# Patient Record
Sex: FEMALE | Race: WHITE | NOT HISPANIC OR LATINO | URBAN - METROPOLITAN AREA
[De-identification: names, ages, dates, MRNs, and addresses within clinical notes are randomized per-mention and may not be internally consistent; named-entity substitution may affect disease eponyms.]

---

## 2017-02-14 ENCOUNTER — EMERGENCY (EMERGENCY)
Facility: HOSPITAL | Age: 82
LOS: 1 days | Discharge: HOME | End: 2017-02-14
Attending: EMERGENCY MEDICINE
Payer: MEDICARE

## 2017-02-14 VITALS
SYSTOLIC BLOOD PRESSURE: 132 MMHG | OXYGEN SATURATION: 96 % | RESPIRATION RATE: 20 BRPM | DIASTOLIC BLOOD PRESSURE: 81 MMHG | WEIGHT: 145.06 LBS | HEART RATE: 76 BPM | HEIGHT: 68 IN | TEMPERATURE: 98 F

## 2017-02-14 VITALS
DIASTOLIC BLOOD PRESSURE: 72 MMHG | HEART RATE: 85 BPM | OXYGEN SATURATION: 94 % | RESPIRATION RATE: 18 BRPM | TEMPERATURE: 98 F | SYSTOLIC BLOOD PRESSURE: 135 MMHG

## 2017-02-14 DIAGNOSIS — Z88.6 ALLERGY STATUS TO ANALGESIC AGENT: ICD-10-CM

## 2017-02-14 DIAGNOSIS — Z88.2 ALLERGY STATUS TO SULFONAMIDES: ICD-10-CM

## 2017-02-14 DIAGNOSIS — I10 ESSENTIAL (PRIMARY) HYPERTENSION: ICD-10-CM
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
## 2017-02-14 DIAGNOSIS — Z98.89 OTHER SPECIFIED POSTPROCEDURAL STATES: Chronic | ICD-10-CM

## 2017-02-14 DIAGNOSIS — S91.301A UNSPECIFIED OPEN WOUND, RIGHT FOOT, INITIAL ENCOUNTER: ICD-10-CM

## 2017-02-14 DIAGNOSIS — E86.0 DEHYDRATION: ICD-10-CM
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
## 2017-02-14 DIAGNOSIS — Z79.01 LONG TERM (CURRENT) USE OF ANTICOAGULANTS: ICD-10-CM
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
## 2017-02-14 DIAGNOSIS — Z88.0 ALLERGY STATUS TO PENICILLIN: ICD-10-CM

## 2017-02-14 DIAGNOSIS — Z91.040 LATEX ALLERGY STATUS: ICD-10-CM

## 2017-02-14 LAB
ALBUMIN SERPL ELPH-MCNC: 3.9 G/DL — SIGNIFICANT CHANGE UP (ref 3.3–5.2)
ALP SERPL-CCNC: 101 U/L — SIGNIFICANT CHANGE UP (ref 40–120)
ALT FLD-CCNC: 15 U/L — SIGNIFICANT CHANGE UP
ANION GAP SERPL CALC-SCNC: 14 MMOL/L — SIGNIFICANT CHANGE UP (ref 5–17)
AST SERPL-CCNC: 30 U/L — SIGNIFICANT CHANGE UP
BILIRUB SERPL-MCNC: 1.3 MG/DL — SIGNIFICANT CHANGE UP (ref 0.4–2)
BUN SERPL-MCNC: 44 MG/DL — HIGH (ref 8–20)
CALCIUM SERPL-MCNC: 9.3 MG/DL — SIGNIFICANT CHANGE UP (ref 8.6–10.2)
CHLORIDE SERPL-SCNC: 97 MMOL/L — LOW (ref 98–107)
CO2 SERPL-SCNC: 25 MMOL/L — SIGNIFICANT CHANGE UP (ref 22–29)
CREAT SERPL-MCNC: 1.65 MG/DL — HIGH (ref 0.5–1.3)
GLUCOSE SERPL-MCNC: 95 MG/DL — SIGNIFICANT CHANGE UP (ref 70–115)
HCT VFR BLD CALC: 44.2 % — SIGNIFICANT CHANGE UP (ref 37–47)
HGB BLD-MCNC: 14.9 G/DL — SIGNIFICANT CHANGE UP (ref 12–16)
INR BLD: 3.33 RATIO — HIGH (ref 0.88–1.16)
MCHC RBC-ENTMCNC: 33.7 G/DL — SIGNIFICANT CHANGE UP (ref 32–36)
MCHC RBC-ENTMCNC: 35.8 PG — HIGH (ref 27–31)
MCV RBC AUTO: 106.3 FL — HIGH (ref 81–99)
PLATELET # BLD AUTO: 163 K/UL — SIGNIFICANT CHANGE UP (ref 150–400)
POTASSIUM SERPL-MCNC: 4.2 MMOL/L — SIGNIFICANT CHANGE UP (ref 3.5–5.3)
POTASSIUM SERPL-SCNC: 4.2 MMOL/L — SIGNIFICANT CHANGE UP (ref 3.5–5.3)
PROT SERPL-MCNC: 7.1 G/DL — SIGNIFICANT CHANGE UP (ref 6.6–8.7)
PROTHROM AB SERPL-ACNC: 37.1 SEC — HIGH (ref 10–13.1)
RBC # BLD: 4.16 M/UL — LOW (ref 4.4–5.2)
RBC # FLD: 14.4 % — SIGNIFICANT CHANGE UP (ref 11–15.6)
SODIUM SERPL-SCNC: 136 MMOL/L — SIGNIFICANT CHANGE UP (ref 135–145)
WBC # BLD: 7.1 K/UL — SIGNIFICANT CHANGE UP (ref 4.8–10.8)
WBC # FLD AUTO: 7.1 K/UL — SIGNIFICANT CHANGE UP (ref 4.8–10.8)

## 2017-02-14 PROCEDURE — 99284 EMERGENCY DEPT VISIT MOD MDM: CPT

## 2017-02-14 PROCEDURE — 85610 PROTHROMBIN TIME: CPT

## 2017-02-14 PROCEDURE — 84443 ASSAY THYROID STIM HORMONE: CPT

## 2017-02-14 PROCEDURE — 85027 COMPLETE CBC AUTOMATED: CPT

## 2017-02-14 PROCEDURE — 80053 COMPREHEN METABOLIC PANEL: CPT

## 2017-02-14 RX ORDER — SODIUM CHLORIDE 9 MG/ML
500 INJECTION INTRAMUSCULAR; INTRAVENOUS; SUBCUTANEOUS ONCE
Qty: 0 | Refills: 0 | Status: COMPLETED | OUTPATIENT
Start: 2017-02-14 | End: 2017-02-14

## 2017-02-14 RX ADMIN — SODIUM CHLORIDE 500 MILLILITER(S): 9 INJECTION INTRAMUSCULAR; INTRAVENOUS; SUBCUTANEOUS at 19:05

## 2017-02-14 RX ADMIN — SODIUM CHLORIDE 500 MILLILITER(S): 9 INJECTION INTRAMUSCULAR; INTRAVENOUS; SUBCUTANEOUS at 17:14

## 2017-02-14 NOTE — ED PROVIDER NOTE - MEDICAL DECISION MAKING DETAILS
foot wound willtx and rec close f/u pmd for wound check foot wound will tx and rec close f/u pmd for wound check

## 2017-02-14 NOTE — ED ADULT NURSE NOTE - OBJECTIVE STATEMENT
Patient recd this evening A/Ox3, VSS, denies chest pain. Respirations are even and unlabored, but reports shortness of breath. lungs cta, +bowel x4 quads, abdomen soft, nontender/nondistended, skin w/d/i. Patient's family reports "patient has had diarrhea for 3 weeks, has become very fatigued over the past several days.  Patient was tested for CDiff at PCP-negative.  Patient currently has UTI-taking macrobid 100mg.  Denies fever, vomit.  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

## 2017-02-14 NOTE — ED PROVIDER NOTE - CARE PLAN
Principal Discharge DX:	Wound, open, foot Principal Discharge DX:	Wound, open, foot  Instructions for follow-up, activity and diet:	Stay hydrated, follow up with PMD.  Secondary Diagnosis:	Dehydration

## 2017-02-14 NOTE — ED PROVIDER NOTE - OBJECTIVE STATEMENT
83 y/o female states she takes coumadin and she was putting on her shoe today and accidently cur her foot and c/o foot laceration 85 y/o female states she takes coumadin and she was putting on her shoe today and accidently cur her foot and c/o foot laceration pt s daughter later states pt has had diarrhea for 3 weeks and had w/u by pmd and all tests on stool for c diff and cultures all negative pt has been feeling generally weak but able to walk and eating ok Patient says watery diarrhea has now resolved for the past 24 hours

## 2017-02-20 ENCOUNTER — INPATIENT (INPATIENT)
Facility: HOSPITAL | Age: 82
LOS: 8 days | Discharge: NURSING, REHAB OR LONG-TERM CARE | DRG: 871 | End: 2017-03-01
Attending: INTERNAL MEDICINE | Admitting: FAMILY MEDICINE
Payer: MEDICARE

## 2017-02-20 VITALS
RESPIRATION RATE: 22 BRPM | DIASTOLIC BLOOD PRESSURE: 79 MMHG | SYSTOLIC BLOOD PRESSURE: 152 MMHG | HEIGHT: 64 IN | TEMPERATURE: 100 F | OXYGEN SATURATION: 97 % | WEIGHT: 136.91 LBS | HEART RATE: 82 BPM

## 2017-02-20 DIAGNOSIS — Z98.89 OTHER SPECIFIED POSTPROCEDURAL STATES: Chronic | ICD-10-CM

## 2017-02-20 DIAGNOSIS — R65.10 SYSTEMIC INFLAMMATORY RESPONSE SYNDROME (SIRS) OF NON-INFECTIOUS ORIGIN WITHOUT ACUTE ORGAN DYSFUNCTION: ICD-10-CM

## 2017-02-20 DIAGNOSIS — A41.9 SEPSIS, UNSPECIFIED ORGANISM: ICD-10-CM

## 2017-02-20 DIAGNOSIS — R10.9 UNSPECIFIED ABDOMINAL PAIN: ICD-10-CM

## 2017-02-20 DIAGNOSIS — R06.02 SHORTNESS OF BREATH: ICD-10-CM

## 2017-02-20 DIAGNOSIS — K80.20 CALCULUS OF GALLBLADDER WITHOUT CHOLECYSTITIS WITHOUT OBSTRUCTION: ICD-10-CM

## 2017-02-20 DIAGNOSIS — Z71.89 OTHER SPECIFIED COUNSELING: ICD-10-CM

## 2017-02-20 DIAGNOSIS — I48.91 UNSPECIFIED ATRIAL FIBRILLATION: ICD-10-CM

## 2017-02-20 DIAGNOSIS — I50.9 HEART FAILURE, UNSPECIFIED: ICD-10-CM

## 2017-02-20 LAB
ALBUMIN SERPL ELPH-MCNC: 3.8 G/DL — SIGNIFICANT CHANGE UP (ref 3.3–5.2)
ALP SERPL-CCNC: 118 U/L — SIGNIFICANT CHANGE UP (ref 40–120)
ALT FLD-CCNC: 20 U/L — SIGNIFICANT CHANGE UP
AMYLASE P1 CFR SERPL: 88 U/L — SIGNIFICANT CHANGE UP (ref 36–128)
AMYLASE P1 CFR SERPL: 92 U/L — SIGNIFICANT CHANGE UP (ref 36–128)
ANION GAP SERPL CALC-SCNC: 15 MMOL/L — SIGNIFICANT CHANGE UP (ref 5–17)
ANION GAP SERPL CALC-SCNC: 18 MMOL/L — HIGH (ref 5–17)
ANISOCYTOSIS BLD QL: SLIGHT — SIGNIFICANT CHANGE UP
APPEARANCE UR: CLEAR — SIGNIFICANT CHANGE UP
APTT BLD: 41.9 SEC — HIGH (ref 27.5–37.4)
AST SERPL-CCNC: 30 U/L — SIGNIFICANT CHANGE UP
BACTERIA # UR AUTO: ABNORMAL
BASE EXCESS BLDA CALC-SCNC: -2.8 MMOL/L — SIGNIFICANT CHANGE UP (ref -3–3)
BILIRUB SERPL-MCNC: 1.5 MG/DL — SIGNIFICANT CHANGE UP (ref 0.4–2)
BILIRUB UR-MCNC: NEGATIVE — SIGNIFICANT CHANGE UP
BLOOD GAS COMMENTS ARTERIAL: SIGNIFICANT CHANGE UP
BUN SERPL-MCNC: 32 MG/DL — HIGH (ref 8–20)
BUN SERPL-MCNC: 34 MG/DL — HIGH (ref 8–20)
C DIFF BY PCR RESULT: DETECTED
C DIFF TOX GENS STL QL NAA+PROBE: SIGNIFICANT CHANGE UP
CALCIUM SERPL-MCNC: 8.9 MG/DL — SIGNIFICANT CHANGE UP (ref 8.6–10.2)
CALCIUM SERPL-MCNC: 9 MG/DL — SIGNIFICANT CHANGE UP (ref 8.6–10.2)
CHLORIDE SERPL-SCNC: 94 MMOL/L — LOW (ref 98–107)
CHLORIDE SERPL-SCNC: 97 MMOL/L — LOW (ref 98–107)
CK SERPL-CCNC: 85 U/L — SIGNIFICANT CHANGE UP (ref 25–170)
CO2 SERPL-SCNC: 21 MMOL/L — LOW (ref 22–29)
CO2 SERPL-SCNC: 24 MMOL/L — SIGNIFICANT CHANGE UP (ref 22–29)
COLOR SPEC: YELLOW — SIGNIFICANT CHANGE UP
CREAT SERPL-MCNC: 1.25 MG/DL — SIGNIFICANT CHANGE UP (ref 0.5–1.3)
CREAT SERPL-MCNC: 1.38 MG/DL — HIGH (ref 0.5–1.3)
DIFF PNL FLD: ABNORMAL
DIGOXIN SERPL-MCNC: 0.9 NG/ML — SIGNIFICANT CHANGE UP (ref 0.8–2)
DIGOXIN SERPL-MCNC: 1.2 NG/ML — SIGNIFICANT CHANGE UP (ref 0.8–2)
ELLIPTOCYTES BLD QL SMEAR: SLIGHT — SIGNIFICANT CHANGE UP
EOSINOPHIL NFR BLD AUTO: 1 % — SIGNIFICANT CHANGE UP (ref 0–5)
EPI CELLS # UR: SIGNIFICANT CHANGE UP
GAS PNL BLDA: SIGNIFICANT CHANGE UP
GLUCOSE SERPL-MCNC: 145 MG/DL — HIGH (ref 70–115)
GLUCOSE SERPL-MCNC: 190 MG/DL — HIGH (ref 70–115)
GLUCOSE UR QL: NEGATIVE MG/DL — SIGNIFICANT CHANGE UP
HCO3 BLDA-SCNC: 22 MMOL/L — SIGNIFICANT CHANGE UP (ref 20–26)
HCT VFR BLD CALC: 44.5 % — SIGNIFICANT CHANGE UP (ref 37–47)
HGB BLD-MCNC: 14.8 G/DL — SIGNIFICANT CHANGE UP (ref 12–16)
HOROWITZ INDEX BLDA+IHG-RTO: SIGNIFICANT CHANGE UP
HYALINE CASTS # UR AUTO: ABNORMAL /LPF
INR BLD: 4.19 RATIO — HIGH (ref 0.88–1.16)
KETONES UR-MCNC: ABNORMAL
LACTATE BLDV-MCNC: 3 MMOL/L — HIGH (ref 0.7–2)
LACTATE SERPL-SCNC: 2.5 MMOL/L — HIGH (ref 0.5–2)
LACTATE SERPL-SCNC: 2.9 MMOL/L — HIGH (ref 0.5–2)
LEUKOCYTE ESTERASE UR-ACNC: ABNORMAL
LIDOCAIN IGE QN: 20 U/L — LOW (ref 22–51)
LIDOCAIN IGE QN: 26 U/L — SIGNIFICANT CHANGE UP (ref 22–51)
LYMPHOCYTES # BLD AUTO: 3 % — LOW (ref 20–55)
MACROCYTES BLD QL: SLIGHT — SIGNIFICANT CHANGE UP
MAGNESIUM SERPL-MCNC: 1.9 MG/DL — SIGNIFICANT CHANGE UP (ref 1.8–2.5)
MAGNESIUM SERPL-MCNC: 1.9 MG/DL — SIGNIFICANT CHANGE UP (ref 1.8–2.5)
MCHC RBC-ENTMCNC: 33.3 G/DL — SIGNIFICANT CHANGE UP (ref 32–36)
MCHC RBC-ENTMCNC: 35.7 PG — HIGH (ref 27–31)
MCV RBC AUTO: 107.2 FL — HIGH (ref 81–99)
MICROCYTES BLD QL: SLIGHT — SIGNIFICANT CHANGE UP
MONOCYTES NFR BLD AUTO: 2 % — LOW (ref 3–10)
NEUTROPHILS NFR BLD AUTO: 87 % — HIGH (ref 37–73)
NEUTS BAND # BLD: 4 % — SIGNIFICANT CHANGE UP (ref 0–8)
NITRITE UR-MCNC: NEGATIVE — SIGNIFICANT CHANGE UP
NT-PROBNP SERPL-SCNC: HIGH PG/ML (ref 0–300)
OVALOCYTES BLD QL SMEAR: SLIGHT — SIGNIFICANT CHANGE UP
PCO2 BLDA: 40 MMHG — SIGNIFICANT CHANGE UP (ref 35–45)
PH BLDA: 7.36 — SIGNIFICANT CHANGE UP (ref 7.35–7.45)
PH UR: 5 — SIGNIFICANT CHANGE UP (ref 4.8–8)
PHOSPHATE SERPL-MCNC: 3 MG/DL — SIGNIFICANT CHANGE UP (ref 2.4–4.7)
PLAT MORPH BLD: NORMAL — SIGNIFICANT CHANGE UP
PLATELET # BLD AUTO: 182 K/UL — SIGNIFICANT CHANGE UP (ref 150–400)
PO2 BLDA: 118 MMHG — HIGH (ref 83–108)
POIKILOCYTOSIS BLD QL AUTO: SLIGHT — SIGNIFICANT CHANGE UP
POTASSIUM SERPL-MCNC: 4 MMOL/L — SIGNIFICANT CHANGE UP (ref 3.5–5.3)
POTASSIUM SERPL-MCNC: 4.7 MMOL/L — SIGNIFICANT CHANGE UP (ref 3.5–5.3)
POTASSIUM SERPL-SCNC: 4 MMOL/L — SIGNIFICANT CHANGE UP (ref 3.5–5.3)
POTASSIUM SERPL-SCNC: 4.7 MMOL/L — SIGNIFICANT CHANGE UP (ref 3.5–5.3)
PROCALCITONIN SERPL-MCNC: 1.8 NG/ML — HIGH (ref 0–0.5)
PROT SERPL-MCNC: 7.3 G/DL — SIGNIFICANT CHANGE UP (ref 6.6–8.7)
PROT UR-MCNC: 100 MG/DL
PROTHROM AB SERPL-ACNC: 46.8 SEC — HIGH (ref 10–13.1)
RBC # BLD: 4.15 M/UL — LOW (ref 4.4–5.2)
RBC # FLD: 14.6 % — SIGNIFICANT CHANGE UP (ref 11–15.6)
RBC BLD AUTO: ABNORMAL
RBC CASTS # UR COMP ASSIST: SIGNIFICANT CHANGE UP /HPF (ref 0–4)
SAO2 % BLDA: 98 % — SIGNIFICANT CHANGE UP (ref 95–99)
SODIUM SERPL-SCNC: 133 MMOL/L — LOW (ref 135–145)
SODIUM SERPL-SCNC: 136 MMOL/L — SIGNIFICANT CHANGE UP (ref 135–145)
SP GR SPEC: 1.02 — SIGNIFICANT CHANGE UP (ref 1.01–1.02)
T3 SERPL-MCNC: 61 NG/DL — LOW (ref 80–200)
T4 AB SER-ACNC: 5.44 UG/DL — SIGNIFICANT CHANGE UP (ref 4.5–12)
TROPONIN T SERPL-MCNC: 0.03 NG/ML — SIGNIFICANT CHANGE UP (ref 0–0.06)
TROPONIN T SERPL-MCNC: 0.1 NG/ML — HIGH (ref 0–0.06)
TSH SERPL-MCNC: 2.23 UIU/ML — SIGNIFICANT CHANGE UP (ref 0.27–4.2)
UROBILINOGEN FLD QL: NEGATIVE MG/DL — SIGNIFICANT CHANGE UP
VARIANT LYMPHS # BLD: 3 % — SIGNIFICANT CHANGE UP (ref 0–6)
WBC # BLD: 20.8 K/UL — HIGH (ref 4.8–10.8)
WBC # FLD AUTO: 20.8 K/UL — HIGH (ref 4.8–10.8)
WBC UR QL: SIGNIFICANT CHANGE UP

## 2017-02-20 PROCEDURE — 71020: CPT | Mod: 26

## 2017-02-20 PROCEDURE — 99223 1ST HOSP IP/OBS HIGH 75: CPT

## 2017-02-20 PROCEDURE — 76705 ECHO EXAM OF ABDOMEN: CPT | Mod: 26

## 2017-02-20 PROCEDURE — 99285 EMERGENCY DEPT VISIT HI MDM: CPT | Mod: 25

## 2017-02-20 PROCEDURE — 93010 ELECTROCARDIOGRAM REPORT: CPT

## 2017-02-20 PROCEDURE — 74176 CT ABD & PELVIS W/O CONTRAST: CPT | Mod: 26

## 2017-02-20 PROCEDURE — 71250 CT THORAX DX C-: CPT | Mod: 26

## 2017-02-20 RX ORDER — IPRATROPIUM/ALBUTEROL SULFATE 18-103MCG
3 AEROSOL WITH ADAPTER (GRAM) INHALATION ONCE
Qty: 0 | Refills: 0 | Status: COMPLETED | OUTPATIENT
Start: 2017-02-20 | End: 2017-02-20

## 2017-02-20 RX ORDER — VANCOMYCIN HCL 1 G
1000 VIAL (EA) INTRAVENOUS EVERY 12 HOURS
Qty: 0 | Refills: 0 | Status: DISCONTINUED | OUTPATIENT
Start: 2017-02-20 | End: 2017-02-20

## 2017-02-20 RX ORDER — DIGOXIN 250 MCG
0.12 TABLET ORAL DAILY
Qty: 0 | Refills: 0 | Status: DISCONTINUED | OUTPATIENT
Start: 2017-02-20 | End: 2017-02-25

## 2017-02-20 RX ORDER — VANCOMYCIN HCL 1 G
1000 VIAL (EA) INTRAVENOUS EVERY 24 HOURS
Qty: 0 | Refills: 0 | Status: DISCONTINUED | OUTPATIENT
Start: 2017-02-20 | End: 2017-02-20

## 2017-02-20 RX ORDER — AZTREONAM 2 G
VIAL (EA) INJECTION
Qty: 0 | Refills: 0 | Status: DISCONTINUED | OUTPATIENT
Start: 2017-02-20 | End: 2017-02-20

## 2017-02-20 RX ORDER — ERTAPENEM SODIUM 1 G/1
1000 INJECTION, POWDER, LYOPHILIZED, FOR SOLUTION INTRAMUSCULAR; INTRAVENOUS ONCE
Qty: 0 | Refills: 0 | Status: COMPLETED | OUTPATIENT
Start: 2017-02-20 | End: 2017-02-20

## 2017-02-20 RX ORDER — ZINC SULFATE TAB 220 MG (50 MG ZINC EQUIVALENT) 220 (50 ZN) MG
220 TAB ORAL DAILY
Qty: 0 | Refills: 0 | Status: DISCONTINUED | OUTPATIENT
Start: 2017-02-20 | End: 2017-03-01

## 2017-02-20 RX ORDER — ERTAPENEM SODIUM 1 G/1
500 INJECTION, POWDER, LYOPHILIZED, FOR SOLUTION INTRAMUSCULAR; INTRAVENOUS EVERY 24 HOURS
Qty: 0 | Refills: 0 | Status: DISCONTINUED | OUTPATIENT
Start: 2017-02-21 | End: 2017-02-22

## 2017-02-20 RX ORDER — FUROSEMIDE 40 MG
60 TABLET ORAL ONCE
Qty: 0 | Refills: 0 | Status: COMPLETED | OUTPATIENT
Start: 2017-02-20 | End: 2017-02-20

## 2017-02-20 RX ORDER — DOCUSATE SODIUM 100 MG
100 CAPSULE ORAL THREE TIMES A DAY
Qty: 0 | Refills: 0 | Status: DISCONTINUED | OUTPATIENT
Start: 2017-02-20 | End: 2017-02-24

## 2017-02-20 RX ORDER — ATENOLOL 25 MG/1
50 TABLET ORAL AT BEDTIME
Qty: 0 | Refills: 0 | Status: DISCONTINUED | OUTPATIENT
Start: 2017-02-20 | End: 2017-02-25

## 2017-02-20 RX ORDER — HYDRALAZINE HCL 50 MG
10 TABLET ORAL
Qty: 0 | Refills: 0 | Status: DISCONTINUED | OUTPATIENT
Start: 2017-02-20 | End: 2017-03-01

## 2017-02-20 RX ORDER — ERTAPENEM SODIUM 1 G/1
INJECTION, POWDER, LYOPHILIZED, FOR SOLUTION INTRAMUSCULAR; INTRAVENOUS
Qty: 0 | Refills: 0 | Status: DISCONTINUED | OUTPATIENT
Start: 2017-02-20 | End: 2017-02-22

## 2017-02-20 RX ORDER — METRONIDAZOLE 500 MG
TABLET ORAL
Qty: 0 | Refills: 0 | Status: DISCONTINUED | OUTPATIENT
Start: 2017-02-20 | End: 2017-02-20

## 2017-02-20 RX ORDER — ATENOLOL 25 MG/1
75 TABLET ORAL
Qty: 0 | Refills: 0 | Status: DISCONTINUED | OUTPATIENT
Start: 2017-02-20 | End: 2017-02-26

## 2017-02-20 RX ORDER — ASCORBIC ACID 60 MG
500 TABLET,CHEWABLE ORAL DAILY
Qty: 0 | Refills: 0 | Status: DISCONTINUED | OUTPATIENT
Start: 2017-02-20 | End: 2017-03-01

## 2017-02-20 RX ORDER — VANCOMYCIN HCL 1 G
125 VIAL (EA) INTRAVENOUS EVERY 6 HOURS
Qty: 0 | Refills: 0 | Status: DISCONTINUED | OUTPATIENT
Start: 2017-02-20 | End: 2017-03-01

## 2017-02-20 RX ORDER — ONDANSETRON 8 MG/1
4 TABLET, FILM COATED ORAL EVERY 6 HOURS
Qty: 0 | Refills: 0 | Status: DISCONTINUED | OUTPATIENT
Start: 2017-02-20 | End: 2017-03-01

## 2017-02-20 RX ORDER — SODIUM CHLORIDE 9 MG/ML
1000 INJECTION INTRAMUSCULAR; INTRAVENOUS; SUBCUTANEOUS
Qty: 0 | Refills: 0 | Status: DISCONTINUED | OUTPATIENT
Start: 2017-02-20 | End: 2017-02-21

## 2017-02-20 RX ORDER — SPIRONOLACTONE 25 MG/1
12.5 TABLET, FILM COATED ORAL
Qty: 0 | Refills: 0 | Status: DISCONTINUED | OUTPATIENT
Start: 2017-02-20 | End: 2017-03-01

## 2017-02-20 RX ORDER — VANCOMYCIN HCL 1 G
1000 VIAL (EA) INTRAVENOUS ONCE
Qty: 0 | Refills: 0 | Status: COMPLETED | OUTPATIENT
Start: 2017-02-20 | End: 2017-02-20

## 2017-02-20 RX ORDER — ACETAMINOPHEN 500 MG
650 TABLET ORAL EVERY 6 HOURS
Qty: 0 | Refills: 0 | Status: DISCONTINUED | OUTPATIENT
Start: 2017-02-20 | End: 2017-03-01

## 2017-02-20 RX ORDER — SODIUM CHLORIDE 9 MG/ML
999 INJECTION INTRAMUSCULAR; INTRAVENOUS; SUBCUTANEOUS ONCE
Qty: 0 | Refills: 0 | Status: DISCONTINUED | OUTPATIENT
Start: 2017-02-20 | End: 2017-02-20

## 2017-02-20 RX ORDER — ALBUTEROL 90 UG/1
2.5 AEROSOL, METERED ORAL EVERY 6 HOURS
Qty: 0 | Refills: 0 | Status: DISCONTINUED | OUTPATIENT
Start: 2017-02-20 | End: 2017-03-01

## 2017-02-20 RX ORDER — FUROSEMIDE 40 MG
40 TABLET ORAL
Qty: 0 | Refills: 0 | Status: DISCONTINUED | OUTPATIENT
Start: 2017-02-21 | End: 2017-02-28

## 2017-02-20 RX ORDER — DIGOXIN 250 MCG
125 TABLET ORAL DAILY
Qty: 0 | Refills: 0 | Status: DISCONTINUED | OUTPATIENT
Start: 2017-02-20 | End: 2017-02-20

## 2017-02-20 RX ORDER — METRONIDAZOLE 500 MG
500 TABLET ORAL EVERY 8 HOURS
Qty: 0 | Refills: 0 | Status: DISCONTINUED | OUTPATIENT
Start: 2017-02-20 | End: 2017-02-20

## 2017-02-20 RX ADMIN — ERTAPENEM SODIUM 100 MILLIGRAM(S): 1 INJECTION, POWDER, LYOPHILIZED, FOR SOLUTION INTRAMUSCULAR; INTRAVENOUS at 16:36

## 2017-02-20 RX ADMIN — ATENOLOL 75 MILLIGRAM(S): 25 TABLET ORAL at 09:26

## 2017-02-20 RX ADMIN — ZINC SULFATE TAB 220 MG (50 MG ZINC EQUIVALENT) 220 MILLIGRAM(S): 220 (50 ZN) TAB at 12:02

## 2017-02-20 RX ADMIN — ONDANSETRON 4 MILLIGRAM(S): 8 TABLET, FILM COATED ORAL at 16:22

## 2017-02-20 RX ADMIN — SODIUM CHLORIDE 100 MILLILITER(S): 9 INJECTION INTRAMUSCULAR; INTRAVENOUS; SUBCUTANEOUS at 04:00

## 2017-02-20 RX ADMIN — ALBUTEROL 2.5 MILLIGRAM(S): 90 AEROSOL, METERED ORAL at 20:38

## 2017-02-20 RX ADMIN — Medication 60 MILLIGRAM(S): at 06:34

## 2017-02-20 RX ADMIN — ALBUTEROL 2.5 MILLIGRAM(S): 90 AEROSOL, METERED ORAL at 16:07

## 2017-02-20 RX ADMIN — Medication 3 MILLILITER(S): at 03:34

## 2017-02-20 RX ADMIN — ALBUTEROL 2.5 MILLIGRAM(S): 90 AEROSOL, METERED ORAL at 08:32

## 2017-02-20 RX ADMIN — Medication 500 MILLIGRAM(S): at 12:01

## 2017-02-20 RX ADMIN — Medication 100 MILLIGRAM(S): at 15:04

## 2017-02-20 RX ADMIN — Medication 250 MILLIGRAM(S): at 03:27

## 2017-02-20 NOTE — H&P ADULT. - ASSESSMENT
Sepsis, source unknown  -	CT abd/pelvis: moderate gallbladder wall edema; bilateral renal cyst; 4.6 x 3.5 cm   -	abdominal aortic aneurysm; Small volume abdominal and pelvic ascites.   -	- Findings discussed with patient who refused surgical intervention as this time for possible acute cholecystitis.   -	F/U Abdominal Sonogram  -	[facility] consult – Dr Chapman ([facility] [place] notified).   -	IV abx: Vancomycin, flagly, levaquin  -	Consulted Dr. Garcia [facility].   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 Soliz ([facility], [facility] notified)  -	IV lasix  -	Montez  -	HF protocol  -	Daily weight  -	Resume: Digoxin, atenolol, hydralazine  -	ARB was discontinued as out pt, will await cardiology evaluation prior to restart  -	Pt is allergic to Aspirin.     -	f/u BNP, ECHO, Cardiac enzymes  H/O A.fib, rate controlled on home medications, on AC  -	Cont Digoxin, atenolol  -	Supratherapeutic INR, no acute bleeding, multiple ecchymotic skin lesions  -	Hold Coumadin   -	Monitor INR  CKD, stage 3, stable  -	Follows with Dr. Mcdonald, consult if needed.   -	Medication dosed to CrCl  Bilateral LE ulcers, unna boot in place  -	Vascular consult with Dr. Garcia, [facility] notified, to rule out leg ulcers as a source of infection. Sepsis, source unknown  -	CT abd/pelvis: moderate gallbladder wall edema; bilateral renal cyst; 4.6 x 3.5 cm   -	abdominal aortic aneurysm; Small volume abdominal and pelvic ascites.   -	- Findings discussed with patient who refused surgical intervention as this time for possible acute cholecystitis.   -	F/U Abdominal Sonogram  -	[place] [facility] [facility] consult – Dr Chapman ([facility] [place] notified).   -	IV abx: Vancomycin, flagly, levaquin  -	Consulted Dr. Garcia [facility].   -	Gentle IV hydration as pt is currently in respiratory distress requiring BIPAP  -	ID consult – Jose Antonio, [facility] [place] notified  -	F/U bld cultures  -	Trend leukocytosis, lactic acidosis  Acute decompensated combined Systolic/diastolic heart failure, suspected cause of ascites and bilateral pleural effusion  -	admit to telemetry unit  -	[facility] consult – Dr. Soliz ([facility], [facility] [place] notified)  -	IV lasix  -	Montez  -	HF protocol  -	Daily weight  -	Resume: Digoxin, atenolol, hydralazine  -	ARB was discontinued as out pt, will await cardiology evaluation prior to restart  -	Pt is allergic to Aspirin.     -	f/u BNP, ECHO, Cardiac enzymes  H/O A.fib, rate controlled, on AC, status post cardizem in EMS  -	Cont Digoxin, atenolol  -	Supratherapeutic INR, no acute bleeding, multiple ecchymotic skin lesions  -	Hold Coumadin   -	Monitor INR  CKD, stage 3, stable  -	Follows with Dr. Mcdonald, consult if needed.   -	Medication dosed to CrCl  Bilateral LE ulcers, unna boot in place  -	Vascular consult with Dr. Garcia, [facility] notified, to rule out leg ulcers as a source of infection.

## 2017-02-20 NOTE — CONSULT NOTE ADULT - SUBJECTIVE AND OBJECTIVE BOX
SURG    notes reviewed    pt with possible sepsis - asked to r/o source from leg ulcers    both legs with Unna's boots - removed    chronic ulcers present left shin anterolaterally    skin tears present both shins anteriorly    toes seem adequately perfused though limited distal pulses     - there is no inflammation in any area - these do not appear to be a source of sepsis  - wounds are chronic and mostly stasis- related    - local care for now while hospitalized    - may return to our office for further care when able    - thank you
HPI:  This is an 84 year old female who was brought in by her  and daughter due to ongoing nausea, intermittent for past three weeks, no vomiting. In December pt was placed on Clindamycin by ENT, following which she developed three weeks of diarrhea, positive for C-diff, completed Flagyl in January and repeat C-Diff testing was negative in early Feb, but her diarrhea persisted until two days ago. Early Feb she was diagnosed with UTI and completed treatment with Nitrofurantoin. Sometime during late January and early February her nausea began, she is unable to specify alleviating or aggravating factors. Pt also has bilateral LE ulcers and is currently under vascular surgery – Dr. Upton, unna boot was placed on bilateral LE on Friday. In addition, her Losartan and Spironolactone was discontinued in dec/ due to hyperkalemia and since that time she has been gaining weight and noticed increase in her abdominal girth. Currently she complains of weakness, fatigue, moderate difficulty breathing, non tender mass palpable on right mid abdomen and weight gain. Denies: chest pain, palpitations, dizziness, fever, chills, vomiting, dysuria and abdominal pain. (2017 04:19)    GI consult for possible acute cholecystitis. CT abdomen showed: Gallstones and nonspecific moderate gallbladder wall edema. Please correlate clinically for acute cholecystitis. 4.5 cm abdominal aortic aneurysm. Small volume abdominal and pelvic ascites. LFTs are unremarkable. as per daughter, she developed recurrent diarrhea 3 days ago. It has resolved now.      PAST MEDICAL & SURGICAL HISTORY:  Mitral valve regurgitation  Hypertension  Atrial fibrillation  H/O skin graft  No significant past surgical history      REVIEW OF SYSTEMS    General: unremarkable, no fever    Skin/Breast: unremarkable  	  Ophthalmologic: unremarkable  	  ENMT:	unremarkable    Respiratory and Thorax: unremarkable  	  Cardiovascular:	unremarkable    Gastrointestinal:	 as above    Genitourinary:	unremarkable    Musculoskeletal:	unremarkable    Neurological:	unremarkable    Psychiatric:	unremarkable    Hematology/Lymphatics:	unremarkable    Endocrine:	unremarkable    Allergic/Immunologic:	unremarkable      MEDICATIONS  (STANDING):  sodium chloride 0.9%. 1000milliLiter(s) IV Continuous <Continuous>  levoFLOXacin IVPB 750milliGRAM(s) IV Intermittent every 48 hours  vancomycin  IVPB 1000milliGRAM(s) IV Intermittent every 24 hours  metroNIDAZOLE    Tablet 500milliGRAM(s) Oral every 8 hours  ALBUTerol    0.083% 2.5milliGRAM(s) Nebulizer every 6 hours  ATENolol  Tablet 75milliGRAM(s) Oral <User Schedule>  ATENolol  Tablet 50milliGRAM(s) Oral at bedtime  docusate sodium 100milliGRAM(s) Oral three times a day  zinc sulfate 220milliGRAM(s) Oral daily  hydrALAZINE 10milliGRAM(s) Oral two times a day  ascorbic acid 500milliGRAM(s) Oral daily  digoxin     Tablet 0.125milliGRAM(s) Oral daily    MEDICATIONS  (PRN):  ondansetron Injectable 4milliGRAM(s) IV Push every 6 hours PRN Nausea and/or Vomiting  acetaminophen   Tablet 650milliGRAM(s) Oral every 6 hours PRN For Temp over 37.9 C (100.2 F)      Allergies    aspirin (Unknown)  latex (Unknown)  morphine (Other; Short breath)  penicillins (Unknown)  sulfa drugs (Unknown)    Intolerances        SOCIAL HISTORY:    FAMILY HISTORY:  No pertinent family history in first degree relatives      Vital Signs Last 24 Hrs  T(C): 36.9, Max: 37.8 ( @ 00:33)  T(F): 98.4, Max: 100.1 (- @ 00:33)  HR: 71 (71 - 84)  BP: 126/76 (110/75 - 152/79)  BP(mean): --  RR: 20 (20 - 24)  SpO2: 94% (94% - 97%)      PHYSICAL EXAM:    Constitutional: no fever, hemodynamically stable    Eyes: unremarkable    ENMT: unremarkable    Neck: unremarkable    Breasts: deferred    Back: unremarkable    Respiratory: unremarkable    Cardiovascular: unremarkable    Gastrointestinal: bowel sounds present, soft, non-tender, no organomegaly    Genitourinary: deferred    Rectal: deferred    Extremities: unremarkable    Vascular: unremarkable    Neurological: Awake, alert, oriented x3, no focal neurological deficit    Skin: unremarkable    Lymph Nodes: deferred    Musculoskeletal: unremarkable    Psychiatric: unremarkable    LABS:                        14.8   20.8  )-----------( 182      ( 2017 02:20 )             44.5     2017 06:55    133    |  94     |  32.0   ----------------------------<  190    4.7     |  24.0   |  1.38     Ca    8.9        2017 06:55  Phos  3.0       2017 06:55  Mg     1.9       2017 06:55    TPro  7.3    /  Alb  3.8    /  TBili  1.5    /  DBili  x      /  AST  30     /  ALT  20     /  AlkPhos  118    2017 02:20    PT/INR - ( 2017 02:20 )   PT: 46.8 sec;   INR: 4.19 ratio         PTT - ( 2017 02:20 )  PTT:41.9 sec  Urinalysis Basic - ( 2017 03:34 )    Color: Yellow / Appearance: Clear / S.020 / pH: x  Gluc: x / Ketone: Trace  / Bili: Negative / Urobili: Negative mg/dL   Blood: x / Protein: 100 mg/dL / Nitrite: Negative   Leuk Esterase: Trace / RBC: 0-2 /HPF / WBC 3-5   Sq Epi: x / Non Sq Epi: Occasional / Bacteria: Moderate
,                                                                                   Giovanna Soliz M.D., F.A.C.C.                                                                                            [facility]                                                                                                   [address] [place] [address]                                                                                                     [phone]      The patient is a 84y Female whose cardiac risk factors include  labile* hypertension  She has a hx of chronic AF  Echo in 10/16 with dilated RA< RV and LA. Normal LV dimensions and slight flattening of IVS consistent with RV pressure overload.  Moderate to severe TR/moderate+ MR and AI  Has had 3 weeks of diarrhea, ?fever and abdominal pain  Also with SOB  No complaints of chest pain  No ankle swelling    Allergies    aspirin (Unknown)  latex (Unknown)  morphine (Other; Short breath)  penicillins (Unknown)  sulfa drugs (Unknown)    Intolerances      MEDICATIONS       AS OUTPT she takes :    Coumadin  Atenolol 75 mg in AM, 50 ms po qd  Dig .25 a day  Lasix 40 mg daily   Losartan 25 mg a day  [id number]. 5 mg tiw      NOW ON C  sodium chloride 0.9%. 1000milliLiter(s) IV Continuous <Continuous>  levoFLOXacin IVPB 750milliGRAM(s) IV Intermittent every 48 hours  vancomycin  IVPB 1000milliGRAM(s) IV Intermittent every 24 hours  metroNIDAZOLE    Tablet 500milliGRAM(s) Oral every 8 hours  ALBUTerol    0.083% 2.5milliGRAM(s) Nebulizer every 6 hours  ATENolol  Tablet 75milliGRAM(s) Oral <User Schedule>  ATENolol  Tablet 50milliGRAM(s) Oral at bedtime  docusate sodium 100milliGRAM(s) Oral three times a day  zinc sulfate 220milliGRAM(s) Oral daily  hydrALAZINE 10milliGRAM(s) Oral two times a day  ascorbic acid 500milliGRAM(s) Oral daily  digoxin     Tablet 0.125milliGRAM(s) Oral daily    MEDICATIONS  (PRN):  ondansetron Injectable 4milliGRAM(s) IV Push every 6 hours PRN Nausea and/or Vomiting  acetaminophen   Tablet 650milliGRAM(s) Oral every 6 hours PRN For Temp over 37.9 C (100.2 F)      Acute Infectious Diseases:  No history of rheumatic fever or TB.    PAST MEDICAL & SURGICAL HISTORY:  D and C  H/O skin graft  No significant past surgical history    PAST MEDICAL & SURGICAL HISTORY:  Mitral valve regurgitation  Hypertension  Atrial fibrillation  Hematoma evacuation with skin grft right leg       Habits: NONE  [   ] Cigarettes:  [   ] Alcohol:  [   ] Drugs:    Social History:  with children  [   ]    [   ]  Single  [   ]  /Separate  [   ]  Children  [   ]   Job    Family History of Heart Disease:    Vital Signs Last 24 Hrs  T(C): 36.7, Max: 37.8 (02-20 @ 00:33)  T(F): 98.1, Max: 100.1 (02-20 @ 00:33)  HR: 74 (74 - 84)  BP: 110/75 (110/75 - 152/79)  BP(mean): --  RR: 24 (22 - 24)  SpO2: 96% (95% - 97%)    I&O's Detail      Constitutional: ON BIPAP    NC/AT: Normal    HEENT: Normal    Neck:  No JVD, bruits or thyromegaly    Respiratory:  Rhonchi bilaterally    Cardiovascular:  Irregularly irregular with 2-3 systolic murmur, rub or gallop.    Gastrointestinal: Soft with  ascites. .    Extremities: without cyanosis, clubbing or edema. They are wrapped     Neurological:  Oriented   x 3     . No gross sensory or motor defects.    Skin: Clear    Psychiatric: Normal affect.                          14.8   20.8  )-----------( 182      ( 20 Feb 2017 02:20 )             44.5     20 Feb 2017 06:55    133    |  94     |  32.0   ----------------------------<  190    4.7     |  24.0   |  1.38     Ca    8.9        20 Feb 2017 06:55  Phos  3.0       20 Feb 2017 06:55  Mg     1.9       20 Feb 2017 06:55    TPro  7.3    /  Alb  3.8    /  TBili  1.5    /  DBili  x      /  AST  30     /  ALT  20     /  AlkPhos  118    20 Feb 2017 02:20    PT/INR - ( 20 Feb 2017 02:20 )   PT: 46.8 sec;   INR: 4.19 ratio         PTT - ( 20 Feb 2017 02:20 )  PTT:41.9 sec  CARDIAC MARKERS ( 20 Feb 2017 06:55 )  x     / 0.10 ng/mL / 85 U/L / x     / x      CARDIAC MARKERS ( 20 Feb 2017 02:20 )  x     / 0.03 ng/mL / x     / x     / x        CHEST CIT  Airways: Tracheobronchial tree is patent.  Lungs and Pleura: 1.2 cm nodular opacity in the apical segment of the   left lower lobe on series 2 image 22. Background of moderate emphysema.   Trace interlobular septal thickening seen predominantly at the apices.   Trace bilateral pleural effusions with adjacent dependent atelectasis.  Lymph nodes: No mediastinal adenopathy. No hilar or axillary adenopathy.  Heart: Marked cardiomegaly. No pericardial effusion.  Vessels: Normal size.    Upper Abdomen: Partially visualized upper abdominal ascites. Partially   visualized left renal hypodense lesions likely cysts.    Bones and soft tissues: No suspicious lesions. Degenerative changes of   the spine and bilateral shoulders. Right shoulder effusion.    IMPRESSION:    Mild interstitial pulmonary edema. Marked cardiomegaly.    1.2 cm mildly spiculated appearing nodular opacity in the left lower lobe   may be malignant and will require follow-up. PET/CT may be considered for   further evaluation or short-term CT follow-up in 2-3 months.    Partially visualized upper abdominal ascites may also be due to   congestive heart failure or third spacing.          EKG: AF with controlled vr. RAD. PRWP      IMPRESSION:    The patient is an 83 yo woman with  labile hypertension   who presents with fever, abdominal pain, ascites and SOB  Pt clinically is infected and in mild CHF  I would continue her usual diuretics, following her renal fx  Adjust INR  Abx  Apparently the pt has signed a DNR/DNR and does not want any interventions.    Thank you for referring RAGHU DAVIS.  I will be happy to follow with you.
[facility] [place] URIEL HUMPHRIES MD FACP   ROCKY CELESTE MD  Diplomates American Board of Internal Medicine and Infecctious Diseases      MRN-[id number]  RAGHU DAVIS is a 84y  Female     CC:  admitted due to nausea and not feeling well  tx clindamycin after ent visit in past. had bad diarrhea and poss cdiff(not  confirmed but tx and resolved    NOW WITH SOB, NAUSEA AND MILD DIARRHEA  HAS GB STONES AND GB WALL THICKENING  HIDA PENDING    RECENT TX FOR POSS UTI WITH MACROBID - ? IF CAUSED NAUSEA  MAJOR SXS ARE NAUSEA,SOB AND OCC DIARRHEA  Past Medical & Surgical Hx:  PAST MEDICAL & SURGICAL HISTORY:  Mitral valve regurgitation  Hypertension  Atrial fibrillation  H/O skin graft  No significant past surgical history      Problem List:  HEALTH ISSUES - PROBLEM Dx:    Allergies    aspirin (Unknown)  latex (Unknown)  morphine (Other; Short breath)  penicillins (Unknown)  sulfa drugs (Unknown)    Intolerances    ANTIBIOTICS:   ERTAPENAM     Review of Systems: - Negative except as mentioned below  DIARRHEA X 2- NOCRAMPS  NAUSEA  NON SPECIFIC ABD PAIN      Physical Exam:    Vital Signs Last 24 Hrs  T(C): 36.9, Max: 37.8 ( @ 00:33)  T(F): 98.4, Max: 100.1 ( @ 00:33)  HR: 71 (71 - 84)  BP: 126/76 (110/75 - 152/79)  BP(mean): --  RR: 20 (20 - 24)  SpO2: 94% (94% - 97%)    Height (cm): 162.6 ( @ 00:33)  Weight (kg): 62.1 ( @ 00:33)  BMI (kg/m2): 23.5 ( @ 00:33)  BSA (m2): 1.67 ( @ 00:33)    GEN: NAD, pleasant. CHR ILL ON O2  HEENT: normocephalic and atraumatic. EOMI. KENDALL. Moist mucosa. Clear Posterior pharynx.  NECK: Supple. No carotid bruits.  No lymphadenopathy or thyromegaly.  LUNGS: BILAT RALES  HEART: IRRegular rate and rhythm without murmur.  ABDOMEN: SL DISTENDED. SOFT. SL TENDER RUQ  EXTREMITIES: Without any cyanosis, clubbing, rash, lesions or edema.OPEN ULCERS - CLEAN NO CELLULITIS  NEUROLOGIC: Cranial nerves II through XII are grossly intact.  MUSCULOSKELETAL:  SKIN: No ulceration or induration present.      Labs:                        14.8   20.8  )-----------( 182      ( 2017 02:20 )             44.5     2017 06:55    133    |  94     |  32.0   ----------------------------<  190    4.7     |  24.0   |  1.38     Ca    8.9        2017 06:55  Phos  3.0       2017 06:55  Mg     1.9       2017 06:55    TPro  7.3    /  Alb  3.8    /  TBili  1.5    /  DBili  x      /  AST  30     /  ALT  20     /  AlkPhos  118    2017 02:20    PT/INR - ( 2017 02:20 )   PT: 46.8 sec;   INR: 4.19 ratio         PTT - ( 2017 02:20 )  PTT:41.9 sec  Urinalysis Basic - ( 2017 03:34 )    Color: Yellow / Appearance: Clear / S.020 / pH: x  Gluc: x / Ketone: Trace  / Bili: Negative / Urobili: Negative mg/dL   Blood: x / Protein: 100 mg/dL / Nitrite: Negative   Leuk Esterase: Trace / RBC: 0-2 /HPF / WBC 3-5   Sq Epi: x / Non Sq Epi: Occasional / Bacteria: Moderate        Platelet Count - Automated: 182 K/uL ( @ 02:20)  WBC Count: 20.8 K/uL ( @ 02:20)  Hematocrit: 44.5 % ( @ 02:20)  Hemoglobin: 14.8 g/dL ( @ 02:20)    Sodium, Serum: 133 mmol/L <L> ( @ 06:55)  Potassium, Serum: 4.7 mmol/L ( @ 06:55)  Blood Urea Nitrogen, Serum: 32.0 mg/dL <H> ( @ 06:55)  Sodium, Serum: 136 mmol/L ( 02:20)  Potassium, Serum: 4.0 mmol/L ( @ 02:20)  Blood Urea Nitrogen, Serum: 34.0 mg/dL <H> (0220 @ 02:20)      RADIOLOGY  There is small volume abdominal and pelvic ascites. There is no bowel   obstruction or free air. There is no abnormal bowel wall thickening or   inflammatory change. Appendix is not visualized.IMPRESSION:  Gallstones and nonspecific moderate gallbladder wall edema. Please   correlate clinically for acute cholecystitis.    1.2 cm mildly spiculated appearing nodular opacity in the left lower lobe   may be malignant and will require follow-up. PET/CT may be considered for   further evaluation or short-term CT follow-up in 2-3 months.          LILLIANA HUMPHRIES MD FACP

## 2017-02-20 NOTE — H&P ADULT. - NEGATIVE GASTROINTESTINAL SYMPTOMS
no diarrhea/no hematochezia/no hiccoughs/no melena/no jaundice/no flatulence/no vomiting/no abdominal pain/no constipation/no steatorrhea

## 2017-02-20 NOTE — ED ADULT NURSE NOTE - OBJECTIVE STATEMENT
Pt received in ED stretcher alert and oriented x4. pt is visibly SOB, however 02 sat is 95%. ABG performed. Pt extremities appear cyanotic. Pt has multiple ecchymotic areas throughout body. Pt is on coumadin for afib. No active bleeding noted at this time. LABS drawn and sent, XRAY of chest done.  HR irregular, LS clear bilaterally and diminished, abdomen distended and slightly firm to touch, nontender. +BS x4 quads. pt did c/o of diarrhea for a week but it has resolved 2 days ago. Pt has wounds on both LE, dressings are in placed. Dr. Villalobos at bedside assessing pt, no further complaints, will continue to monitor. Pt received in ED stretcher alert and oriented x4. pt is visibly SOB, however 02 sat is 95%. ABG performed. Pt extremities appear cyanotic. Pt has multiple ecchymotic areas throughout body. Pt is on coumadin for afib. No active bleeding noted at this time. LABS drawn and sent, XRAY of chest done.  HR irregular, LS clear bilaterally and diminished, abdomen distended and slightly firm to touch, nontender. +BS x4 quads. pt did c/o of diarrhea for a week but it has resolved 2 days ago. Pt has wounds on both LE, dressings are in placed. Dr. Villalobos at bedside assessing pt, no further complaints, will continue to monitor. Daughter and  at bedside

## 2017-02-20 NOTE — CONSULT NOTE ADULT - ASSESSMENT
IMPRESSION:  This is an 84 year old female who was brought in by her  and daughter due to ongoing nausea, intermittent for past three weeks, no vomiting. CT abdomen showed  gallstones and gallbladder wall edema- r/o acute cholecystitis. No evidence of cbd stone.    PLAN:  - HIDA scan. if abnormal, surgical evaluation.  - continue IV antibiotics.  - IV PPI daily  - no need for ERCP at this time  - d/w family    thanks for the consult.
NO EVID ON PE/HX OR CT OF CDIFF COLITIS  - WILL D/C PO FLAGYL\  NO EVID UTI AND RECENT TX WITH MACROBID  C/W ELEV PCL AND GB STONES - R/O BILIARY SEPSIS

## 2017-02-20 NOTE — ED PROVIDER NOTE - CARE PLAN
Principal Discharge DX:	SIRS (systemic inflammatory response syndrome)  Secondary Diagnosis:	Atrial fibrillation  Secondary Diagnosis:	Dyspnea

## 2017-02-20 NOTE — ED PROVIDER NOTE - MEDICAL DECISION MAKING DETAILS
will order sepsis protocol, labs, EBG, and plan to admit will order sepsis protocol, labs, ABG, and plan to admit

## 2017-02-20 NOTE — H&P ADULT. - HISTORY OF PRESENT ILLNESS
This is an 84 year old female who was brought in by her  and daughter due to ongoing nausea, intermittent for past three weeks, no vomiting. In December pt was placed on Clindamycin by ENT, following which she developed three weeks of diarrhea, positive for C-diff, completed Flagyl in January and repeat C-Diff testing was negative in early Feb, but her diarrhea persisted until two days ago. Early Feb she was diagnosed with UTI and completed treatment with Nitrofurantoin. Sometime during late January and early February her nausea began, she is unable to specify alleviating or aggravating factors. Pt also has bilateral LE ulcers and is currently under vascular surgery – Dr. Upton, jennifera boot was placed on bilateral LE on Friday. In addition, her Losartan and Spironolactone was discontinued in dec/Deion due to hyperkalemia and since that time she has been gaining weight and noticed increase in her abdominal girth. Currently she complains of weakness, fatigue, moderate difficulty breathing, non tender mass palpable on right mid abdomen and weight gain. Denies: chest pain, palpitations, dizziness, fever, chills, vomiting, dysuria and abdominal pain.

## 2017-02-20 NOTE — ED PROVIDER NOTE - OBJECTIVE STATEMENT
This is an 83 y/o F with hx of CHF BIBA for nausea x 3 weeks. She states that she was not feeling well for three weeks and was nauseous. Today pt's family decided to call EMS to evaluate pt. She also reports that she has a bacteria in her bowel that she is unsure the name of. Pt reports taking 500mg of the unknown medication. Denies lung problems, colonoscopy, endoscopy, and abdominal pain. Denies recent travel. Denies difficulty ambulating. This is an 85 y/o F with hx of CHF BIBA for nausea x 3 weeks. She states that she was not feeling well for three weeks and was nauseous. Today pt's family decided to call EMS to evaluate pt for continued nausea. She also reports that she has a bacteria in her bowel that she is unsure the name of. Pt reports taking 500mg of the unknown medication. Denies lung problems, colonoscopy, endoscopy, and abdominal pain. Denies recent travel. Denies difficulty ambulating compared to baseline.

## 2017-02-20 NOTE — ED PROVIDER NOTE - DETAILS:
I, DAISHA Villalobos MD, personally performed the services described in the documentation, reviewed the documentation recorded by the scribe in my presence and it accurately and completely records my words and action

## 2017-02-20 NOTE — ED PROVIDER NOTE - NS ED MD SCRIBE ATTENDING SCRIBE SECTIONS
PHYSICAL EXAM/DISPOSITION/PAST MEDICAL/SURGICAL/SOCIAL HISTORY/REVIEW OF SYSTEMS/HISTORY OF PRESENT ILLNESS/INTAKE ASSESSMENT/SCREENINGS/HIV/VITAL SIGNS( Pullset)

## 2017-02-20 NOTE — ED PROVIDER NOTE - CARDIAC, MLM
Normal rate, regular rhythm.  Heart sounds S1, S2.  No murmurs, rubs or gallops. increased veinous congestion,

## 2017-02-21 DIAGNOSIS — A04.7 ENTEROCOLITIS DUE TO CLOSTRIDIUM DIFFICILE: ICD-10-CM

## 2017-02-21 LAB
ANION GAP SERPL CALC-SCNC: 13 MMOL/L — SIGNIFICANT CHANGE UP (ref 5–17)
BUN SERPL-MCNC: 38 MG/DL — HIGH (ref 8–20)
CALCIUM SERPL-MCNC: 8.8 MG/DL — SIGNIFICANT CHANGE UP (ref 8.6–10.2)
CHLORIDE SERPL-SCNC: 102 MMOL/L — SIGNIFICANT CHANGE UP (ref 98–107)
CO2 SERPL-SCNC: 22 MMOL/L — SIGNIFICANT CHANGE UP (ref 22–29)
CREAT SERPL-MCNC: 1.43 MG/DL — HIGH (ref 0.5–1.3)
CULTURE RESULTS: SIGNIFICANT CHANGE UP
GLUCOSE SERPL-MCNC: 95 MG/DL — SIGNIFICANT CHANGE UP (ref 70–115)
HCT VFR BLD CALC: 41.7 % — SIGNIFICANT CHANGE UP (ref 37–47)
HGB BLD-MCNC: 13.8 G/DL — SIGNIFICANT CHANGE UP (ref 12–16)
LACTATE SERPL-SCNC: 1.7 MMOL/L — SIGNIFICANT CHANGE UP (ref 0.5–2)
MAGNESIUM SERPL-MCNC: 1.9 MG/DL — SIGNIFICANT CHANGE UP (ref 1.8–2.5)
MCHC RBC-ENTMCNC: 33.1 G/DL — SIGNIFICANT CHANGE UP (ref 32–36)
MCHC RBC-ENTMCNC: 34.6 PG — HIGH (ref 27–31)
MCV RBC AUTO: 104.5 FL — HIGH (ref 81–99)
PHOSPHATE SERPL-MCNC: 2.4 MG/DL — SIGNIFICANT CHANGE UP (ref 2.4–4.7)
PLATELET # BLD AUTO: 144 K/UL — LOW (ref 150–400)
POTASSIUM SERPL-MCNC: 3.7 MMOL/L — SIGNIFICANT CHANGE UP (ref 3.5–5.3)
POTASSIUM SERPL-SCNC: 3.7 MMOL/L — SIGNIFICANT CHANGE UP (ref 3.5–5.3)
RBC # BLD: 3.99 M/UL — LOW (ref 4.4–5.2)
RBC # FLD: 14.5 % — SIGNIFICANT CHANGE UP (ref 11–15.6)
SODIUM SERPL-SCNC: 137 MMOL/L — SIGNIFICANT CHANGE UP (ref 135–145)
SPECIMEN SOURCE: SIGNIFICANT CHANGE UP
WBC # BLD: 10.9 K/UL — HIGH (ref 4.8–10.8)
WBC # FLD AUTO: 10.9 K/UL — HIGH (ref 4.8–10.8)

## 2017-02-21 PROCEDURE — 78226 HEPATOBILIARY SYSTEM IMAGING: CPT | Mod: 26

## 2017-02-21 PROCEDURE — 99233 SBSQ HOSP IP/OBS HIGH 50: CPT

## 2017-02-21 PROCEDURE — 99232 SBSQ HOSP IP/OBS MODERATE 35: CPT

## 2017-02-21 RX ADMIN — Medication 125 MILLIGRAM(S): at 23:03

## 2017-02-21 RX ADMIN — SODIUM CHLORIDE 100 MILLILITER(S): 9 INJECTION INTRAMUSCULAR; INTRAVENOUS; SUBCUTANEOUS at 10:18

## 2017-02-21 RX ADMIN — Medication 125 MILLIGRAM(S): at 06:26

## 2017-02-21 RX ADMIN — SODIUM CHLORIDE 100 MILLILITER(S): 9 INJECTION INTRAMUSCULAR; INTRAVENOUS; SUBCUTANEOUS at 01:58

## 2017-02-21 RX ADMIN — ATENOLOL 50 MILLIGRAM(S): 25 TABLET ORAL at 22:33

## 2017-02-21 RX ADMIN — ONDANSETRON 4 MILLIGRAM(S): 8 TABLET, FILM COATED ORAL at 17:07

## 2017-02-21 RX ADMIN — Medication 10 MILLIGRAM(S): at 17:07

## 2017-02-21 RX ADMIN — Medication 40 MILLIGRAM(S): at 17:07

## 2017-02-21 RX ADMIN — Medication 10 MILLIGRAM(S): at 01:43

## 2017-02-21 RX ADMIN — Medication 125 MILLIGRAM(S): at 11:43

## 2017-02-21 RX ADMIN — ALBUTEROL 2.5 MILLIGRAM(S): 90 AEROSOL, METERED ORAL at 02:13

## 2017-02-21 RX ADMIN — ALBUTEROL 2.5 MILLIGRAM(S): 90 AEROSOL, METERED ORAL at 20:44

## 2017-02-21 RX ADMIN — ATENOLOL 50 MILLIGRAM(S): 25 TABLET ORAL at 01:43

## 2017-02-21 RX ADMIN — Medication 500 MILLIGRAM(S): at 11:43

## 2017-02-21 RX ADMIN — Medication 0.12 MILLIGRAM(S): at 06:26

## 2017-02-21 RX ADMIN — Medication 125 MILLIGRAM(S): at 01:57

## 2017-02-21 RX ADMIN — ERTAPENEM SODIUM 100 MILLIGRAM(S): 1 INJECTION, POWDER, LYOPHILIZED, FOR SOLUTION INTRAMUSCULAR; INTRAVENOUS at 17:07

## 2017-02-21 RX ADMIN — ZINC SULFATE TAB 220 MG (50 MG ZINC EQUIVALENT) 220 MILLIGRAM(S): 220 (50 ZN) TAB at 11:43

## 2017-02-21 RX ADMIN — Medication 10 MILLIGRAM(S): at 06:26

## 2017-02-21 RX ADMIN — Medication 40 MILLIGRAM(S): at 06:26

## 2017-02-21 RX ADMIN — ATENOLOL 75 MILLIGRAM(S): 25 TABLET ORAL at 10:10

## 2017-02-21 RX ADMIN — Medication 125 MILLIGRAM(S): at 17:07

## 2017-02-22 DIAGNOSIS — E87.6 HYPOKALEMIA: ICD-10-CM

## 2017-02-22 LAB
ANION GAP SERPL CALC-SCNC: 14 MMOL/L — SIGNIFICANT CHANGE UP (ref 5–17)
BUN SERPL-MCNC: 36 MG/DL — HIGH (ref 8–20)
CALCIUM SERPL-MCNC: 8.6 MG/DL — SIGNIFICANT CHANGE UP (ref 8.6–10.2)
CHLORIDE SERPL-SCNC: 102 MMOL/L — SIGNIFICANT CHANGE UP (ref 98–107)
CO2 SERPL-SCNC: 24 MMOL/L — SIGNIFICANT CHANGE UP (ref 22–29)
CREAT SERPL-MCNC: 1.29 MG/DL — SIGNIFICANT CHANGE UP (ref 0.5–1.3)
GLUCOSE SERPL-MCNC: 138 MG/DL — HIGH (ref 70–115)
HCT VFR BLD CALC: 40.8 % — SIGNIFICANT CHANGE UP (ref 37–47)
HGB BLD-MCNC: 13.5 G/DL — SIGNIFICANT CHANGE UP (ref 12–16)
INR BLD: 3.23 RATIO — HIGH (ref 0.88–1.16)
MAGNESIUM SERPL-MCNC: 1.9 MG/DL — SIGNIFICANT CHANGE UP (ref 1.8–2.5)
MCHC RBC-ENTMCNC: 33.1 G/DL — SIGNIFICANT CHANGE UP (ref 32–36)
MCHC RBC-ENTMCNC: 35.2 PG — HIGH (ref 27–31)
MCV RBC AUTO: 106.3 FL — HIGH (ref 81–99)
NT-PROBNP SERPL-SCNC: HIGH PG/ML (ref 0–300)
PHOSPHATE SERPL-MCNC: 2.5 MG/DL — SIGNIFICANT CHANGE UP (ref 2.4–4.7)
PLATELET # BLD AUTO: 159 K/UL — SIGNIFICANT CHANGE UP (ref 150–400)
POTASSIUM SERPL-MCNC: 3.4 MMOL/L — LOW (ref 3.5–5.3)
POTASSIUM SERPL-SCNC: 3.4 MMOL/L — LOW (ref 3.5–5.3)
PROTHROM AB SERPL-ACNC: 36 SEC — HIGH (ref 10–13.1)
RBC # BLD: 3.84 M/UL — LOW (ref 4.4–5.2)
RBC # FLD: 14.5 % — SIGNIFICANT CHANGE UP (ref 11–15.6)
SODIUM SERPL-SCNC: 140 MMOL/L — SIGNIFICANT CHANGE UP (ref 135–145)
WBC # BLD: 7.8 K/UL — SIGNIFICANT CHANGE UP (ref 4.8–10.8)
WBC # FLD AUTO: 7.8 K/UL — SIGNIFICANT CHANGE UP (ref 4.8–10.8)

## 2017-02-22 PROCEDURE — 99233 SBSQ HOSP IP/OBS HIGH 50: CPT

## 2017-02-22 RX ORDER — WARFARIN SODIUM 2.5 MG/1
2 TABLET ORAL ONCE
Qty: 0 | Refills: 0 | Status: COMPLETED | OUTPATIENT
Start: 2017-02-22 | End: 2017-02-22

## 2017-02-22 RX ORDER — POTASSIUM CHLORIDE 20 MEQ
40 PACKET (EA) ORAL ONCE
Qty: 0 | Refills: 0 | Status: COMPLETED | OUTPATIENT
Start: 2017-02-22 | End: 2017-02-22

## 2017-02-22 RX ADMIN — Medication 40 MILLIGRAM(S): at 18:17

## 2017-02-22 RX ADMIN — Medication 125 MILLIGRAM(S): at 06:31

## 2017-02-22 RX ADMIN — Medication 40 MILLIEQUIVALENT(S): at 14:49

## 2017-02-22 RX ADMIN — Medication 500 MILLIGRAM(S): at 14:49

## 2017-02-22 RX ADMIN — Medication 125 MILLIGRAM(S): at 14:49

## 2017-02-22 RX ADMIN — SPIRONOLACTONE 12.5 MILLIGRAM(S): 25 TABLET, FILM COATED ORAL at 18:17

## 2017-02-22 RX ADMIN — ZINC SULFATE TAB 220 MG (50 MG ZINC EQUIVALENT) 220 MILLIGRAM(S): 220 (50 ZN) TAB at 14:49

## 2017-02-22 RX ADMIN — Medication 10 MILLIGRAM(S): at 06:31

## 2017-02-22 RX ADMIN — Medication 40 MILLIGRAM(S): at 06:31

## 2017-02-22 RX ADMIN — ALBUTEROL 2.5 MILLIGRAM(S): 90 AEROSOL, METERED ORAL at 08:24

## 2017-02-22 RX ADMIN — ALBUTEROL 2.5 MILLIGRAM(S): 90 AEROSOL, METERED ORAL at 15:11

## 2017-02-22 RX ADMIN — ALBUTEROL 2.5 MILLIGRAM(S): 90 AEROSOL, METERED ORAL at 20:53

## 2017-02-22 RX ADMIN — Medication 125 MILLIGRAM(S): at 18:17

## 2017-02-22 RX ADMIN — Medication 10 MILLIGRAM(S): at 18:17

## 2017-02-22 RX ADMIN — Medication 0.12 MILLIGRAM(S): at 06:30

## 2017-02-22 RX ADMIN — ATENOLOL 75 MILLIGRAM(S): 25 TABLET ORAL at 08:56

## 2017-02-22 RX ADMIN — ALBUTEROL 2.5 MILLIGRAM(S): 90 AEROSOL, METERED ORAL at 02:58

## 2017-02-22 NOTE — DIETITIAN INITIAL EVALUATION ADULT. - OTHER INFO
Pt admitted for SIRS. No current nausea or vomiting. Tolerated clear liquid diet, Per MD diet will advance to DASH. Pt with poor intake thus far. Weight with slight flucts 2/2 fluid retention in the setting of CHF. Heart healthy nutrition therapy discussed, literature provided. No c/o GI distress.

## 2017-02-23 LAB
ANION GAP SERPL CALC-SCNC: 13 MMOL/L — SIGNIFICANT CHANGE UP (ref 5–17)
APPEARANCE UR: CLEAR — SIGNIFICANT CHANGE UP
BACTERIA # UR AUTO: ABNORMAL
BILIRUB UR-MCNC: NEGATIVE — SIGNIFICANT CHANGE UP
BUN SERPL-MCNC: 30 MG/DL — HIGH (ref 8–20)
CALCIUM SERPL-MCNC: 8.6 MG/DL — SIGNIFICANT CHANGE UP (ref 8.6–10.2)
CHLORIDE SERPL-SCNC: 106 MMOL/L — SIGNIFICANT CHANGE UP (ref 98–107)
CO2 SERPL-SCNC: 26 MMOL/L — SIGNIFICANT CHANGE UP (ref 22–29)
COLOR SPEC: YELLOW — SIGNIFICANT CHANGE UP
CREAT SERPL-MCNC: 1.33 MG/DL — HIGH (ref 0.5–1.3)
DIFF PNL FLD: ABNORMAL
EPI CELLS # UR: SIGNIFICANT CHANGE UP
GLUCOSE SERPL-MCNC: 96 MG/DL — SIGNIFICANT CHANGE UP (ref 70–115)
GLUCOSE UR QL: NEGATIVE MG/DL — SIGNIFICANT CHANGE UP
HCT VFR BLD CALC: 43.6 % — SIGNIFICANT CHANGE UP (ref 37–47)
HGB BLD-MCNC: 14.4 G/DL — SIGNIFICANT CHANGE UP (ref 12–16)
HYALINE CASTS # UR AUTO: ABNORMAL /LPF
INR BLD: 3.27 RATIO — HIGH (ref 0.88–1.16)
KETONES UR-MCNC: NEGATIVE — SIGNIFICANT CHANGE UP
LEUKOCYTE ESTERASE UR-ACNC: ABNORMAL
MAGNESIUM SERPL-MCNC: 1.9 MG/DL — SIGNIFICANT CHANGE UP (ref 1.8–2.5)
MCHC RBC-ENTMCNC: 33 G/DL — SIGNIFICANT CHANGE UP (ref 32–36)
MCHC RBC-ENTMCNC: 35.1 PG — HIGH (ref 27–31)
MCV RBC AUTO: 106.3 FL — HIGH (ref 81–99)
NITRITE UR-MCNC: NEGATIVE — SIGNIFICANT CHANGE UP
PH UR: 5 — SIGNIFICANT CHANGE UP (ref 4.8–8)
PHOSPHATE SERPL-MCNC: 2.4 MG/DL — SIGNIFICANT CHANGE UP (ref 2.4–4.7)
PLATELET # BLD AUTO: 146 K/UL — LOW (ref 150–400)
POTASSIUM SERPL-MCNC: 3.6 MMOL/L — SIGNIFICANT CHANGE UP (ref 3.5–5.3)
POTASSIUM SERPL-SCNC: 3.6 MMOL/L — SIGNIFICANT CHANGE UP (ref 3.5–5.3)
PROT UR-MCNC: NEGATIVE MG/DL — SIGNIFICANT CHANGE UP
PROTHROM AB SERPL-ACNC: 36.4 SEC — HIGH (ref 10–13.1)
RBC # BLD: 4.1 M/UL — LOW (ref 4.4–5.2)
RBC # FLD: 14.5 % — SIGNIFICANT CHANGE UP (ref 11–15.6)
SODIUM SERPL-SCNC: 145 MMOL/L — SIGNIFICANT CHANGE UP (ref 135–145)
SP GR SPEC: 1.01 — SIGNIFICANT CHANGE UP (ref 1.01–1.02)
UROBILINOGEN FLD QL: NEGATIVE MG/DL — SIGNIFICANT CHANGE UP
WBC # BLD: 8.7 K/UL — SIGNIFICANT CHANGE UP (ref 4.8–10.8)
WBC # FLD AUTO: 8.7 K/UL — SIGNIFICANT CHANGE UP (ref 4.8–10.8)
WBC UR QL: SIGNIFICANT CHANGE UP

## 2017-02-23 PROCEDURE — 99233 SBSQ HOSP IP/OBS HIGH 50: CPT

## 2017-02-23 RX ORDER — WARFARIN SODIUM 2.5 MG/1
2 TABLET ORAL ONCE
Qty: 0 | Refills: 0 | Status: COMPLETED | OUTPATIENT
Start: 2017-02-23 | End: 2017-02-23

## 2017-02-23 RX ADMIN — ALBUTEROL 2.5 MILLIGRAM(S): 90 AEROSOL, METERED ORAL at 14:49

## 2017-02-23 RX ADMIN — ATENOLOL 50 MILLIGRAM(S): 25 TABLET ORAL at 22:56

## 2017-02-23 RX ADMIN — Medication 10 MILLIGRAM(S): at 06:31

## 2017-02-23 RX ADMIN — ONDANSETRON 4 MILLIGRAM(S): 8 TABLET, FILM COATED ORAL at 13:01

## 2017-02-23 RX ADMIN — ALBUTEROL 2.5 MILLIGRAM(S): 90 AEROSOL, METERED ORAL at 03:50

## 2017-02-23 RX ADMIN — ZINC SULFATE TAB 220 MG (50 MG ZINC EQUIVALENT) 220 MILLIGRAM(S): 220 (50 ZN) TAB at 11:34

## 2017-02-23 RX ADMIN — Medication 125 MILLIGRAM(S): at 11:35

## 2017-02-23 RX ADMIN — Medication 40 MILLIGRAM(S): at 17:05

## 2017-02-23 RX ADMIN — Medication 500 MILLIGRAM(S): at 11:34

## 2017-02-23 RX ADMIN — Medication 125 MILLIGRAM(S): at 01:00

## 2017-02-23 RX ADMIN — Medication 125 MILLIGRAM(S): at 07:57

## 2017-02-23 RX ADMIN — WARFARIN SODIUM 2 MILLIGRAM(S): 2.5 TABLET ORAL at 22:55

## 2017-02-23 RX ADMIN — WARFARIN SODIUM 2 MILLIGRAM(S): 2.5 TABLET ORAL at 01:27

## 2017-02-23 RX ADMIN — Medication 0.12 MILLIGRAM(S): at 06:32

## 2017-02-23 RX ADMIN — Medication 40 MILLIGRAM(S): at 06:31

## 2017-02-23 RX ADMIN — Medication 125 MILLIGRAM(S): at 17:06

## 2017-02-23 RX ADMIN — Medication 125 MILLIGRAM(S): at 23:00

## 2017-02-23 RX ADMIN — ALBUTEROL 2.5 MILLIGRAM(S): 90 AEROSOL, METERED ORAL at 20:20

## 2017-02-23 RX ADMIN — Medication 10 MILLIGRAM(S): at 17:05

## 2017-02-23 RX ADMIN — ATENOLOL 50 MILLIGRAM(S): 25 TABLET ORAL at 01:26

## 2017-02-23 RX ADMIN — ALBUTEROL 2.5 MILLIGRAM(S): 90 AEROSOL, METERED ORAL at 08:06

## 2017-02-23 RX ADMIN — ATENOLOL 75 MILLIGRAM(S): 25 TABLET ORAL at 07:56

## 2017-02-23 NOTE — PHYSICAL THERAPY INITIAL EVALUATION ADULT - CRITERIA FOR SKILLED THERAPEUTIC INTERVENTIONS
predicted duration of therapy intervention/therapy frequency/impairments found/anticipated discharge recommendation/functional limitations in following categories/rehab potential

## 2017-02-23 NOTE — PHYSICAL THERAPY INITIAL EVALUATION ADULT - ADDITIONAL COMMENTS
Pt owns a RW but hasn't had to use it lately. Pt has 1 platform step to get in to the home, no rail. Pt resides on that level and states there is no need to negotiate any other stairs

## 2017-02-23 NOTE — ED ADULT NURSE REASSESSMENT NOTE - NS ED NURSE REASSESS COMMENT FT1
Pt. resting in bed comfortably and denies any pain or SOB at this time.
pt is currently resting in stretcher. Pt states her stomach feels upset but doesn't want the zofran. 1:1 at bedside and vitals are as charted. Will continue to monitor.
As per MD lara, remove patient off BiPAP and place on 40% Venti Mask, monitor Spo2. No acute distress noted.
Patient awake, A&Ox4, bipap in place, negative obvious distress noted. Denies any pain, cardiac monitor in place, a-fib with PVC's. IVF in progress, well tolerated. Montze cath in place, patent, draining clear yellow urine. Will continue to monitor.
Patient care round complete. Patient is resting comfortably and has no needs at this time.
Patient received from SANDY Blake. patient received resting in stretcher tolerating BIPAP IPAP 8 EPAP 4 Fio2 40%. Vitals stable, no distress noted. Awaiting admission bed assignment. Pressure ulcer prevention interventions implemented. Safety to be maintained. Ongoing monitoring
Pt. resting comfortably in bed and denies any pain at this time. Pt is on 2L NC and denies SOB.
Report received from off going RN, charting as noted. Patient received A&Ox4, O2 in place, respirations even, labored, 26/min. Stated feels mildly short of breath. Cyanosis noted to nose, patient's daughter stated is normal, that it "comes & goes". Lung sounds wheezing upon expiration bilat.  Respiratory therapist to come administer nebulizer treatment. Denies any chest pain, numbness, tingling or dizziness. Cardiac monitor in place, controlled A-fib with PVC's. Stated has been feeling nauseous for the past few weeks that "comes in waves". Abdomen distended, bowel sounds hypoactive. Stated LBM yesterday, described as regular. Denies any dysuria. Wraps in place to bilat lower extremities. Daughter states is done by MD weekly, refused to allow removal for evaluation by RN. Daughter stated on left lower extremity patient has 3 open wounds, and on right side has open wound on foot. IV sites intact, patent. Full ROMx4. Family at bedside, will continue to monitor.

## 2017-02-23 NOTE — PHYSICAL THERAPY INITIAL EVALUATION ADULT - PERTINENT HX OF CURRENT PROBLEM, REHAB EVAL
pt presents to [facility] due to weakness, fatigue, SIRS, gallstones, sepsis, c-diff, CHF exacerbation

## 2017-02-24 LAB
ANION GAP SERPL CALC-SCNC: 12 MMOL/L — SIGNIFICANT CHANGE UP (ref 5–17)
BUN SERPL-MCNC: 23 MG/DL — HIGH (ref 8–20)
CALCIUM SERPL-MCNC: 9.1 MG/DL — SIGNIFICANT CHANGE UP (ref 8.6–10.2)
CHLORIDE SERPL-SCNC: 95 MMOL/L — LOW (ref 98–107)
CO2 SERPL-SCNC: 33 MMOL/L — HIGH (ref 22–29)
CREAT SERPL-MCNC: 1.26 MG/DL — SIGNIFICANT CHANGE UP (ref 0.5–1.3)
GLUCOSE SERPL-MCNC: 174 MG/DL — HIGH (ref 70–115)
HCT VFR BLD CALC: 45.1 % — SIGNIFICANT CHANGE UP (ref 37–47)
HGB BLD-MCNC: 14.8 G/DL — SIGNIFICANT CHANGE UP (ref 12–16)
MAGNESIUM SERPL-MCNC: 1.8 MG/DL — SIGNIFICANT CHANGE UP (ref 1.8–2.5)
MCHC RBC-ENTMCNC: 32.8 G/DL — SIGNIFICANT CHANGE UP (ref 32–36)
MCHC RBC-ENTMCNC: 35.3 PG — HIGH (ref 27–31)
MCV RBC AUTO: 107.6 FL — HIGH (ref 81–99)
PHOSPHATE SERPL-MCNC: 2.6 MG/DL — SIGNIFICANT CHANGE UP (ref 2.4–4.7)
PLATELET # BLD AUTO: 167 K/UL — SIGNIFICANT CHANGE UP (ref 150–400)
POTASSIUM SERPL-MCNC: 3.3 MMOL/L — LOW (ref 3.5–5.3)
POTASSIUM SERPL-SCNC: 3.3 MMOL/L — LOW (ref 3.5–5.3)
RBC # BLD: 4.19 M/UL — LOW (ref 4.4–5.2)
RBC # FLD: 14.6 % — SIGNIFICANT CHANGE UP (ref 11–15.6)
SODIUM SERPL-SCNC: 140 MMOL/L — SIGNIFICANT CHANGE UP (ref 135–145)
WBC # BLD: 9 K/UL — SIGNIFICANT CHANGE UP (ref 4.8–10.8)
WBC # FLD AUTO: 9 K/UL — SIGNIFICANT CHANGE UP (ref 4.8–10.8)

## 2017-02-24 PROCEDURE — 99232 SBSQ HOSP IP/OBS MODERATE 35: CPT

## 2017-02-24 PROCEDURE — 99233 SBSQ HOSP IP/OBS HIGH 50: CPT

## 2017-02-24 RX ORDER — POTASSIUM CHLORIDE 20 MEQ
40 PACKET (EA) ORAL ONCE
Qty: 0 | Refills: 0 | Status: COMPLETED | OUTPATIENT
Start: 2017-02-24 | End: 2017-02-24

## 2017-02-24 RX ORDER — WARFARIN SODIUM 2.5 MG/1
2 TABLET ORAL ONCE
Qty: 0 | Refills: 0 | Status: COMPLETED | OUTPATIENT
Start: 2017-02-24 | End: 2017-02-24

## 2017-02-24 RX ADMIN — ALBUTEROL 2.5 MILLIGRAM(S): 90 AEROSOL, METERED ORAL at 20:40

## 2017-02-24 RX ADMIN — Medication 0.12 MILLIGRAM(S): at 06:02

## 2017-02-24 RX ADMIN — ALBUTEROL 2.5 MILLIGRAM(S): 90 AEROSOL, METERED ORAL at 02:45

## 2017-02-24 RX ADMIN — Medication 40 MILLIGRAM(S): at 06:02

## 2017-02-24 RX ADMIN — Medication 10 MILLIGRAM(S): at 06:02

## 2017-02-24 RX ADMIN — ATENOLOL 75 MILLIGRAM(S): 25 TABLET ORAL at 09:23

## 2017-02-24 RX ADMIN — Medication 500 MILLIGRAM(S): at 12:04

## 2017-02-24 RX ADMIN — Medication 40 MILLIEQUIVALENT(S): at 13:52

## 2017-02-24 RX ADMIN — ATENOLOL 50 MILLIGRAM(S): 25 TABLET ORAL at 22:45

## 2017-02-24 RX ADMIN — SPIRONOLACTONE 12.5 MILLIGRAM(S): 25 TABLET, FILM COATED ORAL at 19:01

## 2017-02-24 RX ADMIN — ALBUTEROL 2.5 MILLIGRAM(S): 90 AEROSOL, METERED ORAL at 08:25

## 2017-02-24 RX ADMIN — Medication 40 MILLIGRAM(S): at 19:01

## 2017-02-24 RX ADMIN — Medication 125 MILLIGRAM(S): at 23:58

## 2017-02-24 RX ADMIN — ZINC SULFATE TAB 220 MG (50 MG ZINC EQUIVALENT) 220 MILLIGRAM(S): 220 (50 ZN) TAB at 12:04

## 2017-02-24 RX ADMIN — Medication 125 MILLIGRAM(S): at 12:03

## 2017-02-24 RX ADMIN — WARFARIN SODIUM 2 MILLIGRAM(S): 2.5 TABLET ORAL at 22:49

## 2017-02-24 RX ADMIN — Medication 125 MILLIGRAM(S): at 06:02

## 2017-02-24 RX ADMIN — Medication 125 MILLIGRAM(S): at 19:01

## 2017-02-24 RX ADMIN — ALBUTEROL 2.5 MILLIGRAM(S): 90 AEROSOL, METERED ORAL at 08:20

## 2017-02-24 RX ADMIN — ALBUTEROL 2.5 MILLIGRAM(S): 90 AEROSOL, METERED ORAL at 15:28

## 2017-02-24 RX ADMIN — Medication 10 MILLIGRAM(S): at 19:01

## 2017-02-25 DIAGNOSIS — E83.42 HYPOMAGNESEMIA: ICD-10-CM

## 2017-02-25 DIAGNOSIS — R79.1 ABNORMAL COAGULATION PROFILE: ICD-10-CM

## 2017-02-25 LAB
ANION GAP SERPL CALC-SCNC: 14 MMOL/L — SIGNIFICANT CHANGE UP (ref 5–17)
BUN SERPL-MCNC: 21 MG/DL — HIGH (ref 8–20)
CALCIUM SERPL-MCNC: 8.7 MG/DL — SIGNIFICANT CHANGE UP (ref 8.6–10.2)
CHLORIDE SERPL-SCNC: 100 MMOL/L — SIGNIFICANT CHANGE UP (ref 98–107)
CO2 SERPL-SCNC: 29 MMOL/L — SIGNIFICANT CHANGE UP (ref 22–29)
CREAT SERPL-MCNC: 1.08 MG/DL — SIGNIFICANT CHANGE UP (ref 0.5–1.3)
CULTURE RESULTS: SIGNIFICANT CHANGE UP
DIGOXIN SERPL-MCNC: 1.1 NG/ML — SIGNIFICANT CHANGE UP (ref 0.8–2)
GLUCOSE SERPL-MCNC: 100 MG/DL — SIGNIFICANT CHANGE UP (ref 70–115)
HCT VFR BLD CALC: 42.6 % — SIGNIFICANT CHANGE UP (ref 37–47)
HGB BLD-MCNC: 14.5 G/DL — SIGNIFICANT CHANGE UP (ref 12–16)
INR BLD: 5.07 RATIO — CRITICAL HIGH (ref 0.88–1.16)
MAGNESIUM SERPL-MCNC: 1.7 MG/DL — LOW (ref 1.8–2.5)
MCHC RBC-ENTMCNC: 34 G/DL — SIGNIFICANT CHANGE UP (ref 32–36)
MCHC RBC-ENTMCNC: 35.6 PG — HIGH (ref 27–31)
MCV RBC AUTO: 104.7 FL — HIGH (ref 81–99)
PHOSPHATE SERPL-MCNC: 2.6 MG/DL — SIGNIFICANT CHANGE UP (ref 2.4–4.7)
PLATELET # BLD AUTO: 129 K/UL — LOW (ref 150–400)
POTASSIUM SERPL-MCNC: 3.5 MMOL/L — SIGNIFICANT CHANGE UP (ref 3.5–5.3)
POTASSIUM SERPL-SCNC: 3.5 MMOL/L — SIGNIFICANT CHANGE UP (ref 3.5–5.3)
PROTHROM AB SERPL-ACNC: 56.7 SEC — HIGH (ref 10–13.1)
RBC # BLD: 4.07 M/UL — LOW (ref 4.4–5.2)
RBC # FLD: 14.3 % — SIGNIFICANT CHANGE UP (ref 11–15.6)
SODIUM SERPL-SCNC: 143 MMOL/L — SIGNIFICANT CHANGE UP (ref 135–145)
SPECIMEN SOURCE: SIGNIFICANT CHANGE UP
TSH SERPL-MCNC: 3.06 UIU/ML — SIGNIFICANT CHANGE UP (ref 0.27–4.2)
WBC # BLD: 8.9 K/UL — SIGNIFICANT CHANGE UP (ref 4.8–10.8)
WBC # FLD AUTO: 8.9 K/UL — SIGNIFICANT CHANGE UP (ref 4.8–10.8)

## 2017-02-25 PROCEDURE — 99233 SBSQ HOSP IP/OBS HIGH 50: CPT

## 2017-02-25 RX ORDER — MAGNESIUM SULFATE 500 MG/ML
2 VIAL (ML) INJECTION ONCE
Qty: 0 | Refills: 0 | Status: COMPLETED | OUTPATIENT
Start: 2017-02-25 | End: 2017-02-25

## 2017-02-25 RX ADMIN — Medication 10 MILLIGRAM(S): at 05:50

## 2017-02-25 RX ADMIN — Medication 40 MILLIGRAM(S): at 05:50

## 2017-02-25 RX ADMIN — ZINC SULFATE TAB 220 MG (50 MG ZINC EQUIVALENT) 220 MILLIGRAM(S): 220 (50 ZN) TAB at 12:46

## 2017-02-25 RX ADMIN — Medication 10 MILLIGRAM(S): at 18:10

## 2017-02-25 RX ADMIN — ALBUTEROL 2.5 MILLIGRAM(S): 90 AEROSOL, METERED ORAL at 14:48

## 2017-02-25 RX ADMIN — Medication 0.12 MILLIGRAM(S): at 05:50

## 2017-02-25 RX ADMIN — Medication 50 GRAM(S): at 10:00

## 2017-02-25 RX ADMIN — Medication 40 MILLIGRAM(S): at 18:10

## 2017-02-25 RX ADMIN — Medication 125 MILLIGRAM(S): at 18:10

## 2017-02-25 RX ADMIN — Medication 500 MILLIGRAM(S): at 12:46

## 2017-02-25 RX ADMIN — Medication 125 MILLIGRAM(S): at 12:45

## 2017-02-25 RX ADMIN — Medication 125 MILLIGRAM(S): at 23:50

## 2017-02-25 RX ADMIN — ALBUTEROL 2.5 MILLIGRAM(S): 90 AEROSOL, METERED ORAL at 08:20

## 2017-02-25 RX ADMIN — Medication 125 MILLIGRAM(S): at 05:51

## 2017-02-25 RX ADMIN — ALBUTEROL 2.5 MILLIGRAM(S): 90 AEROSOL, METERED ORAL at 03:43

## 2017-02-25 RX ADMIN — ALBUTEROL 2.5 MILLIGRAM(S): 90 AEROSOL, METERED ORAL at 20:26

## 2017-02-25 RX ADMIN — ATENOLOL 75 MILLIGRAM(S): 25 TABLET ORAL at 09:08

## 2017-02-26 ENCOUNTER — TRANSCRIPTION ENCOUNTER (OUTPATIENT)
Age: 82
End: 2017-02-26

## 2017-02-26 LAB
ANION GAP SERPL CALC-SCNC: 10 MMOL/L — SIGNIFICANT CHANGE UP (ref 5–17)
BUN SERPL-MCNC: 20 MG/DL — SIGNIFICANT CHANGE UP (ref 8–20)
CALCIUM SERPL-MCNC: 9.2 MG/DL — SIGNIFICANT CHANGE UP (ref 8.6–10.2)
CHLORIDE SERPL-SCNC: 96 MMOL/L — LOW (ref 98–107)
CO2 SERPL-SCNC: 35 MMOL/L — HIGH (ref 22–29)
CREAT SERPL-MCNC: 1.1 MG/DL — SIGNIFICANT CHANGE UP (ref 0.5–1.3)
GLUCOSE SERPL-MCNC: 91 MG/DL — SIGNIFICANT CHANGE UP (ref 70–115)
HCT VFR BLD CALC: 44.3 % — SIGNIFICANT CHANGE UP (ref 37–47)
HGB BLD-MCNC: 14.5 G/DL — SIGNIFICANT CHANGE UP (ref 12–16)
INR BLD: 4.64 RATIO — HIGH (ref 0.88–1.16)
MAGNESIUM SERPL-MCNC: 1.9 MG/DL — SIGNIFICANT CHANGE UP (ref 1.8–2.5)
MCHC RBC-ENTMCNC: 32.7 G/DL — SIGNIFICANT CHANGE UP (ref 32–36)
MCHC RBC-ENTMCNC: 34.9 PG — HIGH (ref 27–31)
MCV RBC AUTO: 106.7 FL — HIGH (ref 81–99)
PLATELET # BLD AUTO: 164 K/UL — SIGNIFICANT CHANGE UP (ref 150–400)
POTASSIUM SERPL-MCNC: 3.9 MMOL/L — SIGNIFICANT CHANGE UP (ref 3.5–5.3)
POTASSIUM SERPL-SCNC: 3.9 MMOL/L — SIGNIFICANT CHANGE UP (ref 3.5–5.3)
PROTHROM AB SERPL-ACNC: 51.8 SEC — HIGH (ref 10–13.1)
RBC # BLD: 4.15 M/UL — LOW (ref 4.4–5.2)
RBC # FLD: 14.4 % — SIGNIFICANT CHANGE UP (ref 11–15.6)
SODIUM SERPL-SCNC: 141 MMOL/L — SIGNIFICANT CHANGE UP (ref 135–145)
WBC # BLD: 8.6 K/UL — SIGNIFICANT CHANGE UP (ref 4.8–10.8)
WBC # FLD AUTO: 8.6 K/UL — SIGNIFICANT CHANGE UP (ref 4.8–10.8)

## 2017-02-26 PROCEDURE — 99233 SBSQ HOSP IP/OBS HIGH 50: CPT

## 2017-02-26 RX ADMIN — ALBUTEROL 2.5 MILLIGRAM(S): 90 AEROSOL, METERED ORAL at 20:20

## 2017-02-26 RX ADMIN — ALBUTEROL 2.5 MILLIGRAM(S): 90 AEROSOL, METERED ORAL at 03:39

## 2017-02-26 RX ADMIN — Medication 125 MILLIGRAM(S): at 23:04

## 2017-02-26 RX ADMIN — Medication 500 MILLIGRAM(S): at 12:33

## 2017-02-26 RX ADMIN — ALBUTEROL 2.5 MILLIGRAM(S): 90 AEROSOL, METERED ORAL at 14:41

## 2017-02-26 RX ADMIN — ALBUTEROL 2.5 MILLIGRAM(S): 90 AEROSOL, METERED ORAL at 08:13

## 2017-02-26 RX ADMIN — Medication 125 MILLIGRAM(S): at 06:57

## 2017-02-26 RX ADMIN — Medication 40 MILLIGRAM(S): at 18:07

## 2017-02-26 RX ADMIN — Medication 125 MILLIGRAM(S): at 18:08

## 2017-02-26 RX ADMIN — Medication 40 MILLIGRAM(S): at 06:57

## 2017-02-26 RX ADMIN — Medication 10 MILLIGRAM(S): at 06:57

## 2017-02-26 RX ADMIN — Medication 125 MILLIGRAM(S): at 12:33

## 2017-02-26 RX ADMIN — ZINC SULFATE TAB 220 MG (50 MG ZINC EQUIVALENT) 220 MILLIGRAM(S): 220 (50 ZN) TAB at 12:33

## 2017-02-26 RX ADMIN — Medication 10 MILLIGRAM(S): at 18:08

## 2017-02-26 NOTE — DISCHARGE NOTE ADULT - MEDICATION SUMMARY - MEDICATIONS TO CHANGE
I will SWITCH the dose or number of times a day I take the medications listed below when I get home from the hospital:    atenolol  -- 75 milligram(s) by mouth once a day (in the morning)    atenolol 50 mg oral tablet  -- 1 tab(s) by mouth once (at bedtime)    Coumadin  -- 5 milligram(s) by mouth once a day  -- as directed

## 2017-02-26 NOTE — DISCHARGE NOTE ADULT - CARE PROVIDERS DIRECT ADDRESSES
,DirectAddress_Unknown,DirectAddress_Unknown ,DirectAddress_Unknown,DirectAddress_Unknown,DirectAddress_Unknown,DirectAddress_Unknown,DirectAddress_Unknown

## 2017-02-26 NOTE — DISCHARGE NOTE ADULT - MEDICATION SUMMARY - MEDICATIONS TO STOP TAKING
I will STOP taking the medications listed below when I get home from the hospital:    digoxin  -- 125  mcg by mouth    losartan 25 mg oral tablet  -- 1 tab(s) by mouth once a day    Tenormin 50 mg oral tablet  --  by mouth  at bedtime    Macrobid 100 mg oral capsule  -- 1 cap(s) by mouth 2 times a day

## 2017-02-26 NOTE — DISCHARGE NOTE ADULT - ADDITIONAL INSTRUCTIONS
F/U INR in am and adjust coumadin dose accordingly   F/U PCP, Vascular surgery, cardiology  CT chest 2-3 months

## 2017-02-26 NOTE — DISCHARGE NOTE ADULT - CARE PLAN
Principal Discharge DX:	Clostridium difficile diarrhea  Goal:	Resolution of infection  Instructions for follow-up, activity and diet:	Complete Vancomycin, on 3/317  Secondary Diagnosis:	Atrial fibrillation  Instructions for follow-up, activity and diet:	Follow up with Dr Soliz in office in 1 week.  Secondary Diagnosis:	Hypertension  Secondary Diagnosis:	Mitral valve regurgitation Principal Discharge DX:	Clostridium difficile diarrhea  Goal:	Resolution of infection  Instructions for follow-up, activity and diet:	Complete Vancomycin, on 3/317  Secondary Diagnosis:	Atrial fibrillation  Instructions for follow-up, activity and diet:	Decrease Atenolol 25 bid, stop digoxin, cont. coumadin, f/u INR   Follow up with Dr Soliz in office in 1 week.  Secondary Diagnosis:	Hypertension  Secondary Diagnosis:	Mitral valve regurgitation  Secondary Diagnosis:	Leg ulcer, left  Instructions for follow-up, activity and diet:	f/u vascular surgery - Dr. Braswell  Secondary Diagnosis:	Pulmonary nodule  Instructions for follow-up, activity and diet:	F/U Repeat CT chest in 2-3 months and f/u Pulmonary

## 2017-02-26 NOTE — DISCHARGE NOTE ADULT - MEDICATION SUMMARY - MEDICATIONS TO TAKE
I will START or STAY ON the medications listed below when I get home from the hospital:    spironolactone  -- 12.5 milligram(s) by mouth 4 times a week  -- Indication: For CHF (congestive heart failure)    Coumadin 2 mg oral tablet  -- 1 tab(s) by mouth once a day  -- Do not take this drug if you are pregnant.  It is very important that you take or use this exactly as directed.  Do not skip doses or discontinue unless directed by your doctor.  Obtain medical advice before taking any non-prescription drugs as some may affect the action of this medication.    -- Indication: For Atrial fibrillation    atenolol 25 mg oral tablet  -- 1 tab(s) by mouth 2 times a day  hold if SBP is less than 90, HR less than 50  -- Indication: For Atrial fibrillation    nystatin 100,000 units/g topical powder  -- 1 application on skin 2 times a day  -- Indication: For rash     furosemide 40 mg oral tablet  -- 1 tab(s) by mouth once a day  -- Indication: For Atrial fibrillation    vancomycin 250 mg/5 mL oral solution  -- 2.5 milliliter(s) by mouth every 6 hours  -- Indication: For Clostridium difficile diarrhea    zinc sulfate 220 mg oral capsule  -- 1 cap(s) by mouth once a day  -- Indication: For Home medication     hydrALAZINE 10 mg oral tablet  -- Indication: For CHF (congestive heart failure)    ascorbic acid 500 mg oral tablet  -- 1 tab(s) by mouth 2 times a day  -- Indication: For Home medication

## 2017-02-26 NOTE — DISCHARGE NOTE ADULT - CARE PROVIDER_API CALL
Dr Soliz,   Cardiology  Phone: (   )    -  Fax: (   )    - Poli Marie), Surgery; Vascular Surgery  [address] [place] [address]  Phone: [phone]  Fax: [phone]    Simon Braswell), Surgery  [address] [place] [address]  Phone: [phone]  Fax: [phone]    Dr Soliz,   Cardiology  Phone: (   )    -  Fax: (   )    -    PCP- Dr. Mejia,   Phone: (   )    -  Fax: (   )    -

## 2017-02-26 NOTE — DISCHARGE NOTE ADULT - PLAN OF CARE
Resolution of infection Follow up with Dr Soliz in office in 1 week. Complete Vancomycin, on 3/317 f/u vascular surgery - Dr. Braswell F/U Repeat CT chest in 2-3 months and f/u Pulmonary Decrease Atenolol 25 bid, stop digoxin, cont. coumadin, f/u INR   Follow up with Dr Soliz in office in 1 week.

## 2017-02-26 NOTE — DISCHARGE NOTE ADULT - HOSPITAL COURSE
84 yr old female with chronic CHF, CKD, hypertension, chronic leg ulcers, chronic a fib, recent C diff and UTI, admitted with complaints of nausea/weakness. Noted to have CHF, sepsis likely from GB. Labs and imaging noted. Discussed with patient and family spiculated nodule on CT, need for repeat imaging in 2-3 months. US abdomen done showed likely acute cholecystitis, GI and ID consulted. Tested positive for C diff. HIDA negative for cholecystitis. Invanz was discontinued. CO 1:1 was ordered for safety as she had episodes of confusion. Diet was started. Diarrhea resolving. CO 1:1 discontinued and placed on enhanced supervision.  Enhanced supervision discontinued. PT evaluation done, home vs JESSICA. Discussed with daughter regarding JESSICA. Coumadin held given  elevated INR. Noted to have bradycardia, hence digoxin and Atenolol was held. Digoxin level sent 1.1. 84 yr old female with chronic CHF, CKD, hypertension, chronic leg ulcers, chronic a fib, recent C diff and UTI, admitted with complaints of nausea/weakness. Noted to have CHF, sepsis likely from GB. Labs and imaging noted. Discussed with patient and family spiculated nodule on CT, need for repeat imaging in 2-3 months. US abdomen done showed likely acute cholecystitis, GI and ID consulted. Tested positive for C diff. HIDA negative for cholecystitis. Invanz was discontinued. CO 1:1 was ordered for safety as she had episodes of confusion. Diarrhea resolved.    Pt has h/o Afib, INR was elevated, coumadin was held, INR is therapeutic now, will be d.c on coumadin 2mg, f/u INR in am. Noted to have bradycardia, hence digoxin and Atenolol was held. Digoxin level sent 1.1, discussed with cardiology, restarted on Atenolol 25 bid, heart rate is stable, clear by cardiology to d/c home and f/u in office.    Pt has chronic left leg ulcer, seen by vascular surgery, Ena boot removed, no signs of infection, discussed with DR. Barrera, partner of DR. Braswell, discussed about leg ulcer, suggest cont, dressing for now, f/u in office, will likely need Ena boot in office. Pt and family notified.    Pt is feeling better, diarrhea resolved, denied chest pain, SOB, abd. pain, nausea and vomiting. Pt will be d/c on vanco for 2 more days     ICU Vital Signs Last 24 Hrs  T(C): 36.6, Max: 36.8 (02-28 @ 23:17)  T(F): 97.9, Max: 98.3 (02-28 @ 23:17)  HR: 75 (68 - 84)  BP: 114/78 (114/78 - 134/68)  BP(mean): --  ABP: --  ABP(mean): --  RR: 20 (16 - 20)  SpO2: 95% (95% - 98%)    PHYSICAL EXAM:    GENERAL: NAD  EYES: EOMI, PERRLA  NECK: Supple, No JVD  NERVOUS SYSTEM:  Alert & Oriented X3, Good concentration; Motor Strength 5/5 B/L upper and lower extremities; DTRs 2+ intact and symmetric  CHEST/LUNG: positive air entry   HEART: s1/s2 audible   ABDOMEN: Soft, Nontender, Nondistended; Bowel sounds present  EXTREMITIES:  LLE dressing intact     Time spent 40 minutes

## 2017-02-26 NOTE — DISCHARGE NOTE ADULT - PATIENT PORTAL LINK FT
“You can access the [employer] Patient Portal, offered by [facility], by registering with the following website: [email or web address]”

## 2017-02-26 NOTE — PROGRESS NOTE ADULT - ATTENDING COMMENTS
Discussed with PT, RN and patient's daughter Iona. Patient will benefit from JESSICA given deconditioning and complex medical problems. Explained plan of care with daughter, all questions answered. Daughter will speak with patient and her  regarding rehab placement.
Discussed with family at length regarding plan of care,  patient will benefit from JESSICA given weakness and multiple medical problems and lack of adequate support and supervision at home. SW consulted.
PT evaluation. Maintain 1:1 for safety. Discussed with  at bedside.
PT evaluation noted. Place on enhanced supervision. Fall precautions.
Place on CO 1:1 for safety. Discussed at length with  at bedside.
Plan of care discussed with patient and RN. Family to meet with SW regarding discharge planning in am.
Plan of care discussed with patient and family.

## 2017-02-26 NOTE — DISCHARGE NOTE ADULT - PROVIDER TOKENS
FREE:[LAST:[Dr Soliz],PHONE:[(   )    -],FAX:[(   )    -],ADDRESS:[Cardiology]] TOKEN:'[id number]',TOKEN:'[id number]',FREE:[LAST:[Dr Soliz],PHONE:[(   )    -],FAX:[(   )    -],ADDRESS:[Cardiology]],FREE:[LAST:[PCP- Dr. Mejia],PHONE:[(   )    -],FAX:[(   )    -]]

## 2017-02-27 PROCEDURE — 99233 SBSQ HOSP IP/OBS HIGH 50: CPT

## 2017-02-27 RX ORDER — ATENOLOL 25 MG/1
25 TABLET ORAL DAILY
Qty: 0 | Refills: 0 | Status: DISCONTINUED | OUTPATIENT
Start: 2017-02-27 | End: 2017-02-28

## 2017-02-27 RX ADMIN — Medication 10 MILLIGRAM(S): at 05:35

## 2017-02-27 RX ADMIN — ALBUTEROL 2.5 MILLIGRAM(S): 90 AEROSOL, METERED ORAL at 08:12

## 2017-02-27 RX ADMIN — ALBUTEROL 2.5 MILLIGRAM(S): 90 AEROSOL, METERED ORAL at 20:12

## 2017-02-27 RX ADMIN — SPIRONOLACTONE 12.5 MILLIGRAM(S): 25 TABLET, FILM COATED ORAL at 18:32

## 2017-02-27 RX ADMIN — ALBUTEROL 2.5 MILLIGRAM(S): 90 AEROSOL, METERED ORAL at 14:44

## 2017-02-27 RX ADMIN — ZINC SULFATE TAB 220 MG (50 MG ZINC EQUIVALENT) 220 MILLIGRAM(S): 220 (50 ZN) TAB at 12:17

## 2017-02-27 RX ADMIN — Medication 40 MILLIGRAM(S): at 05:35

## 2017-02-27 RX ADMIN — Medication 125 MILLIGRAM(S): at 18:34

## 2017-02-27 RX ADMIN — Medication 500 MILLIGRAM(S): at 12:17

## 2017-02-27 RX ADMIN — Medication 10 MILLIGRAM(S): at 18:33

## 2017-02-27 RX ADMIN — Medication 125 MILLIGRAM(S): at 23:55

## 2017-02-27 RX ADMIN — Medication 125 MILLIGRAM(S): at 12:17

## 2017-02-27 RX ADMIN — Medication 125 MILLIGRAM(S): at 05:35

## 2017-02-27 RX ADMIN — Medication 40 MILLIGRAM(S): at 18:32

## 2017-02-28 DIAGNOSIS — L97.909 NON-PRESSURE CHRONIC ULCER OF UNSPECIFIED PART OF UNSPECIFIED LOWER LEG WITH UNSPECIFIED SEVERITY: ICD-10-CM

## 2017-02-28 LAB
ANION GAP SERPL CALC-SCNC: 11 MMOL/L — SIGNIFICANT CHANGE UP (ref 5–17)
BUN SERPL-MCNC: 17 MG/DL — SIGNIFICANT CHANGE UP (ref 8–20)
CALCIUM SERPL-MCNC: 9 MG/DL — SIGNIFICANT CHANGE UP (ref 8.6–10.2)
CHLORIDE SERPL-SCNC: 95 MMOL/L — LOW (ref 98–107)
CO2 SERPL-SCNC: 34 MMOL/L — HIGH (ref 22–29)
CREAT SERPL-MCNC: 1.02 MG/DL — SIGNIFICANT CHANGE UP (ref 0.5–1.3)
GLUCOSE SERPL-MCNC: 94 MG/DL — SIGNIFICANT CHANGE UP (ref 70–115)
HCT VFR BLD CALC: 44.9 % — SIGNIFICANT CHANGE UP (ref 37–47)
HGB BLD-MCNC: 14.9 G/DL — SIGNIFICANT CHANGE UP (ref 12–16)
INR BLD: 3.17 RATIO — HIGH (ref 0.88–1.16)
MCHC RBC-ENTMCNC: 33.2 G/DL — SIGNIFICANT CHANGE UP (ref 32–36)
MCHC RBC-ENTMCNC: 35.1 PG — HIGH (ref 27–31)
MCV RBC AUTO: 105.9 FL — HIGH (ref 81–99)
PLATELET # BLD AUTO: 174 K/UL — SIGNIFICANT CHANGE UP (ref 150–400)
POTASSIUM SERPL-MCNC: 3.6 MMOL/L — SIGNIFICANT CHANGE UP (ref 3.5–5.3)
POTASSIUM SERPL-SCNC: 3.6 MMOL/L — SIGNIFICANT CHANGE UP (ref 3.5–5.3)
PROTHROM AB SERPL-ACNC: 35.3 SEC — HIGH (ref 10–13.1)
RBC # BLD: 4.24 M/UL — LOW (ref 4.4–5.2)
RBC # FLD: 14.5 % — SIGNIFICANT CHANGE UP (ref 11–15.6)
SODIUM SERPL-SCNC: 140 MMOL/L — SIGNIFICANT CHANGE UP (ref 135–145)
WBC # BLD: 9.5 K/UL — SIGNIFICANT CHANGE UP (ref 4.8–10.8)
WBC # FLD AUTO: 9.5 K/UL — SIGNIFICANT CHANGE UP (ref 4.8–10.8)

## 2017-02-28 PROCEDURE — 99233 SBSQ HOSP IP/OBS HIGH 50: CPT

## 2017-02-28 RX ORDER — ATENOLOL 25 MG/1
25 TABLET ORAL
Qty: 0 | Refills: 0 | Status: DISCONTINUED | OUTPATIENT
Start: 2017-02-28 | End: 2017-03-01

## 2017-02-28 RX ORDER — FUROSEMIDE 40 MG
20 TABLET ORAL DAILY
Qty: 0 | Refills: 0 | Status: DISCONTINUED | OUTPATIENT
Start: 2017-02-28 | End: 2017-02-28

## 2017-02-28 RX ORDER — WARFARIN SODIUM 2.5 MG/1
1 TABLET ORAL ONCE
Qty: 0 | Refills: 0 | Status: COMPLETED | OUTPATIENT
Start: 2017-02-28 | End: 2017-02-28

## 2017-02-28 RX ORDER — FUROSEMIDE 40 MG
40 TABLET ORAL DAILY
Qty: 0 | Refills: 0 | Status: DISCONTINUED | OUTPATIENT
Start: 2017-03-01 | End: 2017-03-01

## 2017-02-28 RX ADMIN — ALBUTEROL 2.5 MILLIGRAM(S): 90 AEROSOL, METERED ORAL at 04:04

## 2017-02-28 RX ADMIN — Medication 40 MILLIGRAM(S): at 06:24

## 2017-02-28 RX ADMIN — WARFARIN SODIUM 1 MILLIGRAM(S): 2.5 TABLET ORAL at 23:21

## 2017-02-28 RX ADMIN — ZINC SULFATE TAB 220 MG (50 MG ZINC EQUIVALENT) 220 MILLIGRAM(S): 220 (50 ZN) TAB at 12:15

## 2017-02-28 RX ADMIN — Medication 500 MILLIGRAM(S): at 12:15

## 2017-02-28 RX ADMIN — Medication 125 MILLIGRAM(S): at 23:22

## 2017-02-28 RX ADMIN — ATENOLOL 25 MILLIGRAM(S): 25 TABLET ORAL at 06:24

## 2017-02-28 RX ADMIN — ALBUTEROL 2.5 MILLIGRAM(S): 90 AEROSOL, METERED ORAL at 15:06

## 2017-02-28 RX ADMIN — Medication 125 MILLIGRAM(S): at 12:15

## 2017-02-28 RX ADMIN — Medication 10 MILLIGRAM(S): at 06:24

## 2017-02-28 RX ADMIN — Medication 125 MILLIGRAM(S): at 17:46

## 2017-02-28 RX ADMIN — Medication 125 MILLIGRAM(S): at 06:24

## 2017-02-28 RX ADMIN — ALBUTEROL 2.5 MILLIGRAM(S): 90 AEROSOL, METERED ORAL at 20:01

## 2017-02-28 RX ADMIN — ALBUTEROL 2.5 MILLIGRAM(S): 90 AEROSOL, METERED ORAL at 08:39

## 2017-02-28 RX ADMIN — ATENOLOL 25 MILLIGRAM(S): 25 TABLET ORAL at 17:46

## 2017-02-28 RX ADMIN — Medication 10 MILLIGRAM(S): at 17:46

## 2017-02-28 NOTE — PROGRESS NOTE ADULT - PROBLEM SELECTOR PLAN 6
Resolved.
Resolved.
vascular surgery reconsulted about dressing and long term care
Replace magnesium.
Replace potassium.

## 2017-02-28 NOTE — PROGRESS NOTE ADULT - PROBLEM SELECTOR PROBLEM 4
CHF (congestive heart failure)
Clostridium difficile diarrhea
Goals of care, counseling/discussion

## 2017-02-28 NOTE — PROGRESS NOTE ADULT - PROBLEM SELECTOR PLAN 1
HIDA pending. Continue Invanz. GI eval noted. Explained findings to patient  and family, about cholecystitis, refusing any sort of surgical intervention. Lactate improved.
Resolved, Vancomycin for C diff, until 3/3/17. Contact isolation.
Resolved, Vancomycin for C diff, until 3/3/17. Contact isolation. ID follow up noted.
Resolved, Vancomycin for C diff. Contact isolation.
Resolving, continue Vancomycin for C diff infection. Invanz discontinued.
cont. vanco through 3/3
ID, GI evaluation noted. HIDA scan, antibiotics changed to Invanz. Will continue gentle IVF hydration. Weaned off BiPAP. Supplemental oxygen.

## 2017-02-28 NOTE — PROGRESS NOTE ADULT - PROBLEM SELECTOR PLAN 5
Continue Vancomycin, contact isolation.
Hold coumadin, check INR in am. Hb stable.
Hold coumadin.
Hold coumadin.
Resolved.
Continue Vancomycin, contact isolation.

## 2017-02-28 NOTE — PROGRESS NOTE ADULT - PROBLEM SELECTOR PROBLEM 5
Clostridium difficile diarrhea
Clostridium difficile diarrhea
Hypomagnesemia
Supratherapeutic INR

## 2017-02-28 NOTE — PROGRESS NOTE ADULT - PROBLEM SELECTOR PLAN 4
BiPAP on stand by. Patient is full code/
Continue Vancomycin, contact isolation.
Patient full code, explained guarded prognosis. BiPAP on stand by.
decrease Lasix 40 daily. strict I/O. Continue spironolactone.
Discussed guarded prognosis with patient and family. Explained that if her respiratory status worsens, we will give her trial of BiPAP and possible need for intubation. At this time, weaned off BiPAP. But they want a short trial of intubation should the need arise.

## 2017-02-28 NOTE — PROGRESS NOTE ADULT - PROBLEM SELECTOR PROBLEM 3
Atrial fibrillation
CHF (congestive heart failure)
Abdominal pain of multiple sites
CHF (congestive heart failure)

## 2017-02-28 NOTE — PROGRESS NOTE ADULT - PROBLEM SELECTOR PLAN 3
HR is stable, cont atenolol low dose 25, hold digoxin, cardiology reval requetsed  INR trending down, start low dose coumadin 1mg tonight HR is stable, discussed with cardiology, recommend to increase  atenolol 25 bid, hold digoxin for now, further management as per cardiology   INR trending down, start low dose coumadin 1mg tonight

## 2017-02-28 NOTE — PROGRESS NOTE ADULT - PROBLEM SELECTOR PROBLEM 2
Atrial fibrillation
Sepsis
Gallstones
Atrial fibrillation

## 2017-02-28 NOTE — PROGRESS NOTE ADULT - PROBLEM SELECTOR PLAN 2
Continue Digoxin, Hold warfarin.
Continue Digoxin, Warfarin. Check INR in am.
Continue Digoxin, Warfarin. Monitor INR.
Continue Digoxin, monitor INR.
Continue Digoxin, will resume coumadin.
HR is in 80's, restart atenolol low dose 25, monitor tele  Hold warfarin due to supra therapeutic INR . Monitor INR.
Resolved, Vancomycin for C diff, until 3/3/17. Contact isolation.
Will resume low dose atenolol. Hold warfarin. Monitor INR.
Hold coumadin in view of INR. Continue Digoxin.

## 2017-03-01 VITALS
TEMPERATURE: 98 F | RESPIRATION RATE: 20 BRPM | SYSTOLIC BLOOD PRESSURE: 102 MMHG | HEART RATE: 73 BPM | DIASTOLIC BLOOD PRESSURE: 70 MMHG | OXYGEN SATURATION: 99 %

## 2017-03-01 LAB
HCT VFR BLD CALC: 42.7 % — SIGNIFICANT CHANGE UP (ref 37–47)
HGB BLD-MCNC: 14.9 G/DL — SIGNIFICANT CHANGE UP (ref 12–16)
INR BLD: 2.13 RATIO — HIGH (ref 0.88–1.16)
MCHC RBC-ENTMCNC: 34.9 G/DL — SIGNIFICANT CHANGE UP (ref 32–36)
MCHC RBC-ENTMCNC: 35.7 PG — HIGH (ref 27–31)
MCV RBC AUTO: 102.4 FL — HIGH (ref 81–99)
PLATELET # BLD AUTO: 166 K/UL — SIGNIFICANT CHANGE UP (ref 150–400)
PROTHROM AB SERPL-ACNC: 23.6 SEC — HIGH (ref 10–13.1)
RBC # BLD: 4.17 M/UL — LOW (ref 4.4–5.2)
RBC # FLD: 14.5 % — SIGNIFICANT CHANGE UP (ref 11–15.6)
WBC # BLD: 9.6 K/UL — SIGNIFICANT CHANGE UP (ref 4.8–10.8)
WBC # FLD AUTO: 9.6 K/UL — SIGNIFICANT CHANGE UP (ref 4.8–10.8)

## 2017-03-01 PROCEDURE — 87086 URINE CULTURE/COLONY COUNT: CPT

## 2017-03-01 PROCEDURE — 85027 COMPLETE CBC AUTOMATED: CPT

## 2017-03-01 PROCEDURE — 85610 PROTHROMBIN TIME: CPT

## 2017-03-01 PROCEDURE — 96374 THER/PROPH/DIAG INJ IV PUSH: CPT

## 2017-03-01 PROCEDURE — 93306 TTE W/DOPPLER COMPLETE: CPT

## 2017-03-01 PROCEDURE — 84145 PROCALCITONIN (PCT): CPT

## 2017-03-01 PROCEDURE — 99239 HOSP IP/OBS DSCHRG MGMT >30: CPT

## 2017-03-01 PROCEDURE — 94660 CPAP INITIATION&MGMT: CPT

## 2017-03-01 PROCEDURE — 85730 THROMBOPLASTIN TIME PARTIAL: CPT

## 2017-03-01 PROCEDURE — 76705 ECHO EXAM OF ABDOMEN: CPT

## 2017-03-01 PROCEDURE — 84100 ASSAY OF PHOSPHORUS: CPT

## 2017-03-01 PROCEDURE — 82803 BLOOD GASES ANY COMBINATION: CPT

## 2017-03-01 PROCEDURE — 71250 CT THORAX DX C-: CPT

## 2017-03-01 PROCEDURE — 83605 ASSAY OF LACTIC ACID: CPT

## 2017-03-01 PROCEDURE — 99285 EMERGENCY DEPT VISIT HI MDM: CPT | Mod: 25

## 2017-03-01 PROCEDURE — 36415 COLL VENOUS BLD VENIPUNCTURE: CPT

## 2017-03-01 PROCEDURE — 71046 X-RAY EXAM CHEST 2 VIEWS: CPT

## 2017-03-01 PROCEDURE — A9537: CPT

## 2017-03-01 PROCEDURE — 97110 THERAPEUTIC EXERCISES: CPT

## 2017-03-01 PROCEDURE — 84436 ASSAY OF TOTAL THYROXINE: CPT

## 2017-03-01 PROCEDURE — 96375 TX/PRO/DX INJ NEW DRUG ADDON: CPT

## 2017-03-01 PROCEDURE — 80048 BASIC METABOLIC PNL TOTAL CA: CPT

## 2017-03-01 PROCEDURE — 80053 COMPREHEN METABOLIC PANEL: CPT

## 2017-03-01 PROCEDURE — 78226 HEPATOBILIARY SYSTEM IMAGING: CPT

## 2017-03-01 PROCEDURE — 83690 ASSAY OF LIPASE: CPT

## 2017-03-01 PROCEDURE — 94640 AIRWAY INHALATION TREATMENT: CPT

## 2017-03-01 PROCEDURE — 83735 ASSAY OF MAGNESIUM: CPT

## 2017-03-01 PROCEDURE — 81001 URINALYSIS AUTO W/SCOPE: CPT

## 2017-03-01 PROCEDURE — 87493 C DIFF AMPLIFIED PROBE: CPT

## 2017-03-01 PROCEDURE — 97530 THERAPEUTIC ACTIVITIES: CPT

## 2017-03-01 PROCEDURE — 93005 ELECTROCARDIOGRAM TRACING: CPT

## 2017-03-01 PROCEDURE — 74176 CT ABD & PELVIS W/O CONTRAST: CPT

## 2017-03-01 PROCEDURE — 84443 ASSAY THYROID STIM HORMONE: CPT

## 2017-03-01 PROCEDURE — 87040 BLOOD CULTURE FOR BACTERIA: CPT

## 2017-03-01 PROCEDURE — 82550 ASSAY OF CK (CPK): CPT

## 2017-03-01 PROCEDURE — 97116 GAIT TRAINING THERAPY: CPT

## 2017-03-01 PROCEDURE — 83880 ASSAY OF NATRIURETIC PEPTIDE: CPT

## 2017-03-01 PROCEDURE — 84484 ASSAY OF TROPONIN QUANT: CPT

## 2017-03-01 PROCEDURE — 82150 ASSAY OF AMYLASE: CPT

## 2017-03-01 PROCEDURE — 97163 PT EVAL HIGH COMPLEX 45 MIN: CPT

## 2017-03-01 PROCEDURE — 80162 ASSAY OF DIGOXIN TOTAL: CPT

## 2017-03-01 PROCEDURE — 84480 ASSAY TRIIODOTHYRONINE (T3): CPT

## 2017-03-01 RX ORDER — NYSTATIN CREAM 100000 [USP'U]/G
1 CREAM TOPICAL
Qty: 0 | Refills: 0 | Status: DISCONTINUED | OUTPATIENT
Start: 2017-03-01 | End: 2017-03-01

## 2017-03-01 RX ORDER — NITROFURANTOIN MACROCRYSTAL 50 MG
1 CAPSULE ORAL
Qty: 0 | Refills: 0 | COMMUNITY

## 2017-03-01 RX ORDER — NYSTATIN CREAM 100000 [USP'U]/G
1 CREAM TOPICAL
Qty: 0 | Refills: 0 | COMMUNITY
Start: 2017-03-01

## 2017-03-01 RX ORDER — WARFARIN SODIUM 2.5 MG/1
1 TABLET ORAL
Qty: 10 | Refills: 0 | OUTPATIENT
Start: 2017-03-01 | End: 2017-03-11

## 2017-03-01 RX ORDER — VANCOMYCIN HCL 1 G
2.5 VIAL (EA) INTRAVENOUS
Qty: 20 | Refills: 0 | OUTPATIENT
Start: 2017-03-01 | End: 2017-03-03

## 2017-03-01 RX ORDER — ATENOLOL 25 MG/1
1 TABLET ORAL
Qty: 60 | Refills: 0 | OUTPATIENT
Start: 2017-03-01 | End: 2017-03-31

## 2017-03-01 RX ADMIN — ALBUTEROL 2.5 MILLIGRAM(S): 90 AEROSOL, METERED ORAL at 15:15

## 2017-03-01 RX ADMIN — Medication 125 MILLIGRAM(S): at 06:33

## 2017-03-01 RX ADMIN — ZINC SULFATE TAB 220 MG (50 MG ZINC EQUIVALENT) 220 MILLIGRAM(S): 220 (50 ZN) TAB at 12:33

## 2017-03-01 RX ADMIN — ALBUTEROL 2.5 MILLIGRAM(S): 90 AEROSOL, METERED ORAL at 08:18

## 2017-03-01 RX ADMIN — Medication 40 MILLIGRAM(S): at 06:33

## 2017-03-01 RX ADMIN — Medication 10 MILLIGRAM(S): at 06:33

## 2017-03-01 RX ADMIN — ALBUTEROL 2.5 MILLIGRAM(S): 90 AEROSOL, METERED ORAL at 03:06

## 2017-03-01 RX ADMIN — Medication 500 MILLIGRAM(S): at 12:33

## 2017-03-01 RX ADMIN — ATENOLOL 25 MILLIGRAM(S): 25 TABLET ORAL at 06:33

## 2017-03-01 RX ADMIN — Medication 125 MILLIGRAM(S): at 12:33

## 2017-03-01 NOTE — PROGRESS NOTE ADULT - SUBJECTIVE AND OBJECTIVE BOX
Giovanna Soliz M.D., F.A.C.C.                                                                                            [facility]                                                                                                   [address] [place] [address]                                                                                                     [phone]      RAGHU DAVIS is a 84y Female  with hx of hypertension, chronic AF and significant TR/MR/ preserved LV function presented with nausea, abdominal discomfort, diarrhea, fever and SOB  Being treated with abx and fluids for elevated Lactate plus continued diuretics  On Bipap overnight-- now on nasal cannula  No complaints of chest pain  Feels a little better      MEDICATIONS  (STANDING):  sodium chloride 0.9%. 1000milliLiter(s) IV Continuous <Continuous>  ALBUTerol    0.083% 2.5milliGRAM(s) Nebulizer every 6 hours  ATENolol  Tablet 75milliGRAM(s) Oral <User Schedule>  ATENolol  Tablet 50milliGRAM(s) Oral at bedtime  furosemide    Tablet 40milliGRAM(s) Oral two times a day  docusate sodium 100milliGRAM(s) Oral three times a day  zinc sulfate 220milliGRAM(s) Oral daily  hydrALAZINE 10milliGRAM(s) Oral two times a day  ascorbic acid 500milliGRAM(s) Oral daily  digoxin     Tablet 0.125milliGRAM(s) Oral daily  ertapenem  IVPB  IV Intermittent   ertapenem  IVPB 500milliGRAM(s) IV Intermittent every 24 hours  spironolactone 12.5milliGRAM(s) Oral <User Schedule>  vancomycin    Solution 125milliGRAM(s) Oral every 6 hours    MEDICATIONS  (PRN):  ondansetron Injectable 4milliGRAM(s) IV Push every 6 hours PRN Nausea and/or Vomiting  acetaminophen   Tablet 650milliGRAM(s) Oral every 6 hours PRN For Temp over 37.9 C (100.2 F)      Vital Signs Last 24 Hrs  T(C): 36.7, Max: 36.9 (02-20 @ 11:03)  T(F): 98.1, Max: 98.4 (02-20 @ 11:03)  HR: 84 (71 - 86)  BP: 133/83 (124/65 - 133/83)  BP(mean): --  RR: 22 (20 - 22)  SpO2: 96% (94% - 97%)    I&O's Detail    I & Os for current day (as of 21 Feb 2017 07:47)  =============================================  IN:    Total IN: 0 ml  ---------------------------------------------  OUT:    Ureteral Catheter: 1200 ml    Total OUT: 1200 ml  ---------------------------------------------  Total NET: -1200 ml      Constitutional:    NC/AT: Normal    HEENT: Normal    Neck:  No JVD, bruits or thyromegaly    Respiratory:  Clearer  with few  rhonchi    Cardiovascular:  Irregularly irrregular with 3/6 systolic murmur..    Gastrointestinal: Soft without hepatosplenomegaly.    Extremities: without cyanosis, clubbing. No edema (they are usually wrapped)    Neurological:  Oriented   x  3    . No gross sensory or motor defects.    Skin: Normal    Psychiatric: Normal affect.                              14.8   20.8  )-----------( 182      ( 20 Feb 2017 02:20 )             44.5     20 Feb 2017 06:55    133    |  94     |  32.0   ----------------------------<  190    4.7     |  24.0   |  1.38     Ca    8.9        20 Feb 2017 06:55  Phos  3.0       20 Feb 2017 06:55  Mg     1.9       20 Feb 2017 06:55    TPro  7.3    /  Alb  3.8    /  TBili  1.5    /  DBili  x      /  AST  30     /  ALT  20     /  AlkPhos  118    20 Feb 2017 02:20    PT/INR - ( 20 Feb 2017 02:20 )   PT: 46.8 sec;   INR: 4.19 ratio         PTT - ( 20 Feb 2017 02:20 )  PTT:41.9 sec  CARDIAC MARKERS ( 20 Feb 2017 06:55 )  x     / 0.10 ng/mL / 85 U/L / x     / x      CARDIAC MARKERS ( 20 Feb 2017 02:20 )  x     / 0.03 ng/mL / x     / x     / x              Active Issues:  HEALTH ISSUES - PROBLEM Dx:  Goals of care, counseling/discussion: Goals of care, counseling/discussion  CHF (congestive heart failure): CHF (congestive heart failure)  Atrial fibrillation: Atrial fibrillation  Sepsis: Sepsis  Gallstones: Gallstones  Abdominal pain of multiple sites: Abdominal pain of multiple sites  SIRS (systemic inflammatory response syndrome): SIRS (systemic inflammatory response syndrome)  Shortness of breath: Shortness of breath          IMPRESSION: Abdominal process --?GB ?C. Diff  Hx of valvular heart disease/AF  Mild CHF
.                                                                                   Giovanna Soliz M.D., F.A.CUmerC.                                                                                            [facility]                                                                                                   [address] [address]                                                                                                     [phone]      RAGHU DAVIS is a 84y Female with hx of chronic AF and significant valvular heart disease who presented with C. Diff  Being treated with abx  Her Lasix and Atenolol were lowered because of bradycardia and ?renal fx    Pt looks better- sitting in the chair   No edema  HR in the 80's    MEDICATIONS  (STANDING):  ALBUTerol    0.083% 2.5milliGRAM(s) Nebulizer every 6 hours  zinc sulfate 220milliGRAM(s) Oral daily  hydrALAZINE 10milliGRAM(s) Oral two times a day  ascorbic acid 500milliGRAM(s) Oral daily  spironolactone 12.5milliGRAM(s) Oral <User Schedule>  vancomycin    Solution 125milliGRAM(s) Oral every 6 hours  furosemide    Tablet 40milliGRAM(s) Oral daily  ATENolol  Tablet 25milliGRAM(s) Oral two times a day    MEDICATIONS  (PRN):  ondansetron Injectable 4milliGRAM(s) IV Push every 6 hours PRN Nausea and/or Vomiting  acetaminophen   Tablet 650milliGRAM(s) Oral every 6 hours PRN For Temp over 37.9 C (100.2 F)      Vital Signs Last 24 Hrs  T(C): 36.7, Max: 36.8 (02-28 @ 23:17)  T(F): 98, Max: 98.3 (02-28 @ 23:17)  HR: 84 (64 - 84)  BP: 130/68 (100/52 - 134/68)  BP(mean): --  RR: 16 (16 - 18)  SpO2: 96% (95% - 98%)    I&O's Detail    I & Os for current day (as of 01 Mar 2017 08:11)  =============================================  IN:    Oral Fluid: 360 ml    Total IN: 360 ml  ---------------------------------------------  OUT:    Voided: 850 ml    Total OUT: 850 ml  ---------------------------------------------  Total NET: -490 ml      Constitutional:    NC/AT: Normal    HEENT: Normal    Neck:  No JVD, bruits or thyromegaly    Respiratory:  Clear without rales or rhonchi    Cardiovascular:  Irreg irrreg with 3/6 systolic  murmur, rub or gallop.    Gastrointestinal: Soft without hepatosplenomegaly.    Extremities: without cyanosis, clubbing . Discolored with bandage on left LE. No significant edema.    Neurological:  Oriented   x  3    . No gross sensory or motor defects.    Skin: Discolored on lower extremities    Psychiatric: Normal affect.                              14.9   9.6   )-----------( 166      ( 01 Mar 2017 07:15 )             42.7     28 Feb 2017 06:48    140    |  95     |  17.0   ----------------------------<  94     3.6     |  34.0   |  1.02     Ca    9.0        28 Feb 2017 06:48      PT/INR - ( 01 Mar 2017 07:15 )   PT: 23.6 sec;   INR: 2.13 ratio                       Radiology:    Active Issues:  HEALTH ISSUES - PROBLEM Dx:  Leg ulcer: Leg ulcer  Hypomagnesemia: Hypomagnesemia  Supratherapeutic INR: Supratherapeutic INR  Hypokalemia: Hypokalemia  Clostridium difficile diarrhea: Clostridium difficile diarrhea  Goals of care, counseling/discussion: Goals of care, counseling/discussion  CHF (congestive heart failure): CHF (congestive heart failure)  Atrial fibrillation: Atrial fibrillation  Sepsis: Sepsis  Gallstones: Gallstones  Abdominal pain of multiple sites: Abdominal pain of multiple sites  SIRS (systemic inflammatory response syndrome): SIRS (systemic inflammatory response syndrome)  Shortness of breath: Shortness of breath          IMPRESSION: Chronic AF  Valvular Heart Disease  C. Diff
.                                                                                   Giovanna Soliz M.D., F.A.JAYDAC.                                                                                            [facility]                                                                                                   [address] [place] [address]                                                                                                     [phone]      RAGHU DAVIS is a 84y Female  with hx of hypertension, moderately severe TR >MR /preserved LV fx and chronic AF who presented with nausea, diarrhea  Has gallstones and being treated for C. Diff  No longer on Bipap  Finally fell asleep this AM  On diuretics for mild CHF    MEDICATIONS  (STANDING):  ALBUTerol    0.083% 2.5milliGRAM(s) Nebulizer every 6 hours  ATENolol  Tablet 75milliGRAM(s) Oral <User Schedule>  ATENolol  Tablet 50milliGRAM(s) Oral at bedtime  furosemide    Tablet 40milliGRAM(s) Oral two times a day  docusate sodium 100milliGRAM(s) Oral three times a day  zinc sulfate 220milliGRAM(s) Oral daily  hydrALAZINE 10milliGRAM(s) Oral two times a day  ascorbic acid 500milliGRAM(s) Oral daily  digoxin     Tablet 0.125milliGRAM(s) Oral daily  ertapenem  IVPB  IV Intermittent   ertapenem  IVPB 500milliGRAM(s) IV Intermittent every 24 hours  spironolactone 12.5milliGRAM(s) Oral <User Schedule>  vancomycin    Solution 125milliGRAM(s) Oral every 6 hours    MEDICATIONS  (PRN):  ondansetron Injectable 4milliGRAM(s) IV Push every 6 hours PRN Nausea and/or Vomiting  acetaminophen   Tablet 650milliGRAM(s) Oral every 6 hours PRN For Temp over 37.9 C (100.2 F)      Vital Signs Last 24 Hrs  T(C): 36.4, Max: 36.8 (02-21 @ 08:05)  T(F): 97.6, Max: 98.3 (02-21 @ 11:53)  HR: 77 (72 - 89)  BP: 154/84 (121/83 - 154/84)  BP(mean): --  RR: 22 (18 - 22)  SpO2: 94% (94% - 98%)    I&O's Detail    I & Os for current day (as of 22 Feb 2017 07:50)  =============================================  IN:    sodium chloride 0.9%: 400 ml    Total IN: 400 ml  ---------------------------------------------  OUT:    Voided: 575 ml    Ureteral Catheter: 500 ml    Total OUT: 1075 ml  ---------------------------------------------  Total NET: -675 ml      Constitutional: Normal    NC/AT: Normal    HEENT: Normal    Neck:  No JVD, bruits or thyromegaly    Respiratory:  Clear without few rhonchi    Cardiovascular:   Irregularly irregular  with 3/6 systolic  murmur    Gastrointestinal: Soft without hepatosplenomegaly.    Extremities: without cyanosis, clubbing or edema. Legs are discolored    Neurological:  Oriented   x   3    . No gross sensory or motor defects.    Skin: Discolored on legs    Psychiatric: Normal affect.                              13.8   10.9  )-----------( 144      ( 21 Feb 2017 07:58 )             41.7     21 Feb 2017 07:58    137    |  102    |  38.0   ----------------------------<  95     3.7     |  22.0   |  1.43     Ca    8.8        21 Feb 2017 07:58  Phos  2.4       21 Feb 2017 07:58  Mg     1.9       21 Feb 2017 07:58      INR pending                  Active Issues:  HEALTH ISSUES - PROBLEM Dx:  Clostridium difficile diarrhea: Clostridium difficile diarrhea  Goals of care, counseling/discussion: Goals of care, counseling/discussion  CHF (congestive heart failure): CHF (congestive heart failure)  Atrial fibrillation: Atrial fibrillation  Sepsis: Sepsis  Gallstones: Gallstones  Abdominal pain of multiple sites: Abdominal pain of multiple sites  SIRS (systemic inflammatory response syndrome): SIRS (systemic inflammatory response syndrome)  Shortness of breath: Shortness of breath          IMPRESSION:
Chief Complaint:  bradycardia to 38 early this am, Late note.        Assessment:  Ros negative, patient comfortable and resting comfortable, very confused, rr  wnl for rate and rhythm, color pink, oxygen sat 96 on 2 liters NC, bilateral diminished at bases otherwise clear, heart rate increased to 68 currently, spoke with MD Yoon this am and recommend holding tonight dose of Tenormin dig level and tsh, both which were normal and MD will follow up in am to continue medication      Plan:  Follow up as per MD Micheal Multani, labs wnl, tomorrow md to evaluate bb and dig.  Continue to monitor overnight
INTERVAL HPI/OVERNIGHT EVENTS:      CC: Resolving sepsis secondary to C diff, CHF exacerbation, supra therapeutic INR    No overnight events, states she is tired. More alert today, eating. No BM yet today.    Vital Signs Last 24 Hrs  T(C): 36.5, Max: 36.8 (02-24 @ 16:18)  T(F): 97.7, Max: 98.3 (02-24 @ 16:18)  HR: 80 (78 - 84)  BP: 124/68 (108/60 - 130/70)  BP(mean): --  RR: 18 (16 - 18)  SpO2: 93% (93% - 95%)    PHYSICAL EXAM:    GENERAL: Not in distress  CHEST/LUNG:b/l air entry  HEART: irregular  ABDOMEN: Soft, BS +  EXTREMITIES: 1+ edema    MEDICATIONS  (STANDING):  ALBUTerol    0.083% 2.5milliGRAM(s) Nebulizer every 6 hours  ATENolol  Tablet 75milliGRAM(s) Oral <User Schedule>  ATENolol  Tablet 50milliGRAM(s) Oral at bedtime  furosemide    Tablet 40milliGRAM(s) Oral two times a day  zinc sulfate 220milliGRAM(s) Oral daily  hydrALAZINE 10milliGRAM(s) Oral two times a day  ascorbic acid 500milliGRAM(s) Oral daily  digoxin     Tablet 0.125milliGRAM(s) Oral daily  spironolactone 12.5milliGRAM(s) Oral <User Schedule>  vancomycin    Solution 125milliGRAM(s) Oral every 6 hours    MEDICATIONS  (PRN):  ondansetron Injectable 4milliGRAM(s) IV Push every 6 hours PRN Nausea and/or Vomiting  acetaminophen   Tablet 650milliGRAM(s) Oral every 6 hours PRN For Temp over 37.9 C (100.2 F)      Allergies    aspirin (Unknown)  azithromycin (Diarrhea)  latex (Unknown)  morphine (Other; Short breath)  penicillins (Unknown)  sulfa drugs (Unknown)    Intolerances          LABS:                          14.5   8.9   )-----------( 129      ( 2017 07:56 )             42.6     2017 07:54    143    |  100    |  21.0   ----------------------------<  100    3.5     |  29.0   |  1.08     Ca    8.7        2017 07:54  Phos  2.6       2017 07:54  Mg     1.7       2017 07:54      PT/INR - ( 2017 07:56 )   PT: 56.7 sec;   INR: 5.07 ratio           Urinalysis Basic - ( 2017 15:43 )    Color: Yellow / Appearance: Clear / S.010 / pH: x  Gluc: x / Ketone: Negative  / Bili: Negative / Urobili: Negative mg/dL   Blood: x / Protein: Negative mg/dL / Nitrite: Negative   Leuk Esterase: Moderate / RBC: x / WBC 3-5   Sq Epi: x / Non Sq Epi: Occasional / Bacteria: Few        RADIOLOGY & ADDITIONAL TESTS:
INTERVAL HPI/OVERNIGHT EVENTS:    CC:  C diff colitis, CHF exacerbation    No overnight events, diarrhea improving.     Vital Signs Last 24 Hrs  T(C): 36.4, Max: 36.6 ( @ 18:18)  T(F): 97.5, Max: 97.8 ( @ 18:18)  HR: 73 (65 - 89)  BP: 153/91 (135/73 - 165/93)  BP(mean): --  RR: 28 (21 - 28)  SpO2: 100% (94% - 100%)    PHYSICAL EXAM:    GENERAL: Not in distress, alert, comfortable  CHEST/LUNG: b/l air entry, decreased in bases  HEART: Regular  ABDOMEN: Soft, BS +  EXTREMITIES:  1+ edema    MEDICATIONS  (STANDING):  ALBUTerol    0.083% 2.5milliGRAM(s) Nebulizer every 6 hours  ATENolol  Tablet 75milliGRAM(s) Oral <User Schedule>  ATENolol  Tablet 50milliGRAM(s) Oral at bedtime  furosemide    Tablet 40milliGRAM(s) Oral two times a day  docusate sodium 100milliGRAM(s) Oral three times a day  zinc sulfate 220milliGRAM(s) Oral daily  hydrALAZINE 10milliGRAM(s) Oral two times a day  ascorbic acid 500milliGRAM(s) Oral daily  digoxin     Tablet 0.125milliGRAM(s) Oral daily  spironolactone 12.5milliGRAM(s) Oral <User Schedule>  vancomycin    Solution 125milliGRAM(s) Oral every 6 hours  warfarin 2milliGRAM(s) Oral once    MEDICATIONS  (PRN):  ondansetron Injectable 4milliGRAM(s) IV Push every 6 hours PRN Nausea and/or Vomiting  acetaminophen   Tablet 650milliGRAM(s) Oral every 6 hours PRN For Temp over 37.9 C (100.2 F)      Allergies    aspirin (Unknown)  azithromycin (Diarrhea)  latex (Unknown)  morphine (Other; Short breath)  penicillins (Unknown)  sulfa drugs (Unknown)    Intolerances          LABS:                          14.4   8.7   )-----------( 146      ( 2017 06:54 )             43.6     2017 06:52    145    |  106    |  30.0   ----------------------------<  96     3.6     |  26.0   |  1.33     Ca    8.6        2017 06:52  Phos  2.4       2017 06:52  Mg     1.9       2017 06:52      PT/INR - ( 2017 06:53 )   PT: 36.4 sec;   INR: 3.27 ratio           Urinalysis Basic - ( 2017 15:43 )    Color: Yellow / Appearance: Clear / S.010 / pH: x  Gluc: x / Ketone: Negative  / Bili: Negative / Urobili: Negative mg/dL   Blood: x / Protein: Negative mg/dL / Nitrite: Negative   Leuk Esterase: Moderate / RBC: x / WBC x   Sq Epi: x / Non Sq Epi: x / Bacteria: x        RADIOLOGY & ADDITIONAL TESTS:
INTERVAL HPI/OVERNIGHT EVENTS:    CC: C diff colitis, CHF exacerbation, sepsis    Feels better, tolerating diet, HIDA was negative. No diarrhea since am.     Vital Signs Last 24 Hrs  T(C): 36.3, Max: 36.5 (02-21 @ 16:23)  T(F): 97.3, Max: 97.7 (02-21 @ 16:23)  HR: 77 (72 - 89)  BP: 138/75 (121/83 - 154/84)  BP(mean): --  RR: 24 (20 - 24)  SpO2: 94% (94% - 98%)    PHYSICAL EXAM:    GENERAL: Not in distress  CHEST/LUNG:b/l air entry, decreased in bases  HEART: irregular  ABDOMEN: Soft, BS +  EXTREMITIES: 1+ edema    MEDICATIONS  (STANDING):  ALBUTerol    0.083% 2.5milliGRAM(s) Nebulizer every 6 hours  ATENolol  Tablet 75milliGRAM(s) Oral <User Schedule>  ATENolol  Tablet 50milliGRAM(s) Oral at bedtime  furosemide    Tablet 40milliGRAM(s) Oral two times a day  docusate sodium 100milliGRAM(s) Oral three times a day  zinc sulfate 220milliGRAM(s) Oral daily  hydrALAZINE 10milliGRAM(s) Oral two times a day  ascorbic acid 500milliGRAM(s) Oral daily  digoxin     Tablet 0.125milliGRAM(s) Oral daily  spironolactone 12.5milliGRAM(s) Oral <User Schedule>  vancomycin    Solution 125milliGRAM(s) Oral every 6 hours    MEDICATIONS  (PRN):  ondansetron Injectable 4milliGRAM(s) IV Push every 6 hours PRN Nausea and/or Vomiting  acetaminophen   Tablet 650milliGRAM(s) Oral every 6 hours PRN For Temp over 37.9 C (100.2 F)      Allergies    aspirin (Unknown)  azithromycin (Diarrhea)  latex (Unknown)  morphine (Other; Short breath)  penicillins (Unknown)  sulfa drugs (Unknown)    Intolerances          LABS:                          13.5   7.8   )-----------( 159      ( 22 Feb 2017 13:51 )             40.8     21 Feb 2017 07:58    137    |  102    |  38.0   ----------------------------<  95     3.7     |  22.0   |  1.43     Ca    8.8        21 Feb 2017 07:58  Phos  2.4       21 Feb 2017 07:58  Mg     1.9       21 Feb 2017 07:58      PT/INR - ( 22 Feb 2017 13:51 )   PT: 36.0 sec;   INR: 3.23 ratio               RADIOLOGY & ADDITIONAL TESTS:
INTERVAL HPI/OVERNIGHT EVENTS:    CC: C diff colitis, sepsis resolved. CHF exacerbation    No overnight events. 1 episode of diarrhea today, states she feels tired. No shortness of breath.    Vital Signs Last 24 Hrs  T(C): 36.8, Max: 36.9 (- @ 18:05)  T(F): 98.2, Max: 98.4 (02- @ 18:05)  HR: 80 (71 - 80)  BP: 122/74 (122/74 - 156/80)  BP(mean): --  RR: 20 (18 - 28)  SpO2: 95% (95% - 100%)    PHYSICAL EXAM:    GENERAL: Not in distress, sitting in chair.  CHEST/LUNG: b/l air entry, crackles in bases  HEART: irregular  ABDOMEN: Soft, BS+  EXTREMITIES:  1+ edema    MEDICATIONS  (STANDING):  ALBUTerol    0.083% 2.5milliGRAM(s) Nebulizer every 6 hours  ATENolol  Tablet 75milliGRAM(s) Oral <User Schedule>  ATENolol  Tablet 50milliGRAM(s) Oral at bedtime  furosemide    Tablet 40milliGRAM(s) Oral two times a day  zinc sulfate 220milliGRAM(s) Oral daily  hydrALAZINE 10milliGRAM(s) Oral two times a day  ascorbic acid 500milliGRAM(s) Oral daily  digoxin     Tablet 0.125milliGRAM(s) Oral daily  spironolactone 12.5milliGRAM(s) Oral <User Schedule>  vancomycin    Solution 125milliGRAM(s) Oral every 6 hours  warfarin 2milliGRAM(s) Oral once  potassium chloride    Tablet ER 40milliEquivalent(s) Oral once    MEDICATIONS  (PRN):  ondansetron Injectable 4milliGRAM(s) IV Push every 6 hours PRN Nausea and/or Vomiting  acetaminophen   Tablet 650milliGRAM(s) Oral every 6 hours PRN For Temp over 37.9 C (100.2 F)      Allergies    aspirin (Unknown)  azithromycin (Diarrhea)  latex (Unknown)  morphine (Other; Short breath)  penicillins (Unknown)  sulfa drugs (Unknown)    Intolerances          LABS:                          14.8   9.0   )-----------( 167      ( 2017 10:43 )             45.1     2017 10:43    140    |  95     |  23.0   ----------------------------<  174    3.3     |  33.0   |  1.26     Ca    9.1        2017 10:43  Phos  2.6       2017 10:43  Mg     1.8       2017 10:43      PT/INR - ( 2017 06:53 )   PT: 36.4 sec;   INR: 3.27 ratio           Urinalysis Basic - ( 2017 15:43 )    Color: Yellow / Appearance: Clear / S.010 / pH: x  Gluc: x / Ketone: Negative  / Bili: Negative / Urobili: Negative mg/dL   Blood: x / Protein: Negative mg/dL / Nitrite: Negative   Leuk Esterase: Moderate / RBC: x / WBC 3-5   Sq Epi: x / Non Sq Epi: Occasional / Bacteria: Few        RADIOLOGY & ADDITIONAL TESTS:
INTERVAL HPI/OVERNIGHT EVENTS:    CC: C diff diarrhea, sepsis likely from acute cholecystitis, CHF exacerbation    No fever, feels less short of breath, did not require BiPAP. 1 BM since am. Noted to be confused and climbing out of bed by family earlier, although at present does not appear to be confused.    Vital Signs Last 24 Hrs  T(C): 36.8, Max: 36.8 (- @ 08:05)  T(F): 98.3, Max: 98.3 (02- @ 11:53)  HR: 86 (84 - 89)  BP: 137/81 (124/65 - 149/89)  BP(mean): --  RR: 20 (18 - 22)  SpO2: 96% (94% - 97%)    PHYSICAL EXAM:    GENERAL: Not in distress, alert  CHEST/LUNG: b/l air entry, crackles in bases  HEART:  irregular  ABDOMEN: distended, non tender, BS+  EXTREMITIES:  No edema    MEDICATIONS  (STANDING):  ALBUTerol    0.083% 2.5milliGRAM(s) Nebulizer every 6 hours  ATENolol  Tablet 75milliGRAM(s) Oral <User Schedule>  ATENolol  Tablet 50milliGRAM(s) Oral at bedtime  furosemide    Tablet 40milliGRAM(s) Oral two times a day  docusate sodium 100milliGRAM(s) Oral three times a day  zinc sulfate 220milliGRAM(s) Oral daily  hydrALAZINE 10milliGRAM(s) Oral two times a day  ascorbic acid 500milliGRAM(s) Oral daily  digoxin     Tablet 0.125milliGRAM(s) Oral daily  ertapenem  IVPB  IV Intermittent   ertapenem  IVPB 500milliGRAM(s) IV Intermittent every 24 hours  spironolactone 12.5milliGRAM(s) Oral <User Schedule>  vancomycin    Solution 125milliGRAM(s) Oral every 6 hours    MEDICATIONS  (PRN):  ondansetron Injectable 4milliGRAM(s) IV Push every 6 hours PRN Nausea and/or Vomiting  acetaminophen   Tablet 650milliGRAM(s) Oral every 6 hours PRN For Temp over 37.9 C (100.2 F)      Allergies    aspirin (Unknown)  azithromycin (Diarrhea)  latex (Unknown)  morphine (Other; Short breath)  penicillins (Unknown)  sulfa drugs (Unknown)    Intolerances          LABS:                          13.8   10.9  )-----------( 144      ( 2017 07:58 )             41.7     2017 07:58    137    |  102    |  38.0   ----------------------------<  95     3.7     |  22.0   |  1.43     Ca    8.8        2017 07:58  Phos  2.4       2017 07:58  Mg     1.9       2017 07:58    TPro  7.3    /  Alb  3.8    /  TBili  1.5    /  DBili  x      /  AST  30     /  ALT  20     /  AlkPhos  118    2017 02:20    PT/INR - ( 2017 02:20 )   PT: 46.8 sec;   INR: 4.19 ratio         PTT - ( 2017 02:20 )  PTT:41.9 sec  Urinalysis Basic - ( 2017 03:34 )    Color: Yellow / Appearance: Clear / S.020 / pH: x  Gluc: x / Ketone: Trace  / Bili: Negative / Urobili: Negative mg/dL   Blood: x / Protein: 100 mg/dL / Nitrite: Negative   Leuk Esterase: Trace / RBC: 0-2 /HPF / WBC 3-5   Sq Epi: x / Non Sq Epi: Occasional / Bacteria: Moderate        RADIOLOGY & ADDITIONAL TESTS:
INTERVAL HPI/OVERNIGHT EVENTS:    CC: Sepsis secondary to C diff resolving, CHF exacerbation improving, bradycardia, supra therapeutic INR.    Events noted. Episodes of bradycardia yesterday. Tele today HR 60-70 a fib. No diarrhea. Less confused. States slept better.    Vital Signs Last 24 Hrs  T(C): 36.7, Max: 36.7 (02-26 @ 12:27)  T(F): 98.1, Max: 98.1 (02-26 @ 12:27)  HR: 69 (69 - 73)  BP: 124/70 (122/70 - 124/70)  BP(mean): --  RR: 18 (18 - 18)  SpO2: 97% (93% - 97%)    PHYSICAL EXAM:    GENERAL: sitting in chair, not in distress  CHEST/LUNG: b/l air entry, decreased in bases  HEART: irregular  ABDOMEN: Soft, BS+  EXTREMITIES: 1+ edema    MEDICATIONS  (STANDING):  ALBUTerol    0.083% 2.5milliGRAM(s) Nebulizer every 6 hours  furosemide    Tablet 40milliGRAM(s) Oral two times a day  zinc sulfate 220milliGRAM(s) Oral daily  hydrALAZINE 10milliGRAM(s) Oral two times a day  ascorbic acid 500milliGRAM(s) Oral daily  spironolactone 12.5milliGRAM(s) Oral <User Schedule>  vancomycin    Solution 125milliGRAM(s) Oral every 6 hours    MEDICATIONS  (PRN):  ondansetron Injectable 4milliGRAM(s) IV Push every 6 hours PRN Nausea and/or Vomiting  acetaminophen   Tablet 650milliGRAM(s) Oral every 6 hours PRN For Temp over 37.9 C (100.2 F)      Allergies    aspirin (Unknown)  azithromycin (Diarrhea)  latex (Unknown)  morphine (Other; Short breath)  penicillins (Unknown)  sulfa drugs (Unknown)    Intolerances          LABS:                          14.5   8.6   )-----------( 164      ( 26 Feb 2017 06:14 )             44.3     26 Feb 2017 06:14    141    |  96     |  20.0   ----------------------------<  91     3.9     |  35.0   |  1.10     Ca    9.2        26 Feb 2017 06:14  Phos  2.6       25 Feb 2017 07:54  Mg     1.9       26 Feb 2017 06:14      PT/INR - ( 26 Feb 2017 06:14 )   PT: 51.8 sec;   INR: 4.64 ratio               RADIOLOGY & ADDITIONAL TESTS:
INTERVAL HPI/OVERNIGHT EVENTS:    CC: sepsis, possible biliary sepsis, chf exacerbation    Chart and events noted. Admitted early this am for weakness, nausea and not feeling well. She was placed on BiPAP this am with improvement in breathing and hence transitioned to . Afebrile. No diarrhea, although had it until 2 days ago. Recent UTI treated with antibiotics.     Vital Signs Last 24 Hrs  T(C): 36.9, Max: 37.8 (02-20 @ 00:33)  T(F): 98.4, Max: 100.1 (02-20 @ 00:33)  HR: 71 (71 - 84)  BP: 126/76 (110/75 - 152/79)  BP(mean): --  RR: 20 (20 - 24)  SpO2: 94% (94% - 97%)    PHYSICAL EXAM:    GENERAL: Not in distress, sitting in bed.  CHEST/LUNG: b/l air entry, decreased in bases  HEART: Regular   ABDOMEN: distended, soft, non tender  EXTREMITIES:  1+ edema    MEDICATIONS  (STANDING):  sodium chloride 0.9%. 1000milliLiter(s) IV Continuous <Continuous>  ALBUTerol    0.083% 2.5milliGRAM(s) Nebulizer every 6 hours  ATENolol  Tablet 75milliGRAM(s) Oral <User Schedule>  ATENolol  Tablet 50milliGRAM(s) Oral at bedtime  docusate sodium 100milliGRAM(s) Oral three times a day  zinc sulfate 220milliGRAM(s) Oral daily  hydrALAZINE 10milliGRAM(s) Oral two times a day  ascorbic acid 500milliGRAM(s) Oral daily  digoxin     Tablet 0.125milliGRAM(s) Oral daily  ertapenem  IVPB  IV Intermittent   ertapenem  IVPB 1000milliGRAM(s) IV Intermittent once    MEDICATIONS  (PRN):  ondansetron Injectable 4milliGRAM(s) IV Push every 6 hours PRN Nausea and/or Vomiting  acetaminophen   Tablet 650milliGRAM(s) Oral every 6 hours PRN For Temp over 37.9 C (100.2 F)      Allergies    aspirin (Unknown)  latex (Unknown)  morphine (Other; Short breath)  penicillins (Unknown)  sulfa drugs (Unknown)    Intolerances          LABS:                          14.8   20.8  )-----------( 182      ( 2017 02:20 )             44.5     2017 06:55    133    |  94     |  32.0   ----------------------------<  190    4.7     |  24.0   |  1.38     Ca    8.9        2017 06:55  Phos  3.0       2017 06:55  Mg     1.9       2017 06:55    TPro  7.3    /  Alb  3.8    /  TBili  1.5    /  DBili  x      /  AST  30     /  ALT  20     /  AlkPhos  118    2017 02:20    PT/INR - ( 2017 02:20 )   PT: 46.8 sec;   INR: 4.19 ratio         PTT - ( 2017 02:20 )  PTT:41.9 sec  Urinalysis Basic - ( 2017 03:34 )    Color: Yellow / Appearance: Clear / S.020 / pH: x  Gluc: x / Ketone: Trace  / Bili: Negative / Urobili: Negative mg/dL   Blood: x / Protein: 100 mg/dL / Nitrite: Negative   Leuk Esterase: Trace / RBC: 0-2 /HPF / WBC 3-5   Sq Epi: x / Non Sq Epi: Occasional / Bacteria: Moderate        RADIOLOGY & ADDITIONAL TESTS:
INTERVAL HPI/OVERNIGHT EVENTS:  Patient seen and examined    MEDICATIONS  (STANDING):  ALBUTerol    0.083% 2.5milliGRAM(s) Nebulizer every 6 hours  ATENolol  Tablet 75milliGRAM(s) Oral <User Schedule>  ATENolol  Tablet 50milliGRAM(s) Oral at bedtime  furosemide    Tablet 40milliGRAM(s) Oral two times a day  docusate sodium 100milliGRAM(s) Oral three times a day  zinc sulfate 220milliGRAM(s) Oral daily  hydrALAZINE 10milliGRAM(s) Oral two times a day  ascorbic acid 500milliGRAM(s) Oral daily  digoxin     Tablet 0.125milliGRAM(s) Oral daily  ertapenem  IVPB  IV Intermittent   ertapenem  IVPB 500milliGRAM(s) IV Intermittent every 24 hours  spironolactone 12.5milliGRAM(s) Oral <User Schedule>  vancomycin    Solution 125milliGRAM(s) Oral every 6 hours    MEDICATIONS  (PRN):  ondansetron Injectable 4milliGRAM(s) IV Push every 6 hours PRN Nausea and/or Vomiting  acetaminophen   Tablet 650milliGRAM(s) Oral every 6 hours PRN For Temp over 37.9 C (100.2 F)      Allergies    aspirin (Unknown)  azithromycin (Diarrhea)  latex (Unknown)  morphine (Other; Short breath)  penicillins (Unknown)  sulfa drugs (Unknown)    Intolerances        Vital Signs Last 24 Hrs  T(C): 36.8, Max: 36.8 (02-21 @ 08:05)  T(F): 98.3, Max: 98.3 (02-21 @ 11:53)  HR: 86 (84 - 89)  BP: 137/81 (124/65 - 149/89)  BP(mean): --  RR: 20 (18 - 22)  SpO2: 96% (94% - 97%)    PHYSICAL EXAM:  General: NAD.  CVS: S1, S2  Chest: air entry bilaterally present  Abd: BS present, soft, non-tender      LABS:                        13.8   10.9  )-----------( 144      ( 21 Feb 2017 07:58 )             41.7     21 Feb 2017 07:58    137    |  102    |  38.0   ----------------------------<  95     3.7     |  22.0   |  1.43     Ca    8.8        21 Feb 2017 07:58  Phos  2.4       21 Feb 2017 07:58  Mg     1.9       21 Feb 2017 07:58    TPro  7.3    /  Alb  3.8    /  TBili  1.5    /  DBili  x      /  AST  30     /  ALT  20     /  AlkPhos  118    20 Feb 2017 02:20    PT/INR - ( 20 Feb 2017 02:20 )   PT: 46.8 sec;   INR: 4.19 ratio         PTT - ( 20 Feb 2017 02:20 )  PTT:41.9 sec
Internal Medicine Hospitalist - Dr. Gustavo DAVIS    [id number]    84y      Female    Patient is a 84y old  Female who presents with a chief complaint of feeling sick nausea for 1-2 weeks as per ems afib given cardiazem 15mg (26 Feb 2017 14:09)    HPI:  This is an 84 year old female who was brought in by her  and daughter due to ongoing nausea, intermittent for past three weeks, no vomiting. In December pt was placed on Clindamycin by ENT, following which she developed three weeks of diarrhea, positive for C-diff, completed Flagyl in January and repeat C-Diff testing was negative in early Feb, but her diarrhea persisted until two days ago. Early Feb she was diagnosed with UTI and completed treatment with Nitrofurantoin. Sometime during late January and early February her nausea began, she is unable to specify alleviating or aggravating factors. Pt also has bilateral LE ulcers and is currently under vascular surgery – Dr. Upton, unna boot was placed on bilateral LE on Friday. In addition, her Losartan and Spironolactone was discontinued in dec/Jan due to hyperkalemia and since that time she has been gaining weight and noticed increase in her abdominal girth. Currently she complains of weakness, fatigue, moderate difficulty breathing, non tender mass palpable on right mid abdomen and weight gain. Denies: chest pain, palpitations, dizziness, fever, chills, vomiting, dysuria and abdominal pain. (20 Feb 2017 04:19)      INTERVAL HPI/ OVERNIGHT EVENTS: Patient is seen and examined, pt report she is feeling weak, denied abd. pain, nausea, vomiting, poor oral intake     REVIEW OF SYSTEMS:    Denied fever, chills, abd. pain, nausea, vomiting, chest pain, SOB, headache, dizziness    PHYSICAL EXAM:    Vital Signs Last 24 Hrs  T(C): 37.3, Max: 37.3 (02-27 @ 12:00)  T(F): 99.1, Max: 99.1 (02-27 @ 12:00)  HR: 74 (74 - 90)  BP: 104/70 (104/70 - 134/70)  BP(mean): --  RR: 16 (16 - 18)  SpO2: 98% (93% - 98%)    GENERAL: NAD  HEENT: EOMI  Neck: supple  CHEST/LUNG: positive air entry   HEART: S1S2+ audible  ABDOMEN: Soft, Nontender, Nondistended; Bowel sounds present  EXTREMITIES:  no edema  CNS: AAO X 3    LABS:                        14.5   8.6   )-----------( 164      ( 26 Feb 2017 06:14 )             44.3     26 Feb 2017 06:14    141    |  96     |  20.0   ----------------------------<  91     3.9     |  35.0   |  1.10     Ca    9.2        26 Feb 2017 06:14  Mg     1.9       26 Feb 2017 06:14      PT/INR - ( 26 Feb 2017 06:14 )   PT: 51.8 sec;   INR: 4.64 ratio                 MEDICATIONS  (STANDING):  ALBUTerol    0.083% 2.5milliGRAM(s) Nebulizer every 6 hours  furosemide    Tablet 40milliGRAM(s) Oral two times a day  zinc sulfate 220milliGRAM(s) Oral daily  hydrALAZINE 10milliGRAM(s) Oral two times a day  ascorbic acid 500milliGRAM(s) Oral daily  spironolactone 12.5milliGRAM(s) Oral <User Schedule>  vancomycin    Solution 125milliGRAM(s) Oral every 6 hours    MEDICATIONS  (PRN):  ondansetron Injectable 4milliGRAM(s) IV Push every 6 hours PRN Nausea and/or Vomiting  acetaminophen   Tablet 650milliGRAM(s) Oral every 6 hours PRN For Temp over 37.9 C (100.2 F)      RADIOLOGY & ADDITIONAL TEST
Internal Medicine Hospitalist - Dr. Gustavo DAVIS    [id number]    84y      Female    Patient is a 84y old  Female who presents with a chief complaint of feeling sick nausea for 1-2 weeks as per ems afib given cardiazem 15mg (26 Feb 2017 14:09)    HPI:  This is an 84 year old female who was brought in by her  and daughter due to ongoing nausea, intermittent for past three weeks, no vomiting. In December pt was placed on Clindamycin by ENT, following which she developed three weeks of diarrhea, positive for C-diff, completed Flagyl in January and repeat C-Diff testing was negative in early Feb, but her diarrhea persisted until two days ago. Early Feb she was diagnosed with UTI and completed treatment with Nitrofurantoin. Sometime during late January and early February her nausea began, she is unable to specify alleviating or aggravating factors. Pt also has bilateral LE ulcers and is currently under vascular surgery – Dr. Upton, unna boot was placed on bilateral LE on Friday. In addition, her Losartan and Spironolactone was discontinued in dec/Jan due to hyperkalemia and since that time she has been gaining weight and noticed increase in her abdominal girth. Currently she complains of weakness, fatigue, moderate difficulty breathing, non tender mass palpable on right mid abdomen and weight gain. Denies: chest pain, palpitations, dizziness, fever, chills, vomiting, dysuria and abdominal pain. (20 Feb 2017 04:19)      INTERVAL HPI/ OVERNIGHT EVENTS: Patient is seen and examined, pt report feeling better, diarrhea resolved, breathing is getting better as well.     REVIEW OF SYSTEMS:    Denied fever, chills, abd. pain, nausea, vomiting, chest pain, SOB, headache, dizziness    PHYSICAL EXAM:    Vital Signs Last 24 Hrs  T(C): 36.7, Max: 37.3 (02-27 @ 12:00)  T(F): 98.1, Max: 99.1 (02-27 @ 12:00)  HR: 64 (64 - 91)  BP: 140/78 (104/70 - 140/78)  BP(mean): --  RR: 18 (16 - 18)  SpO2: 96% (94% - 98%)    GENERAL: NAD  HEENT: EOMI  Neck: supple  CHEST/LUNG: decrease breath sounds over bases   HEART: S1S2+ audible  ABDOMEN: Soft, Nontender, Nondistended; Bowel sounds present  EXTREMITIES:  LLE - dressing over  CNS: AAO X 3  Psychiatry: normal mood    LABS:                        14.9   9.5   )-----------( 174      ( 28 Feb 2017 06:48 )             44.9     28 Feb 2017 06:48    140    |  95     |  17.0   ----------------------------<  94     3.6     |  34.0   |  1.02     Ca    9.0        28 Feb 2017 06:48      PT/INR - ( 28 Feb 2017 06:48 )   PT: 35.3 sec;   INR: 3.17 ratio         MEDICATIONS  (STANDING):  ALBUTerol    0.083% 2.5milliGRAM(s) Nebulizer every 6 hours  furosemide    Tablet 40milliGRAM(s) Oral two times a day  zinc sulfate 220milliGRAM(s) Oral daily  hydrALAZINE 10milliGRAM(s) Oral two times a day  ascorbic acid 500milliGRAM(s) Oral daily  spironolactone 12.5milliGRAM(s) Oral <User Schedule>  vancomycin    Solution 125milliGRAM(s) Oral every 6 hours  ATENolol  Tablet 25milliGRAM(s) Oral daily    MEDICATIONS  (PRN):  ondansetron Injectable 4milliGRAM(s) IV Push every 6 hours PRN Nausea and/or Vomiting  acetaminophen   Tablet 650milliGRAM(s) Oral every 6 hours PRN For Temp over 37.9 C (100.2 F)      RADIOLOGY & ADDITIONAL TEST
[facility] [place] URIEL HUMPHRIES MD FACP   ROCKY CELESTE MD  Diplomates American Board of Internal Medicine and Infectious Diseases      RAGHU DAVIS  MRN-[id number]  84y    INTERVAL HPI/OVERNIGHT EVENTS:  cdiff pos- LESS DIARRHEA  no diarrhea  feels well  hida NEG    Vital Signs Last 24 Hrs  T(C): 36.8, Max: 36.9 (02-20 @ 11:03)  T(F): 98.2, Max: 98.4 (02-20 @ 11:03)  HR: 89 (71 - 89)  BP: 149/89 (124/65 - 149/89)  BP(mean): --  RR: 18 (18 - 22)  SpO2: 96% (94% - 97%)    ANTIBIOTICS    vancomycin    Solution 125milliGRAM(s) Oral every 6 hours      Allergies    aspirin (Unknown)  azithromycin (Diarrhea)  latex (Unknown)  morphine (Other; Short breath)  penicillins (Unknown)  sulfa drugs (Unknown)    Intolerances        REVIEW OF SYSTEMS:FEELS BETTER    PHYSICAL EXAM:  Vital Signs Last 24 Hrs  T(C): 36.8, Max: 36.9 (02-20 @ 11:03)  T(F): 98.2, Max: 98.4 (02-20 @ 11:03)  HR: 89 (71 - 89)  BP: 149/89 (124/65 - 149/89)  BP(mean): --  RR: 18 (18 - 22)  SpO2: 96% (94% - 97%)      GEN: NAD, pleasant  HEENT: normocephalic and atraumatic. EOMI. KENDALL. Moist mucosa. Clear Posterior pharynx.  NECK: Supple. No carotid bruits.  No lymphadenopathy or thyromegaly.  LUNGS: Clear to auscultation.  HEART: Regular rate and rhythm without murmur.  ABDOMEN: Soft, nontender, and nondistended.  Positive bowel sounds.  No hepatosplenomegaly was noted.SOFT  EXTREMITIES: Without any cyanosis, clubbing, rash, lesions or edema.  NEUROLOGIC: Cranial nerves II through XII are grossly intact.  MUSCULOSKELETAL:  SKIN: No ulceration or induration present    LABS:                        13.8   10.9  )-----------( 144      ( 21 Feb 2017 07:58 )             41.7       21 Feb 2017 07:58    137    |  102    |  38.0   ----------------------------<  95     3.7     |  22.0   |  1.43     Ca    8.8        21 Feb 2017 07:58  Phos  2.4       21 Feb 2017 07:58  Mg     1.9       21 Feb 2017 07:58    TPro  7.3    /  Alb  3.8    /  TBili  1.5    /  DBili  x      /  AST  30     /  ALT  20     /  AlkPhos  118    20 Feb 2017 02:20        02-21 @ 07:58  hct 41.7 % hgb 13.8 g/dL    02-21 @ 07:58  plat 144 K/uL wbc 10.9 K/uL    02-20 @ 02:20  hct 44.5 % hgb 14.8 g/dL    02-20 @ 02:20  plat 182 K/uL wbc 20.8 K/uL      02-21-17  creat 1.43 mg/dL gfr 39 mL/min/1.73M2 bun 38.0 mg/dL k 3.7 mmol/L  02-20-17  creat 1.38 mg/dL gfr 41 mL/min/1.73M2 bun 32.0 mg/dL k 4.7 mmol/L  02-20-17  creat 1.25 mg/dL gfr 46 mL/min/1.73M2 bun 34.0 mg/dL k 4.0 mmol/L      MICROBIOLOGY:  CDIFF PCR POS      RADIOLOGY & ADDITIONAL STUDIES:  NL CT AND NL HIDA    ASSESSMENT AND PLAN:  CDIFF PRIMARY PROBLEM    HIDA NEG  D/C INVANZ  SUGGEST STOP PPI    LILLIANA HUMPHRIES MD FACP
[facility] [place] URIEL HUMPHRIES MD FACP   ROCKY CELESTE MD  Diplomates American Board of Internal Medicine and Infectious Diseases      RAGHU DAVIS  MRN-[id number]  84y    INTERVAL HPI/OVERNIGHT EVENTS:  NO DIARRHEA    Vital Signs Last 24 Hrs  T(C): 36.8, Max: 36.9 (02-23 @ 18:05)  T(F): 98.2, Max: 98.4 (02-23 @ 18:05)  HR: 80 (65 - 80)  BP: 122/74 (122/74 - 156/80)  BP(mean): --  RR: 20 (18 - 28)  SpO2: 95% (95% - 100%)    ANTIBIOTICS  vancomycin    Solution 125milliGRAM(s) Oral every 6 hours      Allergies    aspirin (Unknown)  azithromycin (Diarrhea)  latex (Unknown)  morphine (Other; Short breath)  penicillins (Unknown)  sulfa drugs (Unknown)    Intolerances        REVIEW OF SYSTEMS:NEG  NNO  SXS    PHYSICAL EXAM:  Vital Signs Last 24 Hrs  T(C): 36.8, Max: 36.9 (02-23 @ 18:05)  T(F): 98.2, Max: 98.4 (02-23 @ 18:05)  HR: 80 (65 - 80)  BP: 122/74 (122/74 - 156/80)  BP(mean): --  RR: 20 (18 - 28)  SpO2: 95% (95% - 100%)      GEN: NAD, pleasant  HEENT: normocephalic and atraumatic. EOMI. KENDALL. Moist mucosa. Clear Posterior pharynx.  NECK: Supple. No carotid bruits.  No lymphadenopathy or thyromegaly.  LUNGS: Clear to auscultation.  HEART: Regular rate and rhythm without murmur.  ABDOMEN: Soft, nontender, and nondistended.  Positive bowel sounds.  No hepatosplenomegaly was noted.  EXTREMITIES: Without any cyanosis, clubbing, rash, lesions or edema.  NEUROLOGIC: Cranial nerves II through XII are grossly intact.  MUSCULOSKELETAL:  SKIN: No ulceration or induration present    LABS:                        14.8   9.0   )-----------( 167      ( 24 Feb 2017 10:43 )             45.1       23 Feb 2017 06:52    145    |  106    |  30.0   ----------------------------<  96     3.6     |  26.0   |  1.33     Ca    8.6        23 Feb 2017 06:52  Phos  2.4       23 Feb 2017 06:52  Mg     1.9       23 Feb 2017 06:52          02-24 @ 10:43  hct 45.1 % hgb 14.8 g/dL    02-24 @ 10:43  plat 167 K/uL wbc 9.0 K/uL    02-23 @ 06:54  hct 43.6 % hgb 14.4 g/dL    02-23 @ 06:54  plat 146 K/uL wbc 8.7 K/uL    02-22 @ 13:51  hct 40.8 % hgb 13.5 g/dL    02-22 @ 13:51  plat 159 K/uL wbc 7.8 K/uL      02-23-17  creat 1.33 mg/dL gfr 42 mL/min/1.73M2 bun 30.0 mg/dL k 3.6 mmol/L  02-22-17  creat 1.29 mg/dL gfr 44 mL/min/1.73M2 bun 36.0 mg/dL k 3.4 mmol/L      MICROBIOLOGY:  Culture Results:   Culture grew 3 or more types of organisms which indicate  collection contamination; consider recollection only if clinically  indicated.  .  Results called to Mmae Gutierrez RN (02-20 @ 03:34)  Culture Results:   No growth at 48 hours (02-20 @ 02:22)  Culture Results:   No growth at 48 hours (02-20 @ 02:22)        RADIOLOGY & ADDITIONAL STUDIES:      ASSESSMENT AND PLAN:  CDIFF COLITIS - RESOLVING   COMPLETE VANCO 03/03/17  WILL NO LONGER SEE      LILLIANA HUMPHRIES MD FACP
[facility] [place] URIEL HUMPHRIES MD FACP   ROCKY CELESTE MD  Diplomates American Board of Internal Medicine and Infectious Diseases      RAGHU DAVIS  MRN-[id number]  84y    INTERVAL HPI/OVERNIGHT EVENTS:  cdiff pos in absence of sxs  no diarrhea  feels well  hida pending    Vital Signs Last 24 Hrs  T(C): 36.8, Max: 36.9 (02-20 @ 11:03)  T(F): 98.2, Max: 98.4 (02-20 @ 11:03)  HR: 89 (71 - 89)  BP: 149/89 (124/65 - 149/89)  BP(mean): --  RR: 18 (18 - 22)  SpO2: 96% (94% - 97%)    ANTIBIOTICS  ertapenem  IVPB  IV Intermittent   ertapenem  IVPB 500milliGRAM(s) IV Intermittent every 24 hours  vancomycin    Solution 125milliGRAM(s) Oral every 6 hours      Allergies    aspirin (Unknown)  azithromycin (Diarrhea)  latex (Unknown)  morphine (Other; Short breath)  penicillins (Unknown)  sulfa drugs (Unknown)    Intolerances        REVIEW OF SYSTEMS:    PHYSICAL EXAM:  Vital Signs Last 24 Hrs  T(C): 36.8, Max: 36.9 (02-20 @ 11:03)  T(F): 98.2, Max: 98.4 (02-20 @ 11:03)  HR: 89 (71 - 89)  BP: 149/89 (124/65 - 149/89)  BP(mean): --  RR: 18 (18 - 22)  SpO2: 96% (94% - 97%)      GEN: NAD, pleasant  HEENT: normocephalic and atraumatic. EOMI. KENDALL. Moist mucosa. Clear Posterior pharynx.  NECK: Supple. No carotid bruits.  No lymphadenopathy or thyromegaly.  LUNGS: Clear to auscultation.  HEART: Regular rate and rhythm without murmur.  ABDOMEN: Soft, nontender, and nondistended.  Positive bowel sounds.  No hepatosplenomegaly was noted.  EXTREMITIES: Without any cyanosis, clubbing, rash, lesions or edema.  NEUROLOGIC: Cranial nerves II through XII are grossly intact.  MUSCULOSKELETAL:  SKIN: No ulceration or induration present    LABS:                        13.8   10.9  )-----------( 144      ( 21 Feb 2017 07:58 )             41.7       21 Feb 2017 07:58    137    |  102    |  38.0   ----------------------------<  95     3.7     |  22.0   |  1.43     Ca    8.8        21 Feb 2017 07:58  Phos  2.4       21 Feb 2017 07:58  Mg     1.9       21 Feb 2017 07:58    TPro  7.3    /  Alb  3.8    /  TBili  1.5    /  DBili  x      /  AST  30     /  ALT  20     /  AlkPhos  118    20 Feb 2017 02:20        02-21 @ 07:58  hct 41.7 % hgb 13.8 g/dL    02-21 @ 07:58  plat 144 K/uL wbc 10.9 K/uL    02-20 @ 02:20  hct 44.5 % hgb 14.8 g/dL    02-20 @ 02:20  plat 182 K/uL wbc 20.8 K/uL      02-21-17  creat 1.43 mg/dL gfr 39 mL/min/1.73M2 bun 38.0 mg/dL k 3.7 mmol/L  02-20-17  creat 1.38 mg/dL gfr 41 mL/min/1.73M2 bun 32.0 mg/dL k 4.7 mmol/L  02-20-17  creat 1.25 mg/dL gfr 46 mL/min/1.73M2 bun 34.0 mg/dL k 4.0 mmol/L      MICROBIOLOGY:  CDIFF PCR POS      RADIOLOGY & ADDITIONAL STUDIES:  NL CT    ASSESSMENT AND PLAN:  UNCLEAR IF CDIFF - ASX AND POS TOXIN MAY BE DUE TO PERSISTENCE FROM PRIOR EPISODE.  IF HIDA NEG D/C ERTAPENAM  CAN CONTINUE PO MASON HUMPHRIES MD FACP

## 2017-03-01 NOTE — PROGRESS NOTE ADULT - PROVIDER SPECIALTY LIST ADULT
Cardiology
Gastroenterology
Hospitalist
Infectious Disease
Cardiology

## 2017-03-01 NOTE — PROGRESS NOTE ADULT - ASSESSMENT
Would stop IVF if lactate has cleared  Adjust INR  Continue Lasix and Spironolactone/diurese as needed  Wean Oxygen  OOB
84 yr old female with chronic CHF, CKD, hypertension, chronic leg ulcers, chronic a fib, recent C diff and UTI, admitted with complaints of nausea/weakness. Noted to have CHF, sepsis likely from GB. Labs and imaging noted. Discussed with patient and family spiculated nodule on CT, need for repeat imaging in 2-3 months.
84 yr old female with chronic CHF, CKD, hypertension, chronic leg ulcers, chronic a fib, recent C diff and UTI, admitted with complaints of nausea/weakness. Noted to have CHF, sepsis likely from GB. Labs and imaging noted. Discussed with patient and family spiculated nodule on CT, need for repeat imaging in 2-3 months. US abdomen done showed likely acute cholecystitis, GI and ID consulted. Tested positive for C diff.
84 yr old female with chronic CHF, CKD, hypertension, chronic leg ulcers, chronic a fib, recent C diff and UTI, admitted with complaints of nausea/weakness. Noted to have CHF, sepsis likely from GB. Labs and imaging noted. Discussed with patient and family spiculated nodule on CT, need for repeat imaging in 2-3 months. US abdomen done showed likely acute cholecystitis, GI and ID consulted. Tested positive for C diff. HIDA negative for cholecystitis. Invanz was discontinued. CO 1:1 was ordered for safety as she had episodes of confusion. Diet was started.
84 yr old female with chronic CHF, CKD, hypertension, chronic leg ulcers, chronic a fib, recent C diff and UTI, admitted with complaints of nausea/weakness. Noted to have CHF, sepsis likely from GB. Labs and imaging noted. Discussed with patient and family spiculated nodule on CT, need for repeat imaging in 2-3 months. US abdomen done showed likely acute cholecystitis, GI and ID consulted. Tested positive for C diff. HIDA negative for cholecystitis. Invanz was discontinued. CO 1:1 was ordered for safety as she had episodes of confusion. Diet was started. Diarrhea resolving.
84 yr old female with chronic CHF, CKD, hypertension, chronic leg ulcers, chronic a fib, recent C diff and UTI, admitted with complaints of nausea/weakness. Noted to have CHF, sepsis likely from GB. Labs and imaging noted. Discussed with patient and family spiculated nodule on CT, need for repeat imaging in 2-3 months. US abdomen done showed likely acute cholecystitis, GI and ID consulted. Tested positive for C diff. HIDA negative for cholecystitis. Invanz was discontinued. CO 1:1 was ordered for safety as she had episodes of confusion. Diet was started. Diarrhea resolving. CO 1:1 discontinued and placed on enhanced supervision.
84 yr old female with chronic CHF, CKD, hypertension, chronic leg ulcers, chronic a fib, recent C diff and UTI, admitted with complaints of nausea/weakness. Noted to have CHF, sepsis likely from GB. Labs and imaging noted. Discussed with patient and family spiculated nodule on CT, need for repeat imaging in 2-3 months. US abdomen done showed likely acute cholecystitis, GI and ID consulted. Tested positive for C diff. HIDA negative for cholecystitis. Invanz was discontinued. CO 1:1 was ordered for safety as she had episodes of confusion. Diet was started. Diarrhea resolving. CO 1:1 discontinued and placed on enhanced supervision.  Enhanced supervision discontinued. PT evaluation done, home vs [facility]. Discussed with daughter regarding [facility].
84 yr old female with chronic CHF, CKD, hypertension, chronic leg ulcers, chronic a fib, recent C diff and UTI, admitted with complaints of nausea/weakness. Noted to have CHF, sepsis likely from GB. Labs and imaging noted. Discussed with patient and family spiculated nodule on CT, need for repeat imaging in 2-3 months. US abdomen done showed likely acute cholecystitis, GI and ID consulted. Tested positive for C diff. HIDA negative for cholecystitis. Invanz was discontinued. CO 1:1 was ordered for safety as she had episodes of confusion. Diet was started. Diarrhea resolving. CO 1:1 discontinued and placed on enhanced supervision.  Enhanced supervision discontinued. PT evaluation done, home vs [facility]. Discussed with daughter regarding [facility]. Coumadin held given  elevated INR. Noted to have bradycardia, hence digoxin and Atenolol was held. Digoxin level sent 1.1.
Currently the pt appears reasonably well compensated on a  lower Atenolol dose of 25 mg po bid  (was on 75 qam and 50 qpm as an outpt ) and OFF Digoxin (since 2/20)  Not edematous on Lasix 40 mg a day and Spironolactone with stable renal fx    At present would continue her on her current doses/meds  (although may need more Atenolol if HR increases when ambulatory-- would keep <100 bpm)  Continue Spironolactone and Lasix at present levels (but again may need an increase in future )  OOB/Rehab
GI process/Gallstones (but not felt to acute cholecystitis?) and C Diff  Hx of AF and moderately severe valvular heart disease  Mild CHF    Clinically better from CV standpoint and on usual cardiac meds- not in overt CHF and off IVF  Continue GI Rx  Adjust INR  (on abx)  Should she require sx, she is "cleared" at at least moderate risk given hx (please let me know if she should require sx)     Will not actively follow  Please reconsult prn and have her follow up as outpt.  Thank you
IMPRESSION:  This is an 84 year old female who was brought in by her  and daughter due to ongoing nausea, intermittent for past three weeks, no vomiting. CT abdomen showed  gallstones and gallbladder wall edema- r/o acute cholecystitis. No evidence of cbd stone. Stool positive for C. diff - on oral vancomycin now.    PLAN:  - HIDA scan. if abnormal, surgical evaluation.  - continue oral vancomycin (1o days)  - IV PPI daily  - no need for ERCP at this time  - d/w family

## 2017-10-04 ENCOUNTER — INPATIENT (INPATIENT)
Facility: HOSPITAL | Age: 82
LOS: 7 days | Discharge: NURSING, REHAB OR LONG-TERM CARE | DRG: 872 | End: 2017-10-12
Attending: GENERAL ACUTE CARE HOSPITAL | Admitting: HOSPITALIST
Payer: MEDICARE

## 2017-10-04 VITALS
WEIGHT: 130.07 LBS | DIASTOLIC BLOOD PRESSURE: 103 MMHG | HEART RATE: 129 BPM | TEMPERATURE: 102 F | HEIGHT: 64 IN | RESPIRATION RATE: 24 BRPM | OXYGEN SATURATION: 92 % | SYSTOLIC BLOOD PRESSURE: 179 MMHG

## 2017-10-04 DIAGNOSIS — I10 ESSENTIAL (PRIMARY) HYPERTENSION: ICD-10-CM

## 2017-10-04 DIAGNOSIS — J18.9 PNEUMONIA, UNSPECIFIED ORGANISM: ICD-10-CM

## 2017-10-04 DIAGNOSIS — I50.22 CHRONIC SYSTOLIC (CONGESTIVE) HEART FAILURE: ICD-10-CM

## 2017-10-04 DIAGNOSIS — A41.9 SEPSIS, UNSPECIFIED ORGANISM: ICD-10-CM

## 2017-10-04 DIAGNOSIS — Z98.89 OTHER SPECIFIED POSTPROCEDURAL STATES: Chronic | ICD-10-CM

## 2017-10-04 DIAGNOSIS — I48.2 CHRONIC ATRIAL FIBRILLATION: ICD-10-CM

## 2017-10-04 DIAGNOSIS — N18.3 CHRONIC KIDNEY DISEASE, STAGE 3 (MODERATE): ICD-10-CM

## 2017-10-04 DIAGNOSIS — E11.8 TYPE 2 DIABETES MELLITUS WITH UNSPECIFIED COMPLICATIONS: ICD-10-CM

## 2017-10-04 LAB
-  STREPTOCOCCUS SP. (NOT GRP A, B OR S PNEUMONIAE): SIGNIFICANT CHANGE UP
ALBUMIN SERPL ELPH-MCNC: 3.4 G/DL — SIGNIFICANT CHANGE UP (ref 3.3–5.2)
ALBUMIN SERPL ELPH-MCNC: 3.8 G/DL — SIGNIFICANT CHANGE UP (ref 3.3–5.2)
ALP SERPL-CCNC: 102 U/L — SIGNIFICANT CHANGE UP (ref 40–120)
ALP SERPL-CCNC: 92 U/L — SIGNIFICANT CHANGE UP (ref 40–120)
ALT FLD-CCNC: 20 U/L — SIGNIFICANT CHANGE UP
ALT FLD-CCNC: 23 U/L — SIGNIFICANT CHANGE UP
ANION GAP SERPL CALC-SCNC: 13 MMOL/L — SIGNIFICANT CHANGE UP (ref 5–17)
ANION GAP SERPL CALC-SCNC: 24 MMOL/L — HIGH (ref 5–17)
APPEARANCE UR: CLEAR — SIGNIFICANT CHANGE UP
APTT BLD: 40.7 SEC — HIGH (ref 27.5–37.4)
AST SERPL-CCNC: 31 U/L — SIGNIFICANT CHANGE UP
AST SERPL-CCNC: 50 U/L — HIGH
BASE EXCESS BLDA CALC-SCNC: -3 MMOL/L — SIGNIFICANT CHANGE UP (ref -3–3)
BASOPHILS # BLD AUTO: 0 K/UL — SIGNIFICANT CHANGE UP (ref 0–0.2)
BASOPHILS NFR BLD AUTO: 0.2 % — SIGNIFICANT CHANGE UP (ref 0–2)
BILIRUB SERPL-MCNC: 0.8 MG/DL — SIGNIFICANT CHANGE UP (ref 0.4–2)
BILIRUB SERPL-MCNC: 0.8 MG/DL — SIGNIFICANT CHANGE UP (ref 0.4–2)
BILIRUB UR-MCNC: NEGATIVE — SIGNIFICANT CHANGE UP
BLOOD GAS COMMENTS ARTERIAL: SIGNIFICANT CHANGE UP
BUN SERPL-MCNC: 26 MG/DL — HIGH (ref 8–20)
BUN SERPL-MCNC: 29 MG/DL — HIGH (ref 8–20)
CALCIUM SERPL-MCNC: 8.7 MG/DL — SIGNIFICANT CHANGE UP (ref 8.6–10.2)
CALCIUM SERPL-MCNC: 9 MG/DL — SIGNIFICANT CHANGE UP (ref 8.6–10.2)
CHLORIDE SERPL-SCNC: 103 MMOL/L — SIGNIFICANT CHANGE UP (ref 98–107)
CHLORIDE SERPL-SCNC: 94 MMOL/L — LOW (ref 98–107)
CK MB CFR SERPL CALC: 13.2 NG/ML — HIGH (ref 0–6.7)
CK SERPL-CCNC: 1021 U/L — HIGH (ref 25–170)
CO2 SERPL-SCNC: 17 MMOL/L — LOW (ref 22–29)
CO2 SERPL-SCNC: 23 MMOL/L — SIGNIFICANT CHANGE UP (ref 22–29)
COLOR SPEC: YELLOW — SIGNIFICANT CHANGE UP
CREAT SERPL-MCNC: 1.29 MG/DL — SIGNIFICANT CHANGE UP (ref 0.5–1.3)
CREAT SERPL-MCNC: 1.7 MG/DL — HIGH (ref 0.5–1.3)
DIFF PNL FLD: ABNORMAL
DIGOXIN SERPL-MCNC: 0.8 NG/ML — SIGNIFICANT CHANGE UP (ref 0.8–2)
EOSINOPHIL # BLD AUTO: 0.4 K/UL — SIGNIFICANT CHANGE UP (ref 0–0.5)
EOSINOPHIL NFR BLD AUTO: 3.6 % — SIGNIFICANT CHANGE UP (ref 0–6)
GAS PNL BLDA: SIGNIFICANT CHANGE UP
GAS PNL BLDA: SIGNIFICANT CHANGE UP
GLUCOSE SERPL-MCNC: 151 MG/DL — HIGH (ref 70–115)
GLUCOSE SERPL-MCNC: 220 MG/DL — HIGH (ref 70–115)
GLUCOSE UR QL: NEGATIVE MG/DL — SIGNIFICANT CHANGE UP
HCO3 BLDA-SCNC: 22 MMOL/L — SIGNIFICANT CHANGE UP (ref 20–26)
HCT VFR BLD CALC: 42.3 % — SIGNIFICANT CHANGE UP (ref 37–47)
HCT VFR BLD CALC: 46.3 % — SIGNIFICANT CHANGE UP (ref 37–47)
HGB BLD-MCNC: 13.8 G/DL — SIGNIFICANT CHANGE UP (ref 12–16)
HGB BLD-MCNC: 15.6 G/DL — SIGNIFICANT CHANGE UP (ref 12–16)
HOROWITZ INDEX BLDA+IHG-RTO: 3 — SIGNIFICANT CHANGE UP
INR BLD: 3.31 RATIO — HIGH (ref 0.88–1.16)
KETONES UR-MCNC: NEGATIVE — SIGNIFICANT CHANGE UP
LACTATE BLDV-MCNC: 2.3 MMOL/L — HIGH (ref 0.5–2)
LACTATE BLDV-MCNC: 3.4 MMOL/L — HIGH (ref 0.5–2)
LACTATE BLDV-MCNC: 8 MMOL/L — CRITICAL HIGH (ref 0.5–2)
LEUKOCYTE ESTERASE UR-ACNC: NEGATIVE — SIGNIFICANT CHANGE UP
LYMPHOCYTES # BLD AUTO: 0.8 K/UL — LOW (ref 1–4.8)
LYMPHOCYTES # BLD AUTO: 8.3 % — LOW (ref 20–55)
MCHC RBC-ENTMCNC: 32.6 G/DL — SIGNIFICANT CHANGE UP (ref 32–36)
MCHC RBC-ENTMCNC: 33.3 PG — HIGH (ref 27–31)
MCHC RBC-ENTMCNC: 33.5 PG — HIGH (ref 27–31)
MCHC RBC-ENTMCNC: 33.7 G/DL — SIGNIFICANT CHANGE UP (ref 32–36)
MCV RBC AUTO: 101.9 FL — HIGH (ref 81–99)
MCV RBC AUTO: 99.4 FL — HIGH (ref 81–99)
METHOD TYPE: SIGNIFICANT CHANGE UP
MONOCYTES # BLD AUTO: 0.7 K/UL — SIGNIFICANT CHANGE UP (ref 0–0.8)
MONOCYTES NFR BLD AUTO: 6.9 % — SIGNIFICANT CHANGE UP (ref 3–10)
NEUTROPHILS # BLD AUTO: 8.1 K/UL — HIGH (ref 1.8–8)
NEUTROPHILS NFR BLD AUTO: 80.8 % — HIGH (ref 37–73)
NITRITE UR-MCNC: NEGATIVE — SIGNIFICANT CHANGE UP
NT-PROBNP SERPL-SCNC: HIGH PG/ML (ref 0–300)
PCO2 BLDA: 35 MMHG — SIGNIFICANT CHANGE UP (ref 35–45)
PH BLDA: 7.39 — SIGNIFICANT CHANGE UP (ref 7.35–7.45)
PH UR: 6 — SIGNIFICANT CHANGE UP (ref 5–8)
PLATELET # BLD AUTO: 110 K/UL — LOW (ref 150–400)
PLATELET # BLD AUTO: 139 K/UL — LOW (ref 150–400)
PO2 BLDA: 116 MMHG — HIGH (ref 83–108)
POTASSIUM SERPL-MCNC: 4.2 MMOL/L — SIGNIFICANT CHANGE UP (ref 3.5–5.3)
POTASSIUM SERPL-MCNC: 4.6 MMOL/L — SIGNIFICANT CHANGE UP (ref 3.5–5.3)
POTASSIUM SERPL-SCNC: 4.2 MMOL/L — SIGNIFICANT CHANGE UP (ref 3.5–5.3)
POTASSIUM SERPL-SCNC: 4.6 MMOL/L — SIGNIFICANT CHANGE UP (ref 3.5–5.3)
PROT SERPL-MCNC: 6.4 G/DL — LOW (ref 6.6–8.7)
PROT SERPL-MCNC: 7.4 G/DL — SIGNIFICANT CHANGE UP (ref 6.6–8.7)
PROT UR-MCNC: 30 MG/DL
PROTHROM AB SERPL-ACNC: 37.3 SEC — HIGH (ref 9.8–12.7)
RAPID RVP RESULT: SIGNIFICANT CHANGE UP
RBC # BLD: 4.15 M/UL — LOW (ref 4.4–5.2)
RBC # BLD: 4.66 M/UL — SIGNIFICANT CHANGE UP (ref 4.4–5.2)
RBC # FLD: 16.4 % — HIGH (ref 11–15.6)
RBC # FLD: 17.1 % — HIGH (ref 11–15.6)
RBC CASTS # UR COMP ASSIST: ABNORMAL /HPF (ref 0–4)
SAO2 % BLDA: 99 % — SIGNIFICANT CHANGE UP (ref 95–99)
SODIUM SERPL-SCNC: 135 MMOL/L — SIGNIFICANT CHANGE UP (ref 135–145)
SODIUM SERPL-SCNC: 139 MMOL/L — SIGNIFICANT CHANGE UP (ref 135–145)
SP GR SPEC: 1.01 — SIGNIFICANT CHANGE UP (ref 1.01–1.02)
TROPONIN T SERPL-MCNC: 0.18 NG/ML — HIGH (ref 0–0.06)
UROBILINOGEN FLD QL: NEGATIVE MG/DL — SIGNIFICANT CHANGE UP
WBC # BLD: 10.1 K/UL — SIGNIFICANT CHANGE UP (ref 4.8–10.8)
WBC # BLD: 8 K/UL — SIGNIFICANT CHANGE UP (ref 4.8–10.8)
WBC # FLD AUTO: 10.1 K/UL — SIGNIFICANT CHANGE UP (ref 4.8–10.8)
WBC # FLD AUTO: 8 K/UL — SIGNIFICANT CHANGE UP (ref 4.8–10.8)
WBC UR QL: SIGNIFICANT CHANGE UP

## 2017-10-04 PROCEDURE — 99291 CRITICAL CARE FIRST HOUR: CPT

## 2017-10-04 PROCEDURE — 74176 CT ABD & PELVIS W/O CONTRAST: CPT | Mod: 26

## 2017-10-04 PROCEDURE — 71250 CT THORAX DX C-: CPT | Mod: 26

## 2017-10-04 PROCEDURE — 99223 1ST HOSP IP/OBS HIGH 75: CPT

## 2017-10-04 PROCEDURE — 93010 ELECTROCARDIOGRAM REPORT: CPT

## 2017-10-04 PROCEDURE — 71010: CPT | Mod: 26

## 2017-10-04 RX ORDER — DEXTROSE 50 % IN WATER 50 %
1 SYRINGE (ML) INTRAVENOUS ONCE
Qty: 0 | Refills: 0 | Status: DISCONTINUED | OUTPATIENT
Start: 2017-10-04 | End: 2017-10-12

## 2017-10-04 RX ORDER — VANCOMYCIN HCL 1 G
750 VIAL (EA) INTRAVENOUS
Qty: 0 | Refills: 0 | Status: DISCONTINUED | OUTPATIENT
Start: 2017-10-05 | End: 2017-10-06

## 2017-10-04 RX ORDER — FUROSEMIDE 40 MG
20 TABLET ORAL ONCE
Qty: 0 | Refills: 0 | Status: COMPLETED | OUTPATIENT
Start: 2017-10-04 | End: 2017-10-04

## 2017-10-04 RX ORDER — HYDRALAZINE HCL 50 MG
10 TABLET ORAL
Qty: 0 | Refills: 0 | Status: DISCONTINUED | OUTPATIENT
Start: 2017-10-04 | End: 2017-10-08

## 2017-10-04 RX ORDER — IPRATROPIUM/ALBUTEROL SULFATE 18-103MCG
3 AEROSOL WITH ADAPTER (GRAM) INHALATION ONCE
Qty: 0 | Refills: 0 | Status: COMPLETED | OUTPATIENT
Start: 2017-10-04 | End: 2017-10-04

## 2017-10-04 RX ORDER — DEXTROSE MONOHYDRATE, SODIUM CHLORIDE, AND POTASSIUM CHLORIDE 50; .745; 4.5 G/1000ML; G/1000ML; G/1000ML
1000 INJECTION, SOLUTION INTRAVENOUS
Qty: 0 | Refills: 0 | Status: DISCONTINUED | OUTPATIENT
Start: 2017-10-04 | End: 2017-10-04

## 2017-10-04 RX ORDER — FUROSEMIDE 40 MG
40 TABLET ORAL DAILY
Qty: 0 | Refills: 0 | Status: DISCONTINUED | OUTPATIENT
Start: 2017-10-04 | End: 2017-10-04

## 2017-10-04 RX ORDER — HYDRALAZINE HCL 50 MG
10 TABLET ORAL ONCE
Qty: 0 | Refills: 0 | Status: COMPLETED | OUTPATIENT
Start: 2017-10-04 | End: 2017-10-04

## 2017-10-04 RX ORDER — DEXTROSE 50 % IN WATER 50 %
25 SYRINGE (ML) INTRAVENOUS ONCE
Qty: 0 | Refills: 0 | Status: DISCONTINUED | OUTPATIENT
Start: 2017-10-04 | End: 2017-10-12

## 2017-10-04 RX ORDER — ACETAMINOPHEN 500 MG
650 TABLET ORAL ONCE
Qty: 0 | Refills: 0 | Status: COMPLETED | OUTPATIENT
Start: 2017-10-04 | End: 2017-10-04

## 2017-10-04 RX ORDER — AZTREONAM 2 G
1000 VIAL (EA) INJECTION ONCE
Qty: 0 | Refills: 0 | Status: COMPLETED | OUTPATIENT
Start: 2017-10-04 | End: 2017-10-04

## 2017-10-04 RX ORDER — AZTREONAM 2 G
500 VIAL (EA) INJECTION
Qty: 0 | Refills: 0 | Status: DISCONTINUED | OUTPATIENT
Start: 2017-10-04 | End: 2017-10-05

## 2017-10-04 RX ORDER — WARFARIN SODIUM 2.5 MG/1
2 TABLET ORAL ONCE
Qty: 0 | Refills: 0 | Status: DISCONTINUED | OUTPATIENT
Start: 2017-10-04 | End: 2017-10-04

## 2017-10-04 RX ORDER — AZTREONAM 2 G
1000 VIAL (EA) INJECTION EVERY 12 HOURS
Qty: 0 | Refills: 0 | Status: DISCONTINUED | OUTPATIENT
Start: 2017-10-04 | End: 2017-10-04

## 2017-10-04 RX ORDER — VANCOMYCIN HCL 1 G
500 VIAL (EA) INTRAVENOUS EVERY 12 HOURS
Qty: 0 | Refills: 0 | Status: DISCONTINUED | OUTPATIENT
Start: 2017-10-04 | End: 2017-10-04

## 2017-10-04 RX ORDER — SODIUM CHLORIDE 9 MG/ML
500 INJECTION INTRAMUSCULAR; INTRAVENOUS; SUBCUTANEOUS ONCE
Qty: 0 | Refills: 0 | Status: COMPLETED | OUTPATIENT
Start: 2017-10-04 | End: 2017-10-04

## 2017-10-04 RX ORDER — FUROSEMIDE 40 MG
20 TABLET ORAL EVERY 12 HOURS
Qty: 0 | Refills: 0 | Status: DISCONTINUED | OUTPATIENT
Start: 2017-10-04 | End: 2017-10-05

## 2017-10-04 RX ORDER — ATENOLOL 25 MG/1
25 TABLET ORAL
Qty: 0 | Refills: 0 | Status: DISCONTINUED | OUTPATIENT
Start: 2017-10-04 | End: 2017-10-12

## 2017-10-04 RX ORDER — SODIUM CHLORIDE 9 MG/ML
3 INJECTION INTRAMUSCULAR; INTRAVENOUS; SUBCUTANEOUS ONCE
Qty: 0 | Refills: 0 | Status: COMPLETED | OUTPATIENT
Start: 2017-10-04 | End: 2017-10-04

## 2017-10-04 RX ORDER — DIGOXIN 250 MCG
0.12 TABLET ORAL EVERY OTHER DAY
Qty: 0 | Refills: 0 | Status: DISCONTINUED | OUTPATIENT
Start: 2017-10-04 | End: 2017-10-12

## 2017-10-04 RX ORDER — SODIUM CHLORIDE 9 MG/ML
1000 INJECTION, SOLUTION INTRAVENOUS
Qty: 0 | Refills: 0 | Status: DISCONTINUED | OUTPATIENT
Start: 2017-10-04 | End: 2017-10-12

## 2017-10-04 RX ORDER — LACTOBACILLUS ACIDOPHILUS 100MM CELL
1 CAPSULE ORAL DAILY
Qty: 0 | Refills: 0 | Status: DISCONTINUED | OUTPATIENT
Start: 2017-10-04 | End: 2017-10-12

## 2017-10-04 RX ORDER — SODIUM CHLORIDE 9 MG/ML
1800 INJECTION INTRAMUSCULAR; INTRAVENOUS; SUBCUTANEOUS ONCE
Qty: 0 | Refills: 0 | Status: COMPLETED | OUTPATIENT
Start: 2017-10-04 | End: 2017-10-04

## 2017-10-04 RX ORDER — ACETAMINOPHEN 500 MG
650 TABLET ORAL EVERY 6 HOURS
Qty: 0 | Refills: 0 | Status: DISCONTINUED | OUTPATIENT
Start: 2017-10-04 | End: 2017-10-12

## 2017-10-04 RX ORDER — INSULIN LISPRO 100/ML
VIAL (ML) SUBCUTANEOUS
Qty: 0 | Refills: 0 | Status: DISCONTINUED | OUTPATIENT
Start: 2017-10-04 | End: 2017-10-12

## 2017-10-04 RX ORDER — SPIRONOLACTONE 25 MG/1
12.5 TABLET, FILM COATED ORAL DAILY
Qty: 0 | Refills: 0 | Status: DISCONTINUED | OUTPATIENT
Start: 2017-10-04 | End: 2017-10-04

## 2017-10-04 RX ORDER — VANCOMYCIN HCL 1 G
750 VIAL (EA) INTRAVENOUS EVERY 24 HOURS
Qty: 0 | Refills: 0 | Status: DISCONTINUED | OUTPATIENT
Start: 2017-10-04 | End: 2017-10-04

## 2017-10-04 RX ORDER — DEXTROSE 50 % IN WATER 50 %
12.5 SYRINGE (ML) INTRAVENOUS ONCE
Qty: 0 | Refills: 0 | Status: DISCONTINUED | OUTPATIENT
Start: 2017-10-04 | End: 2017-10-12

## 2017-10-04 RX ORDER — VANCOMYCIN HCL 1 G
750 VIAL (EA) INTRAVENOUS
Qty: 0 | Refills: 0 | Status: DISCONTINUED | OUTPATIENT
Start: 2017-10-04 | End: 2017-10-04

## 2017-10-04 RX ORDER — GLUCAGON INJECTION, SOLUTION 0.5 MG/.1ML
1 INJECTION, SOLUTION SUBCUTANEOUS ONCE
Qty: 0 | Refills: 0 | Status: DISCONTINUED | OUTPATIENT
Start: 2017-10-04 | End: 2017-10-12

## 2017-10-04 RX ORDER — VANCOMYCIN HCL 1 G
1000 VIAL (EA) INTRAVENOUS ONCE
Qty: 0 | Refills: 0 | Status: COMPLETED | OUTPATIENT
Start: 2017-10-04 | End: 2017-10-04

## 2017-10-04 RX ADMIN — Medication 0.12 MILLIGRAM(S): at 11:44

## 2017-10-04 RX ADMIN — SODIUM CHLORIDE 3000 MILLILITER(S): 9 INJECTION INTRAMUSCULAR; INTRAVENOUS; SUBCUTANEOUS at 06:00

## 2017-10-04 RX ADMIN — Medication 250 MILLIGRAM(S): at 02:51

## 2017-10-04 RX ADMIN — Medication 10 MILLIGRAM(S): at 15:55

## 2017-10-04 RX ADMIN — Medication 1 TABLET(S): at 11:44

## 2017-10-04 RX ADMIN — ATENOLOL 25 MILLIGRAM(S): 25 TABLET ORAL at 18:26

## 2017-10-04 RX ADMIN — SODIUM CHLORIDE 3 MILLILITER(S): 9 INJECTION INTRAMUSCULAR; INTRAVENOUS; SUBCUTANEOUS at 02:48

## 2017-10-04 RX ADMIN — Medication 650 MILLIGRAM(S): at 02:50

## 2017-10-04 RX ADMIN — Medication 20 MILLIGRAM(S): at 13:10

## 2017-10-04 RX ADMIN — Medication 50 MILLIGRAM(S): at 03:34

## 2017-10-04 RX ADMIN — DEXTROSE MONOHYDRATE, SODIUM CHLORIDE, AND POTASSIUM CHLORIDE 70 MILLILITER(S): 50; .745; 4.5 INJECTION, SOLUTION INTRAVENOUS at 06:27

## 2017-10-04 RX ADMIN — Medication 40 MILLIGRAM(S): at 11:44

## 2017-10-04 RX ADMIN — Medication 3 MILLILITER(S): at 13:10

## 2017-10-04 RX ADMIN — Medication 10 MILLIGRAM(S): at 18:26

## 2017-10-04 RX ADMIN — Medication 20 MILLIGRAM(S): at 21:55

## 2017-10-04 RX ADMIN — SODIUM CHLORIDE 900 MILLILITER(S): 9 INJECTION INTRAMUSCULAR; INTRAVENOUS; SUBCUTANEOUS at 02:51

## 2017-10-04 NOTE — ED ADULT NURSE REASSESSMENT NOTE - NS ED NURSE REASSESS COMMENT FT1
pt status unchanged, refer to flowsheet and chart, pt safety maintained, pt hemodynamically stable, refer to sepsis flowsheet, transfer to B2 pt status unchanged, refer to flowsheet and chart, pt safety maintained, pt hemodynamically stable, refer to sepsis flowsheet, transfer to B13, report given Ophelia Arauz RN

## 2017-10-04 NOTE — PROGRESS NOTE ADULT - SUBJECTIVE AND OBJECTIVE BOX
RAGHU DAVIS    [id number]    84y      Female    INTERVAL HPI/OVERNIGHT EVENTS:  83 y/o female with h/o CHF, A fib on coumadin, was brought in to the ER because of difficulty to breath in the ER, patient had fever 102. code sepsis was activated. patient denies any pain, cough or dysuria. patient added that she is not feeling well for weeks. h/o diarrhea 1 week ago after taking po antibiotic. patient does not recall the reason for antibiotic. Ct chest and abdomen in Feb. 2017 shows a 1.2 cm lung nodule and gall stones. no sore throat, cough or runny nose     REVIEW OF SYSTEMS:    This morning, pt appears comfortable, afebrile.  She says she would like to go home.  She appears alert and oriented with some confusion.   She denies ever having shortness of breath.  She state she went to Dr. Mejia's office bc she had a headache and didn't feel well and he called an ambulance.  Denies any HA/ SOB/ CP/ fever/ chills/ dysuria/ n/v/ diarrhea currently.  She states she was aware that she has an abdominal aneurysm but states she never had a CT before.  Difficult to assess how reliable of historian pt is.    CONSTITUTIONAL: + fever, no weight loss, or fatigue  RESPIRATORY: No cough, wheezing, hemoptysis; + shortness of breath  CARDIOVASCULAR: No chest pain, palpitations  GASTROINTESTINAL: No abdominal or epigastric pain.  nausea, vomiting  NEUROLOGICAL: + headaches, memory loss, No loss of strength.      Vital Signs Last 24 Hrs  T(C): 36.4 (04 Oct 2017 08:49), Max: 39.9 (04 Oct 2017 02:30)  T(F): 97.6 (04 Oct 2017 08:49), Max: 103.9 (04 Oct 2017 02:30)  HR: 81 (04 Oct 2017 08:49) (81 - 129)  BP: 112/76 (04 Oct 2017 08:49) (103/67 - 179/103)  RR: 29 (04 Oct 2017 08:49) (20 - 30)  SpO2: 98% (04 Oct 2017 08:49) (92% - 98%)    PHYSICAL EXAM:    GENERAL: NAD, frail elderly   HEENT: PERRL, +EOMI  NECK: soft, Supple, No JVD,   CHEST/LUNG: Clear to ascultation bilaterally; No wheezing  HEART: Irregularly irregular, + murmur  ABDOMEN: Soft, Nontender, Nondistended; Bowel sounds present  EXTREMITIES:  2+ Peripheral Pulses, No clubbing, cyanosis, or edema  SKIN: No rashes or lesions  NEURO: AAOX2, no focal deficits, no motor r sensory loss  PSYCH: pleasant, some confusion      LABS:                        15.6   10.1  )-----------( 139      ( 04 Oct 2017 02:26 )             46.3     10-04    135  |  94<L>  |  29.0<H>  ----------------------------<  220<H>  4.2   |  17.0<L>  |  1.70<H>    Ca    9.0      04 Oct 2017 02:26    TPro  7.4  /  Alb  3.8  /  TBili  0.8  /  DBili  x   /  AST  31  /  ALT  20  /  AlkPhos  102  10-04    PT/INR - ( 04 Oct 2017 02:26 )   PT: 37.3 sec;   INR: 3.31 ratio         PTT - ( 04 Oct 2017 02:26 )  PTT:40.7 sec    lactate 8- 3.4- 2.3    Dig level 0.8    RVP not detected    MEDICATIONS  (STANDING):  ATENolol  Tablet 25 milliGRAM(s) Oral two times a day  aztreonam  IVPB 500 milliGRAM(s) IV Intermittent <User Schedule>  dextrose 5%. 1000 milliLiter(s) (50 mL/Hr) IV Continuous <Continuous>  dextrose 50% Injectable 12.5 Gram(s) IV Push once  dextrose 50% Injectable 25 Gram(s) IV Push once  dextrose 50% Injectable 25 Gram(s) IV Push once  digoxin     Tablet 0.125 milliGRAM(s) Oral every other day  furosemide    Tablet 40 milliGRAM(s) Oral daily  hydrALAZINE 10 milliGRAM(s) Oral two times a day  insulin lispro (HumaLOG) corrective regimen sliding scale   SubCutaneous three times a day before meals  lactobacillus acidophilus 1 Tablet(s) Oral daily  sodium chloride 0.9% with potassium chloride 20 mEq/L 1000 milliLiter(s) (70 mL/Hr) IV Continuous <Continuous>  vancomycin  IVPB 750 milliGRAM(s) IV Intermittent <User Schedule>  warfarin 2 milliGRAM(s) Oral once    MEDICATIONS  (PRN):  dextrose Gel 1 Dose(s) Oral once PRN Blood Glucose LESS THAN 70 milliGRAM(s)/deciliter  glucagon  Injectable 1 milliGRAM(s) IntraMuscular once PRN Glucose LESS THAN 70 milligrams/deciliter      RADIOLOGY & ADDITIONAL TESTS:    CXR:  No acute cardiopulmonary disease process. Cardiomegaly.    Chest Abd/ Pelvis CT:  No acute findings provided the limitation of a noncontrast technique.    Emphysema.    Mildly enlarged 4.7 cm abdominal aortic aneurysm.

## 2017-10-04 NOTE — ED ADULT NURSE REASSESSMENT NOTE - NS ED NURSE REASSESS COMMENT FT1
Pt. BP fluctuating between 90s to low 100s systolic. Pt. noted to be increasingly lethargic over past hour, awakens to tactile stimuli. Pt. slow to respond.

## 2017-10-04 NOTE — ED ADULT NURSE REASSESSMENT NOTE - NS ED NURSE REASSESS COMMENT FT1
Pt. fluid bolus and antibiotics finished, afebrile, HR controlled, BP trending down. Pt. remains confused to time, however oriented to person, place, and situation. MD Christianson made aware of improved Lactate to 3.4, maintenance fluids recommended, will continue to monitor and maintain safety

## 2017-10-04 NOTE — ED PROVIDER NOTE - PROGRESS NOTE DETAILS
Labs as noted.  Case d/w Hospitalist/Dr. Cloud for PMD/Dr. Mejia and requesting RVP and will admit to monitored bed

## 2017-10-04 NOTE — ED ADULT TRIAGE NOTE - CHIEF COMPLAINT QUOTE
Pt c/o respiratory distress, cough, fever.  Dr. Cole called to assess pt. Pt c/o respiratory distress, cough, fever.  Dr. Cole called to assess pt.  Code sepsis called.

## 2017-10-04 NOTE — CHART NOTE - NSCHARTNOTEFT_GEN_A_CORE
Called by RN reported pt was tachypnic and wheezing.  Upon entering room pt's breathing appeared labored RR rate was 35-40 breaths/ min.  HR in 80s bp 140/85 afebrile.  Pt still answering questions but appeared difficult.  Pt's  and daughter in the room. Lungs- decreased breath sounds at bases and + rales. Pt had just been given 40 mg of lasix PO standing dose. Considering pt's hx of CHF and that she was given over 3 liters IVF overnight for Sepsis, now gave 20mg lasix IVP.  RN reports she had only collected 100 cc of urine from pt this morning.  CXR ordered.  ABG unremarkable. Pt respiratory rate continued to be elevated.  Pt given duoneb. tx with no improvement.  Pt's abd appeared distended.  Bladder sono showed 450 cc residual.  Pt attempt to void but only small amount of urine voided. Straight cath - 350 cc urine.      Pt 's daughter states pt's earlier account was inaccurate.  Pt had been feeling fine yesterday  and at midnight the daughter received a call from pt's  that he could not arrouse her.  When the daughter arrived at the pts house she was shaking.  They called 911.  Pt had been Dr. Mejia's office one week and diagnosed with Gout and given script for Allopurinol.  Pt had been a hospital stay in [place] this past February for Sepsis due to C.diff. Daughter reports at one point, the pt had to be placed on Bipap for about a day for respiratory distress during the previous admission.      Dr. Elizabeth came to examine pt and we discussed case - ordered Bipap, added BNP, EKG - called for Cardiology consult and Renal consult.    ABG on 3L NC  pH 7.39  pCO2 35  pO2 116  HCO3 22  Base excess -3    Currently-      HR 80  /90 afebrile  RR 27 on Bipap O2 SAt 97 %  sleeping but arousable, appears more comfortable  Lungs - decreased breathe sounds at bases, + rales   Heart - Irregularly, irregular  Abd - soft, mildly distended, NT, +BS  Rt. knee- mild edema and increased warmth     Sepsis, CHF, respiratory distress  Bipap  f/u UA/ Urine cx, , broad spectrum antbx, currently holding fluids due to worsening CHF  Cardiology consult  Renal consult  repeat EKG  CXR  continue cardiac monitoring

## 2017-10-04 NOTE — ED PROVIDER NOTE - OBJECTIVE STATEMENT
An 84 year old female pt with a hx of CHF, A-fib, on Coumadin, presents to the ED c/o SOB. As per the pt's family, the Pt has had a worsening fever and cough worsening over the past few days. This morning, the pt's  found her in the tripod position on her bed with difficulty breathing and SOB.  She was BIBA and was placed on O2 en route to the ED. In the ED, the pt was noted to be hypoxic on room air with a IA temp of 103 F. She has not had any recent sick contacts and is currently being treated for Gout by Dr. Mejia, [facility]. Pt is not on home O2. She is a former smoker (30 years ago). Code: Sepsis activated in the ED.

## 2017-10-04 NOTE — CONSULT NOTE ADULT - SUBJECTIVE AND OBJECTIVE BOX
HPI:  83 y/o female with h/o CHF, A fib on coumadin, was brought in to the ER because of difficulty to breath i  the ER, patient had fever 102. code sepsis was activated. patient denies any pain, cough or dysuria. patient added that she is not feeling well for weeks. h/o diarrhea 1 week ago after taking po antibiotic. patient does not recall the reason for antibiotic. Ct chest and abdomen in Feb. 2017 shows a 1.2 cm lung nodule and gall stones. no sore throat, cough or runny nose (04 Oct 2017 04:36). Denies recent  contact with ill person or child. Recently  had right  knee pain and was dx as gout and placed on allopurinol.  Knee was not tapped.    PAST MEDICAL & SURGICAL HISTORY:  Clostridium difficile diarrhea  CHF (congestive heart failure)  Gout  Mitral valve regurgitation  Hypertension  Atrial fibrillation  H/O skin graft right lower leg after SQ bleed      HEALTH ISSUES - PROBLEM Dx:  Type 2 diabetes mellitus with complication, without long-term current use of insulin: Type 2 diabetes mellitus with complication, without long-term current use of insulin  Chronic systolic congestive heart failure: Chronic systolic congestive heart failure  CRI (chronic renal insufficiency), stage 3 (moderate): CRI (chronic renal insufficiency), stage 3 (moderate)  Essential hypertension: Essential hypertension  Chronic atrial fibrillation: Chronic atrial fibrillation  Sepsis, due to unspecified organism: Sepsis, due to unspecified organism          Allergies    aspirin (Unknown)  azithromycin (Diarrhea)  latex (Unknown)  morphine (Other; Short breath)  penicillins (Unknown)  sulfa drugs (Unknown)    Intolerances        FAMILY HISTORY:  No pertinent family history in first degree relatives      MEDICATIONS  (STANDING):  ATENolol  Tablet 25 milliGRAM(s) Oral two times a day  aztreonam  IVPB 500 milliGRAM(s) IV Intermittent <User Schedule>  dextrose 5%. 1000 milliLiter(s) (50 mL/Hr) IV Continuous <Continuous>  dextrose 50% Injectable 12.5 Gram(s) IV Push once  dextrose 50% Injectable 25 Gram(s) IV Push once  dextrose 50% Injectable 25 Gram(s) IV Push once  digoxin     Tablet 0.125 milliGRAM(s) Oral every other day  hydrALAZINE 10 milliGRAM(s) Oral two times a day  insulin lispro (HumaLOG) corrective regimen sliding scale   SubCutaneous three times a day before meals  lactobacillus acidophilus 1 Tablet(s) Oral daily    MEDICATIONS  (PRN):  dextrose Gel 1 Dose(s) Oral once PRN Blood Glucose LESS THAN 70 milliGRAM(s)/deciliter  glucagon  Injectable 1 milliGRAM(s) IntraMuscular once PRN Glucose LESS THAN 70 milligrams/deciliter      ICU Vital Signs Last 24 Hrs  T(C): 36.6 (04 Oct 2017 11:42), Max: 39.9 (04 Oct 2017 02:30)  T(F): 97.9 (04 Oct 2017 11:42), Max: 103.9 (04 Oct 2017 02:30)  HR: 94 (04 Oct 2017 13:38) (72 - 129)  BP: 141/75 (04 Oct 2017 11:42) (103/67 - 179/103)  BP(mean): --  ABP: --  ABP(mean): --  RR: 30 (04 Oct 2017 11:42) (20 - 30)  SpO2: 98% (04 Oct 2017 13:38) (92% - 98%)      I&O's Summary                            13.8   8.0   )-----------( 110      ( 04 Oct 2017 11:49 )             42.3       10-04    139  |  103  |  26.0<H>  ----------------------------<  151<H>  4.6   |  23.0  |  1.29    Ca    8.7      04 Oct 2017 11:49    TPro  6.4<L>  /  Alb  3.4  /  TBili  0.8  /  DBili  x   /  AST  50<H>  /  ALT  23  /  AlkPhos  92  10-04      PHYSICAL EXAM:      Constitutional: Appears a cutely ill    Eyes: wnl    ENMT:    Neck: veins  wnl    Breasts: wnl    Back: not tender    Respiratory: Bipap noted, bs  bilaterally     Cardiovascular: no rub    Gastrointestinal: abdomin not tender    Genitourinary: neg    Rectal: declined    Extremities: stasis  changes  both lower legs, with petechial rash right leg    Vascular: dp  not felt    Neurological: oriented  alert awake    Skin: Diffuse petechial  rash entire trunk and part of extremeties.    Lymph Nodes:    Musculoskeletal: right  knee swollen slightly    Psychiatric: wnl

## 2017-10-04 NOTE — H&P ADULT - HISTORY OF PRESENT ILLNESS
85 y/o female with h/o CHF, A fib on coumadin 83 y/o female with h/o CHF, A fib on coumadin, was brought in to the ER because of difficulty to breath i  the ER, patient had fever 102. code sepsis was activated. patient denies any pain, cough or dysuria. patient added that she is not feeling well for weeks. h/o diarrhea 1 week ago after taking po antibiotic. patient does not recall the reason for antibiotic. Ct chest and abdomen in Feb. 2017 shows a 1.2 cm lung nodule and gall stones. no sore throat, cough or runny nose

## 2017-10-04 NOTE — PROGRESS NOTE ADULT - PROBLEM SELECTOR PLAN 1
Bcx. UA/ Urince culture  IVF.   Azactam and vanco.   RVP not detected  CT chest CT and CXR - no acute changes  lactate trending downward

## 2017-10-04 NOTE — ED PROVIDER NOTE - CRITICAL CARE PROVIDED
documentation/direct patient care (not related to procedure)/interpretation of diagnostic studies/consult w/ pt's family directly relating to pts condition/additional history taking/consultation with other physicians

## 2017-10-04 NOTE — ED ADULT NURSE NOTE - OBJECTIVE STATEMENT
Pt BIBA for episode of unresponsiveness. Pt. was found in bed by  face down, nonverbal, and subjective episode of shaking with blood noted on pillow.  In ED Code Sepsis called for fever and tachycardia. Pt. oriented to person and place, disoriented to time and situation, recognizes family.

## 2017-10-04 NOTE — ED ADULT NURSE NOTE - PMH
Atrial fibrillation    CHF (congestive heart failure)    Clostridium difficile diarrhea    Gout    Hypertension    Mitral valve regurgitation

## 2017-10-04 NOTE — ED PROVIDER NOTE - MEDICAL DECISION MAKING DETAILS
An 84 year old female pt presents to the ED c/o SOB. Will give IV abx, fluids, check labs, and re-evaluate. Code: Sepsis activated in the ED.

## 2017-10-05 DIAGNOSIS — R21 RASH AND OTHER NONSPECIFIC SKIN ERUPTION: ICD-10-CM

## 2017-10-05 DIAGNOSIS — A41.9 SEPSIS, UNSPECIFIED ORGANISM: ICD-10-CM

## 2017-10-05 DIAGNOSIS — M25.461 EFFUSION, RIGHT KNEE: ICD-10-CM

## 2017-10-05 DIAGNOSIS — R78.81 BACTEREMIA: ICD-10-CM

## 2017-10-05 LAB
ALBUMIN SERPL ELPH-MCNC: 3.4 G/DL — SIGNIFICANT CHANGE UP (ref 3.3–5.2)
ALP SERPL-CCNC: 85 U/L — SIGNIFICANT CHANGE UP (ref 40–120)
ALT FLD-CCNC: 30 U/L — SIGNIFICANT CHANGE UP
ANION GAP SERPL CALC-SCNC: 14 MMOL/L — SIGNIFICANT CHANGE UP (ref 5–17)
ANION GAP SERPL CALC-SCNC: 17 MMOL/L — SIGNIFICANT CHANGE UP (ref 5–17)
APTT BLD: 42.6 SEC — HIGH (ref 27.5–37.4)
AST SERPL-CCNC: 61 U/L — HIGH
B PERT IGG+IGM PNL SER: ABNORMAL
BILIRUB SERPL-MCNC: 0.9 MG/DL — SIGNIFICANT CHANGE UP (ref 0.4–2)
BUN SERPL-MCNC: 29 MG/DL — HIGH (ref 8–20)
BUN SERPL-MCNC: 33 MG/DL — HIGH (ref 8–20)
CALCIUM SERPL-MCNC: 8.7 MG/DL — SIGNIFICANT CHANGE UP (ref 8.6–10.2)
CALCIUM SERPL-MCNC: 9.1 MG/DL — SIGNIFICANT CHANGE UP (ref 8.6–10.2)
CHLORIDE SERPL-SCNC: 100 MMOL/L — SIGNIFICANT CHANGE UP (ref 98–107)
CHLORIDE SERPL-SCNC: 100 MMOL/L — SIGNIFICANT CHANGE UP (ref 98–107)
CK MB CFR SERPL CALC: 9 NG/ML — HIGH (ref 0–6.7)
CK SERPL-CCNC: 936 U/L — HIGH (ref 25–170)
CO2 SERPL-SCNC: 23 MMOL/L — SIGNIFICANT CHANGE UP (ref 22–29)
CO2 SERPL-SCNC: 23 MMOL/L — SIGNIFICANT CHANGE UP (ref 22–29)
COLOR FLD: ABNORMAL
CREAT SERPL-MCNC: 1.2 MG/DL — SIGNIFICANT CHANGE UP (ref 0.5–1.3)
CREAT SERPL-MCNC: 1.33 MG/DL — HIGH (ref 0.5–1.3)
ERYTHROCYTE [SEDIMENTATION RATE] IN BLOOD: 8 MM/HR — SIGNIFICANT CHANGE UP (ref 0–20)
FLUID INTAKE SUBSTANCE CLASS: SIGNIFICANT CHANGE UP
FLUID SEGMENTED GRANULOCYTES: 4 % — SIGNIFICANT CHANGE UP
GLUCOSE SERPL-MCNC: 113 MG/DL — SIGNIFICANT CHANGE UP (ref 70–115)
GLUCOSE SERPL-MCNC: 115 MG/DL — SIGNIFICANT CHANGE UP (ref 70–115)
HBA1C BLD-MCNC: 6.3 % — HIGH (ref 4–5.6)
HCT VFR BLD CALC: 48.8 % — HIGH (ref 37–47)
HGB BLD-MCNC: 16.1 G/DL — HIGH (ref 12–16)
INR BLD: 2.22 RATIO — HIGH (ref 0.88–1.16)
LACTATE SERPL-SCNC: 3.8 MMOL/L — HIGH (ref 0.5–2)
LYMPHOCYTES # FLD: 66 % — SIGNIFICANT CHANGE UP
MCHC RBC-ENTMCNC: 32.7 PG — HIGH (ref 27–31)
MCHC RBC-ENTMCNC: 33 G/DL — SIGNIFICANT CHANGE UP (ref 32–36)
MCV RBC AUTO: 99.2 FL — HIGH (ref 81–99)
MONOS+MACROS # FLD: 30 % — SIGNIFICANT CHANGE UP
PLATELET # BLD AUTO: 126 K/UL — LOW (ref 150–400)
POTASSIUM SERPL-MCNC: 3.6 MMOL/L — SIGNIFICANT CHANGE UP (ref 3.5–5.3)
POTASSIUM SERPL-MCNC: 3.9 MMOL/L — SIGNIFICANT CHANGE UP (ref 3.5–5.3)
POTASSIUM SERPL-SCNC: 3.6 MMOL/L — SIGNIFICANT CHANGE UP (ref 3.5–5.3)
POTASSIUM SERPL-SCNC: 3.9 MMOL/L — SIGNIFICANT CHANGE UP (ref 3.5–5.3)
PROT SERPL-MCNC: 6.6 G/DL — SIGNIFICANT CHANGE UP (ref 6.6–8.7)
PROTHROM AB SERPL-ACNC: 24.8 SEC — HIGH (ref 9.8–12.7)
RBC # BLD: 4.92 M/UL — SIGNIFICANT CHANGE UP (ref 4.4–5.2)
RBC # BLD: 4.92 M/UL — SIGNIFICANT CHANGE UP (ref 4.4–5.2)
RBC # FLD: 16.9 % — HIGH (ref 11–15.6)
RCV VOL RI: HIGH /UL (ref 0–5)
RETICS #: 5.4 K/UL — LOW (ref 25–125)
RETICS/RBC NFR: 1.1 % — SIGNIFICANT CHANGE UP (ref 0.5–2.5)
SODIUM SERPL-SCNC: 137 MMOL/L — SIGNIFICANT CHANGE UP (ref 135–145)
SODIUM SERPL-SCNC: 140 MMOL/L — SIGNIFICANT CHANGE UP (ref 135–145)
TOTAL NUCLEATED CELL COUNT, BODY FLUID: 3600 /UL — HIGH (ref 0–5)
TROPONIN T SERPL-MCNC: 0.18 NG/ML — HIGH (ref 0–0.06)
TUBE TYPE: SIGNIFICANT CHANGE UP
WBC # BLD: 15.2 K/UL — HIGH (ref 4.8–10.8)
WBC # FLD AUTO: 15.2 K/UL — HIGH (ref 4.8–10.8)

## 2017-10-05 PROCEDURE — 73562 X-RAY EXAM OF KNEE 3: CPT | Mod: 26,RT

## 2017-10-05 PROCEDURE — 12345: CPT | Mod: NC

## 2017-10-05 PROCEDURE — 99223 1ST HOSP IP/OBS HIGH 75: CPT

## 2017-10-05 RX ORDER — FUROSEMIDE 40 MG
20 TABLET ORAL DAILY
Qty: 0 | Refills: 0 | Status: DISCONTINUED | OUTPATIENT
Start: 2017-10-05 | End: 2017-10-07

## 2017-10-05 RX ORDER — BENZOCAINE AND MENTHOL 5; 1 G/100ML; G/100ML
1 LIQUID ORAL EVERY 4 HOURS
Qty: 0 | Refills: 0 | Status: DISCONTINUED | OUTPATIENT
Start: 2017-10-05 | End: 2017-10-12

## 2017-10-05 RX ORDER — POTASSIUM CHLORIDE 20 MEQ
20 PACKET (EA) ORAL DAILY
Qty: 0 | Refills: 0 | Status: DISCONTINUED | OUTPATIENT
Start: 2017-10-05 | End: 2017-10-12

## 2017-10-05 RX ADMIN — Medication 150 MILLIGRAM(S): at 01:14

## 2017-10-05 RX ADMIN — ATENOLOL 25 MILLIGRAM(S): 25 TABLET ORAL at 17:35

## 2017-10-05 RX ADMIN — Medication 650 MILLIGRAM(S): at 00:06

## 2017-10-05 RX ADMIN — Medication 10 MILLIGRAM(S): at 17:35

## 2017-10-05 RX ADMIN — Medication 50 MILLIGRAM(S): at 02:33

## 2017-10-05 RX ADMIN — ATENOLOL 25 MILLIGRAM(S): 25 TABLET ORAL at 06:20

## 2017-10-05 RX ADMIN — Medication 20 MILLIEQUIVALENT(S): at 11:55

## 2017-10-05 RX ADMIN — Medication 10 MILLIGRAM(S): at 06:20

## 2017-10-05 RX ADMIN — Medication 1 TABLET(S): at 11:55

## 2017-10-05 NOTE — PROGRESS NOTE ADULT - SUBJECTIVE AND OBJECTIVE BOX
NEPHROLOGY INTERVAL HPI/OVERNIGHT EVENTS: No new events.    MEDICATIONS  (STANDING):  ATENolol  Tablet 25 milliGRAM(s) Oral two times a day  aztreonam  IVPB 500 milliGRAM(s) IV Intermittent <User Schedule>  dextrose 5%. 1000 milliLiter(s) (50 mL/Hr) IV Continuous <Continuous>  dextrose 50% Injectable 12.5 Gram(s) IV Push once  dextrose 50% Injectable 25 Gram(s) IV Push once  dextrose 50% Injectable 25 Gram(s) IV Push once  digoxin     Tablet 0.125 milliGRAM(s) Oral every other day  furosemide   Injectable 20 milliGRAM(s) IV Push every 12 hours  hydrALAZINE 10 milliGRAM(s) Oral two times a day  insulin lispro (HumaLOG) corrective regimen sliding scale   SubCutaneous three times a day before meals  lactobacillus acidophilus 1 Tablet(s) Oral daily  vancomycin  IVPB 750 milliGRAM(s) IV Intermittent <User Schedule>    MEDICATIONS  (PRN):  acetaminophen   Tablet 650 milliGRAM(s) Oral every 6 hours PRN For Temp greater than 38 C (100.4 F)  dextrose Gel 1 Dose(s) Oral once PRN Blood Glucose LESS THAN 70 milliGRAM(s)/deciliter  glucagon  Injectable 1 milliGRAM(s) IntraMuscular once PRN Glucose LESS THAN 70 milligrams/deciliter      Allergies    aspirin (Unknown)  azithromycin (Diarrhea)  latex (Unknown)  morphine (Other; Short breath)  penicillins (Unknown)  sulfa drugs (Unknown)    Intolerances        Vital Signs Last 24 Hrs  T(C): 37.1 (05 Oct 2017 01:57), Max: 39.2 (05 Oct 2017 00:22)  T(F): 98.8 (05 Oct 2017 01:57), Max: 102.5 (05 Oct 2017 00:22)  HR: 85 (05 Oct 2017 04:00) (72 - 101)  BP: 120/83 (05 Oct 2017 04:00) (105/63 - 160/90)  BP(mean): 96 (05 Oct 2017 04:00) (77 - 96)  RR: 21 (05 Oct 2017 04:00) (20 - 30)  SpO2: 100% (05 Oct 2017 04:00) (96% - 100%)  Daily     Daily     PHYSICAL EXAM:    GENERAL: appears  acutely ill.  HEAD:  Bipap mask on  EYES:   ENMT:   NECK: veins  wnl  NERVOUS SYSTEM:  wnl  CHEST/LUNG: no  wheezes, bs bilaterally  HEART: reg. rate, no rub  ABDOMEN: soft, not tender, no bruit  EXTREMITIES:  same  LYMPH:   SKIN: diffuse  rash  same    LABS:                        16.1   15.2  )-----------( 126      ( 05 Oct 2017 05:47 )             48.8     10-    140  |  100  |  29.0<H>  ----------------------------<  113  3.6   |  23.0  |  1.33<H>    Ca    9.1      05 Oct 2017 05:47    TPro  6.6  /  Alb  3.4  /  TBili  0.9  /  DBili  x   /  AST  61<H>  /  ALT  30  /  AlkPhos  85  10-05    PT/INR - ( 05 Oct 2017 05:47 )   PT: 24.8 sec;   INR: 2.22 ratio         PTT - ( 05 Oct 2017 05:47 )  PTT:42.6 sec  Urinalysis Basic - ( 04 Oct 2017 13:19 )    Color: Yellow / Appearance: Clear / S.015 / pH: x  Gluc: x / Ketone: Negative  / Bili: Negative / Urobili: Negative mg/dL   Blood: x / Protein: 30 mg/dL / Nitrite: Negative   Leuk Esterase: Negative / RBC: 3-5 /HPF / WBC 0-2   Sq Epi: x / Non Sq Epi: x / Bacteria: x        ABG - ( 04 Oct 2017 11:40 )  pH: 7.39  /  pCO2: 35    /  pO2: 116   / HCO3: 22    / Base Excess: -3.0  /  SaO2: 99                    RADIOLOGY & ADDITIONAL TESTS:

## 2017-10-05 NOTE — CONSULT NOTE ADULT - SUBJECTIVE AND OBJECTIVE BOX
Giovanna Soliz M.D., F.A.C.C.                                                                                            [facility]                                                                                                   [address] [place] [address]                                                                                                     [phone]      The patient is a 84y Female whose cardiac risk factors include hypertension  She has a hx of chronic AF and severe anxiety  I have followed her for many years for AF and periodic SOB/CHF/fluid overload and leg swelling  Her last Echo 10/16 with moderate to severe TR. Moderate AI and moderate+ MR. Dilated RA,RV and LA. Normal LV functon with flattening of IVS c/w RV pressure overload. PA pressure 51 mm Hg.    Most recently seen in office in August at which time was stable  Recent gout treated with Allopurinol.  Now admitted with fever, AMS and worsening renal insufficiency and elevated lactic acid  Hydrated vigorously and developed fluid overload requiring BiPAP and diuresis  Better  now that fluids stopped, and  on abx  and diuretics restarted  Off Bipap and awake and alert  No CP    Allergies    aspirin (Unknown)  azithromycin (Diarrhea)  latex (Unknown)  morphine (Other; Short breath)  penicillins (Unknown)  sulfa drugs (Unknown)    Intolerances      MEDICATIONS  (STANDING):  ATENolol  Tablet 25 milliGRAM(s) Oral two times a day  aztreonam  IVPB 500 milliGRAM(s) IV Intermittent <User Schedule>  dextrose 5%. 1000 milliLiter(s) (50 mL/Hr) IV Continuous <Continuous>  dextrose 50% Injectable 12.5 Gram(s) IV Push once  dextrose 50% Injectable 25 Gram(s) IV Push once  dextrose 50% Injectable 25 Gram(s) IV Push once  digoxin     Tablet 0.125 milliGRAM(s) Oral every other day  furosemide   Injectable 20 milliGRAM(s) IV Push daily  hydrALAZINE 10 milliGRAM(s) Oral two times a day  insulin lispro (HumaLOG) corrective regimen sliding scale   SubCutaneous three times a day before meals  lactobacillus acidophilus 1 Tablet(s) Oral daily  potassium chloride    Tablet ER 20 milliEquivalent(s) Oral daily  vancomycin  IVPB 750 milliGRAM(s) IV Intermittent <User Schedule>    MEDICATIONS  (PRN):  acetaminophen   Tablet 650 milliGRAM(s) Oral every 6 hours PRN For Temp greater than 38 C (100.4 F)  dextrose Gel 1 Dose(s) Oral once PRN Blood Glucose LESS THAN 70 milliGRAM(s)/deciliter  glucagon  Injectable 1 milliGRAM(s) IntraMuscular once PRN Glucose LESS THAN 70 milligrams/deciliter      Acute Infectious Diseases:  No history of rheumatic fever or TB.    PAST MEDICAL & SURGICAL HISTORY:  H/O skin graft  D and C  Leg surgery  Graft for leg hematoma        PAST MEDICAL & SURGICAL HISTORY:  Clostridium difficile diarrhea  CHF (congestive heart failure)  Gout  Mitral valve regurgitation  Hypertension  Atrial fibrillation      Habits:  [   ] Cigarettes: no  [   ] Alcohol:  [   ] Drugs:    Social History:  [   ]   Yes  [   ]  Single  [   ]  /Separate  [   ]  Children  [   ]   Job    Family History of Heart Disease:    Vital Signs Last 24 Hrs  T(C): 36.5 (05 Oct 2017 08:08), Max: 39.2 (05 Oct 2017 00:22)  T(F): 97.7 (05 Oct 2017 08:08), Max: 102.5 (05 Oct 2017 00:22)  HR: 80 (05 Oct 2017 07:46) (72 - 101)  BP: 120/83 (05 Oct 2017 04:00) (105/63 - 160/90)  BP(mean): 96 (05 Oct 2017 04:00) (77 - 96)  RR: 21 (05 Oct 2017 04:00) (20 - 30)  SpO2: 97% (05 Oct 2017 08:07) (96% - 100%)    I&O's Detail    04 Oct 2017 07:01  -  05 Oct 2017 07:00  --------------------------------------------------------  IN:    IV PiggyBack: 250 mL  Total IN: 250 mL    OUT:  Total OUT: 0 mL    Total NET: 250 mL      05 Oct 2017 07:01  -  05 Oct 2017 11:24  --------------------------------------------------------  IN:    Oral Fluid: 240 mL  Total IN: 240 mL    OUT:  Total OUT: 0 mL    Total NET: 240 mL          Constitutional:    NC/AT: Skin red over nose    HEENT: Red over nose    Neck:  No JVD, bruits or thyromegaly    Respiratory:  Clear with few rhonchi .     Cardiovascular:   IRR with   3/6 systolic murmur, rub or gallop.    Gastrointestinal: Soft without hepatosplenomegaly.    Extremities: without cyanosis, clubbing or edema. Much better than usual- red but not edematous    Neurological:  Oriented   x  3    . No gross sensory or motor defects.    Skin: Legs red    Psychiatric: Normal affect.        EKG:   AF with controlled VR. PRWP. NSST changes.     TROPONINS flat    CT chest with 4.7 AAA    .                      16.1   15.2  )-----------( 126      ( 05 Oct 2017 05:47 )             48.8     10-05    137  |  100  |  33.0<H>  ----------------------------<  115  3.9   |  23.0  |  1.20    Ca    8.7      05 Oct 2017 10:32    TPro  6.6  /  Alb  3.4  /  TBili  0.9  /  DBili  x   /  AST  61<H>  /  ALT  30  /  AlkPhos  85  10-05    PT/INR - ( 05 Oct 2017 05:47 )   PT: 24.8 sec;   INR: 2.22 ratio         PTT - ( 05 Oct 2017 05:47 )  PTT:42.6 sec  CARDIAC MARKERS ( 05 Oct 2017 10:32 )  x     / 0.18 ng/mL / 936 U/L / x     / 9.0 ng/mL  CARDIAC MARKERS ( 04 Oct 2017 14:58 )  x     / 0.18 ng/mL / 1021 U/L / x     / 13.2 ng/mL          IMPRESSION:    The patient is a  83 yo woman with hypertension, AF and at times leg swelling/CHF  who presents with sepsis/infection  Troponins flat and NOT consistent with Acute MI (probable demand ischemia)  Better now that fluids stopped and kris diuresis begun ( renal fx similar)            At this I would recommend treatment of her infection  Slowly restart her diuretics dependent on renal fx.   ( was on Dig .125 a day, Lasix 40 mg a day, Hydralazine 10 mg po bid, Spironolactone 12.5 mg 3-4 times a wk as outpt).   Continue rate control  Adjust INR        Thank you for referring RAGHU DAVIS.

## 2017-10-05 NOTE — CONSULT NOTE ADULT - PROBLEM SELECTOR RECOMMENDATION 9
Fever to 102F  Leukocytosis to 15k  Blood cultures with Strep sp.  Continue Vancomycin   D/C Aztreonam  CT Chest/Abd/Pel with no acute findings  Need to aspirate Rt Knee  Has diffuse rash, consult dermatology

## 2017-10-05 NOTE — PROGRESS NOTE ADULT - SUBJECTIVE AND OBJECTIVE BOX
RAGHU DAVIS    [id number]    84y      Female    INTERVAL HPI/OVERNIGHT EVENTS: No events on. Off bipap. Daughter and  at bedside. Pt lethargic and unable to offer complaints.     Hospital course:  83 yo F with h/o CHF, Afib (on coumadin) presents from home with worsening shortness of breath. In the ED, pt was found to have Tmax 102 lactic acid 7.9. Pt received 2.5L of IVF with improvement 2.3. Pt developed respiratory distress, found to have BNP 70615 and was placed on BiPAP and diuresed gently. Blood cultures positive for strep viridans.     REVIEW OF SYSTEMS:  Unable to obtain as pt lethargic.     Vital Signs Last 24 Hrs  T(C): 36.7 (05 Oct 2017 12:05), Max: 39.2 (05 Oct 2017 00:22)  T(F): 98.1 (05 Oct 2017 12:05), Max: 102.5 (05 Oct 2017 00:22)  HR: 80 (05 Oct 2017 07:46) (77 - 101)  BP: 120/83 (05 Oct 2017 04:00) (105/63 - 160/90)  BP(mean): 96 (05 Oct 2017 04:00) (77 - 96)  RR: 21 (05 Oct 2017 04:00) (20 - 30)  SpO2: 97% (05 Oct 2017 08:07) (97% - 100%)    PHYSICAL EXAM:    GENERAL: nontoxic appearing  HEENT:mmm  CHEST/LUNG: Clear to percussion bilaterally   HEART: S1S2+   ABDOMEN: Soft, Nontender, Nondistended; Bowel sounds present  SKIN: +malar rash, +diffuse maculopapular rash    LABS:                        16.1   15.2  )-----------( 126      ( 05 Oct 2017 05:47 )             48.8     10-05    137  |  100  |  33.0<H>  ----------------------------<  115  3.9   |  23.0  |  1.20    Ca    8.7      05 Oct 2017 10:32    TPro  6.6  /  Alb  3.4  /  TBili  0.9  /  DBili  x   /  AST  61<H>  /  ALT  30  /  AlkPhos  85  10-05    PT/INR - ( 05 Oct 2017 05:47 )   PT: 24.8 sec;   INR: 2.22 ratio         PTT - ( 05 Oct 2017 05:47 )  PTT:42.6 sec  Urinalysis Basic - ( 04 Oct 2017 13:19 )    Color: Yellow / Appearance: Clear / S.015 / pH: x  Gluc: x / Ketone: Negative  / Bili: Negative / Urobili: Negative mg/dL   Blood: x / Protein: 30 mg/dL / Nitrite: Negative   Leuk Esterase: Negative / RBC: 3-5 /HPF / WBC 0-2   Sq Epi: x / Non Sq Epi: x / Bacteria: x          MEDICATIONS  (STANDING):  ATENolol  Tablet 25 milliGRAM(s) Oral two times a day  aztreonam  IVPB 500 milliGRAM(s) IV Intermittent <User Schedule>  dextrose 5%. 1000 milliLiter(s) (50 mL/Hr) IV Continuous <Continuous>  dextrose 50% Injectable 12.5 Gram(s) IV Push once  dextrose 50% Injectable 25 Gram(s) IV Push once  dextrose 50% Injectable 25 Gram(s) IV Push once  digoxin     Tablet 0.125 milliGRAM(s) Oral every other day  furosemide   Injectable 20 milliGRAM(s) IV Push daily  hydrALAZINE 10 milliGRAM(s) Oral two times a day  insulin lispro (HumaLOG) corrective regimen sliding scale   SubCutaneous three times a day before meals  lactobacillus acidophilus 1 Tablet(s) Oral daily  potassium chloride    Tablet ER 20 milliEquivalent(s) Oral daily  vancomycin  IVPB 750 milliGRAM(s) IV Intermittent <User Schedule>    MEDICATIONS  (PRN):  acetaminophen   Tablet 650 milliGRAM(s) Oral every 6 hours PRN For Temp greater than 38 C (100.4 F)  dextrose Gel 1 Dose(s) Oral once PRN Blood Glucose LESS THAN 70 milliGRAM(s)/deciliter  glucagon  Injectable 1 milliGRAM(s) IntraMuscular once PRN Glucose LESS THAN 70 milligrams/deciliter      RADIOLOGY & ADDITIONAL TESTS:

## 2017-10-05 NOTE — CONSULT NOTE ADULT - SUBJECTIVE AND OBJECTIVE BOX
Asked by the medical team to evaluate a 84 year old female who is complaining of pain to her Right Knee.  Patient is found laying in bed and asleep with family members at bedside.  Family helped with the history.  Patient started to experience pain to her right knee approximately 2 weeks ago.  It made her walking more and more difficulty. Family states that she has had some episodes of fever and chills.  On admission patient had a temperature of 104.  Her Knee is now swollen.  There is a skin tear noted to the lateral aspect of her knee.  No redness noted.  Painful when bearing weight.  Uses a walker to ambulate.      PMHx:  Atrial fibrillation    CHF (congestive heart failure)    Clostridium difficile diarrhea    Gout    Hypertension    Mitral valve regurgitation    PSHx:  H/O skin graft    Allergies: Asked by the medical team to evaluate a 84 year old female who is complaining of pain to her Right Knee.  Patient is found laying in bed and asleep with family members at bedside.  Family helped with the history.  Patient started to experience pain to her right knee approximately 2 weeks ago.  It made her walking more and more difficulty. Family states that she has had some episodes of fever and chills.  On admission patient had a temperature of 104.  Her Knee is now swollen.  There is a skin tear noted to the lateral aspect of her knee.  No redness noted.  Painful when bearing weight.  Uses a walker to ambulate.      PMHx:  Atrial fibrillation    CHF (congestive heart failure)    Clostridium difficile diarrhea    Gout    Hypertension    Mitral valve regurgitation    PSHx:  H/O skin graft    Allergies:  Multiple drug allergies, see official list    Review of Systems:  Muscular Skeletal: Right Knee swelling with pain  Remaining systems were reviewed and found to be negative    Physical Exam:  Vital Signs Last 24 Hrs  T(C): 36.7 (05 Oct 2017 12:05), Max: 39.2 (05 Oct 2017 00:22)  T(F): 98.1 (05 Oct 2017 12:05), Max: 102.5 (05 Oct 2017 00:22)  HR: 83 (05 Oct 2017 12:05) (77 - 101)  BP: 99/62 (05 Oct 2017 12:05) (99/62 - 160/90)  BP(mean): 96 (05 Oct 2017 04:00) (77 - 96)  RR: 30 (05 Oct 2017 12:05) (20 - 30)  SpO2: 96% (05 Oct 2017 12:05) (96% - 100%)    Right Lower Extremity:  + Effusion  No Redness noted  Skin tear noted to the lateral aspect of knee  + Sensation  Calf soft, non-tender  2+ Pedal Pulse    X-Rays: Right Knee Pending    A/P: Right Knee Effusion with Pain  - OOB, PT, WBAT  - Pain medication for comfort  - DVT Prophylaxis per medical team  - F/U Knee X-ray  - F/U Labs  - Discussed with Dr. Nichols Asked by the medical team to evaluate a 84 year old female who is complaining of pain to her Right Knee.  Patient is found laying in bed and asleep with family members at bedside.  Family helped with the history.  Patient started to experience pain to her right knee approximately 2 weeks ago.  It made her walking more and more difficulty. Family states that she has had some episodes of fever and chills.  On admission patient had a temperature of 104.  Her Knee is now swollen.  There is a skin tear noted to the lateral aspect of her knee.  No redness noted.  Painful when bearing weight.  Uses a walker to ambulate.      PMHx:  Atrial fibrillation    CHF (congestive heart failure)    Clostridium difficile diarrhea    Gout    Hypertension    Mitral valve regurgitation    PSHx:  H/O skin graft    Allergies:  Multiple drug allergies, see official list    Review of Systems:  Muscular Skeletal: Right Knee swelling with pain  Remaining systems were reviewed and found to be negative    Physical Exam:  Vital Signs Last 24 Hrs  T(C): 36.7 (05 Oct 2017 12:05), Max: 39.2 (05 Oct 2017 00:22)  T(F): 98.1 (05 Oct 2017 12:05), Max: 102.5 (05 Oct 2017 00:22)  HR: 83 (05 Oct 2017 12:05) (77 - 101)  BP: 99/62 (05 Oct 2017 12:05) (99/62 - 160/90)  BP(mean): 96 (05 Oct 2017 04:00) (77 - 96)  RR: 30 (05 Oct 2017 12:05) (20 - 30)  SpO2: 96% (05 Oct 2017 12:05) (96% - 100%)    Right Lower Extremity:  + Effusion  No Redness noted  Skin tear noted to the lateral aspect of knee  + Sensation  Calf soft, non-tender  2+ Pedal Pulse    X-Rays: Right Knee Pending    A/P: Right Knee Effusion with Pain  - OOB, PT, WBAT  - Pain medication for comfort  - DVT Prophylaxis per medical team  - F/U Knee X-ray  - F/U Labs  - Discussed with Dr. Nichols    aspirate 3.6K wbc no indication for immediate surgery. will follow through hosp. stay.

## 2017-10-05 NOTE — PROGRESS NOTE ADULT - PROBLEM SELECTOR PLAN 1
+Strep viridans  Repeat blood cultures pending  TTE pending  ID consult pending  C/w vanc and aztreonam

## 2017-10-05 NOTE — CONSULT NOTE ADULT - SUBJECTIVE AND OBJECTIVE BOX
W F seen at bedside.  and 2 male relatives in room with exception of during exam when 2 male relatives left room.  ~ 11 d ago pt dx'd with gout R knee.  started Allopurinol.    a few days after starting Allopurinol developed rash on abdomen that spread.  subsequent to this on 10/3 pt had fever up to ~ 104 F.  d/c'd Allopurinol on 10/3.  admitted 10/4/17. per family septic.    Rash stable now. denies mucosal surface involvement. not very pruritic.  PE- pleasant oriented to time, place and person.  morbilliform erythematous confluent patches on abdomen, chest, back and proximal extremities. Limited  view of upper back due to pt positioning.    spares hands, palms. face  no lesions anterior genital mucosa or oral mucosa.    A/p Drug eruption to Allopurinol  most likely.  less likely is viral exanthem  suggest avoid Allopurinol and consider allergic to this medicine.  I discussed option of a skin biopsy but pt and family declined upon understanding that results may not be very specific- for example would not implicate Allopurinol specifically as cause of rash.  I offered Rx topical steroids of antihistamines.  Patient declined as is not uncomfortably symptomatic.  I explained the self-limited nature of course given that the offending agent has been d/c'd.    Please contact me if I may be of further assistance.  BEATA RODRIGUEZ MD  [phone]

## 2017-10-05 NOTE — CONSULT NOTE ADULT - SUBJECTIVE AND OBJECTIVE BOX
[facility]  INFECTIOUS DISEASES AND INTERNAL MEDICINE OF [place]  =======================================================  Leighton Brady MD  Diplomates American Board of Internal Medicine and Infectious Diseases  =======================================================      [id number]  RAGHU PADRONLUISITOS     CC: Patient is a 84y old  Female who presents with a chief complaint of fever and respiratory distress (04 Oct 2017 04:36)    85y/o  Female with an extensive cardiac history who comes in with lethargy, and SOB. Per the family patient was sleeping yesterday evening and started to have heavy breathing and was unarousable. She was also having rigors observed by her .  1 week prior she was diagnosed with gout of the rt knee by her PMD and started on Allopurinol  In the ER patient was noted to have fever to 102F, tachycardic and tachypneic. Patient was a code sepsis. Started on IV Vancomycin and Aztreonam. Blood cultures now with Strep viridans. ID input requested.       Past Medical & Surgical Hx:  Clostridium difficile diarrhea  CHF (congestive heart failure)  Gout  Mitral valve regurgitation  Hypertension  Atrial fibrillation  H/O skin graft      Social Hx:  unable to obtain, patient is lethargic and confused      FAMILY HISTORY:  No pertinent family history in first degree relatives      Allergies  aspirin (Unknown)  azithromycin (Diarrhea)  latex (Unknown)  morphine (Other; Short breath)  penicillins (Unknown)  sulfa drugs (Unknown)      Antibiotics:  vancomycin  IVPB 750 milliGRAM(s) IV Intermittent <User Schedule>       REVIEW OF SYSTEMS:  unable to obtain, patient is lethargic  and confused      Physical Exam:  Vital Signs Last 24 Hrs  T(C): 36.7 (05 Oct 2017 12:05), Max: 39.2 (05 Oct 2017 00:22)  T(F): 98.1 (05 Oct 2017 12:05), Max: 102.5 (05 Oct 2017 00:22)  HR: 80 (05 Oct 2017 07:46) (77 - 101)  BP: 120/83 (05 Oct 2017 04:00) (105/63 - 160/90)  BP(mean): 96 (05 Oct 2017 04:00) (77 - 96)  RR: 21 (05 Oct 2017 04:00) (20 - 30)  SpO2: 97% (05 Oct 2017 08:07) (97% - 100%)      GEN: lethargic, wakes up on stimuli and goes back to sleep  HEENT: normocephalic and atraumatic. EOMI. PERRL.    NECK: Supple.  LUNGS: Clear to auscultation.  HEART: Irregular rate and rhythm .  ABDOMEN: Soft, nontender, and nondistended.  Positive bowel sounds.    : No CVA tenderness  EXTREMITIES: Rt Knee swelling, +warmth  MSK: +Rt Knee joint swelling and effusion  NEUROLOGIC: Lethargic, wakes on stimuli  SKIN: + Rash      Labs:  10-05    137  |  100  |  33.0<H>  ----------------------------<  115  3.9   |  23.0  |  1.20    Ca    8.7      05 Oct 2017 10:32    TPro  6.6  /  Alb  3.4  /  TBili  0.9  /  DBili  x   /  AST  61<H>  /  ALT  30  /  AlkPhos  85  10-05               16.1   15.2  )-----------( 126      ( 05 Oct 2017 05:47 )             48.8       PT/INR - ( 05 Oct 2017 05:47 )   PT: 24.8 sec;   INR: 2.22 ratio         PTT - ( 05 Oct 2017 05:47 )  PTT:42.6 sec  Urinalysis Basic - ( 04 Oct 2017 13:19 )    Color: Yellow / Appearance: Clear / S.015 / pH: x  Gluc: x / Ketone: Negative  / Bili: Negative / Urobili: Negative mg/dL   Blood: x / Protein: 30 mg/dL / Nitrite: Negative   Leuk Esterase: Negative / RBC: 3-5 /HPF / WBC 0-2   Sq Epi: x / Non Sq Epi: x / Bacteria: x      LIVER FUNCTIONS - ( 05 Oct 2017 05:47 )  Alb: 3.4 g/dL / Pro: 6.6 g/dL / ALK PHOS: 85 U/L / ALT: 30 U/L / AST: 61 U/L / GGT: x           CARDIAC MARKERS ( 05 Oct 2017 10:32 )  x     / 0.18 ng/mL / 936 U/L / x     / 9.0 ng/mL  CARDIAC MARKERS ( 04 Oct 2017 14:58 )  x     / 0.18 ng/mL / 1021 U/L / x     / 13.2 ng/mL      ABG - ( 04 Oct 2017 11:40 )  pH: 7.39  /  pCO2: 35    /  pO2: 116   / HCO3: 22    / Base Excess: -3.0  /  SaO2: 99          RECENT CULTURES:  10-04 @ 02:27 .Blood Blood Blood Culture PCR    Growth in anaerobic bottle: Streptococcus viridans group  Susceptibility to follow.  ***Blood Panel PCR results on this specimen are available  approximately 3 hours after the Gram stain result.***  Gram stain, PCR, and/or culture results may not always  correspond due to difference in methodologies.  ************************************************************  This PCR assay was performed using [employer].  The following targets are tested for: Enterococcus,  vancomycin resistant enterococci, Listeria monocytogenes,  coagulase negative staphylococci, S. aureus,  methicillin resistant S. aureus, Streptococcus agalactiae  (Group B), S. pneumoniae, S. pyogenes (Group A),  Acinetobacter baumannii, Enterobacter cloacae, E. coli,  Klebsiella oxytoca, K. pneumoniae, Proteus sp.,  Serratia marcescens, Haemophilus influenzae,  Neisseria meningitidis, Pseudomonas aeruginosa, Candida  albicans, C. glabrata, C krusei, C parapsilosis,  C. tropicalis and the KPC resistance gene.  .  Anaerobic Bottle: 12:20 Hours to positivity  Aerobic Bottle: No growth to date          EXAM:  CT ABDOMEN AND PELVIS                        EXAM:  CT CHEST                        PROCEDURE DATE:  10/04/2017    INTERPRETATION:  Clinical information: Respiratory distress, abdominal   pain, history of AAA  Comparison:   PROCEDURE:   CT of the Chest, abdomen and pelvis was performed without intravenous contrast.  Evaluation of the vasculature, abdominal and pelvic viscera/lymph nodes   is limited without intravenous contrast.  FINDINGS:  CHEST:  CHEST WALL AND LOWER NECK: Within normal limits  MEDIASTINUM AND GIANNA: Within normal limits  HEART: Cardiomegaly, coronary calcifications  VESSELS: Within normal limits  LUNG AND LARGE AIRWAYS: Emphysema  ABDOMEN:  LIVER: Cirrhosis, scarring, stable  BILE DUCTS: Normal caliber  GALLBLADDER: No calcified gallstones. Normal caliber wall  PANCREAS: Within normal limits  SPLEEN: Within normal limits  ADRENALS: Within normal limits  KIDNEYS: Bilateral cysts and hypodensities too small to characterize  PELVIS:  REPRODUCTIVE ORGANS: No pelvic masses  BLADDER: Within normal limits  BOWEL: Within normal limitsr  PERITONEUM: No ascites or free air, no fluid collection  VESSELS:4.7 cm abdominal aortic aneurysm, mildly enlarged from 4.5 cm.   Extensive vascular calcifications.  RETROPERITONEUM: No enlarged retroperitoneal or pelvic nodes  ABDOMINAL WALL: Within normal limits  MUSCULOSKELETAL: Spinal degenerative changes.  IMPRESSION:   No acute findings provided the limitation of a noncontrast technique.  Emphysema.  Mildly enlarged 4.7 cm abdominal aortic aneurysm.

## 2017-10-06 DIAGNOSIS — A49.1 STREPTOCOCCAL INFECTION, UNSPECIFIED SITE: ICD-10-CM

## 2017-10-06 LAB
ALBUMIN SERPL ELPH-MCNC: 2.9 G/DL — LOW (ref 3.3–5.2)
ALP SERPL-CCNC: 70 U/L — SIGNIFICANT CHANGE UP (ref 40–120)
ALT FLD-CCNC: 26 U/L — SIGNIFICANT CHANGE UP
ANION GAP SERPL CALC-SCNC: 15 MMOL/L — SIGNIFICANT CHANGE UP (ref 5–17)
AST SERPL-CCNC: 35 U/L — HIGH
B19V IGG SER-ACNC: 6.5 INDEX — HIGH (ref 0–0.8)
B19V IGG+IGM SER-IMP: POSITIVE
B19V IGG+IGM SER-IMP: SIGNIFICANT CHANGE UP
B19V IGM FLD-ACNC: 0.1 INDEX — SIGNIFICANT CHANGE UP (ref 0–0.8)
B19V IGM SER-ACNC: NEGATIVE — SIGNIFICANT CHANGE UP
BILIRUB SERPL-MCNC: 1.2 MG/DL — SIGNIFICANT CHANGE UP (ref 0.4–2)
BUN SERPL-MCNC: 39 MG/DL — HIGH (ref 8–20)
C DIFF BY PCR RESULT: SIGNIFICANT CHANGE UP
C DIFF TOX GENS STL QL NAA+PROBE: SIGNIFICANT CHANGE UP
CALCIUM SERPL-MCNC: 8.6 MG/DL — SIGNIFICANT CHANGE UP (ref 8.6–10.2)
CHLORIDE SERPL-SCNC: 97 MMOL/L — LOW (ref 98–107)
CO2 SERPL-SCNC: 23 MMOL/L — SIGNIFICANT CHANGE UP (ref 22–29)
CREAT SERPL-MCNC: 1.33 MG/DL — HIGH (ref 0.5–1.3)
CRYSTALS SNV QL MICRO: SIGNIFICANT CHANGE UP
GLUCOSE SERPL-MCNC: 111 MG/DL — SIGNIFICANT CHANGE UP (ref 70–115)
GRAM STN FLD: SIGNIFICANT CHANGE UP
HCT VFR BLD CALC: 42.4 % — SIGNIFICANT CHANGE UP (ref 37–47)
HGB BLD-MCNC: 14.3 G/DL — SIGNIFICANT CHANGE UP (ref 12–16)
INR BLD: 2.03 RATIO — HIGH (ref 0.88–1.16)
MAGNESIUM SERPL-MCNC: 1.6 MG/DL — SIGNIFICANT CHANGE UP (ref 1.6–2.6)
MCHC RBC-ENTMCNC: 32.7 PG — HIGH (ref 27–31)
MCHC RBC-ENTMCNC: 33.7 G/DL — SIGNIFICANT CHANGE UP (ref 32–36)
MCV RBC AUTO: 97 FL — SIGNIFICANT CHANGE UP (ref 81–99)
PLATELET # BLD AUTO: 108 K/UL — LOW (ref 150–400)
POTASSIUM SERPL-MCNC: 3.8 MMOL/L — SIGNIFICANT CHANGE UP (ref 3.5–5.3)
POTASSIUM SERPL-SCNC: 3.8 MMOL/L — SIGNIFICANT CHANGE UP (ref 3.5–5.3)
PROT SERPL-MCNC: 5.7 G/DL — LOW (ref 6.6–8.7)
PROTHROM AB SERPL-ACNC: 22.6 SEC — HIGH (ref 9.8–12.7)
RBC # BLD: 4.37 M/UL — LOW (ref 4.4–5.2)
RBC # FLD: 16.7 % — HIGH (ref 11–15.6)
SODIUM SERPL-SCNC: 135 MMOL/L — SIGNIFICANT CHANGE UP (ref 135–145)
SPECIMEN SOURCE: SIGNIFICANT CHANGE UP
URATE SERPL-MCNC: 4.6 MG/DL — SIGNIFICANT CHANGE UP (ref 2.4–5.7)
VANCOMYCIN TROUGH SERPL-MCNC: 9.8 UG/ML — LOW (ref 10–20)
WBC # BLD: 13.4 K/UL — HIGH (ref 4.8–10.8)
WBC # FLD AUTO: 13.4 K/UL — HIGH (ref 4.8–10.8)

## 2017-10-06 PROCEDURE — 99233 SBSQ HOSP IP/OBS HIGH 50: CPT

## 2017-10-06 PROCEDURE — 99223 1ST HOSP IP/OBS HIGH 75: CPT

## 2017-10-06 RX ORDER — VANCOMYCIN HCL 1 G
1000 VIAL (EA) INTRAVENOUS EVERY 24 HOURS
Qty: 0 | Refills: 0 | Status: DISCONTINUED | OUTPATIENT
Start: 2017-10-07 | End: 2017-10-07

## 2017-10-06 RX ADMIN — Medication 650 MILLIGRAM(S): at 10:01

## 2017-10-06 RX ADMIN — Medication 0.12 MILLIGRAM(S): at 12:02

## 2017-10-06 RX ADMIN — Medication 10 MILLIGRAM(S): at 06:32

## 2017-10-06 RX ADMIN — Medication 10 MILLIGRAM(S): at 17:02

## 2017-10-06 RX ADMIN — BENZOCAINE AND MENTHOL 1 LOZENGE: 5; 1 LIQUID ORAL at 10:01

## 2017-10-06 RX ADMIN — Medication 1: at 12:07

## 2017-10-06 RX ADMIN — ATENOLOL 25 MILLIGRAM(S): 25 TABLET ORAL at 17:02

## 2017-10-06 RX ADMIN — Medication 1 TABLET(S): at 10:02

## 2017-10-06 RX ADMIN — Medication 20 MILLIGRAM(S): at 06:31

## 2017-10-06 RX ADMIN — Medication 20 MILLIEQUIVALENT(S): at 12:02

## 2017-10-06 RX ADMIN — ATENOLOL 25 MILLIGRAM(S): 25 TABLET ORAL at 06:30

## 2017-10-06 RX ADMIN — Medication 150 MILLIGRAM(S): at 01:21

## 2017-10-06 NOTE — PROGRESS NOTE ADULT - PROBLEM SELECTOR PLAN 1
-repeat blood cx x 3 sent from 10/5/17  - continue vancomycin  - pending susceptibility testing; posisble Ceftriaxone once susceptibilities are known -repeat blood cx x 3 sent from 10/5/17  - continue vancomycin  - pending susceptibility testing; possible Ceftriaxone once susceptibilities are known

## 2017-10-06 NOTE — PROGRESS NOTE ADULT - SUBJECTIVE AND OBJECTIVE BOX
NEPHROLOGY INTERVAL HPI/OVERNIGHT EVENTS: No new events.    MEDICATIONS  (STANDING):  ATENolol  Tablet 25 milliGRAM(s) Oral two times a day  aztreonam  IVPB 500 milliGRAM(s) IV Intermittent <User Schedule>  dextrose 5%. 1000 milliLiter(s) (50 mL/Hr) IV Continuous <Continuous>  dextrose 50% Injectable 12.5 Gram(s) IV Push once  dextrose 50% Injectable 25 Gram(s) IV Push once  dextrose 50% Injectable 25 Gram(s) IV Push once  digoxin     Tablet 0.125 milliGRAM(s) Oral every other day  furosemide   Injectable 20 milliGRAM(s) IV Push every 12 hours  hydrALAZINE 10 milliGRAM(s) Oral two times a day  insulin lispro (HumaLOG) corrective regimen sliding scale   SubCutaneous three times a day before meals  lactobacillus acidophilus 1 Tablet(s) Oral daily  vancomycin  IVPB 750 milliGRAM(s) IV Intermittent <User Schedule>    MEDICATIONS  (PRN):  acetaminophen   Tablet 650 milliGRAM(s) Oral every 6 hours PRN For Temp greater than 38 C (100.4 F)  dextrose Gel 1 Dose(s) Oral once PRN Blood Glucose LESS THAN 70 milliGRAM(s)/deciliter  glucagon  Injectable 1 milliGRAM(s) IntraMuscular once PRN Glucose LESS THAN 70 milligrams/deciliter      Allergies    aspirin (Unknown)  azithromycin (Diarrhea)  latex (Unknown)  morphine (Other; Short breath)  penicillins (Unknown)  sulfa drugs (Unknown)    Intolerances        Vital Signs Last 24 Hrs  T(C): 36.8 (06 Oct 2017 09:56), Max: 37.7 (05 Oct 2017 17:34)  T(F): 98.2 (06 Oct 2017 09:56), Max: 99.9 (05 Oct 2017 17:34)  HR: 86 (06 Oct 2017 09:56) (81 - 100)  BP: 96/52 (06 Oct 2017 09:56) (96/52 - 140/101)  BP(mean): 96 (05 Oct 2017 20:03) (96 - 96)  RR: 18 (06 Oct 2017 09:56) (18 - 30)  SpO2: 97% (06 Oct 2017 09:56) (96% - 99%)  T(C): 37.1 (05 Oct 2017 01:57), Max: 39.2 (05 Oct 2017 00:22)  T(F): 98.8 (05 Oct 2017 01:57), Max: 102.5 (05 Oct 2017 00:22)  HR: 85 (05 Oct 2017 04:00) (72 - 101)  BP: 120/83 (05 Oct 2017 04:00) (105/63 - 160/90)  BP(mean): 96 (05 Oct 2017 04:00) (77 - 96)  RR: 21 (05 Oct 2017 04:00) (20 - 30)  SpO2: 100% (05 Oct 2017 04:00) (96% - 100%)  Daily     Daily     PHYSICAL EXAM:    GENERAL: appears  chronically ill, oob in  chair, family  present.  HEAD:  wnl  EYES:   ENMT:   NECK: veins  wnl  NERVOUS SYSTEM:  wnl  CHEST/LUNG: no  wheezes, bs bilaterally, nasal 02  HEART: reg. rate, no rub  ABDOMEN: soft, not tender, no bruit  EXTREMITIES: right  knee with dressing post tap.  LYMPH:   SKIN: diffuse  rash  same    LABS:  10-06    135  |  97<L>  |  39.0<H>  ----------------------------<  111  3.8   |  23.0  |  1.33<H>    Ca    8.6      06 Oct 2017 05:36  Mg     1.6     10-06    TPro  5.7<L>  /  Alb  2.9<L>  /  TBili  1.2  /  DBili  x   /  AST  35<H>  /  ALT  26  /  AlkPhos  70  10-                          16.1   15.2  )-----------( 126      ( 05 Oct 2017 05:47 )             48.8     10-05    140  |  100  |  29.0<H>  ----------------------------<  113  3.6   |  23.0  |  1.33<H>    Ca    9.1      05 Oct 2017 05:47    TPro  6.6  /  Alb  3.4  /  TBili  0.9  /  DBili  x   /  AST  61<H>  /  ALT  30  /  AlkPhos  85  10-05    PT/INR - ( 05 Oct 2017 05:47 )   PT: 24.8 sec;   INR: 2.22 ratio         PTT - ( 05 Oct 2017 05:47 )  PTT:42.6 sec  Urinalysis Basic - ( 04 Oct 2017 13:19 )    Color: Yellow / Appearance: Clear / S.015 / pH: x  Gluc: x / Ketone: Negative  / Bili: Negative / Urobili: Negative mg/dL   Blood: x / Protein: 30 mg/dL / Nitrite: Negative   Leuk Esterase: Negative / RBC: 3-5 /HPF / WBC 0-2   Sq Epi: x / Non Sq Epi: x / Bacteria: x        ABG - ( 04 Oct 2017 11:40 )  pH: 7.39  /  pCO2: 35    /  pO2: 116   / HCO3: 22    / Base Excess: -3.0  /  SaO2: 99                    RADIOLOGY & ADDITIONAL TESTS:

## 2017-10-06 NOTE — PROGRESS NOTE ADULT - SUBJECTIVE AND OBJECTIVE BOX
[facility]  INFECTIOUS DISEASES AND INTERNAL MEDICINE OF [place]  =======================================================  Leighton Brady MD  Diplomates American Board of Internal Medicine and Infectious Diseases  =======================================================    RAGHU DAVIS [id number]  chief complaint: follow up for bacteremia  cultures from 10/4/17 reviewed, Strep viridans group.    patient seen and examined in follow up.  Chart and labs reviewed.     =======================================================  Allergies:  aspirin (Unknown)  azithromycin (Diarrhea)  latex (Unknown)  morphine (Other; Short breath)  penicillins (Unknown)  sulfa drugs (Unknown)    =======================================================  Antibiotics:  Vancomycin 1 gram Q24H     =======================================================     REVIEW OF SYSTEMS:  CONSTITUTIONAL:  No Fever or chills  HEENT:   No diplopia or blurred vision.  No earache, sore throat or runny nose.  CARDIOVASCULAR:  No pressure, squeezing, strangling, tightness, heaviness or aching about the chest, neck, axilla or epigastrium.  RESPIRATORY:  No cough, shortness of breath, PND or orthopnea.  GASTROINTESTINAL:  No nausea, vomiting or diarrhea.  GENITOURINARY:  No dysuria, frequency or urgency. No Blood in urine  MUSCULOSKELETAL:  no joint aches, no muscle pain  SKIN:  No change in skin, hair or nails.  NEUROLOGIC:  No paresthesias, fasciculations, seizures or weakness.  PSYCHIATRIC:  No disorder of thought or mood.  ENDOCRINE:  No heat or cold intolerance, polyuria or polydipsia.  HEMATOLOGICAL:  No easy bruising or bleeding.     =======================================================     Physical Exam:  ICU Vital Signs Last 24 Hrs  T(C): 36.8 (06 Oct 2017 06:27), Max: 37.7 (05 Oct 2017 17:34)  T(F): 98.2 (06 Oct 2017 06:27), Max: 99.9 (05 Oct 2017 17:34)  HR: 92 (06 Oct 2017 06:27) (81 - 100)  BP: 138/80 (06 Oct 2017 06:27) (99/62 - 140/101)  BP(mean): 96 (05 Oct 2017 20:03) (96 - 96)  RR: 20 (06 Oct 2017 06:27) (20 - 30)  SpO2: 99% (06 Oct 2017 06:27) (96% - 99%)    GEN: NAD, pleasant  HEENT: normocephalic and atraumatic. EOMI. KENDALL.    NECK: Supple. No carotid bruits.  No lymphadenopathy or thyromegaly.  LUNGS: Clear to auscultation.  HEART: Regular rate and rhythm without murmur.  ABDOMEN: Soft, nontender, and nondistended.  Positive bowel sounds.    : No CVA tenderness  EXTREMITIES: Without any cyanosis, clubbing, rash, lesions or edema.  MSK: no joint swelling  NEUROLOGIC: Cranial nerves II through XII are grossly intact.  PSYCHIATRIC: Appropriate affect .  SKIN: No ulceration or induration present.    =======================================================    Labs:  10-06    135  |  97<L>  |  39.0<H>  ----------------------------<  111  3.8   |  23.0  |  1.33<H>    Ca    8.6      06 Oct 2017 05:36  Mg     1.6     10    TPro  5.7<L>  /  Alb  2.9<L>  /  TBili  1.2  /  DBili  x   /  AST  35<H>  /  ALT  26  /  AlkPhos  70  10-06                          14.3   13.4  )-----------( 108      ( 06 Oct 2017 05:36 )             42.4       PT/INR - ( 05 Oct 2017 05:47 )   PT: 24.8 sec;   INR: 2.22 ratio         PTT - ( 05 Oct 2017 05:47 )  PTT:42.6 sec  Urinalysis Basic - ( 04 Oct 2017 13:19 )    Color: Yellow / Appearance: Clear / S.015 / pH: x  Gluc: x / Ketone: Negative  / Bili: Negative / Urobili: Negative mg/dL   Blood: x / Protein: 30 mg/dL / Nitrite: Negative   Leuk Esterase: Negative / RBC: 3-5 /HPF / WBC 0-2   Sq Epi: x / Non Sq Epi: x / Bacteria: x      LIVER FUNCTIONS - ( 06 Oct 2017 05:36 )  Alb: 2.9 g/dL / Pro: 5.7 g/dL / ALK PHOS: 70 U/L / ALT: 26 U/L / AST: 35 U/L / GGT: x           CARDIAC MARKERS ( 05 Oct 2017 10:32 )  x     / 0.18 ng/mL / 936 U/L / x     / 9.0 ng/mL  CARDIAC MARKERS ( 04 Oct 2017 14:58 )  x     / 0.18 ng/mL / 1021 U/L / x     / 13.2 ng/mL      CAPILLARY BLOOD GLUCOSE  86 (06 Oct 2017 07:58)  115 (05 Oct 2017 21:52)  73 (05 Oct 2017 17:34)  143 (05 Oct 2017 12:05)       ABG - ( 04 Oct 2017 11:40 )  pH: 7.39  /  pCO2: 35    /  pO2: 116   / HCO3: 22    / Base Excess: -3.0  /  SaO2: 99               RECENT CULTURES:  10-04 @ 04:25      NotDetec        10-04 @ 02:27 .Blood Blood Blood Culture PCR    Growth in anaerobic bottle: Streptococcus viridans group  Susceptibility to follow.  ***Blood Panel PCR results on this specimen are available  approximately 3 hours after the Gram stain result.***  Gram stain, PCR, and/or culture results may not always  correspond due to difference in methodologies.  ************************************************************  This PCR assay was performed using [employer].  The following targets are tested for: Enterococcus,  vancomycin resistant enterococci, Listeria monocytogenes,  coagulase negative staphylococci, S. aureus,  methicillin resistant S. aureus, Streptococcus agalactiae  (Group B), S. pneumoniae, S. pyogenes (Group A),  Acinetobacter baumannii, Enterobacter cloacae, E. coli,  Klebsiella oxytoca, K. pneumoniae, Proteus sp.,  Serratia marcescens, Haemophilus influenzae,  Neisseria meningitidis, Pseudomonas aeruginosa, Candida  albicans, C. glabrata, C krusei, C parapsilosis,  C. tropicalis and the KPC resistance gene.  .  Anaerobic Bottle: 12:20 Hours to positivity  Aerobic Bottle: No growth to date  .  TYPE: (C=Critical, N=Notification, A=Abnormal) C  TESTS:  _ Positive Blood GS  DATE/TIME CALLED: _ 10/04/2017 15:30  CALLED TO: _ MARIANA: Ophelia Arauz RN  READ BACK (2 Patient Identifiers)(Y/N): _ Y  READ BACK VALUES (Y/N): _ Y  CALLED BY: Elvira leslie [facility]  INFECTIOUS DISEASES AND INTERNAL MEDICINE OF [place]  =======================================================  Leighton Brady MD  Diplomates American Board of Internal Medicine and Infectious Diseases  =======================================================    RAGHU DAVIS [id number]  chief complaint: follow up for bacteremia  cultures from 10/4/17 reviewed, Strep viridans group.  patient seen and examined in follow up.  Chart and labs reviewed.     still with rash on torso and legs.     =======================================================  Allergies:  aspirin (Unknown)  azithromycin (Diarrhea)  latex (Unknown)  morphine (Other; Short breath)  penicillins (Unknown)  sulfa drugs (Unknown)    =======================================================  Antibiotics:  Vancomycin 1 gram Q24H     =======================================================     REVIEW OF SYSTEMS:  CONSTITUTIONAL:  No Fever or chills  HEENT:   No diplopia or blurred vision.  No earache, sore throat or runny nose.  CARDIOVASCULAR:  No pressure, squeezing, strangling, tightness, heaviness or aching about the chest, neck, axilla or epigastrium.  RESPIRATORY:  No cough, shortness of breath, PND or orthopnea.  GASTROINTESTINAL:  No nausea, vomiting or diarrhea.  GENITOURINARY:  No dysuria, frequency or urgency. No Blood in urine  MUSCULOSKELETAL:  no joint aches, no muscle pain  SKIN:  No change in skin, hair or nails.  + RASH  NEUROLOGIC:  No paresthesias, fasciculations, seizures or weakness.  PSYCHIATRIC:  No disorder of thought or mood.  ENDOCRINE:  No heat or cold intolerance, polyuria or polydipsia.  HEMATOLOGICAL:  No easy bruising or bleeding.     =======================================================     Physical Exam:  ICU Vital Signs Last 24 Hrs  T(C): 36.8 (06 Oct 2017 06:27), Max: 37.7 (05 Oct 2017 17:34)  T(F): 98.2 (06 Oct 2017 06:27), Max: 99.9 (05 Oct 2017 17:34)  HR: 92 (06 Oct 2017 06:27) (81 - 100)  BP: 138/80 (06 Oct 2017 06:27) (99/62 - 140/101)  BP(mean): 96 (05 Oct 2017 20:03) (96 - 96)  RR: 20 (06 Oct 2017 06:27) (20 - 30)  SpO2: 99% (06 Oct 2017 06:27) (96% - 99%)    GEN: NAD, pleasant, THIN ELDERLY  HEENT: normocephalic and atraumatic. EOMI. KENDALL.    NECK: Supple. No carotid bruits.  No lymphadenopathy or thyromegaly.  LUNGS: Clear to auscultation.  HEART: Regular rate and rhythm POSITIVE murmur.  ABDOMEN: Soft, nontender, and nondistended.  Positive bowel sounds.    : No CVA tenderness  EXTREMITIES: Without any cyanosis, clubbing, rash, lesions or edema.  MSK: RIGHT KNEE WITH DRESSING IN PLACE  NEUROLOGIC: Cranial nerves II through XII are grossly intact.  PSYCHIATRIC: Appropriate affect .  SKIN: No ulceration or induration present.  ERYTHEMATOUS RASH ON ABD AND THIGHS.     =======================================================    Labs:  10-06    135  |  97<L>  |  39.0<H>  ----------------------------<  111  3.8   |  23.0  |  1.33<H>    Ca    8.6      06 Oct 2017 05:36  Mg     1.6     10    TPro  5.7<L>  /  Alb  2.9<L>  /  TBili  1.2  /  DBili  x   /  AST  35<H>  /  ALT  26  /  AlkPhos  70  10-06                          14.3   13.4  )-----------( 108      ( 06 Oct 2017 05:36 )             42.4       PT/INR - ( 05 Oct 2017 05:47 )   PT: 24.8 sec;   INR: 2.22 ratio         PTT - ( 05 Oct 2017 05:47 )  PTT:42.6 sec  Urinalysis Basic - ( 04 Oct 2017 13:19 )    Color: Yellow / Appearance: Clear / S.015 / pH: x  Gluc: x / Ketone: Negative  / Bili: Negative / Urobili: Negative mg/dL   Blood: x / Protein: 30 mg/dL / Nitrite: Negative   Leuk Esterase: Negative / RBC: 3-5 /HPF / WBC 0-2   Sq Epi: x / Non Sq Epi: x / Bacteria: x      LIVER FUNCTIONS - ( 06 Oct 2017 05:36 )  Alb: 2.9 g/dL / Pro: 5.7 g/dL / ALK PHOS: 70 U/L / ALT: 26 U/L / AST: 35 U/L / GGT: x           CARDIAC MARKERS ( 05 Oct 2017 10:32 )  x     / 0.18 ng/mL / 936 U/L / x     / 9.0 ng/mL  CARDIAC MARKERS ( 04 Oct 2017 14:58 )  x     / 0.18 ng/mL / 1021 U/L / x     / 13.2 ng/mL      CAPILLARY BLOOD GLUCOSE  86 (06 Oct 2017 07:58)  115 (05 Oct 2017 21:52)  73 (05 Oct 2017 17:34)  143 (05 Oct 2017 12:05)       ABG - ( 04 Oct 2017 11:40 )  pH: 7.39  /  pCO2: 35    /  pO2: 116   / HCO3: 22    / Base Excess: -3.0  /  SaO2: 99               RECENT CULTURES:  10-04 @ 04:25      NotDetec        10-04 @ 02:27 .Blood Blood Blood Culture PCR    Growth in anaerobic bottle: Streptococcus viridans group  Susceptibility to follow.  ***Blood Panel PCR results on this specimen are available  approximately 3 hours after the Gram stain result.***  Gram stain, PCR, and/or culture results may not always  correspond due to difference in methodologies.  ************************************************************  This PCR assay was performed using [employer].  The following targets are tested for: Enterococcus,  vancomycin resistant enterococci, Listeria monocytogenes,  coagulase negative staphylococci, S. aureus,  methicillin resistant S. aureus, Streptococcus agalactiae  (Group B), S. pneumoniae, S. pyogenes (Group A),  Acinetobacter baumannii, Enterobacter cloacae, E. coli,  Klebsiella oxytoca, K. pneumoniae, Proteus sp.,  Serratia marcescens, Haemophilus influenzae,  Neisseria meningitidis, Pseudomonas aeruginosa, Candida  albicans, C. glabrata, C krusei, C parapsilosis,  C. tropicalis and the KPC resistance gene.  .  Anaerobic Bottle: 12:20 Hours to positivity  Aerobic Bottle: No growth to date  .  TYPE: (C=Critical, N=Notification, A=Abnormal) C  TESTS:  _ Positive Blood GS  DATE/TIME CALLED: _ 10/04/2017 15:30  CALLED TO: _ MARIANA: Ophelia Arauz RN  READ BACK (2 Patient Identifiers)(Y/N): _ Y  READ BACK VALUES (Y/N): _ Y  CALLED BY: Elvira leslie

## 2017-10-06 NOTE — PROGRESS NOTE ADULT - SUBJECTIVE AND OBJECTIVE BOX
RAGHU DAVIS    [id number]    84y      Female    INTERVAL HPI/OVERNIGHT EVENTS: No events on. Daughter and  at bedside. Feels better.    Hospital course:  85 yo F with h/o CHF, Afib (on coumadin) presents from home with worsening shortness of breath. In the ED, pt was found to have Tmax 102 lactic acid 7.9. Pt received 2.5L of IVF with improvement 2.3. Pt developed respiratory distress, found to have BNP 90847 and was placed on BiPAP and diuresed gently. Blood cultures positive for strep viridans. C. diff negative. On 10/5 had arthrocentesis of right knee.       REVIEW OF SYSTEMS:    CONSTITUTIONAL: No fever   RESPIRATORY: No cough, wheezing, hemoptysis  CARDIOVASCULAR: No chest pain     Vital Signs Last 24 Hrs  T(C): 37.1 (06 Oct 2017 12:45), Max: 37.7 (05 Oct 2017 17:34)  T(F): 98.7 (06 Oct 2017 12:45), Max: 99.9 (05 Oct 2017 17:34)  HR: 84 (06 Oct 2017 12:45) (81 - 100)  BP: 95/54 (06 Oct 2017 12:45) (95/54 - 140/101)  BP(mean): 96 (05 Oct 2017 20:03) (96 - 96)  RR: 18 (06 Oct 2017 12:45) (18 - 24)  SpO2: 94% (06 Oct 2017 12:45) (94% - 99%)    PHYSICAL EXAM:    GENERAL: NAD, sitting in chair, resting comfortably, able to speak in full sentences  HEENT: PERRL, +EOMI, MMM  CHEST/LUNG: bibasilar fine rales  HEART: S1S2+   ABDOMEN: Soft, Nontender, Nondistended; Bowel sounds present  Skin: morbilliform erythematous rashes on chest abdomen back       LABS:                        14.3   13.4  )-----------( 108      ( 06 Oct 2017 05:36 )             42.4     10    135  |  97<L>  |  39.0<H>  ----------------------------<  111  3.8   |  23.0  |  1.33<H>    Ca    8.6      06 Oct 2017 05:36  Mg     1.6     10-06    TPro  5.7<L>  /  Alb  2.9<L>  /  TBili  1.2  /  DBili  x   /  AST  35<H>  /  ALT  26  /  AlkPhos  70  10-06    PT/INR - ( 05 Oct 2017 05:47 )   PT: 24.8 sec;   INR: 2.22 ratio         PTT - ( 05 Oct 2017 05:47 )  PTT:42.6 sec  Urinalysis Basic - ( 04 Oct 2017 13:19 )    Color: Yellow / Appearance: Clear / S.015 / pH: x  Gluc: x / Ketone: Negative  / Bili: Negative / Urobili: Negative mg/dL   Blood: x / Protein: 30 mg/dL / Nitrite: Negative   Leuk Esterase: Negative / RBC: 3-5 /HPF / WBC 0-2   Sq Epi: x / Non Sq Epi: x / Bacteria: x          MEDICATIONS  (STANDING):  ATENolol  Tablet 25 milliGRAM(s) Oral two times a day  dextrose 5%. 1000 milliLiter(s) (50 mL/Hr) IV Continuous <Continuous>  dextrose 50% Injectable 12.5 Gram(s) IV Push once  dextrose 50% Injectable 25 Gram(s) IV Push once  dextrose 50% Injectable 25 Gram(s) IV Push once  digoxin     Tablet 0.125 milliGRAM(s) Oral every other day  furosemide   Injectable 20 milliGRAM(s) IV Push daily  hydrALAZINE 10 milliGRAM(s) Oral two times a day  insulin lispro (HumaLOG) corrective regimen sliding scale   SubCutaneous three times a day before meals  lactobacillus acidophilus 1 Tablet(s) Oral daily  potassium chloride    Tablet ER 20 milliEquivalent(s) Oral daily    MEDICATIONS  (PRN):  acetaminophen   Tablet 650 milliGRAM(s) Oral every 6 hours PRN For Temp greater than 38 C (100.4 F)  benzocaine 15 mG/menthol 3.6 mG Lozenge 1 Lozenge Oral every 4 hours PRN Sore Throat  dextrose Gel 1 Dose(s) Oral once PRN Blood Glucose LESS THAN 70 milliGRAM(s)/deciliter  glucagon  Injectable 1 milliGRAM(s) IntraMuscular once PRN Glucose LESS THAN 70 milligrams/deciliter      RADIOLOGY & ADDITIONAL TESTS:

## 2017-10-06 NOTE — PROGRESS NOTE ADULT - SUBJECTIVE AND OBJECTIVE BOX
RAGHU DAVIS    [id number]    History: 84y Female with R knee effusion. Patient is doing well and is comfortable. The patient's pain is controlled using the prescribed pain medications. Denies nausea, vomiting, chest pain, shortness of breath, abdominal pain or fever. No new complaints.                          14.3   13.4  )-----------( 108      ( 06 Oct 2017 05:36 )             42.4     10-06    135  |  97<L>  |  39.0<H>  ----------------------------<  111  3.8   |  23.0  |  1.33<H>    Ca    8.6      06 Oct 2017 05:36  Mg     1.6     10-06    TPro  5.7<L>  /  Alb  2.9<L>  /  TBili  1.2  /  DBili  x   /  AST  35<H>  /  ALT  26  /  AlkPhos  70  10-06      MEDICATIONS  (STANDING):  ATENolol  Tablet 25 milliGRAM(s) Oral two times a day  dextrose 5%. 1000 milliLiter(s) (50 mL/Hr) IV Continuous <Continuous>  dextrose 50% Injectable 12.5 Gram(s) IV Push once  dextrose 50% Injectable 25 Gram(s) IV Push once  dextrose 50% Injectable 25 Gram(s) IV Push once  digoxin     Tablet 0.125 milliGRAM(s) Oral every other day  furosemide   Injectable 20 milliGRAM(s) IV Push daily  hydrALAZINE 10 milliGRAM(s) Oral two times a day  insulin lispro (HumaLOG) corrective regimen sliding scale   SubCutaneous three times a day before meals  lactobacillus acidophilus 1 Tablet(s) Oral daily  potassium chloride    Tablet ER 20 milliEquivalent(s) Oral daily  vancomycin  IVPB 750 milliGRAM(s) IV Intermittent <User Schedule>    MEDICATIONS  (PRN):  acetaminophen   Tablet 650 milliGRAM(s) Oral every 6 hours PRN For Temp greater than 38 C (100.4 F)  benzocaine 15 mG/menthol 3.6 mG Lozenge 1 Lozenge Oral every 4 hours PRN Sore Throat  dextrose Gel 1 Dose(s) Oral once PRN Blood Glucose LESS THAN 70 milliGRAM(s)/deciliter  glucagon  Injectable 1 milliGRAM(s) IntraMuscular once PRN Glucose LESS THAN 70 milligrams/deciliter      Physical exam: +ACE bandage. No drainage or discharge. No erythema is noted. No blistering. No ecchymosis. The calf is supple nontender. Passive range of motion with mild pain. Sensation to light touch is grossly intact distally. 2+ dorsalis pedis pulse. Capillary refill is less than 2 seconds. No cyanosis.    Primary Orthopedic Assessment:  • 84y Female with R knee effusion. No sign of septic R knee joint    Secondary  Orthopedic Assessment(s):   •     Secondary  Medical Assessment(s):   •     Plan:   • DVT prophylaxis as prescribed, including use of compression devices and ankle pumps  • Continue physical therapy  • Weightbearing as tolerated of the right lower extremity  • Ice, elevation  • Pain control as clinically indicated  • f/u synovial fluid crystal results   • Discharge planning as per primary team

## 2017-10-07 DIAGNOSIS — J02.9 ACUTE PHARYNGITIS, UNSPECIFIED: ICD-10-CM

## 2017-10-07 LAB
-  CEFTRIAXONE: SIGNIFICANT CHANGE UP
-  CLINDAMYCIN: SIGNIFICANT CHANGE UP
-  ERYTHROMYCIN: SIGNIFICANT CHANGE UP
-  PENICILLIN: SIGNIFICANT CHANGE UP
-  VANCOMYCIN: SIGNIFICANT CHANGE UP
ALBUMIN SERPL ELPH-MCNC: 3.3 G/DL — SIGNIFICANT CHANGE UP (ref 3.3–5.2)
ALP SERPL-CCNC: 87 U/L — SIGNIFICANT CHANGE UP (ref 40–120)
ALT FLD-CCNC: 28 U/L — SIGNIFICANT CHANGE UP
ANION GAP SERPL CALC-SCNC: 16 MMOL/L — SIGNIFICANT CHANGE UP (ref 5–17)
AST SERPL-CCNC: 31 U/L — SIGNIFICANT CHANGE UP
BILIRUB SERPL-MCNC: 1.2 MG/DL — SIGNIFICANT CHANGE UP (ref 0.4–2)
BUN SERPL-MCNC: 56 MG/DL — HIGH (ref 8–20)
CALCIUM SERPL-MCNC: 9 MG/DL — SIGNIFICANT CHANGE UP (ref 8.6–10.2)
CHLORIDE SERPL-SCNC: 96 MMOL/L — LOW (ref 98–107)
CO2 SERPL-SCNC: 23 MMOL/L — SIGNIFICANT CHANGE UP (ref 22–29)
CREAT SERPL-MCNC: 1.61 MG/DL — HIGH (ref 0.5–1.3)
GLUCOSE SERPL-MCNC: 111 MG/DL — SIGNIFICANT CHANGE UP (ref 70–115)
HCT VFR BLD CALC: 45.3 % — SIGNIFICANT CHANGE UP (ref 37–47)
HGB BLD-MCNC: 15 G/DL — SIGNIFICANT CHANGE UP (ref 12–16)
INR BLD: 1.95 RATIO — HIGH (ref 0.88–1.16)
MCHC RBC-ENTMCNC: 32.2 PG — HIGH (ref 27–31)
MCHC RBC-ENTMCNC: 33.1 G/DL — SIGNIFICANT CHANGE UP (ref 32–36)
MCV RBC AUTO: 97.2 FL — SIGNIFICANT CHANGE UP (ref 81–99)
METHOD TYPE: SIGNIFICANT CHANGE UP
PLATELET # BLD AUTO: 113 K/UL — LOW (ref 150–400)
POTASSIUM SERPL-MCNC: 3.7 MMOL/L — SIGNIFICANT CHANGE UP (ref 3.5–5.3)
POTASSIUM SERPL-SCNC: 3.7 MMOL/L — SIGNIFICANT CHANGE UP (ref 3.5–5.3)
PROT SERPL-MCNC: 6.3 G/DL — LOW (ref 6.6–8.7)
PROTHROM AB SERPL-ACNC: 21.7 SEC — HIGH (ref 9.8–12.7)
RBC # BLD: 4.66 M/UL — SIGNIFICANT CHANGE UP (ref 4.4–5.2)
RBC # FLD: 16.5 % — HIGH (ref 11–15.6)
SODIUM SERPL-SCNC: 135 MMOL/L — SIGNIFICANT CHANGE UP (ref 135–145)
WBC # BLD: 11.5 K/UL — HIGH (ref 4.8–10.8)
WBC # FLD AUTO: 11.5 K/UL — HIGH (ref 4.8–10.8)

## 2017-10-07 PROCEDURE — 99233 SBSQ HOSP IP/OBS HIGH 50: CPT

## 2017-10-07 RX ORDER — WARFARIN SODIUM 2.5 MG/1
3 TABLET ORAL ONCE
Qty: 0 | Refills: 0 | Status: COMPLETED | OUTPATIENT
Start: 2017-10-07 | End: 2017-10-07

## 2017-10-07 RX ORDER — ALBUTEROL 90 UG/1
2.5 AEROSOL, METERED ORAL ONCE
Qty: 0 | Refills: 0 | Status: COMPLETED | OUTPATIENT
Start: 2017-10-07 | End: 2017-10-07

## 2017-10-07 RX ORDER — FUROSEMIDE 40 MG
40 TABLET ORAL DAILY
Qty: 0 | Refills: 0 | Status: DISCONTINUED | OUTPATIENT
Start: 2017-10-07 | End: 2017-10-12

## 2017-10-07 RX ORDER — CEFTRIAXONE 500 MG/1
2 INJECTION, POWDER, FOR SOLUTION INTRAMUSCULAR; INTRAVENOUS EVERY 24 HOURS
Qty: 0 | Refills: 0 | Status: DISCONTINUED | OUTPATIENT
Start: 2017-10-07 | End: 2017-10-08

## 2017-10-07 RX ORDER — BENZOCAINE AND MENTHOL 5; 1 G/100ML; G/100ML
1 LIQUID ORAL THREE TIMES A DAY
Qty: 0 | Refills: 0 | Status: DISCONTINUED | OUTPATIENT
Start: 2017-10-07 | End: 2017-10-07

## 2017-10-07 RX ADMIN — ATENOLOL 25 MILLIGRAM(S): 25 TABLET ORAL at 18:45

## 2017-10-07 RX ADMIN — CEFTRIAXONE 100 GRAM(S): 500 INJECTION, POWDER, FOR SOLUTION INTRAMUSCULAR; INTRAVENOUS at 11:44

## 2017-10-07 RX ADMIN — Medication 1 TABLET(S): at 11:44

## 2017-10-07 RX ADMIN — Medication 250 MILLIGRAM(S): at 00:43

## 2017-10-07 RX ADMIN — BENZOCAINE AND MENTHOL 1 LOZENGE: 5; 1 LIQUID ORAL at 15:39

## 2017-10-07 RX ADMIN — WARFARIN SODIUM 3 MILLIGRAM(S): 2.5 TABLET ORAL at 21:10

## 2017-10-07 RX ADMIN — Medication 20 MILLIGRAM(S): at 06:02

## 2017-10-07 RX ADMIN — Medication 10 MILLIGRAM(S): at 18:45

## 2017-10-07 RX ADMIN — BENZOCAINE AND MENTHOL 1 LOZENGE: 5; 1 LIQUID ORAL at 09:20

## 2017-10-07 RX ADMIN — ALBUTEROL 2.5 MILLIGRAM(S): 90 AEROSOL, METERED ORAL at 03:26

## 2017-10-07 RX ADMIN — Medication 1: at 12:45

## 2017-10-07 RX ADMIN — Medication 20 MILLIEQUIVALENT(S): at 11:44

## 2017-10-07 NOTE — PROGRESS NOTE ADULT - SUBJECTIVE AND OBJECTIVE BOX
RAGHU DAVIS    [id number]    84y      Female    INTERVAL HPI/OVERNIGHT EVENTS: No events on.  at bedside. Continuing to have sore throat.    Hospital course:  85 yo F with h/o CHF, Afib (on coumadin) presents from home with worsening shortness of breath. In the ED, pt was found to have Tmax 102 lactic acid 7.9. Pt received 2.5L of IVF with improvement 2.3. Pt developed respiratory distress, found to have BNP 08359 and was placed on BiPAP and diuresed gently. Blood cultures positive for strep milleri. C. diff negative. On 10/5 had arthrocentesis of right knee. TTE showing EF 60-65%,  moderate to severe mitral valve regurgitation, moderate aortic regurgitation, and severe tricuspid regurgitation. Pt with diffuse morbilliform rash, per Derm consult, suspected to be drug eruption due to allopurinol.     REVIEW OF SYSTEMS:    CONSTITUTIONAL: No fever   RESPIRATORY: No cough  CARDIOVASCULAR: No chest pain     Vital Signs Last 24 Hrs  T(C): 36.6 (07 Oct 2017 08:25), Max: 37.1 (06 Oct 2017 12:45)  T(F): 97.8 (07 Oct 2017 08:25), Max: 98.7 (06 Oct 2017 12:45)  HR: 74 (07 Oct 2017 08:25) (73 - 96)  BP: 102/64 (07 Oct 2017 08:25) (95/54 - 120/60)  BP(mean): --  RR: 18 (07 Oct 2017 08:25) (18 - 22)  SpO2: 97% (07 Oct 2017 08:25) (94% - 98%)    PHYSICAL EXAM:    GENERAL: NAD, sitting in chair  HEENT: PERRL, +EOMI, MMM, +erythematous oral mucosa  CHEST/LUNG: Clear to percussion bilaterally   HEART: S1S2+, Regular rate and rhythm   ABDOMEN: Soft, Nontender, Nondistended; Bowel sounds present       LABS:                        15.0   11.5  )-----------( 113      ( 07 Oct 2017 06:55 )             45.3     10-07    135  |  96<L>  |  56.0<H>  ----------------------------<  111  3.7   |  23.0  |  1.61<H>    Ca    9.0      07 Oct 2017 06:55  Mg     1.6     10-06    TPro  6.3<L>  /  Alb  3.3  /  TBili  1.2  /  DBili  x   /  AST  31  /  ALT  28  /  AlkPhos  87  10-07    PT/INR - ( 07 Oct 2017 06:55 )   PT: 21.7 sec;   INR: 1.95 ratio                 MEDICATIONS  (STANDING):  ATENolol  Tablet 25 milliGRAM(s) Oral two times a day  cefTRIAXone   IVPB 2 Gram(s) IV Intermittent every 24 hours  dextrose 5%. 1000 milliLiter(s) (50 mL/Hr) IV Continuous <Continuous>  dextrose 50% Injectable 12.5 Gram(s) IV Push once  dextrose 50% Injectable 25 Gram(s) IV Push once  dextrose 50% Injectable 25 Gram(s) IV Push once  digoxin     Tablet 0.125 milliGRAM(s) Oral every other day  furosemide   Injectable 20 milliGRAM(s) IV Push daily  hydrALAZINE 10 milliGRAM(s) Oral two times a day  insulin lispro (HumaLOG) corrective regimen sliding scale   SubCutaneous three times a day before meals  lactobacillus acidophilus 1 Tablet(s) Oral daily  potassium chloride    Tablet ER 20 milliEquivalent(s) Oral daily  warfarin 3 milliGRAM(s) Oral once    MEDICATIONS  (PRN):  acetaminophen   Tablet 650 milliGRAM(s) Oral every 6 hours PRN For Temp greater than 38 C (100.4 F)  benzocaine 15 mG/menthol 3.6 mG Lozenge 1 Lozenge Oral every 4 hours PRN Sore Throat  benzocaine 15 mG/menthol 3.6 mG Lozenge 1 Lozenge Oral three times a day PRN Sore Throat  dextrose Gel 1 Dose(s) Oral once PRN Blood Glucose LESS THAN 70 milliGRAM(s)/deciliter  glucagon  Injectable 1 milliGRAM(s) IntraMuscular once PRN Glucose LESS THAN 70 milligrams/deciliter      RADIOLOGY & ADDITIONAL TESTS:

## 2017-10-07 NOTE — PROGRESS NOTE ADULT - SUBJECTIVE AND OBJECTIVE BOX
[facility]  INFECTIOUS DISEASES AND INTERNAL MEDIC[facility] [place] [facility]  =======================================================  Leighton Harris MD [facility] Hospital[facility]ICARMEN Brady MD  Diplomates American Board of Internal Medicine and Infectious Diseases  =======================================================    RAGHU DAVIS [id number]  chief complaint: follow up for bacteremia  cultures from 10/4/17 reviewed, Strep viridans group., ss to ceftriaxone  patient seen and examined in follow up.  Chart and labs reviewed.     right knee ACE bandage now removed.     =======================================================  Allergies:  aspirin (Unknown)  azithromycin (Diarrhea)  latex (Unknown)  morphine (Other; Short breath)  penicillins (Unknown)  sulfa drugs (Unknown)    =======================================================  MEDICATIONS  (STANDING):  ATENolol  Tablet 25 milliGRAM(s) Oral two times a day  cefTRIAXone   IVPB 2 Gram(s) IV Intermittent every 24 hours  dextrose 5%. 1000 milliLiter(s) (50 mL/Hr) IV Continuous <Continuous>  dextrose 50% Injectable 12.5 Gram(s) IV Push once  dextrose 50% Injectable 25 Gram(s) IV Push once  dextrose 50% Injectable 25 Gram(s) IV Push once  digoxin     Tablet 0.125 milliGRAM(s) Oral every other day  furosemide    Tablet 40 milliGRAM(s) Oral daily  hydrALAZINE 10 milliGRAM(s) Oral two times a day  insulin lispro (HumaLOG) corrective regimen sliding scale   SubCutaneous three times a day before meals  lactobacillus acidophilus 1 Tablet(s) Oral daily  potassium chloride    Tablet ER 20 milliEquivalent(s) Oral daily  warfarin 3 milliGRAM(s) Oral once     =======================================================     REVIEW OF SYSTEMS:  CONSTITUTIONAL:  No Fever or chills  HEENT:   No diplopia or blurred vision.  No earache, sore throat or runny nose.  CARDIOVASCULAR:  No pressure, squeezing, strangling, tightness, heaviness or aching about the chest, neck, axilla or epigastrium.  RESPIRATORY:  No cough, shortness of breath, PND or orthopnea.  GASTROINTESTINAL:  No nausea, vomiting or diarrhea.  GENITOURINARY:  No dysuria, frequency or urgency. No Blood in urine  MUSCULOSKELETAL:  no joint aches, no muscle pain  SKIN:  No change in skin, hair or nails.  + RASH  NEUROLOGIC:  No paresthesias, fasciculations, seizures or weakness.  PSYCHIATRIC:  No disorder of thought or mood.  ENDOCRINE:  No heat or cold intolerance, polyuria or polydipsia.  HEMATOLOGICAL:  No easy bruising or bleeding.     =======================================================     Physical Exam:  Vital Signs Last 24 Hrs  T(C): 36.7 (07 Oct 2017 12:20), Max: 36.7 (07 Oct 2017 12:20)  T(F): 98 (07 Oct 2017 12:20), Max: 98 (07 Oct 2017 12:20)  HR: 72 (07 Oct 2017 12:20) (70 - 96)  BP: 104/68 (07 Oct 2017 12:20) (100/58 - 120/60)  BP(mean): --  RR: 19 (07 Oct 2017 12:20) (18 - 22)  SpO2: 99% (07 Oct 2017 12:20) (97% - 99%)    GEN: NAD, pleasant, THIN ELDERLY  HEENT: normocephalic and atraumatic. EOMI. KENDALL.    NECK: Supple. No carotid bruits.  No lymphadenopathy or thyromegaly.  LUNGS: Clear to auscultation.  HEART: Regular rate and rhythm POSITIVE murmur.  ABDOMEN: Soft, nontender, and nondistended.  Positive bowel sounds.    : No CVA tenderness  EXTREMITIES: Without any cyanosis, clubbing, rash, lesions or edema.  MSK: RIGHT KNEE slight warmer than left. NO swelling  NEUROLOGIC: Cranial nerves II through XII are grossly intact.  PSYCHIATRIC: Appropriate affect .  SKIN: No ulceration or induration present.  ERYTHEMATOUS RASH ON ABD AND THIGHS.     =======================================================    Labs:      Labs:  10-07    135  |  96<L>  |  56.0<H>  ----------------------------<  111  3.7   |  23.0  |  1.61<H>    Ca    9.0      07 Oct 2017 06:55  Mg     1.6     10-06    TPro  6.3<L>  /  Alb  3.3  /  TBili  1.2  /  DBili  x   /  AST  31  /  ALT  28  /  AlkPhos  87  10-07                          15.0   11.5  )-----------( 113      ( 07 Oct 2017 06:55 )             45.3       PT/INR - ( 07 Oct 2017 06:55 )   PT: 21.7 sec;   INR: 1.95 ratio             LIVER FUNCTIONS - ( 07 Oct 2017 06:55 )  Alb: 3.3 g/dL / Pro: 6.3 g/dL / ALK PHOS: 87 U/L / ALT: 28 U/L / AST: 31 U/L / GGT: x               CAPILLARY BLOOD GLUCOSE  184 (07 Oct 2017 11:51)  102 (07 Oct 2017 08:49)  165 (06 Oct 2017 20:15)  102 (06 Oct 2017 16:50)            RECENT CULTURES:  10-05 @ 15:50 .Body Fluid Synovial Fluid     No growth at 1 day.  Culture in progress    Moderate White blood cells  No organisms seen      10-05 @ 10:33 .Blood Blood     No growth at 48 hours        10-05 @ 05:49 .Blood Blood-Peripheral     No growth at 48 hours        10-04 @ 04:25          NotDetec  10-04 @ 02:27 .Blood Blood Streptococcus milleri, viridans group  Blood Culture PCR    Growth in anaerobic bottle: Streptococcus milleri, viridans group  .  Anaerobic Bottle: 12:20 Hours to positivity  Aerobic Bottle: No growth to date  .  TYPE: (C=Critical, N=Notification, A=Abnormal) C  TESTS:  _ Positive Blood GS  DATE/TIME CALLED: _ 10/04/2017 15:30  CALLED TO: _ ERHR: Ophelia Arauz RN  READ BACK (2 Patient Identifiers)(Y/N): _ Y  READ BACK VALUES (Y/N): _ Y  CALLED BY: Elvira leslie    Culture - Blood (10.04.17 @ 02:27)    -  Streptococcus sp. (Not Grp A, B or S pneumoniae): Detec    -  Ceftriaxone: S <=0.25    -  Clindamycin: S <=0.06    -  Erythromycin: S <=0.06    -  Penicillin: S <=0.03    -  Vancomycin: S <=0.12    Specimen Source: .Blood Blood    Organism: Blood Culture PCR    Organism: Streptococcus milleri, viridans group    Culture Results:   Growth in anaerobic bottle: Streptococcus milleri, viridans group  .  Anaerobic Bottle: 12:20 Hours to positivity  Aerobic Bottle: No growth to date  .  TYPE: (C=Critical, N=Notification, A=Abnormal) C  TESTS:  _ Positive Blood GS  DATE/TIME CALLED: _ 10/04/2017 15:30  CALLED TO: _ DEBIHR: Ophelia Arauz RN  READ BACK (2 Patient Identifiers)(Y/N): _ Y  READ BACK VALUES (Y/N): _ Y  CALLED BY: Elvira leslie    Organism Identification: Streptococcus milleri, viridans group  Blood Culture PCR    Method Type: PCR    Method Type: KATHRYN

## 2017-10-07 NOTE — PROGRESS NOTE ADULT - PROBLEM SELECTOR PLAN 1
+Strep milleri  Repeat blood cultures pending  TTE reviewed  ID consult appreciated  C/w ceftriaxone

## 2017-10-07 NOTE — PROGRESS NOTE ADULT - PROBLEM SELECTOR PLAN 1
-repeat blood cx x 3 sent from 10/5/17 - negative  strep milleri SS to ceftriaxone  - vanco stopped  - Ceftriaxone 2gram Q24H

## 2017-10-07 NOTE — PROGRESS NOTE ADULT - SUBJECTIVE AND OBJECTIVE BOX
Pt Name: RAGHU DAVIS    MRN: [id number]      Patient is a being followed for right knee swelling. She reports of improved swelling and knee pain. She is out of bed today to chair.         PAST MEDICAL & SURGICAL HISTORY:  PAST MEDICAL & SURGICAL HISTORY:  Clostridium difficile diarrhea  CHF (congestive heart failure)  Gout  Mitral valve regurgitation  Hypertension  Atrial fibrillation  H/O skin graft      Allergies: aspirin (Unknown)  azithromycin (Diarrhea)  latex (Unknown)  morphine (Other; Short breath)  penicillins (Unknown)  sulfa drugs (Unknown)      Medications: acetaminophen   Tablet 650 milliGRAM(s) Oral every 6 hours PRN  ATENolol  Tablet 25 milliGRAM(s) Oral two times a day  benzocaine 15 mG/menthol 3.6 mG Lozenge 1 Lozenge Oral every 4 hours PRN  cefTRIAXone   IVPB 2 Gram(s) IV Intermittent every 24 hours  dextrose 5%. 1000 milliLiter(s) IV Continuous <Continuous>  dextrose 50% Injectable 12.5 Gram(s) IV Push once  dextrose 50% Injectable 25 Gram(s) IV Push once  dextrose 50% Injectable 25 Gram(s) IV Push once  dextrose Gel 1 Dose(s) Oral once PRN  digoxin     Tablet 0.125 milliGRAM(s) Oral every other day  furosemide    Tablet 40 milliGRAM(s) Oral daily  glucagon  Injectable 1 milliGRAM(s) IntraMuscular once PRN  hydrALAZINE 10 milliGRAM(s) Oral two times a day  insulin lispro (HumaLOG) corrective regimen sliding scale   SubCutaneous three times a day before meals  lactobacillus acidophilus 1 Tablet(s) Oral daily  potassium chloride    Tablet ER 20 milliEquivalent(s) Oral daily  warfarin 3 milliGRAM(s) Oral once                            15.0   11.5  )-----------( 113      ( 07 Oct 2017 06:55 )             45.3     10    135  |  96<L>  |  56.0<H>  ----------------------------<  111  3.7   |  23.0  |  1.61<H>    Ca    9.0      07 Oct 2017 06:55  Mg     1.6     10    TPro  6.3<L>  /  Alb  3.3  /  TBili  1.2  /  DBili  x   /  AST  31  /  ALT  28  /  AlkPhos  87  10-07        Culture - Body Fluid with Gram Stain (collected 05 Oct 2017 15:50)  Source: .Body Fluid Synovial Fluid  Gram Stain (06 Oct 2017 15:55):    Moderate White blood cells    No organisms seen  Preliminary Report (07 Oct 2017 09:59):    No growth at 1 day.    Culture in progress    Culture - Blood (collected 05 Oct 2017 10:33)  Source: .Blood Blood  Preliminary Report (07 Oct 2017 11:01):    No growth at 48 hours    Culture - Blood (collected 05 Oct 2017 10:33)  Source: .Blood Blood  Preliminary Report (07 Oct 2017 11:01):    No growth at 48 hours    Culture - Blood (collected 05 Oct 2017 05:49)  Source: .Blood Blood-Peripheral  Preliminary Report (07 Oct 2017 07:02):    No growth at 48 hours        XAM:  XR KNEE 3 VIEWS RT                          PROCEDURE DATE:  10/05/2017          INTERPRETATION:  adiographs of the right knee         CLINICAL INFORMATION:  Gout    TECHNIQUE:   Frontal, crosstable lateral and oblique views of the knee   were obtained.     FINDINGS:  No prior exams are available for comparison.    No fracture is seen.  No joint effusion is found.  No loose body is   identified. There is osteopenia and a moderate degree of degenerative   change with new bone formation and joint space narrowing. Vascular   calcifications are seen. The soft tissues appear intact    IMPRESSION: Osteoarthritis without evidence of joint effusion          CANDIDA FUNK   This document has been electronically signed. Oct  5 2017  8:20PM          PHYSICAL EXAM:    Vital Signs Last 24 Hrs  T(C): 36.7 (07 Oct 2017 12:20), Max: 36.7 (07 Oct 2017 12:20)  T(F): 98 (07 Oct 2017 12:20), Max: 98 (07 Oct 2017 12:20)  HR: 72 (07 Oct 2017 12:20) (70 - 96)  BP: 104/68 (07 Oct 2017 12:20) (100/58 - 120/60)  BP(mean): --  RR: 19 (07 Oct 2017 12:20) (18 - 22)  SpO2: 99% (07 Oct 2017 12:20) (97% - 99%)  Daily     Daily Weight in k.1 (07 Oct 2017 08:45)    Appearance: Alert, responsive, in no acute distress.    Neurological: Sensation is grossly intact to light touch. 5/5 motor function of all extremities. No focal deficits or weaknesses found.    Skin: + lateral right knee skin tear noted. No erythema. + venous insufficiency and stasis noted of the right LE.     Vascular: 2+ distal pulses. Cap refill < 2 sec.  No extremity ulcerations. No cyanosis.    Musculoskeletal:         Right Lower Extremity: + minimal effusion. PROM from 95 to -30 deg. + mild PJF crepitus. No bony tenderness. No calf tenderness.      A/P:  Pt is a  84y Female with improved painful right knee from DJD.     PLAN:   * WBAT of the right LE  * will continue to follow Cx  * office follow-up in 1-2 wks Pt Name: RAGHU DAVIS    MRN: [id number]      Patient is a being followed for right knee swelling. She reports of improved swelling and knee pain. She is out of bed today to chair.         PAST MEDICAL & SURGICAL HISTORY:  PAST MEDICAL & SURGICAL HISTORY:  Clostridium difficile diarrhea  CHF (congestive heart failure)  Gout  Mitral valve regurgitation  Hypertension  Atrial fibrillation  H/O skin graft      Allergies: aspirin (Unknown)  azithromycin (Diarrhea)  latex (Unknown)  morphine (Other; Short breath)  penicillins (Unknown)  sulfa drugs (Unknown)      Medications: acetaminophen   Tablet 650 milliGRAM(s) Oral every 6 hours PRN  ATENolol  Tablet 25 milliGRAM(s) Oral two times a day  benzocaine 15 mG/menthol 3.6 mG Lozenge 1 Lozenge Oral every 4 hours PRN  cefTRIAXone   IVPB 2 Gram(s) IV Intermittent every 24 hours  dextrose 5%. 1000 milliLiter(s) IV Continuous <Continuous>  dextrose 50% Injectable 12.5 Gram(s) IV Push once  dextrose 50% Injectable 25 Gram(s) IV Push once  dextrose 50% Injectable 25 Gram(s) IV Push once  dextrose Gel 1 Dose(s) Oral once PRN  digoxin     Tablet 0.125 milliGRAM(s) Oral every other day  furosemide    Tablet 40 milliGRAM(s) Oral daily  glucagon  Injectable 1 milliGRAM(s) IntraMuscular once PRN  hydrALAZINE 10 milliGRAM(s) Oral two times a day  insulin lispro (HumaLOG) corrective regimen sliding scale   SubCutaneous three times a day before meals  lactobacillus acidophilus 1 Tablet(s) Oral daily  potassium chloride    Tablet ER 20 milliEquivalent(s) Oral daily  warfarin 3 milliGRAM(s) Oral once                            15.0   11.5  )-----------( 113      ( 07 Oct 2017 06:55 )             45.3     10    135  |  96<L>  |  56.0<H>  ----------------------------<  111  3.7   |  23.0  |  1.61<H>    Ca    9.0      07 Oct 2017 06:55  Mg     1.6     10    TPro  6.3<L>  /  Alb  3.3  /  TBili  1.2  /  DBili  x   /  AST  31  /  ALT  28  /  AlkPhos  87  10-07        Culture - Body Fluid with Gram Stain (collected 05 Oct 2017 15:50)  Source: .Body Fluid Synovial Fluid  Gram Stain (06 Oct 2017 15:55):    Moderate White blood cells    No organisms seen  Preliminary Report (07 Oct 2017 09:59):    No growth at 1 day.    Culture in progress    Culture - Blood (collected 05 Oct 2017 10:33)  Source: .Blood Blood  Preliminary Report (07 Oct 2017 11:01):    No growth at 48 hours    Culture - Blood (collected 05 Oct 2017 10:33)  Source: .Blood Blood  Preliminary Report (07 Oct 2017 11:01):    No growth at 48 hours    Culture - Blood (collected 05 Oct 2017 05:49)  Source: .Blood Blood-Peripheral  Preliminary Report (07 Oct 2017 07:02):    No growth at 48 hours        XAM:  XR KNEE 3 VIEWS RT                          PROCEDURE DATE:  10/05/2017          INTERPRETATION:  adiographs of the right knee         CLINICAL INFORMATION:  Gout    TECHNIQUE:   Frontal, crosstable lateral and oblique views of the knee   were obtained.     FINDINGS:  No prior exams are available for comparison.    No fracture is seen.  No joint effusion is found.  No loose body is   identified. There is osteopenia and a moderate degree of degenerative   change with new bone formation and joint space narrowing. Vascular   calcifications are seen. The soft tissues appear intact    IMPRESSION: Osteoarthritis without evidence of joint effusion          CANDIDA FUNK   This document has been electronically signed. Oct  5 2017  8:20PM          PHYSICAL EXAM:    Vital Signs Last 24 Hrs  T(C): 36.7 (07 Oct 2017 12:20), Max: 36.7 (07 Oct 2017 12:20)  T(F): 98 (07 Oct 2017 12:20), Max: 98 (07 Oct 2017 12:20)  HR: 72 (07 Oct 2017 12:20) (70 - 96)  BP: 104/68 (07 Oct 2017 12:20) (100/58 - 120/60)  BP(mean): --  RR: 19 (07 Oct 2017 12:20) (18 - 22)  SpO2: 99% (07 Oct 2017 12:20) (97% - 99%)  Daily     Daily Weight in k.1 (07 Oct 2017 08:45)    Appearance: Alert, responsive, in no acute distress.    Neurological: Sensation is grossly intact to light touch. 5/5 motor function of all extremities. No focal deficits or weaknesses found.    Skin: + lateral right knee skin tear noted. No erythema. + venous insufficiency and stasis noted of the right LE.     Vascular: 2+ distal pulses. Cap refill < 2 sec.  No extremity ulcerations. No cyanosis.    Musculoskeletal:         Right Lower Extremity: + minimal effusion. PROM from 95 to -30 deg. + mild PJF crepitus. No bony tenderness. No calf tenderness.      A/P:  Pt is a  84y Female with improved painful right knee from DJD.     PLAN:   * WBAT of the right LE  * will continue to follow Cx  * office follow-up in 1-2 wks  * local wound care of lateral knee skin tear - Xeroform and dry sterile dressing until improved.

## 2017-10-07 NOTE — PROGRESS NOTE ADULT - ATTENDING COMMENTS
Antibiotics as per iD, knee C&S pending.
IV  fluid, meds , viral  titers pending.
Will follow
Will follow
Reviewed above note. D/w DANYEL Tucker. Pt currently HD stable. Physical exam reveals distended abd without tenderness. CT chest/abdomen negative for acute findings. C/w IV abx.

## 2017-10-07 NOTE — PROGRESS NOTE ADULT - SUBJECTIVE AND OBJECTIVE BOX
NEPHROLOGY INTERVAL HPI/OVERNIGHT EVENTS:  pt clinically stable  no acute distress    MEDICATIONS  (STANDING):  ATENolol  Tablet 25 milliGRAM(s) Oral two times a day  dextrose 5%. 1000 milliLiter(s) (50 mL/Hr) IV Continuous <Continuous>  dextrose 50% Injectable 12.5 Gram(s) IV Push once  dextrose 50% Injectable 25 Gram(s) IV Push once  dextrose 50% Injectable 25 Gram(s) IV Push once  digoxin     Tablet 0.125 milliGRAM(s) Oral every other day  furosemide   Injectable 20 milliGRAM(s) IV Push daily  hydrALAZINE 10 milliGRAM(s) Oral two times a day  insulin lispro (HumaLOG) corrective regimen sliding scale   SubCutaneous three times a day before meals  lactobacillus acidophilus 1 Tablet(s) Oral daily  potassium chloride    Tablet ER 20 milliEquivalent(s) Oral daily  vancomycin  IVPB 1000 milliGRAM(s) IV Intermittent every 24 hours    MEDICATIONS  (PRN):  acetaminophen   Tablet 650 milliGRAM(s) Oral every 6 hours PRN For Temp greater than 38 C (100.4 F)  benzocaine 15 mG/menthol 3.6 mG Lozenge 1 Lozenge Oral every 4 hours PRN Sore Throat  dextrose Gel 1 Dose(s) Oral once PRN Blood Glucose LESS THAN 70 milliGRAM(s)/deciliter  glucagon  Injectable 1 milliGRAM(s) IntraMuscular once PRN Glucose LESS THAN 70 milligrams/deciliter      Allergies    aspirin (Unknown)  azithromycin (Diarrhea)  latex (Unknown)  morphine (Other; Short breath)  penicillins (Unknown)  sulfa drugs (Unknown)        Vital Signs Last 24 Hrs  T(C): 36.6 (07 Oct 2017 08:25), Max: 37.1 (06 Oct 2017 12:45)  T(F): 97.8 (07 Oct 2017 08:25), Max: 98.7 (06 Oct 2017 12:45)  HR: 74 (07 Oct 2017 08:25) (73 - 96)  BP: 102/64 (07 Oct 2017 08:25) (95/54 - 120/60)  BP(mean): --  RR: 18 (07 Oct 2017 08:25) (18 - 22)  SpO2: 97% (07 Oct 2017 08:25) (94% - 98%)    PHYSICAL EXAM:  GENERAL: appears chronically ill   NECK: no jvd  NERVOUS SYSTEM:  non focal  CHEST/LUNG: clear bilaterally  HEART: no rub  ABDOMEN: soft, not tender, no bruit  EXTREMITIES: R knee with dressing  SKIN:  rash      LABS:                        15.0   11.5  )-----------( 113      ( 07 Oct 2017 06:55 )             45.3     10-07    135  |  96<L>  |  56.0<H>  ----------------------------<  111  3.7   |  23.0  |  1.61<H>    Creatinine, Serum: 1.33 mg/dL (10.06.17 @ 05:36)        Ca    9.0      07 Oct 2017 06:55  Mg     1.6     10-06    TPro  6.3<L>  /  Alb  3.3  /  TBili  1.2  /  DBili  x   /  AST  31  /  ALT  28  /  AlkPhos  87  10-07    PT/INR - ( 07 Oct 2017 06:55 )   PT: 21.7 sec;   INR: 1.95 ratio                 RADIOLOGY & ADDITIONAL TESTS:

## 2017-10-08 DIAGNOSIS — E87.1 HYPO-OSMOLALITY AND HYPONATREMIA: ICD-10-CM

## 2017-10-08 LAB
ANION GAP SERPL CALC-SCNC: 12 MMOL/L — SIGNIFICANT CHANGE UP (ref 5–17)
BUN SERPL-MCNC: 53 MG/DL — HIGH (ref 8–20)
CALCIUM SERPL-MCNC: 8.7 MG/DL — SIGNIFICANT CHANGE UP (ref 8.6–10.2)
CHLORIDE SERPL-SCNC: 98 MMOL/L — SIGNIFICANT CHANGE UP (ref 98–107)
CO2 SERPL-SCNC: 22 MMOL/L — SIGNIFICANT CHANGE UP (ref 22–29)
CREAT SERPL-MCNC: 1.35 MG/DL — HIGH (ref 0.5–1.3)
GLUCOSE SERPL-MCNC: 104 MG/DL — SIGNIFICANT CHANGE UP (ref 70–115)
HCT VFR BLD CALC: 42.1 % — SIGNIFICANT CHANGE UP (ref 37–47)
HGB BLD-MCNC: 14 G/DL — SIGNIFICANT CHANGE UP (ref 12–16)
INR BLD: 1.66 RATIO — HIGH (ref 0.88–1.16)
MCHC RBC-ENTMCNC: 32.3 PG — HIGH (ref 27–31)
MCHC RBC-ENTMCNC: 33.3 G/DL — SIGNIFICANT CHANGE UP (ref 32–36)
MCV RBC AUTO: 97 FL — SIGNIFICANT CHANGE UP (ref 81–99)
PLATELET # BLD AUTO: 103 K/UL — LOW (ref 150–400)
POTASSIUM SERPL-MCNC: 3.9 MMOL/L — SIGNIFICANT CHANGE UP (ref 3.5–5.3)
POTASSIUM SERPL-SCNC: 3.9 MMOL/L — SIGNIFICANT CHANGE UP (ref 3.5–5.3)
PROTHROM AB SERPL-ACNC: 18.5 SEC — HIGH (ref 9.8–12.7)
RBC # BLD: 4.34 M/UL — LOW (ref 4.4–5.2)
RBC # FLD: 16.2 % — HIGH (ref 11–15.6)
SODIUM SERPL-SCNC: 132 MMOL/L — LOW (ref 135–145)
WBC # BLD: 9.8 K/UL — SIGNIFICANT CHANGE UP (ref 4.8–10.8)
WBC # FLD AUTO: 9.8 K/UL — SIGNIFICANT CHANGE UP (ref 4.8–10.8)

## 2017-10-08 PROCEDURE — 99233 SBSQ HOSP IP/OBS HIGH 50: CPT

## 2017-10-08 RX ORDER — CEFTRIAXONE 500 MG/1
2 INJECTION, POWDER, FOR SOLUTION INTRAMUSCULAR; INTRAVENOUS EVERY 24 HOURS
Qty: 0 | Refills: 0 | Status: DISCONTINUED | OUTPATIENT
Start: 2017-10-08 | End: 2017-10-12

## 2017-10-08 RX ORDER — WARFARIN SODIUM 2.5 MG/1
3 TABLET ORAL
Qty: 0 | Refills: 0 | Status: COMPLETED | OUTPATIENT
Start: 2017-10-08 | End: 2017-10-08

## 2017-10-08 RX ADMIN — CEFTRIAXONE 100 GRAM(S): 500 INJECTION, POWDER, FOR SOLUTION INTRAMUSCULAR; INTRAVENOUS at 13:23

## 2017-10-08 RX ADMIN — Medication 20 MILLIEQUIVALENT(S): at 13:24

## 2017-10-08 RX ADMIN — Medication 40 MILLIGRAM(S): at 05:17

## 2017-10-08 RX ADMIN — Medication 1 TABLET(S): at 13:25

## 2017-10-08 RX ADMIN — Medication 10 MILLIGRAM(S): at 05:17

## 2017-10-08 RX ADMIN — Medication 0.12 MILLIGRAM(S): at 13:24

## 2017-10-08 RX ADMIN — CEFTRIAXONE 100 GRAM(S): 500 INJECTION, POWDER, FOR SOLUTION INTRAMUSCULAR; INTRAVENOUS at 22:12

## 2017-10-08 RX ADMIN — WARFARIN SODIUM 3 MILLIGRAM(S): 2.5 TABLET ORAL at 21:30

## 2017-10-08 RX ADMIN — ATENOLOL 25 MILLIGRAM(S): 25 TABLET ORAL at 05:17

## 2017-10-08 NOTE — PROGRESS NOTE ADULT - PROBLEM SELECTOR PLAN 5
continue lasix as tolerated  on hydralazine and aldactone (aldactone on hold due to hypotensive but will resume at lower dose when tolerated)  cardiology on board f/u rapid strep PCR  benzocaine PRN S/p arthrocentesis; body fluid culture no growth to date   Ortho on board; recommendations appreciated - likely DJD with outpatient parrisho seferino  PT on board - family refused JESSICA; may require home PT vs acute rehab

## 2017-10-08 NOTE — PROGRESS NOTE ADULT - PROBLEM SELECTOR PLAN 1
10/4 Blood Culture positive for Strep milleri - unclear etiology  10/5 Repeat blood cultures negative x 3  10/5 Body fluid culture - no growth to date  On ceftriaxone 2 gram Q24 hrs  TTE reviewed (as above); will discuss role of JANICE with cardiology rate controlled; continue atenolol with holding parameters  continue digoxin and check level in AM  continue coumadin as per INR

## 2017-10-08 NOTE — PROGRESS NOTE ADULT - PROBLEM SELECTOR PLAN 4
S/p arthrocentesis; body fluid culture no growth to date   Ortho on board; recommendations appreciated - likely DJD  PT on board - family refused JESSICA; may require home PT vs acute rehab S/p arthrocentesis; body fluid culture no growth to date   Ortho on board; recommendations appreciated - likely DJD with outpatient parrisho seferino  PT on board - family refused JESSICA; may require home PT vs acute rehab Likely due to drug reaction; appreciate derm input  rash significantly improved since cessation of allopurinol

## 2017-10-08 NOTE — PROGRESS NOTE ADULT - PROBLEM SELECTOR PLAN 8
Renal on board (?baseline creatinine)  will monitor renal function closely  renally adjust all medications  renal diet 24 hour BP Range 130/81 - 97/62  will hold hydralazine; aldactone previously held  continue atenolol and lasix with holding parameters

## 2017-10-08 NOTE — PROGRESS NOTE ADULT - PROBLEM SELECTOR PLAN 2
f/u rapid strep PCR  benzocaine PRN possibly secondary to non-osmotic release of ADH from pain vs hypotonic fluid administration  -will change Ceftriaxone from D5W to NS  -check urine lytes and urine osm  -check TSH and AM cortisol  -monitor serum sodium closely  -renal on board 10/4 Blood Culture positive for Strep milleri - unclear etiology  10/5 Repeat blood cultures negative x 3  10/5 Body fluid culture - no growth to date  On ceftriaxone 2 gram Q24 hrs  TTE reviewed (as above); will discuss role of JANICE with cardiology

## 2017-10-08 NOTE — PROGRESS NOTE ADULT - ASSESSMENT
Patient is a 83 y/o female PMH of CHF, Atrial Fibrillation on AC, and new onset diabetes who was brought to the ED by family for lethargy, right knee pain and diffuse rash. As per the patient's daughter, patient was able to ambulate with the assistance of a walker, but then developed right knee swelling which was presumed to be secondary to gout. She was then started on allopurinol and subsequently broke out in a rash. In the ED, the patient was febrile and code sepsis was activated. Blood cultures were positive for Strep Milleri and started on vancomycin; most recently switched to Ceftriaxone. Furthermore, the patient's rash was deemed to be secondary to a drug reaction from allopurinol and consequently discontinued. Since then her rash has significantly improved. Patient's right knee was tapped and body fluid was not consistent with an infectious process. Prelim body fluid culture no growth to date.

## 2017-10-08 NOTE — PROGRESS NOTE ADULT - PROBLEM SELECTOR PLAN 7
24 hour BP Range 130/81 - 97/62  On hydralazine, atenolol and lasix with holding parameters  aldactone held rate controlled; continue atenolol with holding parameters  continue digoxin and check level in AM  continue coumadin as per INR continue lasix as tolerated  on hydralazine and aldactone (aldactone on hold due to hypotensive but will resume at lower dose when tolerated)  cardiology on board

## 2017-10-08 NOTE — PROGRESS NOTE ADULT - PROBLEM SELECTOR PLAN 9
Sliding scale  A1C 6.3 Renal on board - will have to determine baseline creatinine and etiology of CKD  monitor renal function closely  renally adjust all medications  renal diet

## 2017-10-08 NOTE — PROGRESS NOTE ADULT - ASSESSMENT
CRF(III): stable  Pre renal azotemia  Hyponatremia  - monitor on Lasix  - check Vanco levels  - monitor labs

## 2017-10-08 NOTE — PROGRESS NOTE ADULT - SUBJECTIVE AND OBJECTIVE BOX
CHIEF COMPLAINT/INTERVAL HISTORY:    Patient is a 84y old  Female who presents with a chief complaint of fever and respiratory distress (04 Oct 2017 04:36)    HPI:  85 y/o female with h/o CHF, A fib on coumadin, was brought in to the ER because of difficulty to breath i  the ER, patient had fever 102. code sepsis was activated. patient denies any pain, cough or dysuria. patient added that she is not feeling well for weeks. h/o diarrhea 1 week ago after taking po antibiotic. patient does not recall the reason for antibiotic. Ct chest and abdomen in Feb. 2017 shows a 1.2 cm lung nodule and gall stones. no sore throat, cough or runny nose (04 Oct 2017 04:36)    SUBJECTIVE & OBJECTIVE: Pt seen and examined at bedside. No overnight events. Complaining of right knee pain and unable to bare weight. Labs remarkable for hyponatremia    ROS: No chest pain, palpitations, SOB, light headedness, dizziness, headache, nausea/vomiting, fevers/chills, abdominal pain, dysuria or increased urinary frequency.    ICU Vital Signs Last 24 Hrs  T(C): 36.4 (08 Oct 2017 15:37), Max: 37.1 (07 Oct 2017 23:55)  T(F): 97.5 (08 Oct 2017 15:37), Max: 98.8 (07 Oct 2017 23:55)  HR: 62 (08 Oct 2017 15:37) (62 - 83)  BP: 97/62 (08 Oct 2017 15:37) (97/62 - 130/81)  BP(mean): --  ABP: --  ABP(mean): --  RR: 18 (08 Oct 2017 15:37) (18 - 18)  SpO2: 95% (08 Oct 2017 15:37) (91% - 97%)        MEDICATIONS  (STANDING):  ATENolol  Tablet 25 milliGRAM(s) Oral two times a day  cefTRIAXone   IVPB 2 Gram(s) IV Intermittent every 24 hours  dextrose 5%. 1000 milliLiter(s) (50 mL/Hr) IV Continuous <Continuous>  dextrose 50% Injectable 12.5 Gram(s) IV Push once  dextrose 50% Injectable 25 Gram(s) IV Push once  dextrose 50% Injectable 25 Gram(s) IV Push once  digoxin     Tablet 0.125 milliGRAM(s) Oral every other day  furosemide    Tablet 40 milliGRAM(s) Oral daily  hydrALAZINE 10 milliGRAM(s) Oral two times a day  insulin lispro (HumaLOG) corrective regimen sliding scale   SubCutaneous three times a day before meals  lactobacillus acidophilus 1 Tablet(s) Oral daily  potassium chloride    Tablet ER 20 milliEquivalent(s) Oral daily  warfarin 3 milliGRAM(s) Oral <User Schedule>    MEDICATIONS  (PRN):  acetaminophen   Tablet 650 milliGRAM(s) Oral every 6 hours PRN For Temp greater than 38 C (100.4 F)  benzocaine 15 mG/menthol 3.6 mG Lozenge 1 Lozenge Oral every 4 hours PRN Sore Throat  dextrose Gel 1 Dose(s) Oral once PRN Blood Glucose LESS THAN 70 milliGRAM(s)/deciliter  glucagon  Injectable 1 milliGRAM(s) IntraMuscular once PRN Glucose LESS THAN 70 milligrams/deciliter      LABS:                        14.0   9.8   )-----------( 103      ( 08 Oct 2017 07:06 )             42.1     10-08    132<L>  |  98  |  53.0<H>  ----------------------------<  104  3.9   |  22.0  |  1.35<H>    Ca    8.7      08 Oct 2017 07:33    TPro  6.3<L>  /  Alb  3.3  /  TBili  1.2  /  DBili  x   /  AST  31  /  ALT  28  /  AlkPhos  87  10-07    PT/INR - ( 08 Oct 2017 07:06 )   PT: 18.5 sec;   INR: 1.66 ratio               CAPILLARY BLOOD GLUCOSE  93 (08 Oct 2017 07:45)          RECENT CULTURES:      RADIOLOGY & ADDITIONAL TESTS:      PHYSICAL EXAM:    GENERAL: NAD, well-groomed, well-developed  HEAD:  Atraumatic, Normocephalic  EYES: EOMI, PERRLA, conjunctiva and sclera clear  ENMT: Moist mucous membranes  NECK: Supple, No JVD  NERVOUS SYSTEM:  Alert & Oriented X3, Motor Strength 5/5 B/L upper and lower extremities; DTRs 2+ intact and symmetric  CHEST/LUNG: Clear to auscultation bilaterally; No rales, rhonchi, wheezing, or rubs  HEART: Regular rate and rhythm; No murmurs, rubs, or gallops  ABDOMEN: Soft, Nontender, Nondistended; Bowel sounds present  EXTREMITIES:  2+ Peripheral Pulses, No clubbing, cyanosis, or edema        DVT/GI ppx  Discussed with pt @ bedside CHIEF COMPLAINT/INTERVAL HISTORY:    Patient is a 84y old  Female who presents with a chief complaint of fever and respiratory distress (04 Oct 2017 04:36)    HPI:  83 y/o female with h/o CHF, A fib on coumadin, was brought in to the ER because of difficulty to breath i  the ER, patient had fever 102. code sepsis was activated. patient denies any pain, cough or dysuria. patient added that she is not feeling well for weeks. h/o diarrhea 1 week ago after taking po antibiotic. patient does not recall the reason for antibiotic. Ct chest and abdomen in Feb. 2017 shows a 1.2 cm lung nodule and gall stones. no sore throat, cough or runny nose (04 Oct 2017 04:36)    SUBJECTIVE & OBJECTIVE: Pt seen and examined at bedside. No overnight events. Complaining of right knee pain and unable to bare weight. Labs remarkable for hyponatremia.    ROS: No chest pain, palpitations, SOB, light headedness, dizziness, headache, nausea/vomiting, fevers/chills, abdominal pain, dysuria or increased urinary frequency.    ICU Vital Signs Last 24 Hrs  T(C): 36.4 (08 Oct 2017 15:37), Max: 37.1 (07 Oct 2017 23:55)  T(F): 97.5 (08 Oct 2017 15:37), Max: 98.8 (07 Oct 2017 23:55)  HR: 62 (08 Oct 2017 15:37) (62 - 83)  BP: 97/62 (08 Oct 2017 15:37) (97/62 - 130/81)  RR: 18 (08 Oct 2017 15:37) (18 - 18)  SpO2: 95% (08 Oct 2017 15:37) (91% - 97%)      MEDICATIONS  (STANDING):  ATENolol  Tablet 25 milliGRAM(s) Oral two times a day  cefTRIAXone   IVPB 2 Gram(s) IV Intermittent every 24 hours  dextrose 5%. 1000 milliLiter(s) (50 mL/Hr) IV Continuous <Continuous>  dextrose 50% Injectable 12.5 Gram(s) IV Push once  dextrose 50% Injectable 25 Gram(s) IV Push once  dextrose 50% Injectable 25 Gram(s) IV Push once  digoxin     Tablet 0.125 milliGRAM(s) Oral every other day  furosemide    Tablet 40 milliGRAM(s) Oral daily  hydrALAZINE 10 milliGRAM(s) Oral two times a day  insulin lispro (HumaLOG) corrective regimen sliding scale   SubCutaneous three times a day before meals  lactobacillus acidophilus 1 Tablet(s) Oral daily  potassium chloride    Tablet ER 20 milliEquivalent(s) Oral daily  warfarin 3 milliGRAM(s) Oral <User Schedule>    MEDICATIONS  (PRN):  acetaminophen   Tablet 650 milliGRAM(s) Oral every 6 hours PRN For Temp greater than 38 C (100.4 F)  benzocaine 15 mG/menthol 3.6 mG Lozenge 1 Lozenge Oral every 4 hours PRN Sore Throat  dextrose Gel 1 Dose(s) Oral once PRN Blood Glucose LESS THAN 70 milliGRAM(s)/deciliter  glucagon  Injectable 1 milliGRAM(s) IntraMuscular once PRN Glucose LESS THAN 70 milligrams/deciliter      LABS:                        14.0   9.8   )-----------( 103      ( 08 Oct 2017 07:06 )             42.1     10-08    132<L>  |  98  |  53.0<H>  ----------------------------<  104  3.9   |  22.0  |  1.35<H>    Ca    8.7      08 Oct 2017 07:33    TPro  6.3<L>  /  Alb  3.3  /  TBili  1.2  /  DBili  x   /  AST  31  /  ALT  28  /  AlkPhos  87  10-07    PT/INR - ( 08 Oct 2017 07:06 )   PT: 18.5 sec;   INR: 1.66 ratio         CAPILLARY BLOOD GLUCOSE  93 (08 Oct 2017 07:45)    RECENT CULTURES:  Body Fluid culture - no growth to date    PHYSICAL EXAM:            GENERAL: elderly female, sitting in chair, not in   HEAD:  Atraumatic, Normocephalic  EYES: EOMI, PERRLA, conjunctiva and sclera clear  ENMT: Moist mucous membranes  NECK: Supple, No JVD  NERVOUS SYSTEM:  Alert & Oriented X3, Motor Strength 5/5 B/L upper and lower extremities; DTRs 2+ intact and symmetric  CHEST/LUNG: Clear to auscultation bilaterally; No rales, rhonchi, wheezing, or rubs  HEART: Regular rate and rhythm; No murmurs, rubs, or gallops  ABDOMEN: Soft, Nontender, Nondistended; Bowel sounds present  EXTREMITIES:  2+ Peripheral Pulses, No clubbing, cyanosis, or edema        DVT/GI ppx  Discussed with pt @ bedside CHIEF COMPLAINT/INTERVAL HISTORY:    Patient is a 84y old  Female who presents with a chief complaint of fever and respiratory distress (04 Oct 2017 04:36)    HPI:  85 y/o female with h/o CHF, A fib on coumadin, was brought in to the ER because of difficulty to breath i  the ER, patient had fever 102. code sepsis was activated. patient denies any pain, cough or dysuria. patient added that she is not feeling well for weeks. h/o diarrhea 1 week ago after taking po antibiotic. patient does not recall the reason for antibiotic. Ct chest and abdomen in Feb. 2017 shows a 1.2 cm lung nodule and gall stones. no sore throat, cough or runny nose (04 Oct 2017 04:36)    SUBJECTIVE & OBJECTIVE: Pt seen and examined at bedside. No overnight events. Complaining of right knee pain and unable to bare weight. Labs remarkable for hyponatremia.    ROS: No chest pain, palpitations, SOB, light headedness, dizziness, headache, nausea/vomiting, fevers/chills, abdominal pain, dysuria or increased urinary frequency.    ICU Vital Signs Last 24 Hrs  T(C): 36.4 (08 Oct 2017 15:37), Max: 37.1 (07 Oct 2017 23:55)  T(F): 97.5 (08 Oct 2017 15:37), Max: 98.8 (07 Oct 2017 23:55)  HR: 62 (08 Oct 2017 15:37) (62 - 83)  BP: 97/62 (08 Oct 2017 15:37) (97/62 - 130/81)  RR: 18 (08 Oct 2017 15:37) (18 - 18)  SpO2: 95% (08 Oct 2017 15:37) (91% - 97%)      MEDICATIONS  (STANDING):  ATENolol  Tablet 25 milliGRAM(s) Oral two times a day  cefTRIAXone   IVPB 2 Gram(s) IV Intermittent every 24 hours  dextrose 5%. 1000 milliLiter(s) (50 mL/Hr) IV Continuous <Continuous>  dextrose 50% Injectable 12.5 Gram(s) IV Push once  dextrose 50% Injectable 25 Gram(s) IV Push once  dextrose 50% Injectable 25 Gram(s) IV Push once  digoxin     Tablet 0.125 milliGRAM(s) Oral every other day  furosemide    Tablet 40 milliGRAM(s) Oral daily  hydrALAZINE 10 milliGRAM(s) Oral two times a day  insulin lispro (HumaLOG) corrective regimen sliding scale   SubCutaneous three times a day before meals  lactobacillus acidophilus 1 Tablet(s) Oral daily  potassium chloride    Tablet ER 20 milliEquivalent(s) Oral daily  warfarin 3 milliGRAM(s) Oral <User Schedule>    MEDICATIONS  (PRN):  acetaminophen   Tablet 650 milliGRAM(s) Oral every 6 hours PRN For Temp greater than 38 C (100.4 F)  benzocaine 15 mG/menthol 3.6 mG Lozenge 1 Lozenge Oral every 4 hours PRN Sore Throat  dextrose Gel 1 Dose(s) Oral once PRN Blood Glucose LESS THAN 70 milliGRAM(s)/deciliter  glucagon  Injectable 1 milliGRAM(s) IntraMuscular once PRN Glucose LESS THAN 70 milligrams/deciliter      LABS:                        14.0   9.8   )-----------( 103      ( 08 Oct 2017 07:06 )             42.1     10-08    132<L>  |  98  |  53.0<H>  ----------------------------<  104  3.9   |  22.0  |  1.35<H>    Ca    8.7      08 Oct 2017 07:33    TPro  6.3<L>  /  Alb  3.3  /  TBili  1.2  /  DBili  x   /  AST  31  /  ALT  28  /  AlkPhos  87  10-07    PT/INR - ( 08 Oct 2017 07:06 )   PT: 18.5 sec;   INR: 1.66 ratio         CAPILLARY BLOOD GLUCOSE  93 (08 Oct 2017 07:45)    RECENT CULTURES:  Body Fluid culture 10/5 - no growth to date  Blood Cultures 10/5 - no growth to date  Blood Culture 10/4 - strep milleri, viridans group    PHYSICAL EXAM:  GENERAL: elderly female, sitting in bed, NAD  CVS: irregular, + S1/S2, + murmur  Respiratory: diminished breath sounds but otherwise clear  Abd: soft, nontender, nondistended  Ext: trace pedal edema, +right knee swelling CHIEF COMPLAINT/INTERVAL HISTORY:    Patient is a 84y old  Female who presents with a chief complaint of fever and respiratory distress (04 Oct 2017 04:36)    HPI:  85 y/o female with h/o CHF, A fib on coumadin, was brought in to the ER because of difficulty to breath i  the ER, patient had fever 102. code sepsis was activated. patient denies any pain, cough or dysuria. patient added that she is not feeling well for weeks. h/o diarrhea 1 week ago after taking po antibiotic. patient does not recall the reason for antibiotic. Ct chest and abdomen in Feb. 2017 shows a 1.2 cm lung nodule and gall stones. no sore throat, cough or runny nose (04 Oct 2017 04:36)    SUBJECTIVE & OBJECTIVE: Pt seen and examined at bedside. No overnight events. Complaining of right knee pain and unable to bare weight. Labs remarkable for hyponatremia.    ROS: No chest pain, palpitations, SOB, light headedness, dizziness, headache, nausea/vomiting, fevers/chills, abdominal pain, dysuria or increased urinary frequency.    ICU Vital Signs Last 24 Hrs  T(C): 36.4 (08 Oct 2017 15:37), Max: 37.1 (07 Oct 2017 23:55)  T(F): 97.5 (08 Oct 2017 15:37), Max: 98.8 (07 Oct 2017 23:55)  HR: 62 (08 Oct 2017 15:37) (62 - 83)  BP: 97/62 (08 Oct 2017 15:37) (97/62 - 130/81)  RR: 18 (08 Oct 2017 15:37) (18 - 18)  SpO2: 95% (08 Oct 2017 15:37) (91% - 97%)      MEDICATIONS  (STANDING):  ATENolol  Tablet 25 milliGRAM(s) Oral two times a day  cefTRIAXone   IVPB 2 Gram(s) IV Intermittent every 24 hours  dextrose 5%. 1000 milliLiter(s) (50 mL/Hr) IV Continuous <Continuous>  dextrose 50% Injectable 12.5 Gram(s) IV Push once  dextrose 50% Injectable 25 Gram(s) IV Push once  dextrose 50% Injectable 25 Gram(s) IV Push once  digoxin     Tablet 0.125 milliGRAM(s) Oral every other day  furosemide    Tablet 40 milliGRAM(s) Oral daily  hydrALAZINE 10 milliGRAM(s) Oral two times a day  insulin lispro (HumaLOG) corrective regimen sliding scale   SubCutaneous three times a day before meals  lactobacillus acidophilus 1 Tablet(s) Oral daily  potassium chloride    Tablet ER 20 milliEquivalent(s) Oral daily  warfarin 3 milliGRAM(s) Oral <User Schedule>    MEDICATIONS  (PRN):  acetaminophen   Tablet 650 milliGRAM(s) Oral every 6 hours PRN For Temp greater than 38 C (100.4 F)  benzocaine 15 mG/menthol 3.6 mG Lozenge 1 Lozenge Oral every 4 hours PRN Sore Throat  dextrose Gel 1 Dose(s) Oral once PRN Blood Glucose LESS THAN 70 milliGRAM(s)/deciliter  glucagon  Injectable 1 milliGRAM(s) IntraMuscular once PRN Glucose LESS THAN 70 milligrams/deciliter      LABS:                        14.0   9.8   )-----------( 103      ( 08 Oct 2017 07:06 )             42.1     10-08    132<L>  |  98  |  53.0<H>  ----------------------------<  104  3.9   |  22.0  |  1.35<H>    Ca    8.7      08 Oct 2017 07:33    TPro  6.3<L>  /  Alb  3.3  /  TBili  1.2  /  DBili  x   /  AST  31  /  ALT  28  /  AlkPhos  87  10-07    PT/INR - ( 08 Oct 2017 07:06 )   PT: 18.5 sec;   INR: 1.66 ratio         CAPILLARY BLOOD GLUCOSE  93 (08 Oct 2017 07:45)    RECENT CULTURES:  Body Fluid culture 10/5 - no growth to date  Blood Cultures 10/5 - no growth to date  Blood Culture 10/4 - strep milleri, viridans group    PHYSICAL EXAM:  GENERAL: elderly female, sitting in bed, NAD  CVS: irregular, + S1/S2, + murmur  Respiratory: diminished breath sounds but otherwise clear  Abd: soft, nontender, nondistended  Ext: trace pedal edema CHIEF COMPLAINT/INTERVAL HISTORY:    Patient is a 84y old  Female who presents with a chief complaint of fever and respiratory distress (04 Oct 2017 04:36)    HPI:  83 y/o female with h/o CHF, A fib on coumadin, was brought in to the ER because of difficulty to breath i  the ER, patient had fever 102. code sepsis was activated. patient denies any pain, cough or dysuria. patient added that she is not feeling well for weeks. h/o diarrhea 1 week ago after taking po antibiotic. patient does not recall the reason for antibiotic. Ct chest and abdomen in Feb. 2017 shows a 1.2 cm lung nodule and gall stones. no sore throat, cough or runny nose (04 Oct 2017 04:36)    SUBJECTIVE & OBJECTIVE: Pt seen and examined at bedside. No overnight events. Complaining of right knee pain and unable to bare weight. Labs remarkable for hyponatremia.    ROS: No chest pain, palpitations, SOB, light headedness, dizziness, headache, nausea/vomiting, fevers/chills, abdominal pain, dysuria or increased urinary frequency.    ICU Vital Signs Last 24 Hrs  T(C): 36.4 (08 Oct 2017 15:37), Max: 37.1 (07 Oct 2017 23:55)  T(F): 97.5 (08 Oct 2017 15:37), Max: 98.8 (07 Oct 2017 23:55)  HR: 62 (08 Oct 2017 15:37) (62 - 83)  BP: 97/62 (08 Oct 2017 15:37) (97/62 - 130/81)  RR: 18 (08 Oct 2017 15:37) (18 - 18)  SpO2: 95% (08 Oct 2017 15:37) (91% - 97%)      MEDICATIONS  (STANDING):  ATENolol  Tablet 25 milliGRAM(s) Oral two times a day  cefTRIAXone   IVPB 2 Gram(s) IV Intermittent every 24 hours  dextrose 5%. 1000 milliLiter(s) (50 mL/Hr) IV Continuous <Continuous>  dextrose 50% Injectable 12.5 Gram(s) IV Push once  dextrose 50% Injectable 25 Gram(s) IV Push once  dextrose 50% Injectable 25 Gram(s) IV Push once  digoxin     Tablet 0.125 milliGRAM(s) Oral every other day  furosemide    Tablet 40 milliGRAM(s) Oral daily  hydrALAZINE 10 milliGRAM(s) Oral two times a day  insulin lispro (HumaLOG) corrective regimen sliding scale   SubCutaneous three times a day before meals  lactobacillus acidophilus 1 Tablet(s) Oral daily  potassium chloride    Tablet ER 20 milliEquivalent(s) Oral daily  warfarin 3 milliGRAM(s) Oral <User Schedule>    MEDICATIONS  (PRN):  acetaminophen   Tablet 650 milliGRAM(s) Oral every 6 hours PRN For Temp greater than 38 C (100.4 F)  benzocaine 15 mG/menthol 3.6 mG Lozenge 1 Lozenge Oral every 4 hours PRN Sore Throat  dextrose Gel 1 Dose(s) Oral once PRN Blood Glucose LESS THAN 70 milliGRAM(s)/deciliter  glucagon  Injectable 1 milliGRAM(s) IntraMuscular once PRN Glucose LESS THAN 70 milligrams/deciliter      LABS:                        14.0   9.8   )-----------( 103      ( 08 Oct 2017 07:06 )             42.1     10-08    132<L>  |  98  |  53.0<H>  ----------------------------<  104  3.9   |  22.0  |  1.35<H>    Ca    8.7      08 Oct 2017 07:33    TPro  6.3<L>  /  Alb  3.3  /  TBili  1.2  /  DBili  x   /  AST  31  /  ALT  28  /  AlkPhos  87  10-07    PT/INR - ( 08 Oct 2017 07:06 )   PT: 18.5 sec;   INR: 1.66 ratio         CAPILLARY BLOOD GLUCOSE  93 (08 Oct 2017 07:45)    RECENT CULTURES:  Body Fluid culture 10/5 - no growth to date  Blood Cultures 10/5 - no growth to date  Blood Culture 10/4 - strep milleri, viridans group    PHYSICAL EXAM:  GENERAL: elderly female, sitting in bed, NAD  CVS: irregular, + S1/S2, + murmur  Respiratory: diminished breath sounds but otherwise clear  Abd: soft, nontender, nondistended  Ext: trace pedal edema  Lymph: No lymphadenopathy  Psych: mood stable, calm, cooperative

## 2017-10-08 NOTE — PHYSICAL THERAPY INITIAL EVALUATION ADULT - ADDITIONAL COMMENTS
History taken through patient and patient's family with patient permission. The patient was previously living with her  (who is nearly blind). The patient reports previously performing all ADLs without assistance using RW and rollator. The patient's family describes pt has 2 FROILAN, 1 step inside. The patient owns RW, rollator, shower chair, raised toilet seat, and wheelchair as well as adapted bathroom with grab bars.

## 2017-10-08 NOTE — PHYSICAL THERAPY INITIAL EVALUATION ADULT - DISCHARGE DISPOSITION, PT EVAL
rehabilitation facility/home w/ home PT/Pt family refusing [facility], although that would be appropriate. Acute rehab versus home with assistance pending progress./home w/ assist

## 2017-10-08 NOTE — PHYSICAL THERAPY INITIAL EVALUATION ADULT - PERTINENT HX OF CURRENT PROBLEM, REHAB EVAL
The pt was adm with R knee pain and worsening SOB, code sepsis called in ED. The pt reports prior to admission visiting her PCP who prescribed medication for gout of the R knee. The patient underwent arthrocentesis of R knee 10/5. XRay of R knee visualized no effusion, only OA.

## 2017-10-08 NOTE — PHYSICAL THERAPY INITIAL EVALUATION ADULT - IMPAIRMENTS CONTRIBUTING TO GAIT DEVIATIONS, PT EVAL
impaired coordination/decreased flexibility/narrow base of support/decreased strength/impaired balance/decreased ROM

## 2017-10-08 NOTE — PROGRESS NOTE ADULT - PROBLEM SELECTOR PROBLEM 9
Type 2 diabetes mellitus with complication, without long-term current use of insulin CRI (chronic renal insufficiency), stage 3 (moderate)

## 2017-10-08 NOTE — PROGRESS NOTE ADULT - PROBLEM SELECTOR PLAN 6
rate controlled; continue atenolol with holding parameters  continue coumadin as per INR continue lasix as tolerated  on hydralazine and aldactone (aldactone on hold due to hypotensive but will resume at lower dose when tolerated)  cardiology on board f/u rapid strep PCR  benzocaine PRN

## 2017-10-08 NOTE — PROGRESS NOTE ADULT - SUBJECTIVE AND OBJECTIVE BOX
NEPHROLOGY INTERVAL HPI/OVERNIGHT EVENTS:  pt clinically stable  no acute distress    MEDICATIONS  (STANDING):  ATENolol  Tablet 25 milliGRAM(s) Oral two times a day  cefTRIAXone   IVPB 2 Gram(s) IV Intermittent every 24 hours  dextrose 5%. 1000 milliLiter(s) (50 mL/Hr) IV Continuous <Continuous>  dextrose 50% Injectable 12.5 Gram(s) IV Push once  dextrose 50% Injectable 25 Gram(s) IV Push once  dextrose 50% Injectable 25 Gram(s) IV Push once  digoxin     Tablet 0.125 milliGRAM(s) Oral every other day  furosemide    Tablet 40 milliGRAM(s) Oral daily  hydrALAZINE 10 milliGRAM(s) Oral two times a day  insulin lispro (HumaLOG) corrective regimen sliding scale   SubCutaneous three times a day before meals  lactobacillus acidophilus 1 Tablet(s) Oral daily  potassium chloride    Tablet ER 20 milliEquivalent(s) Oral daily    MEDICATIONS  (PRN):  acetaminophen   Tablet 650 milliGRAM(s) Oral every 6 hours PRN For Temp greater than 38 C (100.4 F)  benzocaine 15 mG/menthol 3.6 mG Lozenge 1 Lozenge Oral every 4 hours PRN Sore Throat  dextrose Gel 1 Dose(s) Oral once PRN Blood Glucose LESS THAN 70 milliGRAM(s)/deciliter  glucagon  Injectable 1 milliGRAM(s) IntraMuscular once PRN Glucose LESS THAN 70 milligrams/deciliter      Allergies    aspirin (Unknown)  azithromycin (Diarrhea)  latex (Unknown)  morphine (Other; Short breath)  penicillins (Unknown)  sulfa drugs (Unknown)        Vital Signs Last 24 Hrs  T(C): 36.4 (08 Oct 2017 07:32), Max: 37.1 (07 Oct 2017 23:55)  T(F): 97.5 (08 Oct 2017 07:32), Max: 98.8 (07 Oct 2017 23:55)  HR: 83 (08 Oct 2017 07:32) (68 - 83)  BP: 115/65 (08 Oct 2017 07:32) (102/64 - 130/81)  BP(mean): --  RR: 18 (08 Oct 2017 07:32) (18 - 19)  SpO2: 97% (08 Oct 2017 07:32) (91% - 100%)    PHYSICAL EXAM:  GENERAL: appears chronically ill   NECK: no jvd  NERVOUS SYSTEM:  non focal  CHEST/LUNG: clear bilaterally  HEART: no rub  ABDOMEN: soft, not tender, no bruit  EXTREMITIES: R knee with dressing  SKIN:  rash betterGENERAL: appears chronically ill        LABS:                        14.0   9.8   )-----------( 103      ( 08 Oct 2017 07:06 )             42.1     10-08    132<L>  |  98  |  53.0<H>  ----------------------------<  104  3.9   |  22.0  |  1.35<H>    Ca    8.7      08 Oct 2017 07:33    TPro  6.3<L>  /  Alb  3.3  /  TBili  1.2  /  DBili  x   /  AST  31  /  ALT  28  /  AlkPhos  87  10-07    PT/INR - ( 08 Oct 2017 07:06 )   PT: 18.5 sec;   INR: 1.66 ratio       Comprehensive Metabolic Panel in AM (10.07.17 @ 06:55)    Sodium, Serum: 135 mmol/L    Vancomycin Level, Trough: 9.8: Vancomycin trough levels should be rapidly reached and maintained at  15-20 ug/ml for life threatening MRSA  infections such as sepsis, endocarditis, osteomyelitis and pneumonia. A  first trough level should be drawn  before the 3rd or 4th dose.  Risk of renal toxicity is increased for levels >15 ug/ml, in patients on  other nephrotoxic drugs, who are  hemodynamically unstable, have unstable renal function, or are on  Vancomycin therapy for >14 days. Renal function with  creatinine levels should be monitored for those patients. ug/mL (10.06.17 @ 01:13)              RADIOLOGY & ADDITIONAL TESTS:

## 2017-10-08 NOTE — PROGRESS NOTE ADULT - PROBLEM SELECTOR PLAN 3
Likely due to drug reaction; appreciate derm input  rash significantly improved since cessation of allopurinol possibly secondary to non-osmotic release of ADH from pain vs hypotonic fluid administration  -will change Ceftriaxone from D5W to NS  -check urine lytes and urine osm  -check TSH and AM cortisol  -monitor serum sodium closely  -renal on board

## 2017-10-08 NOTE — PHYSICAL THERAPY INITIAL EVALUATION ADULT - GENERAL OBSERVATIONS, REHAB EVAL
The patient was rec'd supine in bed on 2GUL, pt's spouse and dtr bedside. Pt appeared fatigued, mildly confused during history taking, however, per family near baseline

## 2017-10-08 NOTE — PHYSICAL THERAPY INITIAL EVALUATION ADULT - ORIENTATION, REHAB EVAL
oriented to person, place, time and situation/Moments of confusion where answers are not answered appropriately.

## 2017-10-09 LAB
ALBUMIN SERPL ELPH-MCNC: <1 G/DL — LOW (ref 3.3–5.2)
ALP SERPL-CCNC: 102 U/L — SIGNIFICANT CHANGE UP (ref 40–120)
ALT FLD-CCNC: 25 U/L — SIGNIFICANT CHANGE UP
ANION GAP SERPL CALC-SCNC: 14 MMOL/L — SIGNIFICANT CHANGE UP (ref 5–17)
APTT BLD: 38.4 SEC — HIGH (ref 27.5–37.4)
AST SERPL-CCNC: <4 U/L — SIGNIFICANT CHANGE UP
BASOPHILS # BLD AUTO: 0.1 K/UL — SIGNIFICANT CHANGE UP (ref 0–0.2)
BASOPHILS NFR BLD AUTO: 1 % — SIGNIFICANT CHANGE UP (ref 0–2)
BILIRUB SERPL-MCNC: 1.9 MG/DL — SIGNIFICANT CHANGE UP (ref 0.4–2)
BUN SERPL-MCNC: 50 MG/DL — HIGH (ref 8–20)
CALCIUM SERPL-MCNC: 8.6 MG/DL — SIGNIFICANT CHANGE UP (ref 8.6–10.2)
CHLORIDE SERPL-SCNC: 100 MMOL/L — SIGNIFICANT CHANGE UP (ref 98–107)
CO2 SERPL-SCNC: 23 MMOL/L — SIGNIFICANT CHANGE UP (ref 22–29)
CORTIS AM PEAK SERPL-MCNC: 15.9 UG/DL — SIGNIFICANT CHANGE UP (ref 6–18.4)
CREAT SERPL-MCNC: 1.33 MG/DL — HIGH (ref 0.5–1.3)
CULTURE RESULTS: SIGNIFICANT CHANGE UP
CULTURE RESULTS: SIGNIFICANT CHANGE UP
DIGOXIN SERPL-MCNC: 0.9 NG/ML — SIGNIFICANT CHANGE UP (ref 0.8–2)
EOSINOPHIL # BLD AUTO: 1 K/UL — HIGH (ref 0–0.5)
EOSINOPHIL NFR BLD AUTO: 10 % — HIGH (ref 0–5)
GLUCOSE SERPL-MCNC: 91 MG/DL — SIGNIFICANT CHANGE UP (ref 70–115)
HCT VFR BLD CALC: 40.3 % — SIGNIFICANT CHANGE UP (ref 37–47)
HGB BLD-MCNC: 13.3 G/DL — SIGNIFICANT CHANGE UP (ref 12–16)
INR BLD: 2.37 RATIO — HIGH (ref 0.88–1.16)
LYMPHOCYTES # BLD AUTO: 1.2 K/UL — SIGNIFICANT CHANGE UP (ref 1–4.8)
LYMPHOCYTES # BLD AUTO: 12 % — LOW (ref 20–55)
MAGNESIUM SERPL-MCNC: 2 MG/DL — SIGNIFICANT CHANGE UP (ref 1.8–2.6)
MCHC RBC-ENTMCNC: 32.3 PG — HIGH (ref 27–31)
MCHC RBC-ENTMCNC: 33 G/DL — SIGNIFICANT CHANGE UP (ref 32–36)
MCV RBC AUTO: 97.8 FL — SIGNIFICANT CHANGE UP (ref 81–99)
MONOCYTES # BLD AUTO: 0.9 K/UL — HIGH (ref 0–0.8)
MONOCYTES NFR BLD AUTO: 9 % — SIGNIFICANT CHANGE UP (ref 3–10)
NEUTROPHILS # BLD AUTO: 6.2 K/UL — SIGNIFICANT CHANGE UP (ref 1.8–8)
NEUTROPHILS NFR BLD AUTO: 63 % — SIGNIFICANT CHANGE UP (ref 37–73)
NEUTS BAND # BLD: 3 % — SIGNIFICANT CHANGE UP (ref 0–8)
ORGANISM # SPEC MICROSCOPIC CNT: SIGNIFICANT CHANGE UP
OSMOLALITY UR: 537 MOSM/KG — SIGNIFICANT CHANGE UP (ref 300–1000)
PHOSPHATE SERPL-MCNC: 2.5 MG/DL — SIGNIFICANT CHANGE UP (ref 2.4–4.7)
PLAT MORPH BLD: ABNORMAL
PLATELET # BLD AUTO: 107 K/UL — LOW (ref 150–400)
POTASSIUM SERPL-MCNC: 3.8 MMOL/L — SIGNIFICANT CHANGE UP (ref 3.5–5.3)
POTASSIUM SERPL-SCNC: 3.8 MMOL/L — SIGNIFICANT CHANGE UP (ref 3.5–5.3)
POTASSIUM UR-SCNC: 31 MMOL/L — SIGNIFICANT CHANGE UP
PROT SERPL-MCNC: 6.1 G/DL — LOW (ref 6.6–8.7)
PROTHROM AB SERPL-ACNC: 26.5 SEC — HIGH (ref 9.8–12.7)
RBC # BLD: 4.12 M/UL — LOW (ref 4.4–5.2)
RBC # FLD: 16.1 % — HIGH (ref 11–15.6)
RBC BLD AUTO: NORMAL — SIGNIFICANT CHANGE UP
SODIUM SERPL-SCNC: 137 MMOL/L — SIGNIFICANT CHANGE UP (ref 135–145)
SODIUM UR-SCNC: <30 MMOL/L — SIGNIFICANT CHANGE UP
SPECIMEN SOURCE: SIGNIFICANT CHANGE UP
SPECIMEN SOURCE: SIGNIFICANT CHANGE UP
TSH SERPL-MCNC: 1.1 UIU/ML — SIGNIFICANT CHANGE UP (ref 0.27–4.2)
VARIANT LYMPHS # BLD: 2 % — SIGNIFICANT CHANGE UP (ref 0–6)
WBC # BLD: 9.5 K/UL — SIGNIFICANT CHANGE UP (ref 4.8–10.8)
WBC # FLD AUTO: 9.5 K/UL — SIGNIFICANT CHANGE UP (ref 4.8–10.8)

## 2017-10-09 PROCEDURE — 99232 SBSQ HOSP IP/OBS MODERATE 35: CPT

## 2017-10-09 PROCEDURE — 99233 SBSQ HOSP IP/OBS HIGH 50: CPT

## 2017-10-09 PROCEDURE — 93970 EXTREMITY STUDY: CPT | Mod: 26

## 2017-10-09 RX ORDER — WARFARIN SODIUM 2.5 MG/1
1 TABLET ORAL
Qty: 0 | Refills: 0 | Status: COMPLETED | OUTPATIENT
Start: 2017-10-09 | End: 2017-10-09

## 2017-10-09 RX ADMIN — Medication 20 MILLIEQUIVALENT(S): at 12:49

## 2017-10-09 RX ADMIN — Medication 1 TABLET(S): at 12:50

## 2017-10-09 RX ADMIN — CEFTRIAXONE 100 GRAM(S): 500 INJECTION, POWDER, FOR SOLUTION INTRAMUSCULAR; INTRAVENOUS at 21:53

## 2017-10-09 RX ADMIN — Medication 40 MILLIGRAM(S): at 06:58

## 2017-10-09 RX ADMIN — ATENOLOL 25 MILLIGRAM(S): 25 TABLET ORAL at 06:58

## 2017-10-09 RX ADMIN — WARFARIN SODIUM 1 MILLIGRAM(S): 2.5 TABLET ORAL at 21:51

## 2017-10-09 RX ADMIN — BENZOCAINE AND MENTHOL 1 LOZENGE: 5; 1 LIQUID ORAL at 12:49

## 2017-10-09 NOTE — PROGRESS NOTE ADULT - SUBJECTIVE AND OBJECTIVE BOX
NEPHROLOGY INTERVAL HPI/OVERNIGHT EVENTS:    Feels better   ID follow up noted   OOB to chair   Meds reviewed   No SOB or CP     MEDICATIONS  (STANDING):  ATENolol  Tablet 25 milliGRAM(s) Oral two times a day  cefTRIAXone   IVPB 2 Gram(s) IV Intermittent every 24 hours  dextrose 5%. 1000 milliLiter(s) (50 mL/Hr) IV Continuous <Continuous>  dextrose 50% Injectable 12.5 Gram(s) IV Push once  dextrose 50% Injectable 25 Gram(s) IV Push once  dextrose 50% Injectable 25 Gram(s) IV Push once  digoxin     Tablet 0.125 milliGRAM(s) Oral every other day  furosemide    Tablet 40 milliGRAM(s) Oral daily  insulin lispro (HumaLOG) corrective regimen sliding scale   SubCutaneous three times a day before meals  lactobacillus acidophilus 1 Tablet(s) Oral daily  potassium chloride    Tablet ER 20 milliEquivalent(s) Oral daily  warfarin 1 milliGRAM(s) Oral <User Schedule>    MEDICATIONS  (PRN):  acetaminophen   Tablet 650 milliGRAM(s) Oral every 6 hours PRN For Temp greater than 38 C (100.4 F)  benzocaine 15 mG/menthol 3.6 mG Lozenge 1 Lozenge Oral every 4 hours PRN Sore Throat  dextrose Gel 1 Dose(s) Oral once PRN Blood Glucose LESS THAN 70 milliGRAM(s)/deciliter  glucagon  Injectable 1 milliGRAM(s) IntraMuscular once PRN Glucose LESS THAN 70 milligrams/deciliter      Allergies    allopurinol (Rash)  aspirin (Unknown)  azithromycin (Diarrhea)  latex (Unknown)  morphine (Other; Short breath)  penicillins (Unknown)  sulfa drugs (Unknown)    Intolerances          Vital Signs Last 24 Hrs  T(C): 36.5 (09 Oct 2017 08:00), Max: 36.5 (09 Oct 2017 08:00)  T(F): 97.7 (09 Oct 2017 08:00), Max: 97.7 (09 Oct 2017 08:00)  HR: 58 (09 Oct 2017 08:00) (58 - 73)  BP: 108/57 (09 Oct 2017 08:00) (108/57 - 118/60)  BP(mean): --  RR: 18 (09 Oct 2017 08:00) (17 - 18)  SpO2: 98% (09 Oct 2017 08:00) (97% - 98%)  Daily     Daily   I&O's Detail    08 Oct 2017 07:01  -  09 Oct 2017 07:00  --------------------------------------------------------  IN:    Oral Fluid: 480 mL  Total IN: 480 mL    OUT:    Voided: 400 mL  Total OUT: 400 mL    Total NET: 80 mL        I&O's Summary    08 Oct 2017 07:01  -  09 Oct 2017 07:00  --------------------------------------------------------  IN: 480 mL / OUT: 400 mL / NET: 80 mL        PHYSICAL EXAM:  NERVOUS SYSTEM:  non focal  CHEST/LUNG: clear bilaterally  HEART: no rub  ABDOMEN: soft, not tender, no bruit  EXTREMITIES: R knee with dressing    LABS:                        13.3   9.5   )-----------( 107      ( 09 Oct 2017 07:12 )             40.3     10-09    137  |  100  |  50.0<H>  ----------------------------<  91  3.8   |  23.0  |  1.33<H>    Ca    8.6      09 Oct 2017 07:12  Phos  2.5     10-09  Mg     2.0     10-09    TPro  6.1<L>  /  Alb  <1.0<L>  /  TBili  1.9  /  DBili  x   /  AST  <4  /  ALT  25  /  AlkPhos  102  10-09    PT/INR - ( 09 Oct 2017 07:12 )   PT: 26.5 sec;   INR: 2.37 ratio         PTT - ( 09 Oct 2017 07:12 )  PTT:38.4 sec    Magnesium, Serum: 2.0 mg/dL (10-09 @ 07:12)  Phosphorus Level, Serum: 2.5 mg/dL (10-09 @ 07:12)          INTERPRETATION:  Clinical information: Respiratory distress, abdominal   pain, history of AAA    Comparison: 2/20    PROCEDURE:   CT of the Chest, abdomen and pelvis was performed without intravenous   contrast.    Evaluation of the vasculature, abdominal and pelvic viscera/lymph nodes   is limited without intravenous contrast.    FINDINGS:    CHEST:    CHEST WALL AND LOWER NECK: Within normal limits  MEDIASTINUM AND GIANNA: Within normal limits  HEART: Cardiomegaly, coronary calcifications  VESSELS: Within normal limits  LUNG AND LARGE AIRWAYS: Emphysema    ABDOMEN:    LIVER: Cirrhosis, scarring, stable  BILE DUCTS: Normal caliber  GALLBLADDER: No calcified gallstones. Normal caliber wall  PANCREAS: Within normal limits  SPLEEN: Within normal limits  ADRENALS: Within normal limits  KIDNEYS: Bilateral cysts and hypodensities too small to characterize    PELVIS:  REPRODUCTIVE ORGANS: No pelvic masses  BLADDER: Within normal limits    BOWEL: Within normal limitsr  PERITONEUM: No ascites or free air, no fluid collection    VESSELS:4.7 cm abdominal aortic aneurysm, mildly enlarged from 4.5 cm.   Extensive vascular calcifications.  RETROPERITONEUM: No enlarged retroperitoneal or pelvic nodes  ABDOMINAL WALL: Within normal limits  MUSCULOSKELETAL: Spinal degenerative changes.    IMPRESSION:     No acute findings provided the limitation of a noncontrast technique.    Emphysema.    Mildly enlarged 4.7 cm abdominal aortic aneurysm.    RADIOLOGY & ADDITIONAL TESTS:

## 2017-10-09 NOTE — PROGRESS NOTE ADULT - SUBJECTIVE AND OBJECTIVE BOX
[facility]  INFECTIOUS DISEASES AND INTERNAL MEDICINE OF [place]  =======================================================  Leighton Harris MD [facility]   Austin Brady MD  Diplomates American Board of Internal Medicine and Infectious Diseases  =======================================================    RAGHU DAVIS [id number]  chief complaint: follow up for bacteremia  cultures from 10/4/17 reviewed, Strep  milleri viridans group., ss to ceftriaxone  patient seen and examined in follow up.  Chart and labs reviewed.     right knee ACE bandage now removed.     =======================================================  Allergies:  aspirin (Unknown)  azithromycin (Diarrhea)  latex (Unknown)  morphine (Other; Short breath)  penicillins (Unknown)  sulfa drugs (Unknown)    =======================================================  MEDICATIONS  (STANDING):  ATENolol  Tablet 25 milliGRAM(s) Oral two times a day  cefTRIAXone   IVPB 2 Gram(s) IV Intermittent every 24 hours  dextrose 5%. 1000 milliLiter(s) (50 mL/Hr) IV Continuous <Continuous>  dextrose 50% Injectable 12.5 Gram(s) IV Push once  dextrose 50% Injectable 25 Gram(s) IV Push once  dextrose 50% Injectable 25 Gram(s) IV Push once  digoxin     Tablet 0.125 milliGRAM(s) Oral every other day  furosemide    Tablet 40 milliGRAM(s) Oral daily  hydrALAZINE 10 milliGRAM(s) Oral two times a day  insulin lispro (HumaLOG) corrective regimen sliding scale   SubCutaneous three times a day before meals  lactobacillus acidophilus 1 Tablet(s) Oral daily  potassium chloride    Tablet ER 20 milliEquivalent(s) Oral daily  warfarin 3 milliGRAM(s) Oral once     =======================================================     REVIEW OF SYSTEMS:  CONSTITUTIONAL:  No Fever or chills  HEENT:   No diplopia or blurred vision.  No earache, sore throat or runny nose.  CARDIOVASCULAR:  No pressure, squeezing, strangling, tightness, heaviness or aching about the chest, neck, axilla or epigastrium.  RESPIRATORY:  No cough, shortness of breath, PND or orthopnea.  GASTROINTESTINAL:  No nausea, vomiting or diarrhea.  GENITOURINARY:  No dysuria, frequency or urgency. No Blood in urine  MUSCULOSKELETAL:  no joint aches, no muscle pain  SKIN:  No change in skin, hair or nails.  + RASH  NEUROLOGIC:  No paresthesias, fasciculations, seizures or weakness.  PSYCHIATRIC:  No disorder of thought or mood.  ENDOCRINE:  No heat or cold intolerance, polyuria or polydipsia.  HEMATOLOGICAL:  No easy bruising or bleeding.     =======================================================     Physical Exam:   Vital Signs Last 24 Hrs  T(C): 36.4 (08 Oct 2017 23:42), Max: 36.4 (08 Oct 2017 15:37)  T(F): 97.6 (08 Oct 2017 23:42), Max: 97.6 (08 Oct 2017 23:42)  HR: 68 (09 Oct 2017 06:55) (62 - 73)  BP: 118/60 (09 Oct 2017 06:55) (97/62 - 118/60)  BP(mean): --  RR: 17 (08 Oct 2017 23:42) (17 - 18)  SpO2: 97% (08 Oct 2017 23:42) (95% - 97%)  T     GEN: NAD, pleasant, THIN ELDERLY  HEENT: normocephalic and atraumatic. EOMI. KENDALL.    NECK: Supple. No carotid bruits.  No lymphadenopathy or thyromegaly.  LUNGS: Clear to auscultation.  HEART: Regular rate and rhythm POSITIVE murmur.  ABDOMEN: Soft, nontender, and nondistended.  Positive bowel sounds.    : No CVA tenderness  EXTREMITIES: Without any cyanosis, clubbing, rash, lesions or edema.  MSK: bandage on knee right  NEUROLOGIC: Cranial nerves II through XII are grossly intact.  PSYCHIATRIC: Appropriate affect .  SKIN: No ulceration or induration present.   .     =======================================================    Labs:  10-09    137  |  100  |  50.0<H>  ----------------------------<  91  3.8   |  23.0  |  1.33<H>    Ca    8.6      09 Oct 2017 07:12  Phos  2.5     10-09  Mg     2.0     10-09    TPro  6.1<L>  /  Alb  <1.0<L>  /  TBili  1.9  /  DBili  x   /  AST  <4  /  ALT  25  /  AlkPhos  102  10-09                        13.3   9.5   )-----------( 107      ( 09 Oct 2017 07:12 )             40.3                     RECENT CULTURES:  10-05 @ 15:50 .Body Fluid Synovial Fluid     No growth at 1 day.  Culture in progress    Moderate White blood cells  No organisms seen      10-05 @ 10:33 .Blood Blood     No growth at 48 hours        10-05 @ 05:49 .Blood Blood-Peripheral     No growth at 48 hours        10-04 @ 04:25          NotDetec  10-04 @ 02:27 .Blood Blood Streptococcus milleri, viridans group  Blood Culture PCR    Growth in anaerobic bottle: Streptococcus milleri, viridans group  .  Anaerobic Bottle: 12:20 Hours to positivity  Aerobic Bottle: No growth to date  .  TYPE: (C=Critical, N=Notification, A=Abnormal) C  TESTS:  _ Positive Blood GS  DATE/TIME CALLED: _ 10/04/2017 15:30  CALLED TO: _ ERHR: Ophelia Arauz RN  READ BACK (2 Patient Identifiers)(Y/N): _ Y  READ BACK VALUES (Y/N): _ Y  CALLED BY: Elvira leslie    Culture - Blood (10.04.17 @ 02:27)    -  Streptococcus sp. (Not Grp A, B or S pneumoniae): Detec    -  Ceftriaxone: S <=0.25    -  Clindamycin: S <=0.06    -  Erythromycin: S <=0.06    -  Penicillin: S <=0.03    -  Vancomycin: S <=0.12    Specimen Source: .Blood Blood    Organism: Blood Culture PCR    Organism: Streptococcus milleri, viridans group    Culture Results:   Growth in anaerobic bottle: Streptococcus milleri, viridans group  .  Anaerobic Bottle: 12:20 Hours to positivity  Aerobic Bottle: No growth to date  .  TYPE: (C=Critical, N=Notification, A=Abnormal) C  TESTS:  _ Positive Blood GS  DATE/TIME CALLED: _ 10/04/2017 15:30  CALLED TO: _ ERHR: Ophelia Arauz RN  READ BACK (2 Patient Identifiers)(Y/N): _ Y  READ BACK VALUES (Y/N): _ Y  CALLED BY: Elvira leslie    Organism Identification: Streptococcus milleri, viridans group  Blood Culture PCR    Method Type: PCR    Method Type: KATHRYN

## 2017-10-09 NOTE — PROGRESS NOTE ADULT - PROBLEM SELECTOR PLAN 1
-repeat blood cx x 3 sent from 10/5/17 - negative  strep milleri SS to ceftriaxone   on   - Ceftriaxone 2gram Q24H

## 2017-10-09 NOTE — PROGRESS NOTE ADULT - SUBJECTIVE AND OBJECTIVE BOX
CHIEF COMPLAINT/INTERVAL HISTORY:    Patient is a 84y old  Female who presents with a chief complaint of fever and respiratory distress (04 Oct 2017 04:36)      HPI:  83 y/o female with h/o CHF, A fib on coumadin, was brought in to the ER because of difficulty to breath i  the ER, patient had fever 102. code sepsis was activated. patient denies any pain, cough or dysuria. patient added that she is not feeling well for weeks. h/o diarrhea 1 week ago after taking po antibiotic. patient does not recall the reason for antibiotic. Ct chest and abdomen in Feb. 2017 shows a 1.2 cm lung nodule and gall stones. no sore throat, cough or runny nose (04 Oct 2017 04:36)      SUBJECTIVE & OBJECTIVE: Pt seen and examined at bedside. No overnight events. Complaining of sore throat, otherwise denies any active complaints. Reports being able to participate in PT with greater effort today.     ROS: No chest pain, palpitations, SOB, light headedness, dizziness, headache, nausea/vomiting, fevers/chills, abdominal pain, dysuria or increased urinary frequency.      ICU Vital Signs Last 24 Hrs  T(C): 36.3 (09 Oct 2017 15:00), Max: 36.5 (09 Oct 2017 08:00)  T(F): 97.3 (09 Oct 2017 15:00), Max: 97.7 (09 Oct 2017 08:00)  HR: 55 (09 Oct 2017 15:00) (55 - 73)  BP: 111/70 (09 Oct 2017 15:00) (108/57 - 118/60)  RR: 17 (09 Oct 2017 15:00) (17 - 18)  SpO2: 98% (09 Oct 2017 15:00) (97% - 98%)        MEDICATIONS  (STANDING):  ATENolol  Tablet 25 milliGRAM(s) Oral two times a day  cefTRIAXone   IVPB 2 Gram(s) IV Intermittent every 24 hours  dextrose 5%. 1000 milliLiter(s) (50 mL/Hr) IV Continuous <Continuous>  dextrose 50% Injectable 12.5 Gram(s) IV Push once  dextrose 50% Injectable 25 Gram(s) IV Push once  dextrose 50% Injectable 25 Gram(s) IV Push once  digoxin     Tablet 0.125 milliGRAM(s) Oral every other day  furosemide    Tablet 40 milliGRAM(s) Oral daily  insulin lispro (HumaLOG) corrective regimen sliding scale   SubCutaneous three times a day before meals  lactobacillus acidophilus 1 Tablet(s) Oral daily  potassium chloride    Tablet ER 20 milliEquivalent(s) Oral daily  warfarin 1 milliGRAM(s) Oral <User Schedule>    MEDICATIONS  (PRN):  acetaminophen   Tablet 650 milliGRAM(s) Oral every 6 hours PRN For Temp greater than 38 C (100.4 F)  benzocaine 15 mG/menthol 3.6 mG Lozenge 1 Lozenge Oral every 4 hours PRN Sore Throat  dextrose Gel 1 Dose(s) Oral once PRN Blood Glucose LESS THAN 70 milliGRAM(s)/deciliter  glucagon  Injectable 1 milliGRAM(s) IntraMuscular once PRN Glucose LESS THAN 70 milligrams/deciliter      LABS:                        13.3   9.5   )-----------( 107      ( 09 Oct 2017 07:12 )             40.3     10-09    137  |  100  |  50.0<H>  ----------------------------<  91  3.8   |  23.0  |  1.33<H>    Ca    8.6      09 Oct 2017 07:12  Phos  2.5     10-09  Mg     2.0     10-09    TPro  6.1<L>  /  Alb  <1.0<L>  /  TBili  1.9  /  DBili  x   /  AST  <4  /  ALT  25  /  AlkPhos  102  10-09    PT/INR - ( 09 Oct 2017 07:12 )   PT: 26.5 sec;   INR: 2.37 ratio         PTT - ( 09 Oct 2017 07:12 )  PTT:38.4 sec      CAPILLARY BLOOD GLUCOSE  133 (09 Oct 2017 17:23)  147 (09 Oct 2017 13:01)  119 (09 Oct 2017 08:59)  106 (08 Oct 2017 21:36)    RECENT CULTURES:  Throat culture pending      PHYSICAL EXAM:    GENERAL: elderly female, sitting out to bed to chair, NAD  HEAD:  Atraumatic, Normocephalic  EYES: EOMI, PERRLA   ENMT: Moist mucous membranes  NECK: Supple   CHEST/LUNG: diminished breath sounds  HEART: Irregular, S1/S2 audible, + murmur  ABDOMEN: Soft, Nontender, mildly distended, +hepatomegaly  KNEES: right knee slightly warm, otherwise nontender and no swelling  EXTREMITIES:  bilateral LE skin changes with hyperpigmentation        DVT/GI ppx  Discussed with pt @ bedside

## 2017-10-09 NOTE — PROGRESS NOTE ADULT - ASSESSMENT
· Assessment		  Patient is a 83 y/o female PMH of CHF, Atrial Fibrillation on AC, and new onset diabetes who was brought to the ED by family for lethargy, right knee pain and diffuse rash. As per the patient's daughter, patient was able to ambulate with the assistance of a walker, but then developed right knee swelling which was presumed to be secondary to gout. She was then started on allopurinol and subsequently broke out in a rash. In the ED, the patient was febrile and code sepsis was activated. Blood cultures were positive for Strep Milleri and started on vancomycin; most recently switched to Ceftriaxone. Furthermore, the patient's rash was deemed to be secondary to a drug reaction from allopurinol and consequently discontinued. Since then her rash has significantly improved. Patient's right knee was tapped and body fluid was not consistent with an infectious process. Prelim body fluid culture no growth to date. Patient is also complaining of a sore throat; throat culture is pending. Case discussed with cardiologist who does not believe a JANICE will . Patient will likely need a PICC line/midline with prolonged course of IV antibiotics for presumed endocarditis secondary to Strep Milleri as per ID.     Problem/Plan - 1:  ·  Problem: Bacteremia  -possibly secondary to presumed endocarditis  -case discussed with patient's cardiologist - JANICE will not    -patient remains afebrile, leukocytosis resolved  -repeat cultures drawn today  -10/4 Blood Culture positive for Strep milleri   -10/5 Repeat blood cultures negative x 3  -10/5 Body fluid culture - no growth to date  -On ceftriaxone 2 gram Q24 hrs; will likely need 4-6 weeks of IV antibiotics as per ID  TTE reviewed (as above); will discuss role of JANICE with cardiology.      Problem/Plan - 2:  ·  Problem: Hyponatremia.  - resolved  -likely secondary to non-osmotic release of ADH from pain   -urine lytes and urine osm reviewed  -TSH normal; f/u cortisol level  -renal on board     Problem/Plan - 3:  ·  Problem: Rash.  - improving  -likely secondary to drug reaction from allopurinol  -appreciate derm input     Problem/Plan - 4:  ·  Problem: Right knee pain  -likely secondary to DJD  -Knee XRAY negative for fracture or effusion; consistent with OA  -S/p arthrocentesis; body fluid culture no growth to date   -Ortho recommendations appreciated - outpatient follow up  -PT on board - family refused JESSICA; may require home PT vs acute rehab.   -will obtain bilateral LE duplux to rule out DVT     Problem/Plan - 5:  ·  Problem: Sore throat.   -rule out strep;  f/u rapid strep PCR  -benzocaine lozenges PRN      Problem/Plan - 6:  Problem: Chronic systolic congestive heart failure.   -continue lasix as tolerated  -monitor daily weights & I/Os  -cardiology on board     Problem/Plan - 7:  ·  Problem: Chronic atrial fibrillation.    -rate controlled; continue atenolol with holding parameters  -continue digoxin; dig level 0.9 and within therapeutic range  -continue coumadin as per INR (goal 2-3)     Problem/Plan - 8:  ·  Problem: Essential hypertension.    -BP acceptable today; aldactone and hydralazine held given marginal BP readings  -continue atenolol and lasix with holding parameters.      Problem/Plan - 9:  ·  Problem: CRI (chronic renal insufficiency), stage 3 (moderate).   -unknown etiology of CKD but possible considerations are cardiorenal syndrome type 4 and/or age related decline in renal function  -renal function stable  -renal on board; continue lasix and KCl and monitor renal function  -renally adjust all medications and avoid nephrotoxic agents     Problem/Plan - 10:  Problem: Type 2 diabetes mellitus with complication, without long-term current use of insulin.   -continue Sliding scale coverage while inpatient  -fingersticks reviewed  -A1C 6.3    Problem/Plan- 11:  Thrombocytopenia - stable  -likely secondary to liver disease   -cirrhosis seen on imaging; will arrange outpatient follow up with GI/hepatology upon discharge    Attending Attestation:   I was physically present for the key portions of the evaluation and management (E/M) service provided.  I agree with the above history, physical, and plan which I have reviewed and edited where appropriate.     45 minutes spent on total encounter; more than 50% of the visit was spent counseling and/or coordinating care by the attending physician.     Plan discussed with Patient and family at bedside (daughter and ), in addition to Dr Serrato and Dr. Maloney

## 2017-10-09 NOTE — PROGRESS NOTE ADULT - ASSESSMENT
CRF(III): stable  Pre renal azotemia  Hyponatremia  No hydro on NCCT abdomen   Hgb stable   Abx as per ID   Monitor on Lasix and KCl

## 2017-10-10 LAB
ANION GAP SERPL CALC-SCNC: 12 MMOL/L — SIGNIFICANT CHANGE UP (ref 5–17)
B19V DNA FLD QL NAA+PROBE: SIGNIFICANT CHANGE UP
BASOPHILS # BLD AUTO: 0.1 K/UL — SIGNIFICANT CHANGE UP (ref 0–0.2)
BASOPHILS NFR BLD AUTO: 0.9 % — SIGNIFICANT CHANGE UP (ref 0–2)
BUN SERPL-MCNC: 45 MG/DL — HIGH (ref 8–20)
CALCIUM SERPL-MCNC: 8.5 MG/DL — LOW (ref 8.6–10.2)
CHLORIDE SERPL-SCNC: 102 MMOL/L — SIGNIFICANT CHANGE UP (ref 98–107)
CO2 SERPL-SCNC: 23 MMOL/L — SIGNIFICANT CHANGE UP (ref 22–29)
CREAT SERPL-MCNC: 1.31 MG/DL — HIGH (ref 0.5–1.3)
EOSINOPHIL # BLD AUTO: 1.1 K/UL — HIGH (ref 0–0.5)
EOSINOPHIL NFR BLD AUTO: 10.7 % — HIGH (ref 0–6)
GLUCOSE BLDC GLUCOMTR-MCNC: 116 MG/DL — HIGH (ref 70–99)
GLUCOSE BLDC GLUCOMTR-MCNC: 128 MG/DL — HIGH (ref 70–99)
GLUCOSE BLDC GLUCOMTR-MCNC: 170 MG/DL — HIGH (ref 70–99)
GLUCOSE SERPL-MCNC: 92 MG/DL — SIGNIFICANT CHANGE UP (ref 70–115)
HCT VFR BLD CALC: 39.7 % — SIGNIFICANT CHANGE UP (ref 37–47)
HGB BLD-MCNC: 13.1 G/DL — SIGNIFICANT CHANGE UP (ref 12–16)
INR BLD: 3.76 RATIO — HIGH (ref 0.88–1.16)
LYMPHOCYTES # BLD AUTO: 1.2 K/UL — SIGNIFICANT CHANGE UP (ref 1–4.8)
LYMPHOCYTES # BLD AUTO: 11.4 % — LOW (ref 20–55)
MAGNESIUM SERPL-MCNC: 1.9 MG/DL — SIGNIFICANT CHANGE UP (ref 1.6–2.6)
MCHC RBC-ENTMCNC: 32.3 PG — HIGH (ref 27–31)
MCHC RBC-ENTMCNC: 33 G/DL — SIGNIFICANT CHANGE UP (ref 32–36)
MCV RBC AUTO: 97.8 FL — SIGNIFICANT CHANGE UP (ref 81–99)
MONOCYTES # BLD AUTO: 1 K/UL — HIGH (ref 0–0.8)
MONOCYTES NFR BLD AUTO: 9.7 % — SIGNIFICANT CHANGE UP (ref 3–10)
NEUTROPHILS # BLD AUTO: 6.7 K/UL — SIGNIFICANT CHANGE UP (ref 1.8–8)
NEUTROPHILS NFR BLD AUTO: 66.6 % — SIGNIFICANT CHANGE UP (ref 37–73)
PHOSPHATE SERPL-MCNC: 2.6 MG/DL — SIGNIFICANT CHANGE UP (ref 2.4–4.7)
PLATELET # BLD AUTO: 127 K/UL — LOW (ref 150–400)
POTASSIUM SERPL-MCNC: 3.5 MMOL/L — SIGNIFICANT CHANGE UP (ref 3.5–5.3)
POTASSIUM SERPL-SCNC: 3.5 MMOL/L — SIGNIFICANT CHANGE UP (ref 3.5–5.3)
PROTHROM AB SERPL-ACNC: 42.5 SEC — HIGH (ref 9.8–12.7)
RBC # BLD: 4.06 M/UL — LOW (ref 4.4–5.2)
RBC # FLD: 16 % — HIGH (ref 11–15.6)
SODIUM SERPL-SCNC: 137 MMOL/L — SIGNIFICANT CHANGE UP (ref 135–145)
WBC # BLD: 10.1 K/UL — SIGNIFICANT CHANGE UP (ref 4.8–10.8)
WBC # FLD AUTO: 10.1 K/UL — SIGNIFICANT CHANGE UP (ref 4.8–10.8)

## 2017-10-10 PROCEDURE — 99222 1ST HOSP IP/OBS MODERATE 55: CPT | Mod: GC

## 2017-10-10 PROCEDURE — 99232 SBSQ HOSP IP/OBS MODERATE 35: CPT

## 2017-10-10 PROCEDURE — 99233 SBSQ HOSP IP/OBS HIGH 50: CPT

## 2017-10-10 RX ADMIN — Medication 0.12 MILLIGRAM(S): at 11:35

## 2017-10-10 RX ADMIN — Medication 1 TABLET(S): at 11:34

## 2017-10-10 RX ADMIN — ATENOLOL 25 MILLIGRAM(S): 25 TABLET ORAL at 05:30

## 2017-10-10 RX ADMIN — Medication 20 MILLIEQUIVALENT(S): at 11:34

## 2017-10-10 RX ADMIN — ATENOLOL 25 MILLIGRAM(S): 25 TABLET ORAL at 18:29

## 2017-10-10 RX ADMIN — BENZOCAINE AND MENTHOL 1 LOZENGE: 5; 1 LIQUID ORAL at 20:01

## 2017-10-10 RX ADMIN — Medication 40 MILLIGRAM(S): at 05:30

## 2017-10-10 RX ADMIN — Medication 1: at 12:21

## 2017-10-10 RX ADMIN — CEFTRIAXONE 100 GRAM(S): 500 INJECTION, POWDER, FOR SOLUTION INTRAMUSCULAR; INTRAVENOUS at 21:53

## 2017-10-10 NOTE — CONSULT NOTE ADULT - SUBJECTIVE AND OBJECTIVE BOX
Rehab evaluation on a 84y old  Female who presents with fever and respiratory distress.      HPI:  83 y/o female with h/o CHF, A fib on coumadin, was brought in to the ER because of difficulty to breathe. In ER, patient had fever 102. code sepsis was activated. Patient added that she is not feeling well for weeks. h/o diarrhea 1 week ago after taking po antibiotic. patient does not recall the reason for antibiotic. Ct chest and abdomen in Feb. 2017 shows a 1.2 cm lung nodule and gall stones. )        PAST MEDICAL & SURGICAL HISTORY:  Clostridium difficile diarrhea  CHF (congestive heart failure)  Gout  Mitral valve regurgitation  Hypertension  Atrial fibrillation  H/O skin graft      FAMILY HISTORY:  No pertinent family history in first degree relatives      SOCIAL HISTORY:  TOBACCO: denies history  ALCOHOL: denies abuse  IVDA: denies history    FUNCTIONAL, ENVIRONMENTAL HISTORY:  LIVES WITH:   HOME LAYOUT:   STAIRS TO ENTER:   STAIRS INSIDE:   FUNCTIONAL HISTORY: independent with ambulation and ADLs    Allergies    allopurinol (Rash)  aspirin (Unknown)  azithromycin (Diarrhea)  latex (Unknown)  morphine (Other; Short breath)  penicillins (Unknown)  sulfa drugs (Unknown)    Intolerances        MEDICATIONS  (STANDING):  ATENolol  Tablet 25 milliGRAM(s) Oral two times a day  cefTRIAXone   IVPB 2 Gram(s) IV Intermittent every 24 hours  dextrose 5%. 1000 milliLiter(s) (50 mL/Hr) IV Continuous <Continuous>  dextrose 50% Injectable 12.5 Gram(s) IV Push once  dextrose 50% Injectable 25 Gram(s) IV Push once  dextrose 50% Injectable 25 Gram(s) IV Push once  digoxin     Tablet 0.125 milliGRAM(s) Oral every other day  furosemide    Tablet 40 milliGRAM(s) Oral daily  insulin lispro (HumaLOG) corrective regimen sliding scale   SubCutaneous three times a day before meals  lactobacillus acidophilus 1 Tablet(s) Oral daily  potassium chloride    Tablet ER 20 milliEquivalent(s) Oral daily    MEDICATIONS  (PRN):  acetaminophen   Tablet 650 milliGRAM(s) Oral every 6 hours PRN For Temp greater than 38 C (100.4 F)  benzocaine 15 mG/menthol 3.6 mG Lozenge 1 Lozenge Oral every 4 hours PRN Sore Throat  dextrose Gel 1 Dose(s) Oral once PRN Blood Glucose LESS THAN 70 milliGRAM(s)/deciliter  glucagon  Injectable 1 milliGRAM(s) IntraMuscular once PRN Glucose LESS THAN 70 milligrams/deciliter      REVIEW OF SYSTEMS:    CONSTITUTIONAL: No fever, weight loss, or fatigue  EYES: No eye pain, visual disturbances, or discharge  RESPIRATORY: No cough,   CARDIOVASCULAR: No chest pain,   GASTROINTESTINAL: No abdominal or epigastric pain.  GENITOURINARY: No dysuria,   NEUROLOGICAL: No headaches,  SKIN: No itching, burning, rashes, or lesions   MUSCULOSKELETAL: No joint pain or swelling;   PSYCHIATRIC: No depression.    Vital Signs Last 24 Hrs  T(C): 36.7 (10 Oct 2017 11:30), Max: 36.7 (10 Oct 2017 11:30)  T(F): 98.1 (10 Oct 2017 11:30), Max: 98.1 (10 Oct 2017 11:30)  HR: 52 (10 Oct 2017 11:30) (52 - 84)  BP: 127/65 (10 Oct 2017 11:30) (127/65 - 128/68)  BP(mean): --  RR: 18 (10 Oct 2017 11:30) (16 - 18)  SpO2: 99% (10 Oct 2017 11:30) (99% - 100%)    PHYSICAL EXAM:    GENERAL: NAD, well-groomed, well-developed  HEAD:  Atraumatic, Normocephalic  EYES: EOMI, PERRLA, conjunctiva and sclera clear  HEART: Regular rate and rhythm; No murmurs, rubs, or gallops  CHEST/LUNG: Clear to auscultation bilaterally; No rales, rhonchi, wheezing, or rubs  ABDOMEN: Soft, Nontender, Nondistended; Bowel sounds present  EXTREMITIES:  2+ Peripheral Pulses, No clubbing, cyanosis, or edema  SKIN: No rashes or lesions      FUNCTIONAL EXAM:     LABS:                        13.1   10.1  )-----------( 127      ( 10 Oct 2017 06:23 )             39.7     10-10    137  |  102  |  45.0<H>  ----------------------------<  92  3.5   |  23.0  |  1.31<H>    Ca    8.5<L>      10 Oct 2017 06:23  Phos  2.6     10-10  Mg     1.9     10-10    TPro  6.1<L>  /  Alb  <1.0<L>  /  TBili  1.9  /  DBili  x   /  AST  <4  /  ALT  25  /  AlkPhos  102  10-09    PT/INR - ( 10 Oct 2017 06:23 )   PT: 42.5 sec;   INR: 3.76 ratio         PTT - ( 09 Oct 2017 07:12 )  PTT:38.4 sec      RADIOLOGY & ADDITIONAL STUDIES: Rehab evaluation on a 84y old  Female who presents with fever and respiratory distress.      HPI:  85 y/o female with h/o CHF, A fib on coumadin, was brought in to the ER because of difficulty to breathe/lethargy/ right knee pain and diffuse rash.  In ER, patient had fever 102. code sepsis was activated. Patient added that she is not feeling well for weeks. h/o diarrhea 1 week ago after taking po antibiotic. Patient was able to ambulate with the assistance of a walker, but then developed right knee swelling which was presumed to be secondary to gout. She was then started on allopurinol and subsequently broke out in a rash. Blood cultures were positive for Strep Milleri and started on vancomycin; most recently switched to Ceftriaxone. Rash ?secondary to a drug reaction from allopurinol and improved after d/c of drug.  Right knee aspirated. Prelim body fluid culture no growth to date. ? Possible need of antibiotics .  Rehab evaluation requested for further recommendations.       PAST MEDICAL & SURGICAL HISTORY:  Clostridium difficile diarrhea  CHF (congestive heart failure)  Gout  Mitral valve regurgitation  Hypertension  Atrial fibrillation  H/O skin graft      FAMILY HISTORY:  No pertinent family history in first degree relatives      SOCIAL HISTORY:  TOBACCO: denies history  ALCOHOL: denies abuse  IVDA: denies history    FUNCTIONAL, ENVIRONMENTAL HISTORY:  LIVES WITH:   HOME LAYOUT:   STAIRS TO ENTER:   STAIRS INSIDE:   FUNCTIONAL HISTORY: independent with ambulation and ADLs    Allergies    allopurinol (Rash)  aspirin (Unknown)  azithromycin (Diarrhea)  latex (Unknown)  morphine (Other; Short breath)  penicillins (Unknown)  sulfa drugs (Unknown)    Intolerances        MEDICATIONS  (STANDING):  ATENolol  Tablet 25 milliGRAM(s) Oral two times a day  cefTRIAXone   IVPB 2 Gram(s) IV Intermittent every 24 hours  dextrose 5%. 1000 milliLiter(s) (50 mL/Hr) IV Continuous <Continuous>  dextrose 50% Injectable 12.5 Gram(s) IV Push once  dextrose 50% Injectable 25 Gram(s) IV Push once  dextrose 50% Injectable 25 Gram(s) IV Push once  digoxin     Tablet 0.125 milliGRAM(s) Oral every other day  furosemide    Tablet 40 milliGRAM(s) Oral daily  insulin lispro (HumaLOG) corrective regimen sliding scale   SubCutaneous three times a day before meals  lactobacillus acidophilus 1 Tablet(s) Oral daily  potassium chloride    Tablet ER 20 milliEquivalent(s) Oral daily    MEDICATIONS  (PRN):  acetaminophen   Tablet 650 milliGRAM(s) Oral every 6 hours PRN For Temp greater than 38 C (100.4 F)  benzocaine 15 mG/menthol 3.6 mG Lozenge 1 Lozenge Oral every 4 hours PRN Sore Throat  dextrose Gel 1 Dose(s) Oral once PRN Blood Glucose LESS THAN 70 milliGRAM(s)/deciliter  glucagon  Injectable 1 milliGRAM(s) IntraMuscular once PRN Glucose LESS THAN 70 milligrams/deciliter      REVIEW OF SYSTEMS:    CONSTITUTIONAL: +fatigue  EYES: No eye pain,   RESPIRATORY: No cough,   CARDIOVASCULAR: No chest pain,   GASTROINTESTINAL: No abdominal pain.  GENITOURINARY: No dysuria,   NEUROLOGICAL: No headaches,  SKIN: No itching,  MUSCULOSKELETAL: No joint pain or swelling;   PSYCHIATRIC: No depression.    Vital Signs Last 24 Hrs  T(C): 36.7 (10 Oct 2017 11:30), Max: 36.7 (10 Oct 2017 11:30)  T(F): 98.1 (10 Oct 2017 11:30), Max: 98.1 (10 Oct 2017 11:30)  HR: 52 (10 Oct 2017 11:30) (52 - 84)  BP: 127/65 (10 Oct 2017 11:30) (127/65 - 128/68)  BP(mean): --  RR: 18 (10 Oct 2017 11:30) (16 - 18)  SpO2: 99% (10 Oct 2017 11:30) (99% - 100%)    PHYSICAL EXAM:    GENERAL: NAD,   HEAD:  Atraumatic, Normocephalic  EYES: EOMI,   HEART: S1S2+  CHEST/LUNG: Clear to auscultation   ABDOMEN: Soft,   EXTREMITIES:        LABS:                        13.1   10.1  )-----------( 127      ( 10 Oct 2017 06:23 )             39.7     10-10    137  |  102  |  45.0<H>  ----------------------------<  92  3.5   |  23.0  |  1.31<H>    Ca    8.5<L>      10 Oct 2017 06:23  Phos  2.6     10-10  Mg     1.9     10-10    TPro  6.1<L>  /  Alb  <1.0<L>  /  TBili  1.9  /  DBili  x   /  AST  <4  /  ALT  25  /  AlkPhos  102  10-09    PT/INR - ( 10 Oct 2017 06:23 )   PT: 42.5 sec;   INR: 3.76 ratio         PTT - ( 09 Oct 2017 07:12 )  PTT:38.4 sec      RADIOLOGY & ADDITIONAL STUDIES: Rehab evaluation on a 84y old  Female who presents with fever and respiratory distress. Family present at bedside and provides history      HPI:  85 y/o female with h/o CHF, A fib on coumadin, was brought in to the ER because of difficulty to breathe/lethargy/ right knee pain and diffuse rash.  In ER, patient had fever 102. code sepsis. h/o diarrhea 1 week ago after taking po antibiotic. Developed right knee swelling which was presumed to be secondary to gout. She was then started on allopurinol and subsequently broke out in a rash. Blood cultures were positive for Strep Milleri and started on vancomycin; most recently switched to Ceftriaxone. Rash ?secondary to a drug reaction from allopurinol and improved after d/c of drug.  Right knee aspirated. Prelim body fluid culture no growth to date. Possible need of prolonged antibiotics .  Rehab evaluation requested for further recommendations.       PAST MEDICAL & SURGICAL HISTORY:  Clostridium difficile diarrhea  CHF (congestive heart failure)  Gout  Mitral valve regurgitation  Hypertension  Atrial fibrillation  H/O skin graft      FAMILY HISTORY:  No pertinent family history in first degree relatives      SOCIAL HISTORY:  TOBACCO: denies history  ALCOHOL: denies abuse  IVDA: denies history    FUNCTIONAL, ENVIRONMENTAL HISTORY:  LIVES WITH:   STAIRS TO ENTER: 3 steps to enter  FUNCTIONAL HISTORY: independent with ambulation and ADLs. Used a walker prior after a stay in [facility]    Allergies    allopurinol (Rash)  aspirin (Unknown)  azithromycin (Diarrhea)  latex (Unknown)  morphine (Other; Short breath)  penicillins (Unknown)  sulfa drugs (Unknown)    Intolerances        MEDICATIONS  (STANDING):  ATENolol  Tablet 25 milliGRAM(s) Oral two times a day  cefTRIAXone   IVPB 2 Gram(s) IV Intermittent every 24 hours  dextrose 5%. 1000 milliLiter(s) (50 mL/Hr) IV Continuous <Continuous>  dextrose 50% Injectable 12.5 Gram(s) IV Push once  dextrose 50% Injectable 25 Gram(s) IV Push once  dextrose 50% Injectable 25 Gram(s) IV Push once  digoxin     Tablet 0.125 milliGRAM(s) Oral every other day  furosemide    Tablet 40 milliGRAM(s) Oral daily  insulin lispro (HumaLOG) corrective regimen sliding scale   SubCutaneous three times a day before meals  lactobacillus acidophilus 1 Tablet(s) Oral daily  potassium chloride    Tablet ER 20 milliEquivalent(s) Oral daily    MEDICATIONS  (PRN):  acetaminophen   Tablet 650 milliGRAM(s) Oral every 6 hours PRN For Temp greater than 38 C (100.4 F)  benzocaine 15 mG/menthol 3.6 mG Lozenge 1 Lozenge Oral every 4 hours PRN Sore Throat  dextrose Gel 1 Dose(s) Oral once PRN Blood Glucose LESS THAN 70 milliGRAM(s)/deciliter  glucagon  Injectable 1 milliGRAM(s) IntraMuscular once PRN Glucose LESS THAN 70 milligrams/deciliter      REVIEW OF SYSTEMS:    CONSTITUTIONAL: +fatigue  EYES: No eye pain,   RESPIRATORY: No cough,   CARDIOVASCULAR: No chest pain,   GASTROINTESTINAL: No abdominal pain.  GENITOURINARY: No dysuria,   NEUROLOGICAL: No headaches,  SKIN: No itching,  MUSCULOSKELETAL: No joint pain or swelling;   PSYCHIATRIC: No depression.    Vital Signs Last 24 Hrs  T(C): 36.7 (10 Oct 2017 11:30), Max: 36.7 (10 Oct 2017 11:30)  T(F): 98.1 (10 Oct 2017 11:30), Max: 98.1 (10 Oct 2017 11:30)  HR: 52 (10 Oct 2017 11:30) (52 - 84)  BP: 127/65 (10 Oct 2017 11:30) (127/65 - 128/68)  BP(mean): --  RR: 18 (10 Oct 2017 11:30) (16 - 18)  SpO2: 99% (10 Oct 2017 11:30) (99% - 100%)    PHYSICAL EXAM:    GENERAL: NAD, Oriented to time with minimal cues. appears tired and needed constant cueing to stay awake  HEAD:  Atraumatic, Normocephalic  EYES: EOMI,   HEART: S1S2+  CHEST/LUNG: Clear to auscultation   ABDOMEN: Soft, NT  EXTREMITIES: LE edema, and LE discoloration. No calf tenderness. UE motor 4/5. LE proximal 3/5, distal 4/5.      LABS:                        13.1   10.1  )-----------( 127      ( 10 Oct 2017 06:23 )             39.7     10-10    137  |  102  |  45.0<H>  ----------------------------<  92  3.5   |  23.0  |  1.31<H>    Ca    8.5<L>      10 Oct 2017 06:23  Phos  2.6     10-10  Mg     1.9     10-10    TPro  6.1<L>  /  Alb  <1.0<L>  /  TBili  1.9  /  DBili  x   /  AST  <4  /  ALT  25  /  AlkPhos  102  10-09    PT/INR - ( 10 Oct 2017 06:23 )   PT: 42.5 sec;   INR: 3.76 ratio         PTT - ( 09 Oct 2017 07:12 )  PTT:38.4 sec      RADIOLOGY & ADDITIONAL STUDIES:      < from: US Duplex Venous Lower Ext Complete, Bilateral (10.09.17 @ 22:31) >  XAM:  US DPLX LWR EXT VEINS COMPL BI                          PROCEDURE DATE:  10/09/2017          INTERPRETATION:  CLINICAL INFORMATION: Bilateral lower extremity pain,   assess DVT    COMPARISON: None available.    TECHNIQUE: Duplex sonography of the BILATERAL LOWER extremities with   color and spectral Doppler, with and without compression.      FINDINGS:    There is normal compressibility of the bilateral common femoral, femoral   and popliteal veins. No calf vein thrombosis is detected.    Doppler examination shows normal spontaneous and phasic flow.    IMPRESSION:     No evidence of bilateral lower extremity deep venous thrombosis.       GEE PERDOMO M.D., ATTENDING RADIOLOGIST  This document has been electronically signed. Oct  9 2017 11:01PM          A/P:    84 year old female with deconditioning from recent hospital stay. sepsis, strep bactermia.  Patient with fatigue.  Recommend continuing bedside therapy and discharge to [facility] as she is UNLIKELY to be able to tolerate intensity of acute rehab program.  Family agreeable.  d/w nurse.

## 2017-10-10 NOTE — PROGRESS NOTE ADULT - SUBJECTIVE AND OBJECTIVE BOX
CHIEF COMPLAINT/INTERVAL HISTORY:    Patient is a 84y old  Female who presents with a chief complaint of fever and respiratory distress (04 Oct 2017 04:36)      HPI:  85 y/o female with h/o CHF, A fib on coumadin, was brought in to the ER because of difficulty to breath i  the ER, patient had fever 102. code sepsis was activated. patient denies any pain, cough or dysuria. patient added that she is not feeling well for weeks. h/o diarrhea 1 week ago after taking po antibiotic. patient does not recall the reason for antibiotic. Ct chest and abdomen in Feb. 2017 shows a 1.2 cm lung nodule and gall stones. no sore throat, cough or runny nose (04 Oct 2017 04:36)    SUBJECTIVE & OBJECTIVE: Pt seen and examined at bedside. No overnight events. Complaining of chronic right knee pain. Throat culture and repeat blood cultures pending. Patient and family agreeable to [facility].    ROS: No chest pain, palpitations, SOB, lightheadedness, dizziness, headache, nausea/vomiting, fevers/chills, abdominal pain, dysuria or increased urinary frequency.    ICU Vital Signs Last 24 Hrs  T(C): 36.7 (10 Oct 2017 11:30), Max: 36.7 (10 Oct 2017 11:30)  T(F): 98.1 (10 Oct 2017 11:30), Max: 98.1 (10 Oct 2017 11:30)  HR: 52 (10 Oct 2017 11:30) (52 - 84)  BP: 127/65 (10 Oct 2017 11:30) (127/65 - 128/68)  BP(mean): --  ABP: --  ABP(mean): --  RR: 18 (10 Oct 2017 11:30) (16 - 18)  SpO2: 99% (10 Oct 2017 11:30) (99% - 100%)    MEDICATIONS  (STANDING):  ATENolol  Tablet 25 milliGRAM(s) Oral two times a day  cefTRIAXone   IVPB 2 Gram(s) IV Intermittent every 24 hours  dextrose 5%. 1000 milliLiter(s) (50 mL/Hr) IV Continuous <Continuous>  dextrose 50% Injectable 12.5 Gram(s) IV Push once  dextrose 50% Injectable 25 Gram(s) IV Push once  dextrose 50% Injectable 25 Gram(s) IV Push once  digoxin     Tablet 0.125 milliGRAM(s) Oral every other day  furosemide    Tablet 40 milliGRAM(s) Oral daily  insulin lispro (HumaLOG) corrective regimen sliding scale   SubCutaneous three times a day before meals  lactobacillus acidophilus 1 Tablet(s) Oral daily  potassium chloride    Tablet ER 20 milliEquivalent(s) Oral daily    MEDICATIONS  (PRN):  acetaminophen   Tablet 650 milliGRAM(s) Oral every 6 hours PRN For Temp greater than 38 C (100.4 F)  benzocaine 15 mG/menthol 3.6 mG Lozenge 1 Lozenge Oral every 4 hours PRN Sore Throat  dextrose Gel 1 Dose(s) Oral once PRN Blood Glucose LESS THAN 70 milliGRAM(s)/deciliter  glucagon  Injectable 1 milliGRAM(s) IntraMuscular once PRN Glucose LESS THAN 70 milligrams/deciliter      LABS:                        13.1   10.1  )-----------( 127      ( 10 Oct 2017 06:23 )             39.7     10-10    137  |  102  |  45.0<H>  ----------------------------<  92  3.5   |  23.0  |  1.31<H>    Ca    8.5<L>      10 Oct 2017 06:23  Phos  2.6     10-10  Mg     1.9     10-10    TPro  6.1<L>  /  Alb  <1.0<L>  /  TBili  1.9  /  DBili  x   /  AST  <4  /  ALT  25  /  AlkPhos  102  10-09    PT/INR - ( 10 Oct 2017 06:23 )   PT: 42.5 sec;   INR: 3.76 ratio         PTT - ( 09 Oct 2017 07:12 )  PTT:38.4 sec      CAPILLARY BLOOD GLUCOSE  105 (10 Oct 2017 08:15)  93 (09 Oct 2017 21:56)  133 (09 Oct 2017 17:23)      POCT Blood Glucose.: 116 mg/dL (10 Oct 2017 16:37)  POCT Blood Glucose.: 170 mg/dL (10 Oct 2017 11:33)      RECENT CULTURES:  throat culture pending  repeat blood cultures pending    RADIOLOGY & ADDITIONAL TESTS:< from: US Duplex Venous Lower Ext Complete, Bilateral (10.09.17 @ 22:31) >    IMPRESSION:     No evidence of bilateral lower extremity deep venous thrombosis.     < end of copied text >      PHYSICAL EXAM:      GENERAL: elderly female, sitting out to bed to chair, NAD  HEAD:  Atraumatic, Normocephalic  EYES: EOMI, PERRLA   ENMT: Moist mucous membranes  NECK: Supple   CHEST/LUNG: diminished breath sounds  HEART: Irregular, S1/S2 audible, + murmur  ABDOMEN: Soft, Nontender, mildly distended, +hepatomegaly  KNEES: right knee slightly warm, otherwise nontender and no swelling  EXTREMITIES:  bilateral LE skin changes with hyperpigmentation

## 2017-10-10 NOTE — PROGRESS NOTE ADULT - SUBJECTIVE AND OBJECTIVE BOX
[facility]  INFECTIOUS DISEASES AND INTERNAL MEDICINE OF [place]  =======================================================  Leighton Harris MD [facility]   Austin Brady MD  Diplomates American Board of Internal Medicine and Infectious Diseases  =======================================================    RAGHU DAVIS [id number]  chief complaint: follow up for bacteremia  cultures from 10/4/17 reviewed, Strep  milleri viridans group., ss to ceftriaxone  patient seen and examined in follow up.  Chart and labs reviewed.     right knee ACE bandage now removed.     =======================================================  Allergies:  aspirin (Unknown)  azithromycin (Diarrhea)  latex (Unknown)  morphine (Other; Short breath)  penicillins (Unknown)  sulfa drugs (Unknown)    =======================================================  MEDICATIONS  (STANDING):  ATENolol  Tablet 25 milliGRAM(s) Oral two times a day  cefTRIAXone   IVPB 2 Gram(s) IV Intermittent every 24 hours  dextrose 5%. 1000 milliLiter(s) (50 mL/Hr) IV Continuous <Continuous>  dextrose 50% Injectable 12.5 Gram(s) IV Push once  dextrose 50% Injectable 25 Gram(s) IV Push once  dextrose 50% Injectable 25 Gram(s) IV Push once  digoxin     Tablet 0.125 milliGRAM(s) Oral every other day  furosemide    Tablet 40 milliGRAM(s) Oral daily  hydrALAZINE 10 milliGRAM(s) Oral two times a day  insulin lispro (HumaLOG) corrective regimen sliding scale   SubCutaneous three times a day before meals  lactobacillus acidophilus 1 Tablet(s) Oral daily  potassium chloride    Tablet ER 20 milliEquivalent(s) Oral daily  warfarin 3 milliGRAM(s) Oral once     =======================================================     REVIEW OF SYSTEMS:  CONSTITUTIONAL:  No Fever or chills  HEENT:   No diplopia or blurred vision.  No earache, sore throat or runny nose.  CARDIOVASCULAR:  No pressure, squeezing, strangling, tightness, heaviness or aching about the chest, neck, axilla or epigastrium.  RESPIRATORY:  No cough, shortness of breath, PND or orthopnea.  GASTROINTESTINAL:  No nausea, vomiting or diarrhea.  GENITOURINARY:  No dysuria, frequency or urgency. No Blood in urine  MUSCULOSKELETAL:  no joint aches, no muscle pain  SKIN:  No change in skin, hair or nails.  + RASH  NEUROLOGIC:  No paresthesias, fasciculations, seizures or weakness.  PSYCHIATRIC:  No disorder of thought or mood.  ENDOCRINE:  No heat or cold intolerance, polyuria or polydipsia.  HEMATOLOGICAL:  No easy bruising or bleeding.     =======================================================     Physical Exam:   Vital Signs Last 24 Hrs  T(C): 36.4 (08 Oct 2017 23:42), Max: 36.4 (08 Oct 2017 15:37)  T(F): 97.6 (08 Oct 2017 23:42), Max: 97.6 (08 Oct 2017 23:42)  HR: 68 (09 Oct 2017 06:55) (62 - 73)  BP: 118/60 (09 Oct 2017 06:55) (97/62 - 118/60)  BP(mean): --  RR: 17 (08 Oct 2017 23:42) (17 - 18)  SpO2: 97% (08 Oct 2017 23:42) (95% - 97%)  T     GEN: NAD, pleasant, THIN ELDERLY  HEENT: normocephalic and atraumatic. EOMI. KENDALL.    NECK: Supple. No carotid bruits.  No lymphadenopathy or thyromegaly.  LUNGS: Clear to auscultation.  HEART: Regular rate and rhythm POSITIVE murmur.  ABDOMEN: Soft, nontender, and nondistended.  Positive bowel sounds.    : No CVA tenderness  EXTREMITIES: Without any cyanosis, clubbing, rash, lesions or edema.  MSK: bandage on knee right  NEUROLOGIC: Cranial nerves II through XII are grossly intact.  PSYCHIATRIC: Appropriate affect .  SKIN: No ulceration or induration present.   .     =======================================================    Labs:  10-09    137  |  100  |  50.0<H>  ----------------------------<  91  3.8   |  23.0  |  1.33<H>    Ca    8.6      09 Oct 2017 07:12  Phos  2.5     10-09  Mg     2.0     10-09    TPro  6.1<L>  /  Alb  <1.0<L>  /  TBili  1.9  /  DBili  x   /  AST  <4  /  ALT  25  /  AlkPhos  102  10-09                        13.3   9.5   )-----------( 107      ( 09 Oct 2017 07:12 )             40.3                     RECENT CULTURES:  10-05 @ 15:50 .Body Fluid Synovial Fluid     No growth at 1 day.  Culture in progress    Moderate White blood cells  No organisms seen      10-05 @ 10:33 .Blood Blood     No growth at 48 hours        10-05 @ 05:49 .Blood Blood-Peripheral     No growth at 48 hours        10-04 @ 04:25          NotDetec  10-04 @ 02:27 .Blood Blood Streptococcus milleri, viridans group  Blood Culture PCR    Growth in anaerobic bottle: Streptococcus milleri, viridans group  .  Anaerobic Bottle: 12:20 Hours to positivity  Aerobic Bottle: No growth to date  .  TYPE: (C=Critical, N=Notification, A=Abnormal) C  TESTS:  _ Positive Blood GS  DATE/TIME CALLED: _ 10/04/2017 15:30  CALLED TO: _ ERHR: Ophelia Arauz RN  READ BACK (2 Patient Identifiers)(Y/N): _ Y  READ BACK VALUES (Y/N): _ Y  CALLED BY: Elvira leslie    Culture - Blood (10.04.17 @ 02:27)    -  Streptococcus sp. (Not Grp A, B or S pneumoniae): Detec    -  Ceftriaxone: S <=0.25    -  Clindamycin: S <=0.06    -  Erythromycin: S <=0.06    -  Penicillin: S <=0.03    -  Vancomycin: S <=0.12    Specimen Source: .Blood Blood    Organism: Blood Culture PCR    Organism: Streptococcus milleri, viridans group    Culture Results:   Growth in anaerobic bottle: Streptococcus milleri, viridans group  .  Anaerobic Bottle: 12:20 Hours to positivity  Aerobic Bottle: No growth to date  .  TYPE: (C=Critical, N=Notification, A=Abnormal) C  TESTS:  _ Positive Blood GS  DATE/TIME CALLED: _ 10/04/2017 15:30  CALLED TO: _ ERHR: Ophelia Arauz RN  READ BACK (2 Patient Identifiers)(Y/N): _ Y  READ BACK VALUES (Y/N): _ Y  CALLED BY: Elvira leslie    Organism Identification: Streptococcus milleri, viridans group  Blood Culture PCR    Method Type: PCR    Method Type: KATHRYN

## 2017-10-10 NOTE — DIETITIAN INITIAL EVALUATION ADULT. - OTHER INFO
Pt seen at bedside with  of pt at bedside as well. Pt states having a good appetite and PO intake currently and PTA. Pt states that she is able to eat 50%-75% of meals provided. Pt denies n/v. Pt states having episodes of diarrhea for the past 3 days. Pt provided nutrition education on current consistent CHO, renal diet.

## 2017-10-10 NOTE — PROGRESS NOTE ADULT - ASSESSMENT
Assessment and Plan:   		  		  Patient is a 85 y/o female PMH of CHF, Atrial Fibrillation on AC, and new onset diabetes who was brought to the ED by family for lethargy, right knee pain and diffuse rash. As per the patient's daughter, patient was able to ambulate with the assistance of a walker, but then developed right knee swelling which was presumed to be secondary to gout. She was then started on allopurinol and subsequently broke out in a rash. In the ED, the patient was febrile and code sepsis was activated. Blood cultures were positive for Strep Milleri and started on vancomycin; most recently switched to Ceftriaxone. Furthermore, the patient's rash was deemed to be secondary to a drug reaction from allopurinol and consequently discontinued. Since then her rash has significantly improved. Patient's right knee was tapped and body fluid was not consistent with an infectious process. Prelim body fluid culture no growth to date. Patient is also complaining of a sore throat; throat culture is pending. Case discussed with cardiologist who does not believe a JANICE will . Patient will likely need a PICC line/midline with prolonged course of IV antibiotics for presumed endocarditis secondary to Strep Milleri as per ID.     Problem/Plan - 1:  ·  Problem: Bacteremia  -presumed endocarditis - no other identifiable source  -case discussed with patient's cardiologist Dr. Soliz- JANICE will not  therefore not indicated at this time  -patient remains afebrile, leukocytosis resolved  -repeat blood cultures pending  -10/4 Blood Culture positive for Strep milleri   -10/5 Repeat blood cultures negative x 3  -10/5 Body fluid culture - no growth to date  -On ceftriaxone 2 gram Q24 hrs; PICC line order and patient will need antibiotics until 11/10 as per ID     Problem/Plan - 2:  ·  Problem: Hyponatremia.  - resolved  -likely secondary to non-osmotic release of ADH from pain   -urine lytes and urine osm reviewed  -TSH and cortisol level     Problem/Plan - 3:  ·  Problem: Rash.  - resolved  -likely secondary to drug reaction from allopurinol  -appreciate derm input     Problem/Plan - 4:  ·  Problem: Right knee pain  -likely secondary to DJD  -uric acid normal  -Knee XRAY negative for fracture or effusion; consistent with OA  -S/p arthrocentesis; body fluid culture no growth to date   -LE duplex negative for DVT  -Ortho recommendations appreciated - outpatient follow up  -PT on board - family now agreeable to JESSICA  -PMR consult     Problem/Plan - 5:  ·  Problem: Sore throat.   -rule out strep;  f/u rapid strep PCR (pending)  -benzocaine lozenges PRN      Problem/Plan - 6:  Problem: Chronic systolic congestive heart failure.   -continue lasix as tolerated  -monitor daily weights & I/Os  -cardiology on board     Problem/Plan - 7:  ·  Problem: Chronic atrial fibrillation.    -rate controlled; continue atenolol with holding parameters  -continue digoxin; dig level 0.9 and within therapeutic range  -supratherapeutic INR - hold coumadin tonight     Problem/Plan - 8:  ·  Problem: Essential hypertension.    -Normotensive; aldactone and hydralazine on hold  -continue atenolol and lasix with holding parameters.      Problem/Plan - 9:  ·  Problem: CRI (chronic renal insufficiency), stage 3 (moderate).   -unknown etiology of CKD but possible considerations are cardiorenal syndrome type 4 and/or age related decline in renal function  -renal function stable  -renal on board; continue lasix and KCl and monitor renal function  -renally adjust all medications and avoid nephrotoxic agents     Problem/Plan - 10:  Problem: Type 2 diabetes mellitus with complication, without long-term current use of insulin.   -continue Sliding scale coverage while inpatient  -fingersticks reviewed  -A1C 6.3    Problem/Plan- 11:  Thrombocytopenia - stable  -likely secondary to liver disease   -cirrhosis seen on imaging; will arrange outpatient follow up with GI/hepatology upon discharge    Attending Attestation:   I was physically present for the key portions of the evaluation and management (E/M) service provided.  I agree with the above history, physical, and plan which I have reviewed and edited where appropriate.     45 minutes spent on total encounter; more than 50% of the visit was spent counseling and/or coordinating care by the attending physician.     Plan discussed with Patient and family at bedside (daughter and ), in addition to Dr Serrato and Dr. Maloney

## 2017-10-10 NOTE — PROGRESS NOTE ADULT - ASSESSMENT
85 y/o F with Strep bacteremia and sepsis;   afebrile will continue iv abx through 11/10 weekly cbc and cmp   will follow up   picc line ordered

## 2017-10-11 LAB
ANION GAP SERPL CALC-SCNC: 12 MMOL/L — SIGNIFICANT CHANGE UP (ref 5–17)
BASOPHILS # BLD AUTO: 0.1 K/UL — SIGNIFICANT CHANGE UP (ref 0–0.2)
BASOPHILS NFR BLD AUTO: 0.7 % — SIGNIFICANT CHANGE UP (ref 0–2)
BUN SERPL-MCNC: 42 MG/DL — HIGH (ref 8–20)
CALCIUM SERPL-MCNC: 8.7 MG/DL — SIGNIFICANT CHANGE UP (ref 8.6–10.2)
CHLORIDE SERPL-SCNC: 102 MMOL/L — SIGNIFICANT CHANGE UP (ref 98–107)
CO2 SERPL-SCNC: 25 MMOL/L — SIGNIFICANT CHANGE UP (ref 22–29)
CREAT SERPL-MCNC: 1.29 MG/DL — SIGNIFICANT CHANGE UP (ref 0.5–1.3)
CULTURE RESULTS: SIGNIFICANT CHANGE UP
EOSINOPHIL # BLD AUTO: 1 K/UL — HIGH (ref 0–0.5)
EOSINOPHIL NFR BLD AUTO: 11.6 % — HIGH (ref 0–6)
GLUCOSE BLDC GLUCOMTR-MCNC: 103 MG/DL — HIGH (ref 70–99)
GLUCOSE BLDC GLUCOMTR-MCNC: 117 MG/DL — HIGH (ref 70–99)
GLUCOSE BLDC GLUCOMTR-MCNC: 118 MG/DL — HIGH (ref 70–99)
GLUCOSE BLDC GLUCOMTR-MCNC: 125 MG/DL — HIGH (ref 70–99)
GLUCOSE SERPL-MCNC: 99 MG/DL — SIGNIFICANT CHANGE UP (ref 70–115)
HCT VFR BLD CALC: 38.8 % — SIGNIFICANT CHANGE UP (ref 37–47)
HGB BLD-MCNC: 13.1 G/DL — SIGNIFICANT CHANGE UP (ref 12–16)
INR BLD: 3.99 RATIO — HIGH (ref 0.88–1.16)
LYMPHOCYTES # BLD AUTO: 1.2 K/UL — SIGNIFICANT CHANGE UP (ref 1–4.8)
LYMPHOCYTES # BLD AUTO: 13.3 % — LOW (ref 20–55)
MAGNESIUM SERPL-MCNC: 2.1 MG/DL — SIGNIFICANT CHANGE UP (ref 1.6–2.6)
MCHC RBC-ENTMCNC: 32.8 PG — HIGH (ref 27–31)
MCHC RBC-ENTMCNC: 33.8 G/DL — SIGNIFICANT CHANGE UP (ref 32–36)
MCV RBC AUTO: 97 FL — SIGNIFICANT CHANGE UP (ref 81–99)
MONOCYTES # BLD AUTO: 0.8 K/UL — SIGNIFICANT CHANGE UP (ref 0–0.8)
MONOCYTES NFR BLD AUTO: 9.7 % — SIGNIFICANT CHANGE UP (ref 3–10)
NEUTROPHILS # BLD AUTO: 5.5 K/UL — SIGNIFICANT CHANGE UP (ref 1.8–8)
NEUTROPHILS NFR BLD AUTO: 63.8 % — SIGNIFICANT CHANGE UP (ref 37–73)
PHOSPHATE SERPL-MCNC: 2.9 MG/DL — SIGNIFICANT CHANGE UP (ref 2.4–4.7)
PLATELET # BLD AUTO: 151 K/UL — SIGNIFICANT CHANGE UP (ref 150–400)
POTASSIUM SERPL-MCNC: 4.4 MMOL/L — SIGNIFICANT CHANGE UP (ref 3.5–5.3)
POTASSIUM SERPL-SCNC: 4.4 MMOL/L — SIGNIFICANT CHANGE UP (ref 3.5–5.3)
PROTHROM AB SERPL-ACNC: 45.2 SEC — HIGH (ref 9.8–12.7)
RBC # BLD: 4 M/UL — LOW (ref 4.4–5.2)
RBC # FLD: 16.4 % — HIGH (ref 11–15.6)
SODIUM SERPL-SCNC: 139 MMOL/L — SIGNIFICANT CHANGE UP (ref 135–145)
SPECIMEN SOURCE: SIGNIFICANT CHANGE UP
WBC # BLD: 8.7 K/UL — SIGNIFICANT CHANGE UP (ref 4.8–10.8)
WBC # FLD AUTO: 8.7 K/UL — SIGNIFICANT CHANGE UP (ref 4.8–10.8)

## 2017-10-11 PROCEDURE — 99233 SBSQ HOSP IP/OBS HIGH 50: CPT

## 2017-10-11 RX ORDER — LIDOCAINE 4 G/100G
CREAM TOPICAL
Qty: 0 | Refills: 0 | Status: DISCONTINUED | OUTPATIENT
Start: 2017-10-11 | End: 2017-10-12

## 2017-10-11 RX ORDER — LIDOCAINE 4 G/100G
15 CREAM TOPICAL ONCE
Qty: 0 | Refills: 0 | Status: COMPLETED | OUTPATIENT
Start: 2017-10-11 | End: 2017-10-11

## 2017-10-11 RX ORDER — LIDOCAINE 4 G/100G
15 CREAM TOPICAL EVERY 6 HOURS
Qty: 0 | Refills: 0 | Status: DISCONTINUED | OUTPATIENT
Start: 2017-10-12 | End: 2017-10-12

## 2017-10-11 RX ADMIN — Medication 1 TABLET(S): at 12:37

## 2017-10-11 RX ADMIN — Medication 40 MILLIGRAM(S): at 05:47

## 2017-10-11 RX ADMIN — LIDOCAINE 15 MILLILITER(S): 4 CREAM TOPICAL at 17:52

## 2017-10-11 RX ADMIN — Medication 20 MILLIEQUIVALENT(S): at 12:37

## 2017-10-11 RX ADMIN — ATENOLOL 25 MILLIGRAM(S): 25 TABLET ORAL at 05:47

## 2017-10-11 RX ADMIN — CEFTRIAXONE 100 GRAM(S): 500 INJECTION, POWDER, FOR SOLUTION INTRAMUSCULAR; INTRAVENOUS at 21:54

## 2017-10-11 RX ADMIN — ATENOLOL 25 MILLIGRAM(S): 25 TABLET ORAL at 17:52

## 2017-10-11 RX ADMIN — LIDOCAINE 15 MILLILITER(S): 4 CREAM TOPICAL at 23:14

## 2017-10-11 RX ADMIN — BENZOCAINE AND MENTHOL 1 LOZENGE: 5; 1 LIQUID ORAL at 12:37

## 2017-10-11 NOTE — PROGRESS NOTE ADULT - NSHPATTENDINGPLANDISCUSS_GEN_ALL_CORE
Patient and Patient's  at bedside
Dr. Elizabeth
Dr. Elizabeth
Patient and family at bedside (daughter and )

## 2017-10-11 NOTE — PROCEDURE NOTE - NSPROCDETAILS_GEN_ALL_CORE
location identified, draped/prepped, sterile technique used/sterile dressing applied/sterile technique, catheter placed/supine position/ultrasound assessment

## 2017-10-11 NOTE — PROCEDURE NOTE - PROCEDURE
<<-----Click on this checkbox to enter Procedure Peripheral insertion of midline catheter  10/11/2017  4.5FR  15CM  ARROW POWER MIDLINE  left basilic good heme back  Active  JSTEELE  Vascular access with ultrasound guidance  10/11/2017  patent left basilic vein  Active  JSTEELE

## 2017-10-11 NOTE — PROCEDURE NOTE - NSPOSTPRCRAD_GEN_A_CORE
postion of catheter/depth of insertion/line adjusted to depth of insertion/ultrasound/line in appropriate postion

## 2017-10-11 NOTE — PROGRESS NOTE ADULT - SUBJECTIVE AND OBJECTIVE BOX
Patient seen and examined  still feels tired; Less than 50% better      REVIEW OF SYSTEMS:    CONSTITUTIONAL: No F/C  RESPIRATORY: No cough or SOB; mouth/jaw area No pain  CARDIOVASCULAR: No CP/palpitations,    GASTROINTESTINAL: No abdominal pain , NVD   GENITOURINARY: No UTI sx  NEUROLOGICAL: No headaches/wk/numbness  MUSCULOSKELETAL:  No joint pain/swelling; No LBP  EXTREMITIES : no swelling,    Vital Signs Last 24 Hrs  T(C): 36.3 (11 Oct 2017 15:30), Max: 36.9 (11 Oct 2017 08:53)  T(F): 97.4 (11 Oct 2017 15:30), Max: 98.4 (11 Oct 2017 08:53)  HR: 63 (11 Oct 2017 15:30) (60 - 67)  BP: 113/68 (11 Oct 2017 15:30) (98/64 - 132/72)  BP(mean): --  RR: 18 (11 Oct 2017 15:30) (18 - 20)  SpO2: 99% (11 Oct 2017 15:30) (94% - 99%)    PHYSICAL EXAM:    GENERAL: NAD,   EYES:  conjunctiva and sclera clear; jaw NT  NECK: Supple, No JVD/Bruit  NERVOUS SYSTEM:  A/O x3,   CHEST:  CTA ,No rales or rhonchi  HEART:  RRR, No murmurs  ABDOMEN: Soft, NT/ND BS+  EXTREMITIES:  No Edema;  SKIN: No rashes    LABS:                        13.1   8.7   )-----------( 151      ( 11 Oct 2017 06:19 )             38.8     10-11    139  |  102  |  42.0<H>  ----------------------------<  99  4.4   |  25.0  |  1.29    Ca    8.7      11 Oct 2017 06:19  Phos  2.9     10-11  Mg     2.1     10-11        MEDICATIONS  (STANDING):  acetaminophen   Tablet PRN  ATENolol  Tablet  benzocaine 15 mG/menthol 3.6 mG Lozenge PRN  cefTRIAXone   IVPB  dextrose 5%.  dextrose 50% Injectable  dextrose 50% Injectable  dextrose 50% Injectable  dextrose Gel PRN  digoxin     Tablet  furosemide    Tablet  glucagon  Injectable PRN  insulin lispro (HumaLOG) corrective regimen sliding scale  lactobacillus acidophilus  lidocaine 2% Viscous  potassium chloride    Tablet ER

## 2017-10-11 NOTE — PROGRESS NOTE ADULT - ASSESSMENT
Assessment and Plan:   Patient is a 83 y/o female PMH of CHF, Atrial Fibrillation on AC, and new onset diabetes who was brought to the ED by family for lethargy, right knee pain and diffuse rash. As per the patient's daughter, patient was able to ambulate with the assistance of a walker, but then developed right knee swelling which was presumed to be secondary to gout. She was then started on allopurinol and subsequently broke out in a rash. In the ED, the patient was febrile and code sepsis was activated. Blood cultures were positive for Strep Milleri and started on vancomycin; most recently switched to Ceftriaxone. Furthermore, the patient's rash was deemed to be secondary to a drug reaction from allopurinol and consequently discontinued. Since then her rash has significantly improved. Patient's right knee was tapped and body fluid was not consistent with an infectious process. Prelim body fluid culture no growth to date. Patient is also complaining of a sore throat; throat culture is pending. Case discussed with cardiologist who does not believe a JANICE will . Patient will likely need a PICC line/midline with prolonged course of IV antibiotics for presumed endocarditis secondary to Strep Milleri.      Problem/Plan - 1:  ·  Problem: Bacteremia  -presumed endocarditis - no other identifiable source  -no open sores in oral cavity  -case discussed with patient's cardiologist Dr. Soliz- JANICE will not  therefore not indicated at this time  -patient remains afebrile, leukocytosis resolved  -repeat blood cultures - no growth to date  -10/4 Blood Culture positive for Strep milleri   -10/5 Repeat blood cultures negative x 3  -10/5 Body fluid culture - no growth to date  -On ceftriaxone 2 gram Q24 hrs; PICC line placed today and patient will need antibiotics until 11/10      Problem/Plan - 2:  ·  Problem: Right knee pain  -likely secondary to DJD  -uric acid normal  -Knee XRAY negative for fracture or effusion; consistent with OA  -S/p arthrocentesis; body fluid culture no growth to date   -LE duplex negative for DVT  -Ortho recommendations appreciated - outpatient follow up  -PT on board - family now agreeable to JESSICA  -PMR consult     Problem/Plan - 3:  ·  Problem: Hyponatremia.  - resolved  -likely secondary to non-osmotic release of ADH from pain   -urine lytes and urine osm reviewed  -TSH and cortisol level     Problem/Plan - 4:  ·  Problem: Rash.  - resolved  -likely secondary to drug reaction from allopurinol  -appreciate derm input     Problem/Plan - 5:  ·  Problem: Sore throat.   -rule out strep;  f/u rapid strep PCR (pending)  -benzocaine lozenges PRN   -lidocaine swish and swallow     Problem/Plan - 6:  Problem: Chronic systolic congestive heart failure.   -continue lasix as tolerated  -monitor daily weights & I/Os  -cardiology on board     Problem/Plan - 7:  ·  Problem: Chronic atrial fibrillation.    -rate controlled; continue atenolol with holding parameters  -continue digoxin; dig level 0.9 and within therapeutic range  -supratherapeutic INR - hold coumadin tonight     Problem/Plan - 8:  ·  Problem: Essential hypertension.    -Normotensive; aldactone and hydralazine on hold  -continue atenolol and lasix with holding parameters.      Problem/Plan - 9:  ·  Problem: CRI (chronic renal insufficiency), stage 3 (moderate).   -unknown etiology of CKD but possible considerations are cardiorenal syndrome type 4 and/or age related decline in renal function  -renal function stable  -renal on board; continue lasix and KCl and monitor renal function  -renally adjust all medications and avoid nephrotoxic agents     Problem/Plan - 10:  Problem: Type 2 diabetes mellitus with complication, without long-term current use of insulin.   -continue Sliding scale coverage while inpatient  -fingersticks reviewed  -A1C 6.3    Problem/Plan- 11:  Thrombocytopenia - stable  -likely secondary to liver disease   -cirrhosis seen on imaging; will arrange outpatient follow up with GI/hepatology upon discharge    Dispo: Awaiting placement at [facility] [place]. Assessment and Plan:   Patient is a 85 y/o female PMH of CHF, Atrial Fibrillation on AC, and new onset diabetes who was brought to the ED by family for lethargy, right knee pain and diffuse rash. As per the patient's daughter, patient was able to ambulate with the assistance of a walker, but then developed right knee swelling which was presumed to be secondary to gout. She was then started on allopurinol and subsequently broke out in a rash. In the ED, the patient was febrile and code sepsis was activated. Blood cultures were positive for Strep Milleri and started on vancomycin; most recently switched to Ceftriaxone. Furthermore, the patient's rash was deemed to be secondary to a drug reaction from allopurinol and consequently discontinued. Since then her rash has significantly improved. Patient's right knee was tapped and body fluid was not consistent with an infectious process. Prelim body fluid culture no growth to date. Patient is also complaining of a sore throat; throat culture is pending. Case discussed with cardiologist who does not believe a JANICE will . Patient will likely need a PICC line/midline with prolonged course of IV antibiotics for presumed endocarditis secondary to Strep Milleri.      Problem/Plan - 1:  ·  Problem: Chronic atrial fibrillation.    -rate controlled; continue atenolol with holding parameters  -continue digoxin; dig level 0.9 and within therapeutic range  -supratherapeutic INR - hold coumadin tonight     Problem/Plan - 2:  ·  Problem: Bacteremia  -presumed endocarditis - no other identifiable source  -no open sores in oral cavity  -case discussed with patient's cardiologist Dr. Soliz- JANICE will not  therefore not indicated at this time  -patient remains afebrile, leukocytosis resolved  -repeat blood cultures - no growth to date  -10/4 Blood Culture positive for Strep milleri   -10/5 Repeat blood cultures negative x 3  -10/5 Body fluid culture - no growth to date  -On ceftriaxone 2 gram Q24 hrs; PICC line placed today and patient will need antibiotics until 11/10      Problem/Plan - 3:  ·  Problem: Right knee pain  -likely secondary to DJD  -uric acid normal  -Knee XRAY negative for fracture or effusion; consistent with OA  -S/p arthrocentesis; body fluid culture no growth to date   -LE duplex negative for DVT  -Ortho recommendations appreciated - outpatient follow up  -PT on board - family now agreeable to JESSICA  -PMR consult     Problem/Plan - 4:  ·  Problem: Hyponatremia.  - resolved  -likely secondary to non-osmotic release of ADH from pain   -urine lytes and urine osm reviewed  -TSH and cortisol level     Problem/Plan - 5:  ·  Problem: Rash.  - resolved  -likely secondary to drug reaction from allopurinol  -appreciate derm input     Problem/Plan - 6:  ·  Problem: Sore throat.   -rule out strep;  f/u rapid strep PCR (pending)  -benzocaine lozenges PRN   -lidocaine swish and swallow     Problem/Plan - 7:  Problem: Chronic systolic congestive heart failure.   -continue lasix as tolerated  -monitor daily weights & I/Os  -cardiology on board     Problem/Plan - 8:  ·  Problem: Essential hypertension.    -Normotensive; aldactone and hydralazine on hold  -continue atenolol and lasix with holding parameters.      Problem/Plan - 9:  ·  Problem: CRI (chronic renal insufficiency), stage 3 (moderate).   -unknown etiology of CKD but possible considerations are cardiorenal syndrome type 4 and/or age related decline in renal function  -renal function stable  -renal on board; continue lasix and KCl and monitor renal function  -renally adjust all medications and avoid nephrotoxic agents     Problem/Plan - 10:  Problem: Type 2 diabetes mellitus with complication, without long-term current use of insulin.   -continue Sliding scale coverage while inpatient  -fingersticks reviewed  -A1C 6.3    Problem/Plan- 11:  Thrombocytopenia - stable  -likely secondary to liver disease   -cirrhosis seen on imaging; will arrange outpatient follow up with GI/hepatology upon discharge    Dispo: Awaiting placement at [facility].

## 2017-10-11 NOTE — PROCEDURE NOTE - NSPOSTCAREGUIDE_GEN_A_CORE
Keep the cast/splint/dressing clean and dry/Care for catheter as per unit/ICU protocols/Verbal/written post procedure instructions were given to patient/caregiver/Instructed patient/caregiver regarding signs and symptoms of infection/Instructed patient/caregiver to follow-up with primary care physician

## 2017-10-11 NOTE — PROGRESS NOTE ADULT - ASSESSMENT
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

## 2017-10-11 NOTE — PROGRESS NOTE ADULT - SUBJECTIVE AND OBJECTIVE BOX
CHIEF COMPLAINT/INTERVAL HISTORY:    Patient is a 84y old  Female who presents with a chief complaint of fever and respiratory distress (04 Oct 2017 04:36)      SUBJECTIVE & OBJECTIVE: Pt seen and examined at bedside.     ICU Vital Signs Last 24 Hrs  T(C): 36.3 (11 Oct 2017 15:30), Max: 36.9 (11 Oct 2017 08:53)  T(F): 97.4 (11 Oct 2017 15:30), Max: 98.4 (11 Oct 2017 08:53)  HR: 63 (11 Oct 2017 15:30) (60 - 67)  BP: 113/68 (11 Oct 2017 15:30) (98/64 - 132/72)  RR: 18 (11 Oct 2017 15:30) (18 - 20)  SpO2: 99% (11 Oct 2017 15:30) (94% - 99%)    MEDICATIONS  (STANDING):  ATENolol  Tablet 25 milliGRAM(s) Oral two times a day  cefTRIAXone   IVPB 2 Gram(s) IV Intermittent every 24 hours  dextrose 5%. 1000 milliLiter(s) (50 mL/Hr) IV Continuous <Continuous>  dextrose 50% Injectable 12.5 Gram(s) IV Push once  dextrose 50% Injectable 25 Gram(s) IV Push once  dextrose 50% Injectable 25 Gram(s) IV Push once  digoxin     Tablet 0.125 milliGRAM(s) Oral every other day  furosemide    Tablet 40 milliGRAM(s) Oral daily  insulin lispro (HumaLOG) corrective regimen sliding scale   SubCutaneous three times a day before meals  lactobacillus acidophilus 1 Tablet(s) Oral daily  lidocaine 2% Viscous      potassium chloride    Tablet ER 20 milliEquivalent(s) Oral daily    MEDICATIONS  (PRN):  acetaminophen   Tablet 650 milliGRAM(s) Oral every 6 hours PRN For Temp greater than 38 C (100.4 F)  benzocaine 15 mG/menthol 3.6 mG Lozenge 1 Lozenge Oral every 4 hours PRN Sore Throat  dextrose Gel 1 Dose(s) Oral once PRN Blood Glucose LESS THAN 70 milliGRAM(s)/deciliter  glucagon  Injectable 1 milliGRAM(s) IntraMuscular once PRN Glucose LESS THAN 70 milligrams/deciliter      LABS:                        13.1   8.7   )-----------( 151      ( 11 Oct 2017 06:19 )             38.8     10-11    139  |  102  |  42.0<H>  ----------------------------<  99  4.4   |  25.0  |  1.29    Ca    8.7      11 Oct 2017 06:19  Phos  2.9     10-11  Mg     2.1     10-11      PT/INR - ( 11 Oct 2017 06:19 )   PT: 45.2 sec;   INR: 3.99 ratio         CAPILLARY BLOOD GLUCOSE    POCT Blood Glucose.: 125 mg/dL (11 Oct 2017 17:50)  POCT Blood Glucose.: 117 mg/dL (11 Oct 2017 12:35)  POCT Blood Glucose.: 103 mg/dL (11 Oct 2017 08:27)  POCT Blood Glucose.: 128 mg/dL (10 Oct 2017 22:00)      RECENT CULTURES:  Repeat blood cultures - no growth to date    PHYSICAL EXAM:  GENERAL: elderly female, laying in bed, NAD  HEAD:  Atraumatic, Normocephalic  EYES: EOMI, PERRLA   ENMT: Moist mucous membranes  NECK: Supple   CHEST/LUNG: diminished breath sounds  HEART: Irregular, S1/S2 audible, + murmur  ABDOMEN: Soft, Nontender, mildly distended, +hepatomegaly  KNEES: right knee without swelling  EXTREMITIES:  bilateral LE skin changes with hyperpigmentation CHIEF COMPLAINT/INTERVAL HISTORY:    Patient is a 84y old  Female who presents with a chief complaint of fever and respiratory distress (04 Oct 2017 04:36)      SUBJECTIVE & OBJECTIVE: Pt seen and examined at bedside.     ICU Vital Signs Last 24 Hrs  T(C): 36.3 (11 Oct 2017 15:30), Max: 36.9 (11 Oct 2017 08:53)  T(F): 97.4 (11 Oct 2017 15:30), Max: 98.4 (11 Oct 2017 08:53)  HR: 63 (11 Oct 2017 15:30) (60 - 67)  BP: 113/68 (11 Oct 2017 15:30) (98/64 - 132/72)  RR: 18 (11 Oct 2017 15:30) (18 - 20)  SpO2: 99% (11 Oct 2017 15:30) (94% - 99%)    MEDICATIONS  (STANDING):  ATENolol  Tablet 25 milliGRAM(s) Oral two times a day  cefTRIAXone   IVPB 2 Gram(s) IV Intermittent every 24 hours  dextrose 5%. 1000 milliLiter(s) (50 mL/Hr) IV Continuous <Continuous>  dextrose 50% Injectable 12.5 Gram(s) IV Push once  dextrose 50% Injectable 25 Gram(s) IV Push once  dextrose 50% Injectable 25 Gram(s) IV Push once  digoxin     Tablet 0.125 milliGRAM(s) Oral every other day  furosemide    Tablet 40 milliGRAM(s) Oral daily  insulin lispro (HumaLOG) corrective regimen sliding scale   SubCutaneous three times a day before meals  lactobacillus acidophilus 1 Tablet(s) Oral daily  lidocaine 2% Viscous      potassium chloride    Tablet ER 20 milliEquivalent(s) Oral daily    MEDICATIONS  (PRN):  acetaminophen   Tablet 650 milliGRAM(s) Oral every 6 hours PRN For Temp greater than 38 C (100.4 F)  benzocaine 15 mG/menthol 3.6 mG Lozenge 1 Lozenge Oral every 4 hours PRN Sore Throat  dextrose Gel 1 Dose(s) Oral once PRN Blood Glucose LESS THAN 70 milliGRAM(s)/deciliter  glucagon  Injectable 1 milliGRAM(s) IntraMuscular once PRN Glucose LESS THAN 70 milligrams/deciliter      LABS:                        13.1   8.7   )-----------( 151      ( 11 Oct 2017 06:19 )             38.8     10-11    139  |  102  |  42.0<H>  ----------------------------<  99  4.4   |  25.0  |  1.29    Ca    8.7      11 Oct 2017 06:19  Phos  2.9     10-11  Mg     2.1     10-11      PT/INR - ( 11 Oct 2017 06:19 )   PT: 45.2 sec;   INR: 3.99 ratio         CAPILLARY BLOOD GLUCOSE    POCT Blood Glucose.: 125 mg/dL (11 Oct 2017 17:50)  POCT Blood Glucose.: 117 mg/dL (11 Oct 2017 12:35)  POCT Blood Glucose.: 103 mg/dL (11 Oct 2017 08:27)  POCT Blood Glucose.: 128 mg/dL (10 Oct 2017 22:00)      RECENT CULTURES:  Repeat blood cultures - no growth to date    PHYSICAL EXAM:  GENERAL: elderly female, laying in bed, NAD  HEAD:  Atraumatic, Normocephalic  EYES: EOMI, PERRLA   ENMT: Moist mucous membranes  NECK: Supple   CHEST/LUNG: diminished breath sounds  HEART: Irregular, S1/S2 audible, + murmur  ABDOMEN: Soft, Nontender, mildly distended, +hepatomegaly  KNEES: right knee without swelling  EXTREMITIES:  bilateral LE skin changes with hyperpigmentation  Lymph: No lymphadenopathy CHIEF COMPLAINT/INTERVAL HISTORY:    Patient is a 84y old  Female who presents with a chief complaint of fever and respiratory distress (04 Oct 2017 04:36)      SUBJECTIVE & OBJECTIVE: Pt seen and examined at bedside. No overnight events. Complaining of sore throat but otherwise denies fever or chills. No lesions in oral cavity. Awaiting placement in [facility].    ROS: No chest pain, palpitations, SOB, light headedness, dizziness, headache, nausea/vomiting, fevers/chills, abdominal pain, dysuria or increased urinary frequency.     ICU Vital Signs Last 24 Hrs  T(C): 36.3 (11 Oct 2017 15:30), Max: 36.9 (11 Oct 2017 08:53)  T(F): 97.4 (11 Oct 2017 15:30), Max: 98.4 (11 Oct 2017 08:53)  HR: 63 (11 Oct 2017 15:30) (60 - 67)  BP: 113/68 (11 Oct 2017 15:30) (98/64 - 132/72)  RR: 18 (11 Oct 2017 15:30) (18 - 20)  SpO2: 99% (11 Oct 2017 15:30) (94% - 99%)    MEDICATIONS  (STANDING):  ATENolol  Tablet 25 milliGRAM(s) Oral two times a day  cefTRIAXone   IVPB 2 Gram(s) IV Intermittent every 24 hours  dextrose 5%. 1000 milliLiter(s) (50 mL/Hr) IV Continuous <Continuous>  dextrose 50% Injectable 12.5 Gram(s) IV Push once  dextrose 50% Injectable 25 Gram(s) IV Push once  dextrose 50% Injectable 25 Gram(s) IV Push once  digoxin     Tablet 0.125 milliGRAM(s) Oral every other day  furosemide    Tablet 40 milliGRAM(s) Oral daily  insulin lispro (HumaLOG) corrective regimen sliding scale   SubCutaneous three times a day before meals  lactobacillus acidophilus 1 Tablet(s) Oral daily  lidocaine 2% Viscous      potassium chloride    Tablet ER 20 milliEquivalent(s) Oral daily    MEDICATIONS  (PRN):  acetaminophen   Tablet 650 milliGRAM(s) Oral every 6 hours PRN For Temp greater than 38 C (100.4 F)  benzocaine 15 mG/menthol 3.6 mG Lozenge 1 Lozenge Oral every 4 hours PRN Sore Throat  dextrose Gel 1 Dose(s) Oral once PRN Blood Glucose LESS THAN 70 milliGRAM(s)/deciliter  glucagon  Injectable 1 milliGRAM(s) IntraMuscular once PRN Glucose LESS THAN 70 milligrams/deciliter      LABS:                        13.1   8.7   )-----------( 151      ( 11 Oct 2017 06:19 )             38.8     10-11    139  |  102  |  42.0<H>  ----------------------------<  99  4.4   |  25.0  |  1.29    Ca    8.7      11 Oct 2017 06:19  Phos  2.9     10-11  Mg     2.1     10-11      PT/INR - ( 11 Oct 2017 06:19 )   PT: 45.2 sec;   INR: 3.99 ratio         CAPILLARY BLOOD GLUCOSE    POCT Blood Glucose.: 125 mg/dL (11 Oct 2017 17:50)  POCT Blood Glucose.: 117 mg/dL (11 Oct 2017 12:35)  POCT Blood Glucose.: 103 mg/dL (11 Oct 2017 08:27)  POCT Blood Glucose.: 128 mg/dL (10 Oct 2017 22:00)      RECENT CULTURES:  Repeat blood cultures - no growth to date    PHYSICAL EXAM:  GENERAL: elderly female, laying in bed, NAD  HEAD:  Atraumatic, Normocephalic  EYES: EOMI, PERRLA   ENMT: Moist mucous membranes  NECK: Supple   CHEST/LUNG: diminished breath sounds  HEART: Irregular, S1/S2 audible, + murmur  ABDOMEN: Soft, Nontender, mildly distended, +hepatomegaly  KNEES: right knee without swelling  EXTREMITIES:  bilateral LE skin changes with hyperpigmentation  Lymph: No lymphadenopathy

## 2017-10-12 ENCOUNTER — TRANSCRIPTION ENCOUNTER (OUTPATIENT)
Age: 82
End: 2017-10-12

## 2017-10-12 VITALS
HEART RATE: 64 BPM | OXYGEN SATURATION: 97 % | RESPIRATION RATE: 18 BRPM | DIASTOLIC BLOOD PRESSURE: 70 MMHG | TEMPERATURE: 98 F | SYSTOLIC BLOOD PRESSURE: 116 MMHG

## 2017-10-12 LAB
APTT BLD: 42.1 SEC — HIGH (ref 27.5–37.4)
GLUCOSE BLDC GLUCOMTR-MCNC: 113 MG/DL — HIGH (ref 70–99)
GLUCOSE BLDC GLUCOMTR-MCNC: 137 MG/DL — HIGH (ref 70–99)
GLUCOSE BLDC GLUCOMTR-MCNC: 92 MG/DL — SIGNIFICANT CHANGE UP (ref 70–99)
INR BLD: 2.76 RATIO — HIGH (ref 0.88–1.16)
PROTHROM AB SERPL-ACNC: 31 SEC — HIGH (ref 9.8–12.7)

## 2017-10-12 PROCEDURE — 87799 DETECT AGENT NOS DNA QUANT: CPT

## 2017-10-12 PROCEDURE — 82330 ASSAY OF CALCIUM: CPT

## 2017-10-12 PROCEDURE — 86747 PARVOVIRUS ANTIBODY: CPT

## 2017-10-12 PROCEDURE — 87581 M.PNEUMON DNA AMP PROBE: CPT

## 2017-10-12 PROCEDURE — 82435 ASSAY OF BLOOD CHLORIDE: CPT

## 2017-10-12 PROCEDURE — 97110 THERAPEUTIC EXERCISES: CPT

## 2017-10-12 PROCEDURE — 87493 C DIFF AMPLIFIED PROBE: CPT

## 2017-10-12 PROCEDURE — 83036 HEMOGLOBIN GLYCOSYLATED A1C: CPT

## 2017-10-12 PROCEDURE — 85730 THROMBOPLASTIN TIME PARTIAL: CPT

## 2017-10-12 PROCEDURE — 80202 ASSAY OF VANCOMYCIN: CPT

## 2017-10-12 PROCEDURE — 99221 1ST HOSP IP/OBS SF/LOW 40: CPT

## 2017-10-12 PROCEDURE — 99285 EMERGENCY DEPT VISIT HI MDM: CPT | Mod: 25

## 2017-10-12 PROCEDURE — 84100 ASSAY OF PHOSPHORUS: CPT

## 2017-10-12 PROCEDURE — 80162 ASSAY OF DIGOXIN TOTAL: CPT

## 2017-10-12 PROCEDURE — 85652 RBC SED RATE AUTOMATED: CPT

## 2017-10-12 PROCEDURE — 87633 RESP VIRUS 12-25 TARGETS: CPT

## 2017-10-12 PROCEDURE — 85014 HEMATOCRIT: CPT

## 2017-10-12 PROCEDURE — 93970 EXTREMITY STUDY: CPT

## 2017-10-12 PROCEDURE — 97116 GAIT TRAINING THERAPY: CPT

## 2017-10-12 PROCEDURE — 97530 THERAPEUTIC ACTIVITIES: CPT

## 2017-10-12 PROCEDURE — 96374 THER/PROPH/DIAG INJ IV PUSH: CPT

## 2017-10-12 PROCEDURE — 93306 TTE W/DOPPLER COMPLETE: CPT

## 2017-10-12 PROCEDURE — 36600 WITHDRAWAL OF ARTERIAL BLOOD: CPT

## 2017-10-12 PROCEDURE — 84484 ASSAY OF TROPONIN QUANT: CPT

## 2017-10-12 PROCEDURE — 84132 ASSAY OF SERUM POTASSIUM: CPT

## 2017-10-12 PROCEDURE — 83605 ASSAY OF LACTIC ACID: CPT

## 2017-10-12 PROCEDURE — 74176 CT ABD & PELVIS W/O CONTRAST: CPT

## 2017-10-12 PROCEDURE — 89051 BODY FLUID CELL COUNT: CPT

## 2017-10-12 PROCEDURE — 84133 ASSAY OF URINE POTASSIUM: CPT

## 2017-10-12 PROCEDURE — 83880 ASSAY OF NATRIURETIC PEPTIDE: CPT

## 2017-10-12 PROCEDURE — 85045 AUTOMATED RETICULOCYTE COUNT: CPT

## 2017-10-12 PROCEDURE — 73562 X-RAY EXAM OF KNEE 3: CPT

## 2017-10-12 PROCEDURE — 94640 AIRWAY INHALATION TREATMENT: CPT

## 2017-10-12 PROCEDURE — 87186 SC STD MICRODIL/AGAR DIL: CPT

## 2017-10-12 PROCEDURE — 82550 ASSAY OF CK (CPK): CPT

## 2017-10-12 PROCEDURE — 97163 PT EVAL HIGH COMPLEX 45 MIN: CPT

## 2017-10-12 PROCEDURE — 99231 SBSQ HOSP IP/OBS SF/LOW 25: CPT

## 2017-10-12 PROCEDURE — 82533 TOTAL CORTISOL: CPT

## 2017-10-12 PROCEDURE — 87081 CULTURE SCREEN ONLY: CPT

## 2017-10-12 PROCEDURE — 85610 PROTHROMBIN TIME: CPT

## 2017-10-12 PROCEDURE — 82962 GLUCOSE BLOOD TEST: CPT

## 2017-10-12 PROCEDURE — 87798 DETECT AGENT NOS DNA AMP: CPT

## 2017-10-12 PROCEDURE — 94660 CPAP INITIATION&MGMT: CPT

## 2017-10-12 PROCEDURE — 96375 TX/PRO/DX INJ NEW DRUG ADDON: CPT

## 2017-10-12 PROCEDURE — 81001 URINALYSIS AUTO W/SCOPE: CPT

## 2017-10-12 PROCEDURE — 82553 CREATINE MB FRACTION: CPT

## 2017-10-12 PROCEDURE — 36415 COLL VENOUS BLD VENIPUNCTURE: CPT

## 2017-10-12 PROCEDURE — 80053 COMPREHEN METABOLIC PANEL: CPT

## 2017-10-12 PROCEDURE — 87040 BLOOD CULTURE FOR BACTERIA: CPT

## 2017-10-12 PROCEDURE — 89060 EXAM SYNOVIAL FLUID CRYSTALS: CPT

## 2017-10-12 PROCEDURE — 82947 ASSAY GLUCOSE BLOOD QUANT: CPT

## 2017-10-12 PROCEDURE — 93005 ELECTROCARDIOGRAM TRACING: CPT

## 2017-10-12 PROCEDURE — 80048 BASIC METABOLIC PNL TOTAL CA: CPT

## 2017-10-12 PROCEDURE — 84550 ASSAY OF BLOOD/URIC ACID: CPT

## 2017-10-12 PROCEDURE — 87075 CULTR BACTERIA EXCEPT BLOOD: CPT

## 2017-10-12 PROCEDURE — 84443 ASSAY THYROID STIM HORMONE: CPT

## 2017-10-12 PROCEDURE — 83735 ASSAY OF MAGNESIUM: CPT

## 2017-10-12 PROCEDURE — 87070 CULTURE OTHR SPECIMN AEROBIC: CPT

## 2017-10-12 PROCEDURE — 99239 HOSP IP/OBS DSCHRG MGMT >30: CPT

## 2017-10-12 PROCEDURE — 87486 CHLMYD PNEUM DNA AMP PROBE: CPT

## 2017-10-12 PROCEDURE — 82803 BLOOD GASES ANY COMBINATION: CPT

## 2017-10-12 PROCEDURE — 94760 N-INVAS EAR/PLS OXIMETRY 1: CPT

## 2017-10-12 PROCEDURE — 85027 COMPLETE CBC AUTOMATED: CPT

## 2017-10-12 PROCEDURE — 87150 DNA/RNA AMPLIFIED PROBE: CPT

## 2017-10-12 PROCEDURE — 84295 ASSAY OF SERUM SODIUM: CPT

## 2017-10-12 PROCEDURE — 87205 SMEAR GRAM STAIN: CPT

## 2017-10-12 PROCEDURE — 83935 ASSAY OF URINE OSMOLALITY: CPT

## 2017-10-12 PROCEDURE — 71045 X-RAY EXAM CHEST 1 VIEW: CPT

## 2017-10-12 PROCEDURE — 71250 CT THORAX DX C-: CPT

## 2017-10-12 PROCEDURE — 84300 ASSAY OF URINE SODIUM: CPT

## 2017-10-12 RX ORDER — HYDRALAZINE HCL 50 MG
1 TABLET ORAL
Qty: 0 | Refills: 0 | COMMUNITY

## 2017-10-12 RX ORDER — DIGOXIN 250 MCG
1 TABLET ORAL
Qty: 0 | Refills: 0 | COMMUNITY
Start: 2017-10-12

## 2017-10-12 RX ORDER — POTASSIUM CHLORIDE 20 MEQ
1 PACKET (EA) ORAL
Qty: 0 | Refills: 0 | COMMUNITY
Start: 2017-10-12

## 2017-10-12 RX ORDER — ATENOLOL 25 MG/1
1 TABLET ORAL
Qty: 0 | Refills: 0 | COMMUNITY
Start: 2017-10-12

## 2017-10-12 RX ORDER — FUROSEMIDE 40 MG
1 TABLET ORAL
Qty: 0 | Refills: 0 | COMMUNITY
Start: 2017-10-12

## 2017-10-12 RX ORDER — LACTOBACILLUS ACIDOPHILUS 100MM CELL
1 CAPSULE ORAL
Qty: 0 | Refills: 0 | COMMUNITY
Start: 2017-10-12

## 2017-10-12 RX ORDER — WARFARIN SODIUM 2.5 MG/1
1 TABLET ORAL
Qty: 0 | Refills: 0 | COMMUNITY
Start: 2017-10-12

## 2017-10-12 RX ORDER — DIGOXIN 250 MCG
1 TABLET ORAL
Qty: 0 | Refills: 0 | COMMUNITY

## 2017-10-12 RX ORDER — CEFTRIAXONE 500 MG/1
2 INJECTION, POWDER, FOR SOLUTION INTRAMUSCULAR; INTRAVENOUS
Qty: 0 | Refills: 0 | COMMUNITY
Start: 2017-10-12

## 2017-10-12 RX ORDER — INSULIN LISPRO 100/ML
0 VIAL (ML) SUBCUTANEOUS
Qty: 0 | Refills: 0 | COMMUNITY
Start: 2017-10-12

## 2017-10-12 RX ADMIN — Medication 20 MILLIEQUIVALENT(S): at 11:07

## 2017-10-12 RX ADMIN — LIDOCAINE 15 MILLILITER(S): 4 CREAM TOPICAL at 06:42

## 2017-10-12 RX ADMIN — LIDOCAINE 15 MILLILITER(S): 4 CREAM TOPICAL at 16:36

## 2017-10-12 RX ADMIN — Medication 0.12 MILLIGRAM(S): at 11:07

## 2017-10-12 RX ADMIN — ATENOLOL 25 MILLIGRAM(S): 25 TABLET ORAL at 06:42

## 2017-10-12 RX ADMIN — Medication 40 MILLIGRAM(S): at 06:42

## 2017-10-12 RX ADMIN — Medication 1 TABLET(S): at 11:07

## 2017-10-12 RX ADMIN — LIDOCAINE 15 MILLILITER(S): 4 CREAM TOPICAL at 11:07

## 2017-10-12 NOTE — DISCHARGE NOTE ADULT - SECONDARY DIAGNOSIS.
Bacteremia Chronic pain of right knee Sore throat (viral) CRI (chronic renal insufficiency), stage 3 (moderate) Chronic systolic congestive heart failure

## 2017-10-12 NOTE — DISCHARGE NOTE ADULT - PROVIDER TOKENS
FREE:[LAST:[Martínez],FIRST:[Giovanna],PHONE:[(   )    -],FAX:[(   )    -]],TOKEN:'[id number]',TOKEN:'[id number]' TOKEN:'[id number]',TOKEN:'[id number]',FREE:[LAST:[Martínez],FIRST:[Giovanna],PHONE:[(   )    -],FAX:[(   )    -]],TOKEN:'[id number]'

## 2017-10-12 NOTE — PROGRESS NOTE ADULT - PROVIDER SPECIALTY LIST ADULT
Hospitalist
Infectious Disease
Nephrology
Orthopedics
Orthopedics
Hospitalist
Nephrology
Nephrology
Infectious Disease

## 2017-10-12 NOTE — DISCHARGE NOTE ADULT - CARE PROVIDER_API CALL
Giovanna Soliz  Phone: (   )    -  Fax: (   )    -    Austin Maloney), Infectious Disease; Internal Medicine  [address] [place] [address]  Phone: [phone]  Fax: [phone]    Felix Mcdonald), Internal Medicine; Nephrology  [address] [place] [address]  Phone: [phone]  Fax: [phone] Austin Maloney), Infectious Disease; Internal Medicine  [address] [facility] [place] [address]  Phone: [phone]  Fax: [phone]    Felix Mcdonald), Internal Medicine; Nephrology  [address] [facility] [place] [address]  Phone: [phone]  Fax: [phone]    Giovanna Soliz  Phone: (   )    -  Fax: (   )    -    Garcia Nichols (DO), Orthopaedic Surgery  [address] [place] [address]  Phone: [phone]  Fax: [phone]

## 2017-10-12 NOTE — DISCHARGE NOTE ADULT - PLAN OF CARE
Rate control Continue coumadin to maintain INR 2-3  Frequent INR checks  Hold if INR > 3  Follow up with cardiology as outpatient - Dr. Power Strep Milleri Bacteremia; complete prolonged course of antibiotics Repeat blood cultures no growth to date  Complete Ceftriaxone until 11/10  Follow up with PMD and ID resolution of pain uric acid within normal limits  XRAY without fracture or effusion, only OA  Body fluid culture - no growth  Ortho recommends outpatient treatment for Degenerative Joint Disease strep PCR negative  should follow up with dentist to elevate for gingival disease stable continue KCL while on lasix  monitor BMP  follow up with renal euvolemia continue lasix  aldactone and hydralazine on hold due to marginal BP  follow up with Dr. Power

## 2017-10-12 NOTE — DISCHARGE NOTE ADULT - CARE PROVIDERS DIRECT ADDRESSES
,DirectAddress_Unknown,DirectAddress_Unknown,DirectAddress_Unknown ,DirectAddress_Unknown,DirectAddress_Unknown,DirectAddress_Unknown,[email or web address]

## 2017-10-12 NOTE — DISCHARGE NOTE ADULT - MEDICATION SUMMARY - MEDICATIONS TO STOP TAKING
I will STOP taking the medications listed below when I get home from the hospital:    spironolactone  -- 12.5 milligram(s) by mouth 4 times a week    hydrALAZINE 10 mg oral tablet

## 2017-10-12 NOTE — DISCHARGE NOTE ADULT - HOSPITAL COURSE
Patient is a 85 y/o female PMH of CHF, Atrial Fibrillation on AC, and new onset diabetes who was brought to the ED by family for lethargy, right knee pain and diffuse rash. As per the patient's daughter, patient was able to ambulate with the assistance of a walker, but then developed right knee swelling which was presumed to be secondary to gout. She was then started on allopurinol and subsequently broke out in a rash. In the ED, the patient was febrile and code sepsis was activated. Blood cultures were positive for Strep Milleri and started on vancomycin and then switched to Ceftriaxone and patient will require this until 11/10. Repeat cultures have been negative. TTE did not reveal a vegetation and JANICE was not indicated as per patient's cardiologist. Furthermore, the patient's rash was deemed to be secondary to a drug reaction from allopurinol and consequently discontinued. Since then her rash has resolved. Patient was seen by ortho for her right knee pain. XRAY was negative for fracture or effusion. An arthrocentesis was done and the body fluid culture was negative. Uric acid was within normal limits. Ortho would like to see the patient as outpatient for management of DJD. Patient was also complaining of a sore throat; throat culture was negative for strep. Most recently, patient INR had been supratherapeutic - most recently 3.99 therefore coumadin has been held. INR will need to be monitored and patient should follow up with cardiology, renal and ID upon discharge.    Lastly, review of CT scans revealed evidence of a cirrhotic live - no decompensation. Patient should follow up with her primary care doctor for evaluation and management. CT also revealed an incidental AAA (4.7 cm diameter) and should also be followed up as outpatient. The above was discussed with patient and family in detail.    Vital signs stable.   CHIEF COMPLAINT/INTERVAL HISTORY:    Patient is a 84y old  Female who presents with a chief complaint of fever and respiratory distress (04 Oct 2017 04:36)      SUBJECTIVE & OBJECTIVE: Pt seen and examined at bedside.     ICU Vital Signs Last 24 Hrs  T(C): 36.3 (11 Oct 2017 15:30), Max: 36.9 (11 Oct 2017 08:53)  T(F): 97.4 (11 Oct 2017 15:30), Max: 98.4 (11 Oct 2017 08:53)  HR: 63 (11 Oct 2017 15:30) (60 - 67)  BP: 113/68 (11 Oct 2017 15:30) (98/64 - 132/72)  RR: 18 (11 Oct 2017 15:30) (18 - 20)  SpO2: 99% (11 Oct 2017 15:30) (94% - 99%)    MEDICATIONS  (STANDING):  ATENolol  Tablet 25 milliGRAM(s) Oral two times a day  cefTRIAXone   IVPB 2 Gram(s) IV Intermittent every 24 hours  dextrose 5%. 1000 milliLiter(s) (50 mL/Hr) IV Continuous <Continuous>  dextrose 50% Injectable 12.5 Gram(s) IV Push once  dextrose 50% Injectable 25 Gram(s) IV Push once  dextrose 50% Injectable 25 Gram(s) IV Push once  digoxin     Tablet 0.125 milliGRAM(s) Oral every other day  furosemide    Tablet 40 milliGRAM(s) Oral daily  insulin lispro (HumaLOG) corrective regimen sliding scale   SubCutaneous three times a day before meals  lactobacillus acidophilus 1 Tablet(s) Oral daily  lidocaine 2% Viscous      potassium chloride    Tablet ER 20 milliEquivalent(s) Oral daily    MEDICATIONS  (PRN):  acetaminophen   Tablet 650 milliGRAM(s) Oral every 6 hours PRN For Temp greater than 38 C (100.4 F)  benzocaine 15 mG/menthol 3.6 mG Lozenge 1 Lozenge Oral every 4 hours PRN Sore Throat  dextrose Gel 1 Dose(s) Oral once PRN Blood Glucose LESS THAN 70 milliGRAM(s)/deciliter  glucagon  Injectable 1 milliGRAM(s) IntraMuscular once PRN Glucose LESS THAN 70 milligrams/deciliter      LABS:                        13.1   8.7   )-----------( 151      ( 11 Oct 2017 06:19 )             38.8     10-11    139  |  102  |  42.0<H>  ----------------------------<  99  4.4   |  25.0  |  1.29    Ca    8.7      11 Oct 2017 06:19  Phos  2.9     10-11  Mg     2.1     10-11      PT/INR - ( 11 Oct 2017 06:19 )   PT: 45.2 sec;   INR: 3.99 ratio         CAPILLARY BLOOD GLUCOSE    POCT Blood Glucose.: 125 mg/dL (11 Oct 2017 17:50)  POCT Blood Glucose.: 117 mg/dL (11 Oct 2017 12:35)  POCT Blood Glucose.: 103 mg/dL (11 Oct 2017 08:27)  POCT Blood Glucose.: 128 mg/dL (10 Oct 2017 22:00)      RECENT CULTURES:  Repeat blood cultures - no growth to date    PHYSICAL EXAM:  GENERAL: elderly female, laying in bed, NAD  HEAD:  Atraumatic, Normocephalic  EYES: EOMI, PERRLA   ENMT: Moist mucous membranes  NECK: Supple   CHEST/LUNG: diminished breath sounds  HEART: Irregular, S1/S2 audible, + murmur  ABDOMEN: Soft, Nontender, mildly distended, +hepatomegaly  KNEES: right knee without swelling  EXTREMITIES:  bilateral LE skin changes with hyperpigmentation  Lymph: No lymphadenopathy     Medically cleared for discharge. Patient is a 85 y/o female PMH of CHF, Atrial Fibrillation on AC, and new onset diabetes who was brought to the ED by family for lethargy, right knee pain and diffuse rash. As per the patient's daughter, patient was able to ambulate with the assistance of a walker, but then developed right knee swelling which was presumed to be secondary to gout. She was then started on allopurinol and subsequently broke out in a rash. In the ED, the patient was febrile and code sepsis was activated. Blood cultures were positive for Strep Milleri and started on vancomycin and then switched to Ceftriaxone and patient will require this until 11/10. Repeat cultures have been negative. TTE did not reveal a vegetation and JANICE was not indicated as per patient's cardiologist. Furthermore, the patient's rash was deemed to be secondary to a drug reaction from allopurinol and consequently discontinued. Since then her rash has resolved. Patient was seen by ortho for her right knee pain. XRAY was negative for fracture or effusion. An arthrocentesis was done and the body fluid culture was negative. Uric acid was within normal limits. Ortho would like to see the patient as outpatient for management of DJD. Patient was also complaining of a sore throat; throat culture was negative for strep. Most recently, patient INR had been supratherapeutic - most recently 3.99 therefore coumadin has been held. INR will need to be monitored and patient should follow up with cardiology, renal and ID upon discharge. Lastly, review of CT scans revealed evidence of a cirrhotic live - no decompensation. Patient should follow up with her primary care doctor for evaluation and management. CT also revealed an incidental AAA (4.7 cm diameter) and should also be followed up as outpatient. The above was discussed with patient and family in detail.    Vital signs stable CHIEF COMPLAINT/INTERVAL HISTORY:    Patient is a 84y old  Female who presents with a chief complaint of fever and respiratory distress (04 Oct 2017 04:36)      SUBJECTIVE & OBJECTIVE: Pt seen and examined at bedside.     ICU Vital Signs Last 24 Hrs  T(C): 36.3 (11 Oct 2017 15:30), Max: 36.9 (11 Oct 2017 08:53)  T(F): 97.4 (11 Oct 2017 15:30), Max: 98.4 (11 Oct 2017 08:53)  HR: 63 (11 Oct 2017 15:30) (60 - 67)  BP: 113/68 (11 Oct 2017 15:30) (98/64 - 132/72)  RR: 18 (11 Oct 2017 15:30) (18 - 20)  SpO2: 99% (11 Oct 2017 15:30) (94% - 99%)    MEDICATIONS  (STANDING):  ATENolol  Tablet 25 milliGRAM(s) Oral two times a day  cefTRIAXone   IVPB 2 Gram(s) IV Intermittent every 24 hours  dextrose 5%. 1000 milliLiter(s) (50 mL/Hr) IV Continuous <Continuous>  dextrose 50% Injectable 12.5 Gram(s) IV Push once  dextrose 50% Injectable 25 Gram(s) IV Push once  dextrose 50% Injectable 25 Gram(s) IV Push once  digoxin     Tablet 0.125 milliGRAM(s) Oral every other day  furosemide    Tablet 40 milliGRAM(s) Oral daily  insulin lispro (HumaLOG) corrective regimen sliding scale   SubCutaneous three times a day before meals  lactobacillus acidophilus 1 Tablet(s) Oral daily  lidocaine 2% Viscous      potassium chloride    Tablet ER 20 milliEquivalent(s) Oral daily    MEDICATIONS  (PRN):  acetaminophen   Tablet 650 milliGRAM(s) Oral every 6 hours PRN For Temp greater than 38 C (100.4 F)  benzocaine 15 mG/menthol 3.6 mG Lozenge 1 Lozenge Oral every 4 hours PRN Sore Throat  dextrose Gel 1 Dose(s) Oral once PRN Blood Glucose LESS THAN 70 milliGRAM(s)/deciliter  glucagon  Injectable 1 milliGRAM(s) IntraMuscular once PRN Glucose LESS THAN 70 milligrams/deciliter      LABS:                        13.1   8.7   )-----------( 151      ( 11 Oct 2017 06:19 )             38.8     10-11    139  |  102  |  42.0<H>  ----------------------------<  99  4.4   |  25.0  |  1.29    Ca    8.7      11 Oct 2017 06:19  Phos  2.9     10-11  Mg     2.1     10-11      PT/INR - ( 11 Oct 2017 06:19 )   PT: 45.2 sec;   INR: 3.99 ratio         CAPILLARY BLOOD GLUCOSE    POCT Blood Glucose.: 125 mg/dL (11 Oct 2017 17:50)  POCT Blood Glucose.: 117 mg/dL (11 Oct 2017 12:35)  POCT Blood Glucose.: 103 mg/dL (11 Oct 2017 08:27)  POCT Blood Glucose.: 128 mg/dL (10 Oct 2017 22:00)      RECENT CULTURES:  Repeat blood cultures - no growth to date    PHYSICAL EXAM:  GENERAL: elderly female, laying in bed, NAD  HEAD:  Atraumatic, Normocephalic  EYES: EOMI, PERRLA   ENMT: Moist mucous membranes  NECK: Supple   CHEST/LUNG: diminished breath sounds  HEART: Irregular, S1/S2 audible, + murmur  ABDOMEN: Soft, Nontender, mildly distended, +hepatomegaly  KNEES: right knee without swelling  EXTREMITIES:  bilateral LE skin changes with hyperpigmentation  Lymph: No lymphadenopathy   Medically cleared for discharge.    I have spent more than 60 minutes arranging this discharge.

## 2017-10-12 NOTE — DISCHARGE NOTE ADULT - MEDICATION SUMMARY - MEDICATIONS TO CHANGE
I will SWITCH the dose or number of times a day I take the medications listed below when I get home from the hospital:    Coumadin 2 mg oral tablet  -- 1 tab(s) by mouth once a day  -- Do not take this drug if you are pregnant.  It is very important that you take or use this exactly as directed.  Do not skip doses or discontinue unless directed by your doctor.  Obtain medical advice before taking any non-prescription drugs as some may affect the action of this medication.

## 2017-10-12 NOTE — DISCHARGE NOTE ADULT - MEDICATION SUMMARY - MEDICATIONS TO TAKE
I will START or STAY ON the medications listed below when I get home from the hospital:    digoxin 125 mcg (0.125 mg) oral tablet  -- 1 tab(s) by mouth every other day  -- Indication: For atrial fibrillation    Coumadin 2 mg oral tablet  -- 1 tab(s) by mouth once a day. DO NOT TAKE UNTIL INR <3 and check INR to maintain INR 2-3  -- Indication: For atrial fibrillation    insulin lispro 100 units/mL subcutaneous solution  -- subcutaneous 3 times a day (before meals) ; 1 Unit(s) if Glucose 151 - 200  2 Unit(s) if Glucose 201 - 250  3 Unit(s) if Glucose 251 - 300  4 Unit(s) if Glucose 301 - 350  5 Unit(s) if Glucose 351 - 400  6 Unit(s) if Glucose Greater Than 400  -- Indication: For DM    atenolol 25 mg oral tablet  -- 1 tab(s) by mouth 2 times a day  -- Indication: For HTN    cefTRIAXone  -- 2 gram(s) intravenously once a day until 11/10  -- Indication: For Bacteremia    furosemide 40 mg oral tablet  -- 1 tab(s) by mouth once a day  -- Indication: For HTN    potassium chloride 20 mEq oral tablet, extended release  -- 1 tab(s) by mouth once a day  -- Indication: For Supplement    Acidophilus Extra Strength oral capsule  -- 1 tab(s) by mouth 2 times a day  -- Indication: For Probiotic

## 2017-10-12 NOTE — DISCHARGE NOTE ADULT - ADDITIONAL INSTRUCTIONS
During hospital course patient had a CT that revealed cirrhosis - should be followed up as outpatient. CT also revealed a 4.7 cm AAA which should be followed up as outpatient.  Please follow up with Primary Doctor within 7 days  Also, continue follow up with Renal, and Cardiology   Please follow up with ortho within 2 weeks  Please follow up with ID after continuation of antibiotics

## 2017-10-12 NOTE — DISCHARGE NOTE ADULT - CARE PLAN
Principal Discharge DX:	Chronic atrial fibrillation  Goal:	Rate control  Instructions for follow-up, activity and diet:	Continue coumadin to maintain INR 2-3  Frequent INR checks  Hold if INR > 3  Follow up with cardiology as outpatient - Dr. Power  Secondary Diagnosis:	Bacteremia  Goal:	Strep Milleri Bacteremia; complete prolonged course of antibiotics  Instructions for follow-up, activity and diet:	Repeat blood cultures no growth to date  Complete Ceftriaxone until 11/10  Follow up with PMD and ID  Secondary Diagnosis:	Chronic pain of right knee  Goal:	resolution of pain  Instructions for follow-up, activity and diet:	uric acid within normal limits  XRAY without fracture or effusion, only OA  Body fluid culture - no growth  Ortho recommends outpatient treatment for Degenerative Joint Disease  Secondary Diagnosis:	Sore throat (viral)  Goal:	resolution of pain  Instructions for follow-up, activity and diet:	strep PCR negative  should follow up with dentist to elevate for gingival disease  Secondary Diagnosis:	CRI (chronic renal insufficiency), stage 3 (moderate)  Goal:	stable  Instructions for follow-up, activity and diet:	continue KCL while on lasix  monitor BMP  follow up with renal  Secondary Diagnosis:	Chronic systolic congestive heart failure  Goal:	euvolemia  Instructions for follow-up, activity and diet:	continue lasix  aldactone and hydralazine on hold due to marginal BP  follow up with Dr. Power

## 2017-10-12 NOTE — DISCHARGE NOTE ADULT - OTHER SIGNIFICANT FINDINGS
PHYSICAL EXAM:  GENERAL: elderly female, laying in bed, NAD  HEAD:  Atraumatic, Normocephalic  EYES: EOMI, PERRLA   ENMT: Moist mucous membranes  NECK: Supple  CHEST/LUNG: diminished breath sounds HEART: Irregular, S1/S2 audible, + murmur ABDOMEN: Soft, Nontender, mildly distended, +hepatomegaly KNEES: right knee without swelling EXTREMITIES:  bilateral LE skin changes with hyperpigmentation Lymph: No lymphadenopathy                          13.1   8.7   )-----------( 151      ( 11 Oct 2017 06:19 )             38.8   10-11    139  |  102  |  42.0<H>  ----------------------------<  99  4.4   |  25.0  |  1.29    Ca    8.7      11 Oct 2017 06:19  Phos  2.9     10-11  Mg     2.1     10-11    < from: CT Abdomen and Pelvis No Cont (10.04.17 @ 08:39) >    IMPRESSION:     No acute findings provided the limitation of a noncontrast technique.    Emphysema.    Mildly enlarged 4.7 cm abdominal aortic aneurysm.    < end of copied text >    < from: TTE Echo Complete w/Doppler (10.05.17 @ 16:28) >   Summary:   1. Left ventricular ejection fraction, by visual estimation, is 60 to   65%.   2. Normal global left ventricular systolic function.   3. Normal left ventricular internal cavity size.   4. There is mild concentric left ventricular hypertrophy.   5. Normal right ventricular size and function.   6. Severely enlarged left atrium.   7. Severely dilated right atrium.   8. The right atrium is normal in size.   9. Mild to moderate mitral annular calcification.  10. Thickening of the anterior and posterior mitral valve leaflets.  11. Moderate to severe mitral valve regurgitation.  12. Mild aortic valve stenosis.  13. Moderate aortic regurgitation.  14. Severe tricuspid regurgitation.  15. Estimated pulmonary artery systolic pressure is 44.1 mmHg assuming a   right atrial pressure of 10 mmHg, which is consistent with mild pulmonary   hypertension.  16. Trivial pericardial effusion.    < end of copied text >

## 2017-10-12 NOTE — DISCHARGE NOTE ADULT - PATIENT PORTAL LINK FT
“You can access the [facility] Patient Portal, offered by [facility], by registering with the following website: [email or web address]”

## 2017-10-12 NOTE — PROGRESS NOTE ADULT - SUBJECTIVE AND OBJECTIVE BOX
Patient seen and examined    Feels fine, walked few steps  no c/o CP SOB NV   No swelling feet    Patient without any significant change  no specific c/o    Vital Signs Last 24 Hrs  T(C): 36.7 (10-12-17 @ 12:33), Max: 37.3 (10-11-17 @ 23:20)  T(F): 98.1 (10-12-17 @ 12:33), Max: 99.1 (10-11-17 @ 23:20)  HR: 49 (10-12-17 @ 12:33) (49 - 72)  BP: 112/63 (10-12-17 @ 12:33) (93/56 - 136/72)  BP(mean): --  RR: 18 (10-12-17 @ 12:33) (17 - 18)  SpO2: 98% (10-12-17 @ 12:33) (98% - 99%)    Chest   clear  CV   no murmur  Abd   soft, NT BS+  Extr   No edema  Neuro  grossly iintact motor        11 Oct 2017 06:19    139    |  102    |  42.0   ----------------------------<  99     4.4     |  25.0   |  1.29     Ca    8.7        11 Oct 2017 06:19  Phos  2.9       11 Oct 2017 06:19  Mg     2.1       11 Oct 2017 06:19                            13.1   8.7   )-----------( 151      ( 11 Oct 2017 06:19 )             38.8           Patient overall stable  Labs and Meds reviewed    Continue same treatment  Likely d/c today  F/u as outpt as scheduled or needed

## 2017-10-14 LAB
CULTURE RESULTS: SIGNIFICANT CHANGE UP
CULTURE RESULTS: SIGNIFICANT CHANGE UP
SPECIMEN SOURCE: SIGNIFICANT CHANGE UP
SPECIMEN SOURCE: SIGNIFICANT CHANGE UP

## 2017-10-26 PROBLEM — I50.9 HEART FAILURE, UNSPECIFIED: Chronic | Status: ACTIVE | Noted: 2017-10-04

## 2017-10-26 PROBLEM — M10.9 GOUT, UNSPECIFIED: Chronic | Status: ACTIVE | Noted: 2017-10-04

## 2017-10-26 PROBLEM — A04.7 ENTEROCOLITIS DUE TO CLOSTRIDIUM DIFFICILE: Chronic | Status: ACTIVE | Noted: 2017-10-04

## 2017-11-05 PROBLEM — M25.569 KNEE PAIN: Status: ACTIVE | Noted: 2017-11-05

## 2017-11-08 ENCOUNTER — APPOINTMENT (OUTPATIENT)
Dept: ORTHOPEDIC SURGERY | Facility: CLINIC | Age: 82
End: 2017-11-08
Payer: MEDICARE

## 2017-11-08 VITALS — WEIGHT: 130 LBS | BODY MASS INDEX: 22.2 KG/M2 | HEIGHT: 64 IN

## 2017-11-08 VITALS — SYSTOLIC BLOOD PRESSURE: 103 MMHG | TEMPERATURE: 98 F | HEART RATE: 63 BPM | DIASTOLIC BLOOD PRESSURE: 68 MMHG

## 2017-11-08 DIAGNOSIS — M25.561 PAIN IN RIGHT KNEE: ICD-10-CM

## 2017-11-08 DIAGNOSIS — M25.569 PAIN IN UNSPECIFIED KNEE: ICD-10-CM

## 2017-11-08 PROCEDURE — 99204 OFFICE O/P NEW MOD 45 MIN: CPT | Mod: 25

## 2017-11-08 PROCEDURE — 73562 X-RAY EXAM OF KNEE 3: CPT | Mod: RT

## 2017-11-08 PROCEDURE — 20610 DRAIN/INJ JOINT/BURSA W/O US: CPT | Mod: RT

## 2017-11-08 RX ORDER — METHYLPRED ACET/NACL,ISO-OS/PF 40 MG/ML
40 VIAL (ML) INJECTION
Qty: 1 | Refills: 0 | Status: ACTIVE | COMMUNITY
Start: 2017-11-08

## 2017-11-29 ENCOUNTER — APPOINTMENT (OUTPATIENT)
Dept: ORTHOPEDIC SURGERY | Facility: CLINIC | Age: 82
End: 2017-11-29
Payer: MEDICARE

## 2017-11-29 PROCEDURE — 99215 OFFICE O/P EST HI 40 MIN: CPT

## 2017-11-29 RX ORDER — HYALURONATE SODIUM 20 MG/2 ML
20 SYRINGE (ML) INTRAARTICULAR
Qty: 3 | Refills: 0 | Status: ACTIVE | OUTPATIENT
Start: 2017-11-29

## 2018-01-23 ENCOUNTER — INPATIENT (INPATIENT)
Facility: HOSPITAL | Age: 83
LOS: 23 days | DRG: 871 | End: 2018-02-16
Attending: INTERNAL MEDICINE | Admitting: FAMILY MEDICINE
Payer: MEDICARE

## 2018-01-23 VITALS
TEMPERATURE: 98 F | HEART RATE: 103 BPM | OXYGEN SATURATION: 95 % | DIASTOLIC BLOOD PRESSURE: 90 MMHG | WEIGHT: 136.03 LBS | HEIGHT: 64 IN | RESPIRATION RATE: 20 BRPM | SYSTOLIC BLOOD PRESSURE: 146 MMHG

## 2018-01-23 DIAGNOSIS — Z98.89 OTHER SPECIFIED POSTPROCEDURAL STATES: Chronic | ICD-10-CM

## 2018-01-23 LAB
ALBUMIN SERPL ELPH-MCNC: 3.9 G/DL — SIGNIFICANT CHANGE UP (ref 3.3–5.2)
ALP SERPL-CCNC: 138 U/L — HIGH (ref 40–120)
ALT FLD-CCNC: 24 U/L — SIGNIFICANT CHANGE UP
ANION GAP SERPL CALC-SCNC: 17 MMOL/L — SIGNIFICANT CHANGE UP (ref 5–17)
ANISOCYTOSIS BLD QL: SLIGHT — SIGNIFICANT CHANGE UP
AST SERPL-CCNC: 64 U/L — HIGH
BASOPHILS NFR BLD AUTO: 1 % — SIGNIFICANT CHANGE UP (ref 0–2)
BILIRUB SERPL-MCNC: 2.3 MG/DL — HIGH (ref 0.4–2)
BUN SERPL-MCNC: 37 MG/DL — HIGH (ref 8–20)
CALCIUM SERPL-MCNC: 9.6 MG/DL — SIGNIFICANT CHANGE UP (ref 8.6–10.2)
CHLORIDE SERPL-SCNC: 91 MMOL/L — LOW (ref 98–107)
CO2 SERPL-SCNC: 22 MMOL/L — SIGNIFICANT CHANGE UP (ref 22–29)
CREAT SERPL-MCNC: 1.52 MG/DL — HIGH (ref 0.5–1.3)
GLUCOSE SERPL-MCNC: 134 MG/DL — HIGH (ref 70–115)
HCT VFR BLD CALC: 43.6 % — SIGNIFICANT CHANGE UP (ref 37–47)
HGB BLD-MCNC: 14 G/DL — SIGNIFICANT CHANGE UP (ref 12–16)
LACTATE BLDV-MCNC: 3.1 MMOL/L — HIGH (ref 0.5–2)
LYMPHOCYTES # BLD AUTO: 4 % — LOW (ref 20–55)
MACROCYTES BLD QL: SLIGHT — SIGNIFICANT CHANGE UP
MCHC RBC-ENTMCNC: 32 PG — HIGH (ref 27–31)
MCHC RBC-ENTMCNC: 32.1 G/DL — SIGNIFICANT CHANGE UP (ref 32–36)
MCV RBC AUTO: 99.8 FL — HIGH (ref 81–99)
MICROCYTES BLD QL: SLIGHT — SIGNIFICANT CHANGE UP
MONOCYTES NFR BLD AUTO: 5 % — SIGNIFICANT CHANGE UP (ref 3–10)
NEUTROPHILS NFR BLD AUTO: 88 % — HIGH (ref 37–73)
NEUTS BAND # BLD: 2 % — SIGNIFICANT CHANGE UP (ref 0–8)
OVALOCYTES BLD QL SMEAR: SLIGHT — SIGNIFICANT CHANGE UP
PLAT MORPH BLD: NORMAL — SIGNIFICANT CHANGE UP
PLATELET # BLD AUTO: 191 K/UL — SIGNIFICANT CHANGE UP (ref 150–400)
POIKILOCYTOSIS BLD QL AUTO: SLIGHT — SIGNIFICANT CHANGE UP
POTASSIUM SERPL-MCNC: 4.6 MMOL/L — SIGNIFICANT CHANGE UP (ref 3.5–5.3)
POTASSIUM SERPL-MCNC: 6.4 MMOL/L — CRITICAL HIGH (ref 3.5–5.3)
POTASSIUM SERPL-SCNC: 4.6 MMOL/L — SIGNIFICANT CHANGE UP (ref 3.5–5.3)
POTASSIUM SERPL-SCNC: 6.4 MMOL/L — CRITICAL HIGH (ref 3.5–5.3)
PROT SERPL-MCNC: 7.5 G/DL — SIGNIFICANT CHANGE UP (ref 6.6–8.7)
RBC # BLD: 4.37 M/UL — LOW (ref 4.4–5.2)
RBC # FLD: 15 % — SIGNIFICANT CHANGE UP (ref 11–15.6)
RBC BLD AUTO: ABNORMAL
SODIUM SERPL-SCNC: 130 MMOL/L — LOW (ref 135–145)
WBC # BLD: 22.2 K/UL — HIGH (ref 4.8–10.8)
WBC # FLD AUTO: 22.2 K/UL — HIGH (ref 4.8–10.8)

## 2018-01-23 PROCEDURE — 73701 CT LOWER EXTREMITY W/DYE: CPT | Mod: 26,RT

## 2018-01-23 PROCEDURE — 73610 X-RAY EXAM OF ANKLE: CPT | Mod: 26,RT

## 2018-01-23 PROCEDURE — 73590 X-RAY EXAM OF LOWER LEG: CPT | Mod: 26,RT

## 2018-01-23 RX ORDER — SODIUM CHLORIDE 9 MG/ML
1000 INJECTION INTRAMUSCULAR; INTRAVENOUS; SUBCUTANEOUS ONCE
Qty: 0 | Refills: 0 | Status: COMPLETED | OUTPATIENT
Start: 2018-01-23 | End: 2018-01-23

## 2018-01-23 RX ORDER — VANCOMYCIN HCL 1 G
VIAL (EA) INTRAVENOUS
Qty: 0 | Refills: 0 | Status: DISCONTINUED | OUTPATIENT
Start: 2018-01-23 | End: 2018-01-23

## 2018-01-23 RX ORDER — ACETAMINOPHEN 500 MG
1000 TABLET ORAL ONCE
Qty: 0 | Refills: 0 | Status: DISCONTINUED | OUTPATIENT
Start: 2018-01-23 | End: 2018-01-23

## 2018-01-23 RX ORDER — AZTREONAM 2 G
1000 VIAL (EA) INJECTION EVERY 6 HOURS
Qty: 0 | Refills: 0 | Status: DISCONTINUED | OUTPATIENT
Start: 2018-01-23 | End: 2018-01-23

## 2018-01-23 RX ORDER — AZTREONAM 2 G
1000 VIAL (EA) INJECTION ONCE
Qty: 0 | Refills: 0 | Status: DISCONTINUED | OUTPATIENT
Start: 2018-01-23 | End: 2018-01-23

## 2018-01-23 RX ORDER — IMIPENEM AND CILASTATIN 250; 250 MG/100ML; MG/100ML
500 INJECTION, POWDER, FOR SOLUTION INTRAVENOUS ONCE
Qty: 0 | Refills: 0 | Status: COMPLETED | OUTPATIENT
Start: 2018-01-23 | End: 2018-01-23

## 2018-01-23 RX ORDER — VANCOMYCIN HCL 1 G
1000 VIAL (EA) INTRAVENOUS ONCE
Qty: 0 | Refills: 0 | Status: COMPLETED | OUTPATIENT
Start: 2018-01-23 | End: 2018-01-23

## 2018-01-23 RX ORDER — IMIPENEM AND CILASTATIN 250; 250 MG/100ML; MG/100ML
1000 INJECTION, POWDER, FOR SOLUTION INTRAVENOUS ONCE
Qty: 0 | Refills: 0 | Status: DISCONTINUED | OUTPATIENT
Start: 2018-01-23 | End: 2018-01-23

## 2018-01-23 RX ORDER — ACETAMINOPHEN 500 MG
975 TABLET ORAL ONCE
Qty: 0 | Refills: 0 | Status: COMPLETED | OUTPATIENT
Start: 2018-01-23 | End: 2018-01-23

## 2018-01-23 RX ADMIN — IMIPENEM AND CILASTATIN 100 MILLIGRAM(S): 250; 250 INJECTION, POWDER, FOR SOLUTION INTRAVENOUS at 23:09

## 2018-01-23 RX ADMIN — SODIUM CHLORIDE 500 MILLILITER(S): 9 INJECTION INTRAMUSCULAR; INTRAVENOUS; SUBCUTANEOUS at 20:06

## 2018-01-23 RX ADMIN — Medication 250 MILLIGRAM(S): at 20:05

## 2018-01-23 NOTE — ED PROVIDER NOTE - OBJECTIVE STATEMENT
Patient w/ PMH A fib on Coumadin presents complaining of acute onset of right lower leg pain, swelling and discoloration. The patient was seen by her cardiologist yesterday and there was no discoloration at that time. She denies fevers, chills, nausea, vomiting or recent antibiotic use. She has a history of poor circulation and therefore required a skin graft for a non-healing wound many years ago without issues. She has been feeling very fatigued over the past few days. She denies any falls or trauma to the area. She notes multiple allergies, but denies smoking, EtOH or illicit drugs. Patient w/ PMH A fib on Coumadin presents complaining of acute onset of right lower leg pain, swelling and discoloration.  When asked where her pain is worst she points to the left ankle with a small, well healed abrasion. The patient was seen by her cardiologist yesterday and there was no discoloration at that time. She denies fevers, chills, nausea, vomiting or recent antibiotic use. She has a history of poor circulation and therefore required a skin graft for a non-healing wound many years ago without issues. She has been feeling very fatigued over the past few days. She denies any falls or trauma to the area. She notes multiple allergies, but denies smoking, EtOH or illicit drugs.

## 2018-01-23 NOTE — ED ADULT TRIAGE NOTE - CHIEF COMPLAINT QUOTE
rt leg pain, swelling, redness. was not as bad yesterday. appears as cellulitis. saw cardiology yesterday and everything was good.

## 2018-01-23 NOTE — CONSULT NOTE ADULT - ATTENDING COMMENTS
Patient seen and examined.  RLE ankle to knee erythematous no blanching skin changes, no crepitus, appears like subcutaneous hematoma, specially under previous STSG.  The skin changes to my evaluation and her not being toxic do not appears to be from a necrotizing soft tissue infection.  Patient does have an establish vascular surgeon that has been following her PVD and skin changes and should be also consulted. Patient seen and examined.  RLE ankle to knee erythematous no blanching skin changes, no crepitus, no bullae, no blisters, appears like subcutaneous hematoma, specially under previous STSG.  The skin changes to my evaluation and her not being toxic do not appears to be from a necrotizing soft tissue infection.  Patient does have an establish vascular surgeon that has been following her PVD and skin changes and should be also consulted.

## 2018-01-23 NOTE — ED ADULT NURSE NOTE - OBJECTIVE STATEMENT
Pt c/o right leg pain, swelling with redness. Pt states she had pain and redness yesterday but wasn't as bad yesterday. Pt states she had it wrapped with an ace bandage and wasn't sure if that could have caused the redness. PT's leg appears bruised with maroon/reddish color, warm to palpation, positive pedal pulse.  Pt saw cardiology yesterday and was told everything was good but pt concerned about leg. Pt has positive pedal pulses - pt waiting for MD evaluation.

## 2018-01-23 NOTE — ED PROVIDER NOTE - MEDICAL DECISION MAKING DETAILS
Upon evaluating the patient and feeling possible crepitus, I consulted surgery to evaluate for nec fasc. I transported the patient myself to XR which did not reveal any subcutaneous air. I will treat empirically for nec fasc. Surgery agrees that this is unlikely nec fasc. I will evaluate for cellulitis vs. phlegmasia cerula dolans and plan to admit.

## 2018-01-23 NOTE — ED PROVIDER NOTE - MUSCULOSKELETAL, MLM
Spine appears normal. Right ankle painful and tender to palpation with decreased ROM due to pain. + swelling to the dorsal area of the right foot with possible underlying creptius.

## 2018-01-23 NOTE — ED PROVIDER NOTE - SKIN WOUND TYPE
large area from right ankle to right knee of edema, erythema, hemorrhagic skin changes with severe TTP.

## 2018-01-23 NOTE — CONSULT NOTE ADULT - EXTREMITIES COMMENTS
Right lower leg with dark red discoloration from knee to ankle, associated with edema. Areas with small blisters. No crepitus. Tenderness to palpation of calf and movement of foot. +2 PT and DP pulses. Good capillary refill. Compartments soft.

## 2018-01-23 NOTE — ED PROVIDER NOTE - PROGRESS NOTE DETAILS
Per general surgery recommendation, I consulted vascular surgery PA (Dr. Parnell on call). Vascular surgery notes that there is nothing to do and therefore recommend medical admission. I discussed with Dr. Lugo who will accept the patient for admission.

## 2018-01-23 NOTE — CONSULT NOTE ADULT - SUBJECTIVE AND OBJECTIVE BOX
ACUTE CARE SURGERY CONSULT    Consulting surgical team: [facility] - Acute Care Surgery (pager [phone])  Consulting attending: Dr. Nj  Patient seen and examined: 01-23-18 @ 19:53    HPI:  Patient is a 85y Female with extensive PMH presented to ED with complaining of R lower leg pain, edema and discoloration started this morning and getting progressive worse.  Denies fever or chills. Never had similar episodes in the past. Denies local trauma.   Started on clindamycin + vancomycin by ED      PAST MEDICAL HISTORY:  Clostridium difficile diarrhea  CHF (congestive heart failure)  Gout  Mitral valve regurgitation  Hypertension  Atrial fibrillation      PAST SURGICAL HISTORY:  H/O skin graft  No significant past surgical history      ALLERGIES:  allopurinol (Rash)  aspirin (Unknown)  azithromycin (Diarrhea)  latex (Unknown)  morphine (Other; Short breath)  penicillins (Unknown)  sulfa drugs (Unknown)      MEDICATIONS  (STANDING):  acetaminophen  IVPB. 1000 milliGRAM(s) IV Intermittent once  aztreonam  IVPB 1000 milliGRAM(s) IV Intermittent once  clindamycin IVPB 600 milliGRAM(s) IV Intermittent Once  sodium chloride 0.9% Bolus 1000 milliLiter(s) IV Bolus once  vancomycin  IVPB 1000 milliGRAM(s) IV Intermittent once    MEDICATIONS  (PRN):      VITALS & I/Os:  Vital Signs Last 24 Hrs  T(C): 36.7 (23 Jan 2018 14:24), Max: 36.7 (23 Jan 2018 14:24)  T(F): 98.1 (23 Jan 2018 14:24), Max: 98.1 (23 Jan 2018 14:24)  HR: 103 (23 Jan 2018 14:24) (103 - 103)  BP: 146/90 (23 Jan 2018 14:24) (146/90 - 146/90)  BP(mean): --  RR: 20 (23 Jan 2018 14:24) (20 - 20)  SpO2: 95% (23 Jan 2018 14:24) (95% - 95%)  CAPILLARY BLOOD GLUCOSE          I&O's Summary        GEN: NAD, alert and oriented x 3  HEENT: WNL  CHEST: Symmetrical chest rise, breath sounds CTAB  HEART: RRR, non-muffled heart sounds  ABD: Soft, non-tender, non-distended  EXT/VASC:         LABS:                        14.0   22.2  )-----------( 191      ( 23 Jan 2018 19:23 )             43.6           Lactate: acetaminophen  IVPB. 1000 milliGRAM(s) IV Intermittent once  aztreonam  IVPB 1000 milliGRAM(s) IV Intermittent once  clindamycin IVPB 600 milliGRAM(s) IV Intermittent Once  sodium chloride 0.9% Bolus 1000 milliLiter(s) IV Bolus once  vancomycin  IVPB 1000 milliGRAM(s) IV Intermittent once             01-23 @ 19:22  3.1        IMAGING:      ASSESSMENT & PLAN   85y      Discussed with ACUTE CARE SURGERY CONSULT    Consulting surgical team: [facility] - Acute Care Surgery (pager [phone])  Consulting attending: Dr. Nj  Patient seen and examined: 01-23-18 @ 19:53    HPI:  Patient is a 85y Female with extensive PMH presented to ED with complaining of R lower leg pain, edema and discoloration started this morning and getting progressive worse.  Denies fever or chills. Never had similar episodes in the past. Denies local trauma.   Started on clindamycin + vancomycin by ED      PAST MEDICAL HISTORY:  Clostridium difficile diarrhea  CHF (congestive heart failure)  Gout  Mitral valve regurgitation  Hypertension  Atrial fibrillation      PAST SURGICAL HISTORY:  H/O skin graft  No significant past surgical history      ALLERGIES:  allopurinol (Rash)  aspirin (Unknown)  azithromycin (Diarrhea)  latex (Unknown)  morphine (Other; Short breath)  penicillins (Unknown)  sulfa drugs (Unknown)      MEDICATIONS  (STANDING):  acetaminophen  IVPB. 1000 milliGRAM(s) IV Intermittent once  aztreonam  IVPB 1000 milliGRAM(s) IV Intermittent once  clindamycin IVPB 600 milliGRAM(s) IV Intermittent Once  sodium chloride 0.9% Bolus 1000 milliLiter(s) IV Bolus once  vancomycin  IVPB 1000 milliGRAM(s) IV Intermittent once    MEDICATIONS  (PRN):      VITALS & I/Os:  Vital Signs Last 24 Hrs  T(C): 36.7 (23 Jan 2018 14:24), Max: 36.7 (23 Jan 2018 14:24)  T(F): 98.1 (23 Jan 2018 14:24), Max: 98.1 (23 Jan 2018 14:24)  HR: 103 (23 Jan 2018 14:24) (103 - 103)  BP: 146/90 (23 Jan 2018 14:24) (146/90 - 146/90)  BP(mean): --  RR: 20 (23 Jan 2018 14:24) (20 - 20)  SpO2: 95% (23 Jan 2018 14:24) (95% - 95%)  CAPILLARY BLOOD GLUCOSE          I&O's Summary          LABS:                        14.0   22.2  )-----------( 191      ( 23 Jan 2018 19:23 )             43.6           Lactate: acetaminophen  IVPB. 1000 milliGRAM(s) IV Intermittent once  aztreonam  IVPB 1000 milliGRAM(s) IV Intermittent once  clindamycin IVPB 600 milliGRAM(s) IV Intermittent Once  sodium chloride 0.9% Bolus 1000 milliLiter(s) IV Bolus once  vancomycin  IVPB 1000 milliGRAM(s) IV Intermittent once             01-23 @ 19:22  3.1        IMAGING:      ASSESSMENT & PLAN   85y      Discussed with

## 2018-01-24 DIAGNOSIS — L03.90 CELLULITIS, UNSPECIFIED: ICD-10-CM

## 2018-01-24 DIAGNOSIS — L03.115 CELLULITIS OF RIGHT LOWER LIMB: ICD-10-CM

## 2018-01-24 DIAGNOSIS — Z88.0 ALLERGY STATUS TO PENICILLIN: ICD-10-CM

## 2018-01-24 LAB
ANION GAP SERPL CALC-SCNC: 16 MMOL/L — SIGNIFICANT CHANGE UP (ref 5–17)
APTT BLD: 31.3 SEC — SIGNIFICANT CHANGE UP (ref 27.5–37.4)
BASOPHILS # BLD AUTO: 0 K/UL — SIGNIFICANT CHANGE UP (ref 0–0.2)
BASOPHILS NFR BLD AUTO: 0.3 % — SIGNIFICANT CHANGE UP (ref 0–2)
BUN SERPL-MCNC: 36 MG/DL — HIGH (ref 8–20)
CALCIUM SERPL-MCNC: 8.9 MG/DL — SIGNIFICANT CHANGE UP (ref 8.6–10.2)
CHLORIDE SERPL-SCNC: 101 MMOL/L — SIGNIFICANT CHANGE UP (ref 98–107)
CO2 SERPL-SCNC: 21 MMOL/L — LOW (ref 22–29)
CREAT SERPL-MCNC: 1.42 MG/DL — HIGH (ref 0.5–1.3)
EOSINOPHIL # BLD AUTO: 0 K/UL — SIGNIFICANT CHANGE UP (ref 0–0.5)
EOSINOPHIL NFR BLD AUTO: 0.2 % — SIGNIFICANT CHANGE UP (ref 0–6)
GLUCOSE BLDC GLUCOMTR-MCNC: 109 MG/DL — HIGH (ref 70–99)
GLUCOSE BLDC GLUCOMTR-MCNC: 110 MG/DL — HIGH (ref 70–99)
GLUCOSE BLDC GLUCOMTR-MCNC: 120 MG/DL — HIGH (ref 70–99)
GLUCOSE BLDC GLUCOMTR-MCNC: 97 MG/DL — SIGNIFICANT CHANGE UP (ref 70–99)
GLUCOSE SERPL-MCNC: 111 MG/DL — SIGNIFICANT CHANGE UP (ref 70–115)
HCT VFR BLD CALC: 38.1 % — SIGNIFICANT CHANGE UP (ref 37–47)
HGB BLD-MCNC: 12.4 G/DL — SIGNIFICANT CHANGE UP (ref 12–16)
INR BLD: 2.41 RATIO — HIGH (ref 0.88–1.16)
LACTATE BLDV-MCNC: 1.9 MMOL/L — SIGNIFICANT CHANGE UP (ref 0.5–2)
LACTATE BLDV-MCNC: 2.9 MMOL/L — HIGH (ref 0.5–2)
LYMPHOCYTES # BLD AUTO: 0.6 K/UL — LOW (ref 1–4.8)
LYMPHOCYTES # BLD AUTO: 3.6 % — LOW (ref 20–55)
MCHC RBC-ENTMCNC: 32.3 PG — HIGH (ref 27–31)
MCHC RBC-ENTMCNC: 32.5 G/DL — SIGNIFICANT CHANGE UP (ref 32–36)
MCV RBC AUTO: 99.2 FL — HIGH (ref 81–99)
MONOCYTES # BLD AUTO: 1.4 K/UL — HIGH (ref 0–0.8)
MONOCYTES NFR BLD AUTO: 9 % — SIGNIFICANT CHANGE UP (ref 3–10)
NEUTROPHILS # BLD AUTO: 13.6 K/UL — HIGH (ref 1.8–8)
NEUTROPHILS NFR BLD AUTO: 86.5 % — HIGH (ref 37–73)
PLATELET # BLD AUTO: 162 K/UL — SIGNIFICANT CHANGE UP (ref 150–400)
POTASSIUM SERPL-MCNC: 4.5 MMOL/L — SIGNIFICANT CHANGE UP (ref 3.5–5.3)
POTASSIUM SERPL-SCNC: 4.5 MMOL/L — SIGNIFICANT CHANGE UP (ref 3.5–5.3)
PROTHROM AB SERPL-ACNC: 27 SEC — HIGH (ref 9.8–12.7)
RBC # BLD: 3.84 M/UL — LOW (ref 4.4–5.2)
RBC # FLD: 15 % — SIGNIFICANT CHANGE UP (ref 11–15.6)
SODIUM SERPL-SCNC: 138 MMOL/L — SIGNIFICANT CHANGE UP (ref 135–145)
WBC # BLD: 15.7 K/UL — HIGH (ref 4.8–10.8)
WBC # FLD AUTO: 15.7 K/UL — HIGH (ref 4.8–10.8)

## 2018-01-24 PROCEDURE — 93010 ELECTROCARDIOGRAM REPORT: CPT

## 2018-01-24 PROCEDURE — 99221 1ST HOSP IP/OBS SF/LOW 40: CPT

## 2018-01-24 PROCEDURE — 93971 EXTREMITY STUDY: CPT | Mod: 26,RT

## 2018-01-24 PROCEDURE — 99223 1ST HOSP IP/OBS HIGH 75: CPT

## 2018-01-24 PROCEDURE — 99285 EMERGENCY DEPT VISIT HI MDM: CPT

## 2018-01-24 PROCEDURE — 12345: CPT | Mod: NC

## 2018-01-24 PROCEDURE — 71045 X-RAY EXAM CHEST 1 VIEW: CPT | Mod: 26

## 2018-01-24 RX ORDER — INSULIN LISPRO 100/ML
VIAL (ML) SUBCUTANEOUS
Qty: 0 | Refills: 0 | Status: DISCONTINUED | OUTPATIENT
Start: 2018-01-24 | End: 2018-01-26

## 2018-01-24 RX ORDER — AZTREONAM 2 G
500 VIAL (EA) INJECTION EVERY 12 HOURS
Qty: 0 | Refills: 0 | Status: DISCONTINUED | OUTPATIENT
Start: 2018-01-24 | End: 2018-01-24

## 2018-01-24 RX ORDER — DEXTROSE 50 % IN WATER 50 %
1 SYRINGE (ML) INTRAVENOUS ONCE
Qty: 0 | Refills: 0 | Status: DISCONTINUED | OUTPATIENT
Start: 2018-01-24 | End: 2018-01-26

## 2018-01-24 RX ORDER — INSULIN LISPRO 100/ML
VIAL (ML) SUBCUTANEOUS AT BEDTIME
Qty: 0 | Refills: 0 | Status: DISCONTINUED | OUTPATIENT
Start: 2018-01-24 | End: 2018-01-26

## 2018-01-24 RX ORDER — VANCOMYCIN HCL 1 G
1000 VIAL (EA) INTRAVENOUS EVERY 24 HOURS
Qty: 0 | Refills: 0 | Status: DISCONTINUED | OUTPATIENT
Start: 2018-01-24 | End: 2018-01-24

## 2018-01-24 RX ORDER — LACTOBACILLUS ACIDOPHILUS 100MM CELL
1 CAPSULE ORAL
Qty: 0 | Refills: 0 | Status: DISCONTINUED | OUTPATIENT
Start: 2018-01-24 | End: 2018-02-11

## 2018-01-24 RX ORDER — WARFARIN SODIUM 2.5 MG/1
2 TABLET ORAL ONCE
Qty: 0 | Refills: 0 | Status: COMPLETED | OUTPATIENT
Start: 2018-01-24 | End: 2018-01-24

## 2018-01-24 RX ORDER — DEXTROSE 50 % IN WATER 50 %
25 SYRINGE (ML) INTRAVENOUS ONCE
Qty: 0 | Refills: 0 | Status: DISCONTINUED | OUTPATIENT
Start: 2018-01-24 | End: 2018-01-26

## 2018-01-24 RX ORDER — DEXTROSE 50 % IN WATER 50 %
12.5 SYRINGE (ML) INTRAVENOUS ONCE
Qty: 0 | Refills: 0 | Status: DISCONTINUED | OUTPATIENT
Start: 2018-01-24 | End: 2018-01-26

## 2018-01-24 RX ORDER — CEFTRIAXONE 500 MG/1
1 INJECTION, POWDER, FOR SOLUTION INTRAMUSCULAR; INTRAVENOUS EVERY 24 HOURS
Qty: 0 | Refills: 0 | Status: DISCONTINUED | OUTPATIENT
Start: 2018-01-24 | End: 2018-01-28

## 2018-01-24 RX ORDER — SODIUM CHLORIDE 9 MG/ML
1000 INJECTION INTRAMUSCULAR; INTRAVENOUS; SUBCUTANEOUS
Qty: 0 | Refills: 0 | Status: DISCONTINUED | OUTPATIENT
Start: 2018-01-24 | End: 2018-01-27

## 2018-01-24 RX ORDER — LINEZOLID 600 MG/300ML
600 INJECTION, SOLUTION INTRAVENOUS EVERY 12 HOURS
Qty: 0 | Refills: 0 | Status: DISCONTINUED | OUTPATIENT
Start: 2018-01-24 | End: 2018-01-28

## 2018-01-24 RX ORDER — SODIUM CHLORIDE 9 MG/ML
1000 INJECTION, SOLUTION INTRAVENOUS
Qty: 0 | Refills: 0 | Status: DISCONTINUED | OUTPATIENT
Start: 2018-01-24 | End: 2018-01-26

## 2018-01-24 RX ORDER — GLUCAGON INJECTION, SOLUTION 0.5 MG/.1ML
1 INJECTION, SOLUTION SUBCUTANEOUS ONCE
Qty: 0 | Refills: 0 | Status: DISCONTINUED | OUTPATIENT
Start: 2018-01-24 | End: 2018-01-26

## 2018-01-24 RX ORDER — DIGOXIN 250 MCG
0.12 TABLET ORAL EVERY OTHER DAY
Qty: 0 | Refills: 0 | Status: DISCONTINUED | OUTPATIENT
Start: 2018-01-24 | End: 2018-02-14

## 2018-01-24 RX ORDER — FUROSEMIDE 40 MG
40 TABLET ORAL DAILY
Qty: 0 | Refills: 0 | Status: DISCONTINUED | OUTPATIENT
Start: 2018-01-25 | End: 2018-01-26

## 2018-01-24 RX ORDER — ACETAMINOPHEN 500 MG
650 TABLET ORAL EVERY 6 HOURS
Qty: 0 | Refills: 0 | Status: DISCONTINUED | OUTPATIENT
Start: 2018-01-24 | End: 2018-02-14

## 2018-01-24 RX ORDER — ATENOLOL 25 MG/1
25 TABLET ORAL DAILY
Qty: 0 | Refills: 0 | Status: DISCONTINUED | OUTPATIENT
Start: 2018-01-24 | End: 2018-02-01

## 2018-01-24 RX ADMIN — Medication 1 TABLET(S): at 19:08

## 2018-01-24 RX ADMIN — LINEZOLID 600 MILLIGRAM(S): 600 INJECTION, SOLUTION INTRAVENOUS at 19:08

## 2018-01-24 RX ADMIN — ATENOLOL 25 MILLIGRAM(S): 25 TABLET ORAL at 15:15

## 2018-01-24 RX ADMIN — SODIUM CHLORIDE 80 MILLILITER(S): 9 INJECTION INTRAMUSCULAR; INTRAVENOUS; SUBCUTANEOUS at 05:45

## 2018-01-24 RX ADMIN — Medication 50 MILLIGRAM(S): at 06:33

## 2018-01-24 RX ADMIN — Medication 0.12 MILLIGRAM(S): at 15:15

## 2018-01-24 RX ADMIN — SODIUM CHLORIDE 80 MILLILITER(S): 9 INJECTION INTRAMUSCULAR; INTRAVENOUS; SUBCUTANEOUS at 15:15

## 2018-01-24 RX ADMIN — SODIUM CHLORIDE 80 MILLILITER(S): 9 INJECTION INTRAMUSCULAR; INTRAVENOUS; SUBCUTANEOUS at 19:08

## 2018-01-24 RX ADMIN — Medication 1 TABLET(S): at 15:15

## 2018-01-24 RX ADMIN — WARFARIN SODIUM 2 MILLIGRAM(S): 2.5 TABLET ORAL at 22:21

## 2018-01-24 RX ADMIN — CEFTRIAXONE 100 GRAM(S): 500 INJECTION, POWDER, FOR SOLUTION INTRAMUSCULAR; INTRAVENOUS at 15:14

## 2018-01-24 NOTE — ED ADULT NURSE REASSESSMENT NOTE - NS ED NURSE REASSESS COMMENT FT1
Assumed pt care @ 1930 from SANDY Watkins. Pt is A&Ox3 in NAD. Pt c/o pain to the right lower extremity which is swollen red edematous and ecchymotic. Pt awaiting IV meds. Dr. Jerez aware of Lactate level. IV clean dry and intact, flushes without difficulty.  Safety maintained, Bed locked in lowest position. Will continue to monitor.
Dr. Blackwood @ bedside assessing pt for admission. As per Dr. Blackwood okay to hold am meds until 9am due to pt being extremely sleepy at this time.
Dr. Jerez called to bedside to look at pts right leg. Leg is getting worse in color. Hemmorghaic area and spreading above knee now. Positive pedal pulses noted. Dr. Jerez to call vascular again to come to bedside.
Pt refusing additional lab work @ this time. Dr. May made aware. Will try again later to draw labs.
Pt sleeping comfortably in NAD resp even and unlabored. Right LE discoloration has not increased more at this time, Pt awaiting bed placement. Will continue to monitor.
Aide @ bedside found pt climbing out of bed bleeding from right lower extremity from scraping leg on side of bed, pt had moments of confusion. Bleeding controlled at this time. Vascular PA @ bedside with doppler. Will continue to monitor.

## 2018-01-24 NOTE — H&P ADULT - HISTORY OF PRESENT ILLNESS
86 y/o femal with PMH of Atrial fibrillation    CHF (congestive heart failure)    Clostridium difficile diarrhea    Gout    Hypertension    Mitral valve regurgitation    came to ER for rt. lower ext. pain, swelling skin discoloration which as per history and ER chart is acute in onset. pt. is poor historian and not giving much history. pt's wbc count  and lactate level was elevated. pt. was seen by surgery and vascular surgery but on ct scan there has been no free subcut air or any drainable collection, cellulitis is noted. no fever. no n/v/d , no cp. no sob as per history. it. is not clear how rt. lower xet. cellulitis started. pt. is on coumadin for her afib.

## 2018-01-24 NOTE — PROGRESS NOTE ADULT - ASSESSMENT
86 y/o female with PMH of Atrial fibrillation on coumadin, CHF, Clostridium difficile diarrhea, Gout, Hypertension, Mitral valve regurgitation presents to ED with RLE pain, swelling, redness, discoloration and admitted for cellulitis. Evaluated by vascular /general surgery and no surgical  intervention recommended- advised for IV antibiotics.    Plan:    Sepsis due RLE cellulitis:  - Hemodynamically stable, wbc coming down, lactate normalized. Sepsis resolved.  - Started on vanco /aztreonam overnight.  - ID consult requested- pending eval. Antibiotics per ID    H/o Afib- C/w home meds BB / dig and coumadin per INR reading.  HTN- Resume home meds atenolol  DM- A1C 6.3 in Oct/17- LSS with FS monitoring.  CHF- C/w home meds, BB /lasix. EF normal on echo in Oct/17  CKD- Cr at baseline

## 2018-01-24 NOTE — H&P ADULT - ASSESSMENT
pt. is admitted for RLE Cellulitis. will keep on vancomycin and azactam. surgery and vascular surgery evaluated the pt. will get ID consult.     Chronic afib. will continue coumadin.     Renal insufficency, likely dehydrated , will give gentle hydration. f/u repeat labs.     Essential htn, will use atenolol 25 mg po daily with parameters. pt. is admitted for RLE Cellulitis.  sepsis related to cellulitis. will keep on vancomycin and azactam. surgery and vascular surgery evaluated the pt. will get ID consult.     Chronic afib. will continue coumadin.     Renal insufficency, likely dehydrated , will give gentle hydration. f/u repeat labs.     Essential htn, will use atenolol 25 mg po daily with parameters.

## 2018-01-24 NOTE — CONSULT NOTE ADULT - SUBJECTIVE AND OBJECTIVE BOX
[facility]  INFECTIOUS DISEASES AND INTERNAL MEDICINE at [place]  =======================================================  Leighton Brady MD  Diplomates American Board of Internal Medicine and Infectious Diseases  =======================================================      N-[id number]  RAGHU DAVIS   History provided by the daughter, patient is awake and alert but a poor historian.    CC: Patient is a 85y old  Female who presents with a chief complaint of rt. lower ext. pain (24 Jan 2018 04:35)    86y/o  Female noted to have redness on her RLE about 5 days back. Patient also had swelling of her LE. Patient went to her Cardiologist and was told to keep her leg wrapped. She did as she was told. Over the past weekend the erythema progressed. Patient became more lethargic No fevers or chills. In the ER patient was Afebrile, Leukocytosis of 22k. Patient was started on Aztreonam and Clindamycin by the ER and was switched to Vancomycin and Aztreonam on admission. Patient had a CT of the extremity reporting cellulitis ID input requested.       Past Medical & Surgical Hx:  Clostridium difficile diarrhea  CHF (congestive heart failure)  Gout  Mitral valve regurgitation  Hypertension  Atrial fibrillation  H/O skin graft      Social Hx:  No ETOH or drug use      FAMILY HISTORY:  No pertinent family history in first degree relatives      Allergies  allopurinol (Rash)  aspirin (Unknown)  azithromycin (Diarrhea)  clindamycin (Diarrhea)  latex (Unknown)  morphine (Other; Short breath)  penicillins (Unknown) - Tolerated Ceftriaxone prior admission  sulfa drugs (Unknown)      ANTIBIOTICS:   Vancomycin  Aztreonam       REVIEW OF SYSTEMS:  CONSTITUTIONAL:  No Fever or chills  HEENT:  No diplopia or blurred vision.  No earache, sore throat or runny nose.  CARDIOVASCULAR:  No pressure, squeezing, strangling, tightness, heaviness or aching about the chest, neck, axilla or epigastrium.  RESPIRATORY:  No cough, shortness of breath  GASTROINTESTINAL:  No nausea, vomiting or diarrhea.  GENITOURINARY:  No dysuria, frequency or urgency.  MUSCULOSKELETAL:  no joint aches, no muscle pain  SKIN:  RLE erythema, warmth, swelling  NEUROLOGIC:  No paresthesias, fasciculations  PSYCHIATRIC:  No disorder of thought or mood.  ENDOCRINE:  No heat or cold intolerance  HEMATOLOGICAL:  No easy bruising or bleeding.       Physical Exam:  Vital Signs Last 24 Hrs  T(C): 36.8 (24 Jan 2018 11:55), Max: 36.9 (23 Jan 2018 20:16)  T(F): 98.2 (24 Jan 2018 11:55), Max: 98.5 (23 Jan 2018 20:16)  HR: 91 (24 Jan 2018 11:55) (75 - 103)  BP: 125/85 (24 Jan 2018 11:55) (108/74 - 146/90)  RR: 18 (24 Jan 2018 11:55) (18 - 20)  SpO2: 95% (24 Jan 2018 11:55) (95% - 97%)  Height (cm): 162.56 (01-23 @ 14:24)  Weight (kg): 61.7 (01-23 @ 14:24)  BMI (kg/m2): 23.3 (01-23 @ 14:24)  BSA (m2): 1.66 (01-23 @ 14:24)      GEN: NAD, Lethargic  HEENT: normocephalic and atraumatic. EOMI. PERRL.    NECK: Supple.  LUNGS: Clear to auscultation.  HEART: Regular rate and rhythm  ABDOMEN: Soft, nontender, and nondistended.  Positive bowel sounds.    : No CVA tenderness  EXTREMITIES: RLE with erythema, swelling and some warmth  MSK: No joint swelling  NEUROLOGIC: Lethargic, Alert and oriented  to person and place  PSYCHIATRIC: Appropriate affect  SKIN:  RLE with erythema, swelling and some warmth      Labs:  01-24    138  |  101  |  36.0<H>  ----------------------------<  111  4.5   |  21.0<L>  |  1.42<H>    Ca    8.9      24 Jan 2018 08:33    TPro  7.5  /  Alb  3.9  /  TBili  2.3<H>  /  DBili  x   /  AST  64<H>  /  ALT  24  /  AlkPhos  138<H>  01-23                          12.4   15.7  )-----------( 162      ( 24 Jan 2018 08:33 )             38.1       PT/INR - ( 24 Jan 2018 02:01 )   PT: 27.0 sec;   INR: 2.41 ratio         PTT - ( 24 Jan 2018 02:01 )  PTT:31.3 sec    LIVER FUNCTIONS - ( 23 Jan 2018 19:23 )  Alb: 3.9 g/dL / Pro: 7.5 g/dL / ALK PHOS: 138 U/L / ALT: 24 U/L / AST: 64 U/L / GGT: x               EXAM:  CT LWR EXT IC RT                        PROCEDURE DATE:  01/23/2018    INTERPRETATION:  Clinical indication: Right lower extremity cellulitis,   assess osteomyelitis.  Technique: CT axial images of the right leg were obtained following   administration of intravenous contrast. 90 mls of Omnipaque-350   administered without complication. 10 mls discarded. Coronal and sagittal   reformatted images were also obtained.  Comparison: Earlier radiographs of the same date.   Findings: Patient motion degrades images.  Soft tissue: Diffuse stranding without drainable fluid collection in the   visualized soft tissue. Right knee joint effusion. Diffuse muscle bulk   loss with fatty replacement. Atherosclerotic arterial calcification.   Quadriceps enthesopathy. Mild thickening of the Achilles tendon, which   may represent tendinosis although tear is not adequately assessed in this study.  Bones: Osteopenia. No obvious fracture or dislocation given the extent of   artifact. No obvious bone erosion or periosteal reaction.   Tricompartmental degenerative changes of the right knee.  Impression:  Diffuse stranding in the visualized right leg soft tissue, corresponding   to known cellulitis. No obvious drainable collection in the visualized   right leg superficial soft tissue.  No obvious bone erosion or periosteal reaction. If there is continued   clinical suspicion for osteomyelitis, additional imaging (nuclear scan   and/or MRI) may be obtained for further evaluation.        EXAM:  XR CHEST PORTABLE URGENT 1V                        PROCEDURE DATE:  01/24/2018    INTERPRETATION:  HISTORY: Sepsis  TECHNIQUE: Portable frontal view of the chest, 1 view.  COMPARISON: 10/4/2017.  FINDINGS:   HEART:  Enlarged  LUNGS: free of consolidation or effusion.  OSSEOUS STRUCTURES:: degenerative changes  IMPRESSION:   Cardiomegaly.  No infiltrates.

## 2018-01-24 NOTE — CONSULT NOTE ADULT - ATTENDING COMMENTS
Pt seen and evaluated. Agree with PA note above.  Pt w hx of chronic LE edema and venous stasis, admitted with worsening swelling and pain in RLE and redness, c/w cellulitis  No ulcers or weeping  Faint pedal pulses due to edema, but multiphasic signals present on doppler  Venous duplex negative for DVT  On Abx  WBC 22 yesterday and down to 15 today  Plan:  Continue abx  compression/elevation   diuretics  can follow up as outpt upon discharge

## 2018-01-24 NOTE — H&P ADULT - SKIN
detailed exam RLE has purplish skin discoloration upto below knee. + warmth. difficult to feel DP on either side due to edema . a skin laceration about 3 cm over rt. lower ext  on the outer aspect of upper end of knee noted. a small skin tear noted medially over mid calf area about 1.5 cm, no sig. discharge.

## 2018-01-24 NOTE — PROGRESS NOTE ADULT - SUBJECTIVE AND OBJECTIVE BOX
MASONIDORAGHU CRAFT Female 85y [id number]    Patient is a 85y old  Female who presents with a chief complaint of rt. lower ext. pain (24 Jan 2018 04:35)      Subjective/objective:  Pt seen and examined at bedside, admitted over night for LE cellulitis. Pt poor historian and unable to provide meaningful history. She is not sure how she developed cellulitis. hemodynamically stable, wbc trending down, lactate normalized.     Review of system:  No fever, chills, nausea, vomiting, headache, dizziness, chest pain, SOB or palpitation.      PHYSICAL EXAM:    Vital Signs Last 24 Hrs  T(C): 36.4 (24 Jan 2018 08:01), Max: 36.9 (23 Jan 2018 20:16)  T(F): 97.5 (24 Jan 2018 08:01), Max: 98.5 (23 Jan 2018 20:16)  HR: 91 (24 Jan 2018 08:01) (75 - 103)  BP: 118/75 (24 Jan 2018 08:01) (108/74 - 146/90)  BP(mean): --  RR: 18 (24 Jan 2018 08:01) (18 - 20)  SpO2: 95% (24 Jan 2018 08:01) (95% - 97%)    GENERAL: Pt lying comfortably, NAD.  CHEST/LUNG: Clear to auscultation bilaterally; No wheezing.  HEART: S1S2+, irregularly irregular, no murmurs.  ABDOMEN: Soft, Nontender, Nondistended; Bowel sounds present.  Extremities B/l LE edema, RLE has purplish skin discoloration + warmth, skin laceration about 3 cm over rt. lower ext  on the outer aspect of upper end of knee noted,  small skin tear noted medially over mid calf area, no sig. discharge.  NEURO: AAOX2 no focal deficits, no motor r sensory loss.  PSYCH: normal mood.          MEDICATIONS  (STANDING):  ATENolol  Tablet 25 milliGRAM(s) Oral daily  aztreonam  IVPB 500 milliGRAM(s) IV Intermittent every 12 hours  dextrose 5%. 1000 milliLiter(s) (50 mL/Hr) IV Continuous <Continuous>  dextrose 50% Injectable 12.5 Gram(s) IV Push once  dextrose 50% Injectable 25 Gram(s) IV Push once  dextrose 50% Injectable 25 Gram(s) IV Push once  digoxin     Tablet 0.125 milliGRAM(s) Oral every other day  insulin lispro (HumaLOG) corrective regimen sliding scale   SubCutaneous three times a day before meals  insulin lispro (HumaLOG) corrective regimen sliding scale   SubCutaneous at bedtime  lactobacillus acidophilus 1 Tablet(s) Oral two times a day with meals  sodium chloride 0.9%. 1000 milliLiter(s) (80 mL/Hr) IV Continuous <Continuous>  vancomycin  IVPB 1000 milliGRAM(s) IV Intermittent every 24 hours  warfarin 2 milliGRAM(s) Oral once    MEDICATIONS  (PRN):  acetaminophen   Tablet 650 milliGRAM(s) Oral every 6 hours PRN For Temp greater than or equal to 38 C (100.4 F)  dextrose Gel 1 Dose(s) Oral once PRN Blood Glucose LESS THAN 70 milliGRAM(s)/deciliter  glucagon  Injectable 1 milliGRAM(s) IntraMuscular once PRN Glucose LESS THAN 70 milligrams/deciliter        Labs:  LABS:                        12.4   15.7  )-----------( 162      ( 24 Jan 2018 08:33 )             38.1     01-24    138  |  101  |  36.0<H>  ----------------------------<  111  4.5   |  21.0<L>  |  1.42<H>    Ca    8.9      24 Jan 2018 08:33    TPro  7.5  /  Alb  3.9  /  TBili  2.3<H>  /  DBili  x   /  AST  64<H>  /  ALT  24  /  AlkPhos  138<H>  01-23    PT/INR - ( 24 Jan 2018 02:01 )   PT: 27.0 sec;   INR: 2.41 ratio         PTT - ( 24 Jan 2018 02:01 )  PTT:31.3 sec    LIVER FUNCTIONS - ( 23 Jan 2018 19:23 )  Alb: 3.9 g/dL / Pro: 7.5 g/dL / ALK PHOS: 138 U/L / ALT: 24 U/L / AST: 64 U/L / GGT: x

## 2018-01-24 NOTE — CONSULT NOTE ADULT - SUBJECTIVE AND OBJECTIVE BOX
Vascular Attending:  Dr. Parnell      HPI:  Patient w/ PMH A fib on Coumadin presents complaining of acute onset of right lower leg pain, swelling and discoloration.  When asked where her pain is worst she points to the left ankle with a small, well healed abrasion. The patient was seen by her cardiologist yesterday and there was no discoloration at that time. She denies fevers, chills, nausea, vomiting or recent antibiotic use. She has a history of poor circulation and therefore required a skin graft for a non-healing wound many years ago without issues. She has been feeling very fatigued over the past few days. She denies any falls or trauma to the area. She notes multiple allergies, but denies smoking, EtOH or illicit drugs.    Pt was seen and examined in the ED she states that this discoloration has been getting worse since she was taken to the hospital by her family.  Pt has some moderate confusion and is unreliable source of information for a thorough history.  Pt states her RLE is very tender from the knee down to the ankle.  She cant remember if she fell or had any trauma to her leg but there is a skin tear on the medial aspect of the calf and a new laceration on the lateral calf near the knee from the stretcher she was on.  Pt denies any CP, SOB, paraesthesias, coldness, tingling in the RLE      PAST MEDICAL & SURGICAL HISTORY:  Clostridium difficile diarrhea  CHF (congestive heart failure)  Gout  Mitral valve regurgitation  Hypertension  Atrial fibrillation  H/O skin graft      REVIEW OF SYSTEMS    Negative as per HPI      Allergies    allopurinol (Rash)  aspirin (Unknown)  azithromycin (Diarrhea)  clindamycin (Diarrhea)  latex (Unknown)  morphine (Other; Short breath)  penicillins (Unknown)  sulfa drugs (Unknown)    Intolerances        SOCIAL HISTORY:      Vital Signs Last 24 Hrs  T(C): 36.3 (23 Jan 2018 23:30), Max: 36.9 (23 Jan 2018 20:16)  T(F): 97.3 (23 Jan 2018 23:30), Max: 98.5 (23 Jan 2018 20:16)  HR: 81 (23 Jan 2018 23:30) (81 - 103)  BP: 117/80 (23 Jan 2018 23:30) (117/80 - 146/90)  BP(mean): --  RR: 20 (23 Jan 2018 23:30) (20 - 20)  SpO2: 95% (23 Jan 2018 23:30) (95% - 96%)    PHYSICAL EXAM:    Constitutional: NAD, resting comfortably, WNWD  Eyes: PERRLA, EOM intact   Head: NCAT  Neck: trachea midline, FROM  Respiratory: CTA b/l, no WRR  Cardiovascular: irregular  Gastrointestinal: abd. soft, NT/ND, +BSx4  Extremities: RLE with discoloration from the knee to the ankle, dark purple in color, edematous RLE 2+, FROM of toes  Neurological: AOx2, sensation grossly intact  Skin: RLE medial skin tear approx 1cm, lateral laceration approx 3cm    Pulses:   Right:                                                                            FEM [ ]2+ [ ]1+ [ ]doppler                                              POP [ ]2+ [ ]1+ [ ]doppler                                               DP [ ]2+ [ ]1+ [ ]doppler [x] not dopperable                   PT[ x]2+ [ ]1+ [ ]doppler                                                       LABS:                        14.0   22.2  )-----------( 191      ( 23 Jan 2018 19:23 )             43.6     01-23    x   |  x   |  x   ----------------------------<  x   4.6   |  x   |  x     Ca    9.6      23 Jan 2018 19:23    TPro  7.5  /  Alb  3.9  /  TBili  2.3<H>  /  DBili  x   /  AST  64<H>  /  ALT  24  /  AlkPhos  138<H>  01-23          RADIOLOGY & ADDITIONAL STUDIES  CT RLE c IVC 1/24:  Diffuse stranding in the visualized right leg soft tissue, corresponding to known cellulitis. No obvious drainable collection in the visualized right leg superficial soft tissue.  No obvious bone erosion or periosteal reaction. If there is continued   clinical suspicion for osteomyelitis, additional imaging (nuclear scan   and/or MRI) may be obtained for further evaluation.  US duplex venous RLE 1/24: No thrombosis in the right common femoral vein, femoral vein, popliteal   vein or tibioperoneal trunk. The remaining right calf veins were not   visualized due to edema, limiting evaluation.

## 2018-01-25 LAB
ANION GAP SERPL CALC-SCNC: 16 MMOL/L — SIGNIFICANT CHANGE UP (ref 5–17)
BUN SERPL-MCNC: 37 MG/DL — HIGH (ref 8–20)
CALCIUM SERPL-MCNC: 9.3 MG/DL — SIGNIFICANT CHANGE UP (ref 8.6–10.2)
CHLORIDE SERPL-SCNC: 102 MMOL/L — SIGNIFICANT CHANGE UP (ref 98–107)
CO2 SERPL-SCNC: 23 MMOL/L — SIGNIFICANT CHANGE UP (ref 22–29)
CREAT SERPL-MCNC: 1.54 MG/DL — HIGH (ref 0.5–1.3)
GLUCOSE BLDC GLUCOMTR-MCNC: 104 MG/DL — HIGH (ref 70–99)
GLUCOSE BLDC GLUCOMTR-MCNC: 106 MG/DL — HIGH (ref 70–99)
GLUCOSE BLDC GLUCOMTR-MCNC: 138 MG/DL — HIGH (ref 70–99)
GLUCOSE SERPL-MCNC: 106 MG/DL — SIGNIFICANT CHANGE UP (ref 70–115)
HBA1C BLD-MCNC: 5.5 % — SIGNIFICANT CHANGE UP (ref 4–5.6)
HCT VFR BLD CALC: 39.3 % — SIGNIFICANT CHANGE UP (ref 37–47)
HGB BLD-MCNC: 12.7 G/DL — SIGNIFICANT CHANGE UP (ref 12–16)
INR BLD: 2.24 RATIO — HIGH (ref 0.88–1.16)
MCHC RBC-ENTMCNC: 32.3 G/DL — SIGNIFICANT CHANGE UP (ref 32–36)
MCHC RBC-ENTMCNC: 32.4 PG — HIGH (ref 27–31)
MCV RBC AUTO: 100.3 FL — HIGH (ref 81–99)
PLATELET # BLD AUTO: 180 K/UL — SIGNIFICANT CHANGE UP (ref 150–400)
POTASSIUM SERPL-MCNC: 4.6 MMOL/L — SIGNIFICANT CHANGE UP (ref 3.5–5.3)
POTASSIUM SERPL-SCNC: 4.6 MMOL/L — SIGNIFICANT CHANGE UP (ref 3.5–5.3)
PROTHROM AB SERPL-ACNC: 25.1 SEC — HIGH (ref 9.8–12.7)
RBC # BLD: 3.92 M/UL — LOW (ref 4.4–5.2)
RBC # FLD: 15.2 % — SIGNIFICANT CHANGE UP (ref 11–15.6)
SODIUM SERPL-SCNC: 141 MMOL/L — SIGNIFICANT CHANGE UP (ref 135–145)
WBC # BLD: 12.2 K/UL — HIGH (ref 4.8–10.8)
WBC # FLD AUTO: 12.2 K/UL — HIGH (ref 4.8–10.8)

## 2018-01-25 PROCEDURE — 71045 X-RAY EXAM CHEST 1 VIEW: CPT | Mod: 26

## 2018-01-25 PROCEDURE — 99232 SBSQ HOSP IP/OBS MODERATE 35: CPT

## 2018-01-25 PROCEDURE — 99233 SBSQ HOSP IP/OBS HIGH 50: CPT

## 2018-01-25 RX ORDER — FUROSEMIDE 40 MG
20 TABLET ORAL ONCE
Qty: 0 | Refills: 0 | Status: COMPLETED | OUTPATIENT
Start: 2018-01-25 | End: 2018-01-25

## 2018-01-25 RX ORDER — ONDANSETRON 8 MG/1
4 TABLET, FILM COATED ORAL ONCE
Qty: 0 | Refills: 0 | Status: COMPLETED | OUTPATIENT
Start: 2018-01-25 | End: 2018-01-25

## 2018-01-25 RX ORDER — WARFARIN SODIUM 2.5 MG/1
3 TABLET ORAL ONCE
Qty: 0 | Refills: 0 | Status: COMPLETED | OUTPATIENT
Start: 2018-01-25 | End: 2018-01-25

## 2018-01-25 RX ORDER — ALBUTEROL 90 UG/1
2.5 AEROSOL, METERED ORAL ONCE
Qty: 0 | Refills: 0 | Status: COMPLETED | OUTPATIENT
Start: 2018-01-25 | End: 2018-01-25

## 2018-01-25 RX ORDER — HYDROCORTISONE 1 %
1 OINTMENT (GRAM) TOPICAL ONCE
Qty: 0 | Refills: 0 | Status: COMPLETED | OUTPATIENT
Start: 2018-01-25 | End: 2018-01-25

## 2018-01-25 RX ADMIN — ATENOLOL 25 MILLIGRAM(S): 25 TABLET ORAL at 05:23

## 2018-01-25 RX ADMIN — Medication 1 TABLET(S): at 08:06

## 2018-01-25 RX ADMIN — Medication 40 MILLIGRAM(S): at 05:23

## 2018-01-25 RX ADMIN — Medication 1 TABLET(S): at 18:04

## 2018-01-25 RX ADMIN — Medication 1 APPLICATION(S): at 00:48

## 2018-01-25 RX ADMIN — CEFTRIAXONE 100 GRAM(S): 500 INJECTION, POWDER, FOR SOLUTION INTRAMUSCULAR; INTRAVENOUS at 15:19

## 2018-01-25 RX ADMIN — WARFARIN SODIUM 3 MILLIGRAM(S): 2.5 TABLET ORAL at 21:46

## 2018-01-25 RX ADMIN — LINEZOLID 600 MILLIGRAM(S): 600 INJECTION, SOLUTION INTRAVENOUS at 18:04

## 2018-01-25 RX ADMIN — ALBUTEROL 2.5 MILLIGRAM(S): 90 AEROSOL, METERED ORAL at 22:37

## 2018-01-25 RX ADMIN — ONDANSETRON 4 MILLIGRAM(S): 8 TABLET, FILM COATED ORAL at 19:50

## 2018-01-25 RX ADMIN — SODIUM CHLORIDE 80 MILLILITER(S): 9 INJECTION INTRAMUSCULAR; INTRAVENOUS; SUBCUTANEOUS at 00:06

## 2018-01-25 RX ADMIN — LINEZOLID 600 MILLIGRAM(S): 600 INJECTION, SOLUTION INTRAVENOUS at 05:23

## 2018-01-25 RX ADMIN — Medication 20 MILLIGRAM(S): at 22:19

## 2018-01-25 NOTE — CHART NOTE - NSCHARTNOTEFT_GEN_A_CORE
Called by RN to assess pt c/o SOB.  Pt with hx of CHF admitted with RLE cellulitis on antibiotics.  Pt states its hard to catch a breath.  Denies CP, V, fever, chills.  +nausea.     152/90  92  97.9  22  94% on RA    General  Pt is alert and Ox3 in NAD  Lungs     + expiratory wheeze b/l; decreased breath sounds in lower fields  Heart      s1/s2 irregular, +murmur  Abdomen soft NT +BS  Ext          +erythema, +edema in RLE    SOB    CXR stat  2L o2 via nc  Albuterol neb tx stat  Hold IVF until further orders  will give lasix after reviewing cxr  continue to monitor and reassess prn Called by RN to assess pt c/o SOB.  Pt with hx of CHF admitted with RLE cellulitis on antibiotics.  Pt states its hard to catch a breath.  Denies CP, V, fever, chills.  +nausea.     152/90  92  97.9  22  94% on RA    General  Pt is alert and Ox3 in NAD  Lungs     + expiratory wheeze b/l; decreased breath sounds in lower fields  Heart      s1/s2 irregular, +murmur  Abdomen soft NT +BS  Ext          +erythema, +edema in RLE    SOB    CXR stat  2L o2 via nc  Albuterol neb tx stat  Hold IVF until further orders  Lasix 20 mg IVP x1 stat  continue to monitor and reassess prn Called by RN to assess pt c/o SOB.  Pt with hx of CHF admitted with RLE cellulitis on antibiotics.  Pt states its hard to catch a breath.  Denies CP, V, fever, chills.  +nausea.     152/90  92  97.9  22  94% on RA    General  Pt is alert and Ox3 in NAD  Lungs     + expiratory wheeze b/l; decreased breath sounds in lower fields  Heart      s1/s2 irregular, +murmur  Abdomen soft NT +BS  Ext          +erythema, +edema in RLE    SOB    CXR stat  2L o2 via nc  Albuterol neb tx stat  Hold IVF until further orders  Lasix 20 mg IVP x1 stat  Zofran 4mg IVPB x 1  continue to monitor and reassess prn Called by RN to assess pt c/o SOB.  Pt with hx of CHF admitted with RLE cellulitis on antibiotics.  Pt states its hard to catch a breath.   is concerned about pt's breathing.  Denies CP, V, fever, chills.  +nausea.     152/90  92  97.9  22  94% on RA    General  Pt is alert and Ox3 in NAD  Lungs     + expiratory wheeze b/l; decreased breath sounds in lower fields  Heart      s1/s2 irregular, +murmur  Abdomen soft NT +BS  Ext          +erythema, +edema in RLE    SOB    CXR stat  2L o2 via nc  Albuterol neb tx stat  Hold IVF until further orders  Lasix 20 mg IVP x1 stat  Zofran 4mg IVPB x 1  continue to monitor and reassess prn

## 2018-01-25 NOTE — PROGRESS NOTE ADULT - SUBJECTIVE AND OBJECTIVE BOX
RAGHU DAVIS Female 85y [id number]    Patient is a 85y old  Female who presents with a chief complaint of rt. lower ext. pain (24 Jan 2018 04:35)      Subjective/objective:  Pt seen and examined at bedside, no over night event reported by night staff. Pt reports LE pain improving, still has significant erythema.     Review of system:  No fever, chills, nausea, vomiting, headache, dizziness, chest pain, SOB or palpitation.      PHYSICAL EXAM:    Vital Signs Last 24 Hrs  T(C): 36.8 (25 Jan 2018 08:10), Max: 36.9 (24 Jan 2018 20:34)  T(F): 98.2 (25 Jan 2018 08:10), Max: 98.4 (24 Jan 2018 20:34)  HR: 93 (25 Jan 2018 08:10) (85 - 95)  BP: 137/82 (25 Jan 2018 08:10) (110/68 - 140/90)  BP(mean): --  RR: 18 (25 Jan 2018 08:10) (18 - 18)  SpO2: 96% (25 Jan 2018 08:10) (94% - 96%)    GENERAL: Pt lying comfortably, NAD.  CHEST/LUNG: Clear to auscultation bilaterally; No wheezing.  HEART: S1S2+, irregularly irregular, no murmurs.  ABDOMEN: Soft, Nontender, Nondistended; Bowel sounds present.  Extremities B/l LE edema, RLE has purplish skin discoloration + warmth, skin laceration about 3 cm over rt. lower ext  on the outer aspect of upper end of knee noted,  small skin tear noted medially over mid calf area, no sig. discharge.  NEURO: AAOX3 no focal deficits, no motor r sensory loss.  PSYCH: normal mood.      MEDICATIONS  (STANDING):  ATENolol  Tablet 25 milliGRAM(s) Oral daily  cefTRIAXone   IVPB 1 Gram(s) IV Intermittent every 24 hours  dextrose 5%. 1000 milliLiter(s) (50 mL/Hr) IV Continuous <Continuous>  dextrose 50% Injectable 12.5 Gram(s) IV Push once  dextrose 50% Injectable 25 Gram(s) IV Push once  dextrose 50% Injectable 25 Gram(s) IV Push once  digoxin     Tablet 0.125 milliGRAM(s) Oral every other day  furosemide    Tablet 40 milliGRAM(s) Oral daily  insulin lispro (HumaLOG) corrective regimen sliding scale   SubCutaneous three times a day before meals  insulin lispro (HumaLOG) corrective regimen sliding scale   SubCutaneous at bedtime  lactobacillus acidophilus 1 Tablet(s) Oral two times a day with meals  linezolid    Tablet 600 milliGRAM(s) Oral every 12 hours  sodium chloride 0.9%. 1000 milliLiter(s) (80 mL/Hr) IV Continuous <Continuous>    MEDICATIONS  (PRN):  acetaminophen   Tablet 650 milliGRAM(s) Oral every 6 hours PRN For Temp greater than or equal to 38 C (100.4 F)  acetaminophen   Tablet. 650 milliGRAM(s) Oral every 6 hours PRN Moderate Pain (4 - 6)  dextrose Gel 1 Dose(s) Oral once PRN Blood Glucose LESS THAN 70 milliGRAM(s)/deciliter  glucagon  Injectable 1 milliGRAM(s) IntraMuscular once PRN Glucose LESS THAN 70 milligrams/deciliter        Labs:  LABS:                        12.4   15.7  )-----------( 162      ( 24 Jan 2018 08:33 )             38.1     01-24    138  |  101  |  36.0<H>  ----------------------------<  111  4.5   |  21.0<L>  |  1.42<H>    Ca    8.9      24 Jan 2018 08:33    TPro  7.5  /  Alb  3.9  /  TBili  2.3<H>  /  DBili  x   /  AST  64<H>  /  ALT  24  /  AlkPhos  138<H>  01-23    PT/INR - ( 25 Jan 2018 09:05 )   PT: 25.1 sec;   INR: 2.24 ratio         PTT - ( 24 Jan 2018 02:01 )  PTT:31.3 sec    LIVER FUNCTIONS - ( 23 Jan 2018 19:23 )  Alb: 3.9 g/dL / Pro: 7.5 g/dL / ALK PHOS: 138 U/L / ALT: 24 U/L / AST: 64 U/L / GGT: x

## 2018-01-25 NOTE — PROGRESS NOTE ADULT - SUBJECTIVE AND OBJECTIVE BOX
[facility]  INFECTIOUS DISEASES AND INTERNAL MEDICINE at [place]  =======================================================  Leighton Brady MD  Diplomates American Board of Internal Medicine and Infectious Diseases  =======================================================    RAGHU DAVIS [id number]    Follow up: Cellulitis    Afebrile  no complaints    Allergies:  allopurinol (Rash)  aspirin (Unknown)  azithromycin (Diarrhea)  clindamycin (Diarrhea)  latex (Unknown)  morphine (Other; Short breath)  penicillins (Unknown)  sulfa drugs (Unknown)      Antibiotics:  cefTRIAXone   IVPB 1 Gram(s) IV Intermittent every 24 hours  linezolid    Tablet 600 milliGRAM(s) Oral every 12 hours        REVIEW OF SYSTEMS:  CONSTITUTIONAL:  No Fever or chills  HEENT:  No diplopia or blurred vision.  No earache, sore throat or runny nose.  CARDIOVASCULAR:  No pressure, squeezing, strangling, tightness, heaviness or aching about the chest, neck, axilla or epigastrium.  RESPIRATORY:  No cough, shortness of breath  GASTROINTESTINAL:  No nausea, vomiting or diarrhea.  GENITOURINARY:  No dysuria, frequency or urgency.  MUSCULOSKELETAL:  no joint aches, no muscle pain  SKIN:  RLE erythema, warmth, swelling  NEUROLOGIC:  No paresthesias, fasciculations  PSYCHIATRIC:  No disorder of thought or mood.  ENDOCRINE:  No heat or cold intolerance  HEMATOLOGICAL:  No easy bruising or bleeding.      Physical Exam:  Vital Signs Last 24 Hrs  T(C): 36.8 (25 Jan 2018 08:10), Max: 36.9 (24 Jan 2018 20:34)  T(F): 98.2 (25 Jan 2018 08:10), Max: 98.4 (24 Jan 2018 20:34)  HR: 93 (25 Jan 2018 08:10) (85 - 95)  BP: 137/82 (25 Jan 2018 08:10) (110/68 - 140/90)  RR: 18 (25 Jan 2018 08:10) (18 - 18)  SpO2: 96% (25 Jan 2018 08:10) (94% - 96%)      GEN: NAD   HEENT: normocephalic and atraumatic. EOMI. PERRL.    NECK: Supple.  LUNGS: Clear to auscultation.  HEART: Regular rate and rhythm  ABDOMEN: Soft, nontender, and nondistended.  Positive bowel sounds.    : No CVA tenderness  EXTREMITIES: RLE with erythema, swelling and some warmth  MSK: No joint swelling  NEUROLOGIC: Alert and oriented  to person and place,  less lethargic  PSYCHIATRIC: Appropriate affect  SKIN:  RLE with erythema, swelling and some warmth, improved from yesterday       Labs:  01-25    141  |  102  |  37.0<H>  ----------------------------<  106  4.6   |  23.0  |  1.54<H>    Ca    9.3      25 Jan 2018 09:05    TPro  7.5  /  Alb  3.9  /  TBili  2.3<H>  /  DBili  x   /  AST  64<H>  /  ALT  24  /  AlkPhos  138<H>  01-23               12.7   12.2  )-----------( 180      ( 25 Jan 2018 09:05 )             39.3       PT/INR - ( 25 Jan 2018 09:05 )   PT: 25.1 sec;   INR: 2.24 ratio         PTT - ( 24 Jan 2018 02:01 )  PTT:31.3 sec    LIVER FUNCTIONS - ( 23 Jan 2018 19:23 )  Alb: 3.9 g/dL / Pro: 7.5 g/dL / ALK PHOS: 138 U/L / ALT: 24 U/L / AST: 64 U/L / GGT: x             EXAM:  CT LWR EXT IC RT                        PROCEDURE DATE:  01/23/2018    INTERPRETATION:  Clinical indication: Right lower extremity cellulitis,   assess osteomyelitis.  Technique: CT axial images of the right leg were obtained following   administration of intravenous contrast. 90 mls of Omnipaque-350   administered without complication. 10 mls discarded. Coronal and sagittal   reformatted images were also obtained.  Comparison: Earlier radiographs of the same date.   Findings: Patient motion degrades images.  Soft tissue: Diffuse stranding without drainable fluid collection in the   visualized soft tissue. Right knee joint effusion. Diffuse muscle bulk   loss with fatty replacement. Atherosclerotic arterial calcification.   Quadriceps enthesopathy. Mild thickening of the Achilles tendon, which   may represent tendinosis although tear is not adequately assessed in this study.  Bones: Osteopenia. No obvious fracture or dislocation given the extent of   artifact. No obvious bone erosion or periosteal reaction.   Tricompartmental degenerative changes of the right knee.  Impression:  Diffuse stranding in the visualized right leg soft tissue, corresponding   to known cellulitis. No obvious drainable collection in the visualized   right leg superficial soft tissue.  No obvious bone erosion or periosteal reaction. If there is continued   clinical suspicion for osteomyelitis, additional imaging (nuclear scan   and/or MRI) may be obtained for further evaluation. [facility]  INFECTIOUS DISEASES AND INTERNAL MEDICINE at [place]  =======================================================  Leighton Brady MD  Diplomates American Board of Internal Medicine and Infectious Diseases  =======================================================    RAGHU DAVIS [id number]    Follow up: Cellulitis    Afebrile  no complaints    Allergies:  allopurinol (Rash)  aspirin (Unknown)  azithromycin (Diarrhea)  clindamycin (Diarrhea)  latex (Unknown)  morphine (Other; Short breath)  penicillins (Unknown)  sulfa drugs (Unknown)      Antibiotics:  cefTRIAXone   IVPB 1 Gram(s) IV Intermittent every 24 hours  linezolid    Tablet 600 milliGRAM(s) Oral every 12 hours        REVIEW OF SYSTEMS:  CONSTITUTIONAL:  No Fever or chills  HEENT:  No diplopia or blurred vision.  No earache, sore throat or runny nose.  CARDIOVASCULAR:  No pressure, squeezing, strangling, tightness, heaviness or aching about the chest, neck, axilla or epigastrium.  RESPIRATORY:  No cough, shortness of breath  GASTROINTESTINAL:  No nausea, vomiting or diarrhea.  GENITOURINARY:  No dysuria, frequency or urgency.  MUSCULOSKELETAL:  no joint aches, no muscle pain  SKIN:  RLE erythema, warmth, swelling  NEUROLOGIC:  No paresthesias, fasciculations  PSYCHIATRIC:  No disorder of thought or mood.  ENDOCRINE:  No heat or cold intolerance  HEMATOLOGICAL:  No easy bruising or bleeding.      Physical Exam:  Vital Signs Last 24 Hrs  T(C): 36.8 (25 Jan 2018 08:10), Max: 36.9 (24 Jan 2018 20:34)  T(F): 98.2 (25 Jan 2018 08:10), Max: 98.4 (24 Jan 2018 20:34)  HR: 93 (25 Jan 2018 08:10) (85 - 95)  BP: 137/82 (25 Jan 2018 08:10) (110/68 - 140/90)  RR: 18 (25 Jan 2018 08:10) (18 - 18)  SpO2: 96% (25 Jan 2018 08:10) (94% - 96%)      GEN: NAD   HEENT: normocephalic and atraumatic. EOMI. PERRL.    NECK: Supple.  LUNGS: Clear to auscultation.  HEART: Regular rate and rhythm  ABDOMEN: Soft, nontender, and nondistended.  Positive bowel sounds.    : No CVA tenderness  EXTREMITIES: RLE with erythema, swelling and some warmth  MSK: No joint swelling  NEUROLOGIC: Alert and oriented x3,  less lethargic  PSYCHIATRIC: Appropriate affect  SKIN:  RLE with erythema, swelling and some warmth, improved from yesterday       Labs:  01-25    141  |  102  |  37.0<H>  ----------------------------<  106  4.6   |  23.0  |  1.54<H>    Ca    9.3      25 Jan 2018 09:05    TPro  7.5  /  Alb  3.9  /  TBili  2.3<H>  /  DBili  x   /  AST  64<H>  /  ALT  24  /  AlkPhos  138<H>  01-23               12.7   12.2  )-----------( 180      ( 25 Jan 2018 09:05 )             39.3       PT/INR - ( 25 Jan 2018 09:05 )   PT: 25.1 sec;   INR: 2.24 ratio         PTT - ( 24 Jan 2018 02:01 )  PTT:31.3 sec    LIVER FUNCTIONS - ( 23 Jan 2018 19:23 )  Alb: 3.9 g/dL / Pro: 7.5 g/dL / ALK PHOS: 138 U/L / ALT: 24 U/L / AST: 64 U/L / GGT: x             EXAM:  CT LWR EXT IC RT                        PROCEDURE DATE:  01/23/2018    INTERPRETATION:  Clinical indication: Right lower extremity cellulitis,   assess osteomyelitis.  Technique: CT axial images of the right leg were obtained following   administration of intravenous contrast. 90 mls of Omnipaque-350   administered without complication. 10 mls discarded. Coronal and sagittal   reformatted images were also obtained.  Comparison: Earlier radiographs of the same date.   Findings: Patient motion degrades images.  Soft tissue: Diffuse stranding without drainable fluid collection in the   visualized soft tissue. Right knee joint effusion. Diffuse muscle bulk   loss with fatty replacement. Atherosclerotic arterial calcification.   Quadriceps enthesopathy. Mild thickening of the Achilles tendon, which   may represent tendinosis although tear is not adequately assessed in this study.  Bones: Osteopenia. No obvious fracture or dislocation given the extent of   artifact. No obvious bone erosion or periosteal reaction.   Tricompartmental degenerative changes of the right knee.  Impression:  Diffuse stranding in the visualized right leg soft tissue, corresponding   to known cellulitis. No obvious drainable collection in the visualized   right leg superficial soft tissue.  No obvious bone erosion or periosteal reaction. If there is continued   clinical suspicion for osteomyelitis, additional imaging (nuclear scan   and/or MRI) may be obtained for further evaluation.

## 2018-01-25 NOTE — PROGRESS NOTE ADULT - PROBLEM SELECTOR PLAN 1
Blood cultures pending  CT scan with cellulitis   continue Linezolid   Continue Ceftriaxone  Follow up Cultures  Trend leukocytosis, downtrending  Afebrile

## 2018-01-26 LAB
ANION GAP SERPL CALC-SCNC: 17 MMOL/L — SIGNIFICANT CHANGE UP (ref 5–17)
BUN SERPL-MCNC: 41 MG/DL — HIGH (ref 8–20)
CALCIUM SERPL-MCNC: 8.9 MG/DL — SIGNIFICANT CHANGE UP (ref 8.6–10.2)
CHLORIDE SERPL-SCNC: 101 MMOL/L — SIGNIFICANT CHANGE UP (ref 98–107)
CO2 SERPL-SCNC: 22 MMOL/L — SIGNIFICANT CHANGE UP (ref 22–29)
CREAT SERPL-MCNC: 1.85 MG/DL — HIGH (ref 0.5–1.3)
GLUCOSE BLDC GLUCOMTR-MCNC: 112 MG/DL — HIGH (ref 70–99)
GLUCOSE BLDC GLUCOMTR-MCNC: 120 MG/DL — HIGH (ref 70–99)
GLUCOSE SERPL-MCNC: 116 MG/DL — HIGH (ref 70–115)
HCT VFR BLD CALC: 40.9 % — SIGNIFICANT CHANGE UP (ref 37–47)
HGB BLD-MCNC: 13.3 G/DL — SIGNIFICANT CHANGE UP (ref 12–16)
INR BLD: 2.26 RATIO — HIGH (ref 0.88–1.16)
MCHC RBC-ENTMCNC: 32.5 G/DL — SIGNIFICANT CHANGE UP (ref 32–36)
MCHC RBC-ENTMCNC: 32.6 PG — HIGH (ref 27–31)
MCV RBC AUTO: 100.2 FL — HIGH (ref 81–99)
PLATELET # BLD AUTO: 202 K/UL — SIGNIFICANT CHANGE UP (ref 150–400)
POTASSIUM SERPL-MCNC: 4.1 MMOL/L — SIGNIFICANT CHANGE UP (ref 3.5–5.3)
POTASSIUM SERPL-SCNC: 4.1 MMOL/L — SIGNIFICANT CHANGE UP (ref 3.5–5.3)
PROTHROM AB SERPL-ACNC: 25.3 SEC — HIGH (ref 9.8–12.7)
RBC # BLD: 4.08 M/UL — LOW (ref 4.4–5.2)
RBC # FLD: 14.9 % — SIGNIFICANT CHANGE UP (ref 11–15.6)
SODIUM SERPL-SCNC: 140 MMOL/L — SIGNIFICANT CHANGE UP (ref 135–145)
WBC # BLD: 13.6 K/UL — HIGH (ref 4.8–10.8)
WBC # FLD AUTO: 13.6 K/UL — HIGH (ref 4.8–10.8)

## 2018-01-26 PROCEDURE — 99232 SBSQ HOSP IP/OBS MODERATE 35: CPT

## 2018-01-26 PROCEDURE — 99233 SBSQ HOSP IP/OBS HIGH 50: CPT

## 2018-01-26 RX ORDER — WARFARIN SODIUM 2.5 MG/1
4 TABLET ORAL ONCE
Qty: 0 | Refills: 0 | Status: COMPLETED | OUTPATIENT
Start: 2018-01-26 | End: 2018-01-26

## 2018-01-26 RX ADMIN — LINEZOLID 600 MILLIGRAM(S): 600 INJECTION, SOLUTION INTRAVENOUS at 05:21

## 2018-01-26 RX ADMIN — ATENOLOL 25 MILLIGRAM(S): 25 TABLET ORAL at 05:21

## 2018-01-26 RX ADMIN — LINEZOLID 600 MILLIGRAM(S): 600 INJECTION, SOLUTION INTRAVENOUS at 17:27

## 2018-01-26 RX ADMIN — Medication 1 TABLET(S): at 07:49

## 2018-01-26 RX ADMIN — Medication 40 MILLIGRAM(S): at 05:21

## 2018-01-26 RX ADMIN — Medication 1 TABLET(S): at 17:27

## 2018-01-26 RX ADMIN — Medication 0.12 MILLIGRAM(S): at 12:09

## 2018-01-26 RX ADMIN — WARFARIN SODIUM 4 MILLIGRAM(S): 2.5 TABLET ORAL at 22:06

## 2018-01-26 RX ADMIN — CEFTRIAXONE 100 GRAM(S): 500 INJECTION, POWDER, FOR SOLUTION INTRAMUSCULAR; INTRAVENOUS at 15:13

## 2018-01-26 NOTE — PROGRESS NOTE ADULT - SUBJECTIVE AND OBJECTIVE BOX
[facility]  INFECTIOUS DISEASES AND INTERNAL MEDICINE at [place]  =======================================================  Leihgton Brady MD  Diplomates American Board of Internal Medicine and Infectious Diseases  =======================================================    MASONIDORAGHU CRAFT [id number]    Follow up: Cellulitis    Afebrile  no complaints    Allergies:  allopurinol (Rash)  aspirin (Unknown)  azithromycin (Diarrhea)  clindamycin (Diarrhea)  latex (Unknown)  morphine (Other; Short breath)  penicillins (Unknown)  sulfa drugs (Unknown)      Antibiotics:  cefTRIAXone   IVPB 1 Gram(s) IV Intermittent every 24 hours  linezolid    Tablet 600 milliGRAM(s) Oral every 12 hours        REVIEW OF SYSTEMS:  CONSTITUTIONAL:  No Fever or chills  HEENT:  No diplopia or blurred vision.  No earache, sore throat or runny nose.  CARDIOVASCULAR:  No pressure, squeezing, strangling, tightness, heaviness or aching about the chest, neck, axilla or epigastrium.  RESPIRATORY:  No cough, shortness of breath  GASTROINTESTINAL:  No nausea, vomiting or diarrhea.  GENITOURINARY:  No dysuria, frequency or urgency.  MUSCULOSKELETAL:  no joint aches, no muscle pain  SKIN:  RLE erythema, warmth, swelling  NEUROLOGIC:  No paresthesias, fasciculations  PSYCHIATRIC:  No disorder of thought or mood.  ENDOCRINE:  No heat or cold intolerance  HEMATOLOGICAL:  No easy bruising or bleeding.      Physical Exam:  Vital Signs Last 24 Hrs  T(C): 36.4 (26 Jan 2018 04:33), Max: 36.8 (25 Jan 2018 23:26)  T(F): 97.6 (26 Jan 2018 04:33), Max: 98.2 (25 Jan 2018 23:26)  HR: 88 (26 Jan 2018 04:33) (88 - 94)  BP: 132/91 (26 Jan 2018 04:33) (132/91 - 152/90)  RR: 18 (26 Jan 2018 04:33) (18 - 18)  SpO2: 98% (26 Jan 2018 04:33) (94% - 98%)      GEN: NAD   HEENT: normocephalic and atraumatic. EOMI. PERRL.    NECK: Supple.  LUNGS: Clear to auscultation.  HEART: Regular rate and rhythm  ABDOMEN: Soft, nontender, and nondistended.  Positive bowel sounds.    : No CVA tenderness  EXTREMITIES: RLE with erythema, swelling and some warmth  MSK: No joint swelling  NEUROLOGIC: Alert and oriented x3,  less lethargic  PSYCHIATRIC: Appropriate affect  SKIN:  RLE with erythema, swelling and some warmth, improved from yesterday       Labs:  01-26    140  |  101  |  41.0<H>  ----------------------------<  116<H>  4.1   |  22.0  |  1.85<H>    Ca    8.9      26 Jan 2018 06:44               13.3   13.6  )-----------( 202      ( 26 Jan 2018 06:44 )             40.9       PT/INR - ( 26 Jan 2018 06:44 )   PT: 25.3 sec;   INR: 2.26 ratio        RECENT CULTURES:  01-23 @ 19:36 .Blood Blood-Peripheral     No growth at 48 hours           EXAM:  CT LWR EXT IC RT                        PROCEDURE DATE:  01/23/2018    INTERPRETATION:  Clinical indication: Right lower extremity cellulitis,   assess osteomyelitis.  Technique: CT axial images of the right leg were obtained following   administration of intravenous contrast. 90 mls of Omnipaque-350   administered without complication. 10 mls discarded. Coronal and sagittal   reformatted images were also obtained.  Comparison: Earlier radiographs of the same date.   Findings: Patient motion degrades images.  Soft tissue: Diffuse stranding without drainable fluid collection in the   visualized soft tissue. Right knee joint effusion. Diffuse muscle bulk   loss with fatty replacement. Atherosclerotic arterial calcification.   Quadriceps enthesopathy. Mild thickening of the Achilles tendon, which   may represent tendinosis although tear is not adequately assessed in this study.  Bones: Osteopenia. No obvious fracture or dislocation given the extent of   artifact. No obvious bone erosion or periosteal reaction.   Tricompartmental degenerative changes of the right knee.  Impression:  Diffuse stranding in the visualized right leg soft tissue, corresponding   to known cellulitis. No obvious drainable collection in the visualized   right leg superficial soft tissue.  No obvious bone erosion or periosteal reaction. If there is continued   clinical suspicion for osteomyelitis, additional imaging (nuclear scan   and/or MRI) may be obtained for further evaluation.

## 2018-01-26 NOTE — PROGRESS NOTE ADULT - ASSESSMENT
86 y/o female with PMH of Atrial fibrillation on coumadin, CHF EF normal, Clostridium difficile diarrhea, Gout, Hypertension, Mitral valve regurgitation presents to ED with RLE pain, swelling, redness, discoloration and admitted for cellulitis. Evaluated by vascular /general surgery and no surgical  intervention recommended- advised for IV antibiotics. Pt continued on IV antibiotics as per ID recommendation and all other home meds resumed.     Plan:    Sepsis due RLE cellulitis:  - Hemodynamically stable, wbc coming down, lactate normalized. Sepsis resolved.  - Per ID- c/w Linezolid and Rociphen.  - Follow up cx.   - Follow up ID.    ALIDA on CKD:  - Cr 1.5--->1.8.  - Hold Lasix and repeat BMP in am- Resume home Lasix if cr remains stable. Consider gentle hydration if cr uptrending.    H/o Afib- C/w home meds BB / dig and coumadin per INR reading.  HTN- Resume home meds atenolol  DM- A1C 5.5. FS stable- D/c diabetic orders.   CHF- EF normal 10/17, C/w home meds, BB- Hold lasix given ALIDA on CKD. Resume if renal function remains stable.  DVT ppx: On Coumadin.

## 2018-01-26 NOTE — PROGRESS NOTE ADULT - PROBLEM SELECTOR PLAN 1
Blood cultures no growth  CT scan with cellulitis   continue Linezolid   Continue Ceftriaxone  Follow up Cultures  Trend leukocytosis, downtrending  Afebrile

## 2018-01-26 NOTE — PROGRESS NOTE ADULT - SUBJECTIVE AND OBJECTIVE BOX
MASONIDORAGHU CRAFT Female 85y [id number]    Patient is a 85y old  Female who presents with a chief complaint of rt. lower ext. pain (24 Jan 2018 04:35)      Off service note:  Pt seen and examined at bedside- here to b/l LE cellulitis requiring IV abx. Over night patient c/o SOB and stat Lasix dose was given- CXR did not show any acute finding except cardiomegaly. Pt reports LE pain /erythema improving- still has significant erythema- ID follow up noted- c/w IV abx for now.    Review of system:  No fever, chills, nausea, vomiting, headache, dizziness.      PHYSICAL EXAM:    Vital Signs Last 24 Hrs  T(C): 36.4 (26 Jan 2018 04:33), Max: 36.8 (25 Jan 2018 23:26)  T(F): 97.6 (26 Jan 2018 04:33), Max: 98.2 (25 Jan 2018 23:26)  HR: 90 (26 Jan 2018 12:08) (88 - 94)  BP: 132/91 (26 Jan 2018 04:33) (132/91 - 152/90)  BP(mean): --  RR: 18 (26 Jan 2018 04:33) (18 - 18)  SpO2: 98% (26 Jan 2018 04:33) (94% - 98%)    GENERAL: Pt lying comfortably, NAD.  CHEST/LUNG: Clear to auscultation bilaterally; No wheezing.  HEART: S1S2+, irregularly irregular, no murmurs.  ABDOMEN: Soft, Nontender, Nondistended; Bowel sounds present.  Extremities B/l LE edema /erythema. small skin tear noted medially over mid calf area.  NEURO: AAOX2, no focal deficits, no motor r sensory loss.  PSYCH: normal mood.        MEDICATIONS  (STANDING):  ATENolol  Tablet 25 milliGRAM(s) Oral daily  cefTRIAXone   IVPB 1 Gram(s) IV Intermittent every 24 hours  dextrose 5%. 1000 milliLiter(s) (50 mL/Hr) IV Continuous <Continuous>  dextrose 50% Injectable 12.5 Gram(s) IV Push once  dextrose 50% Injectable 25 Gram(s) IV Push once  dextrose 50% Injectable 25 Gram(s) IV Push once  digoxin     Tablet 0.125 milliGRAM(s) Oral every other day  insulin lispro (HumaLOG) corrective regimen sliding scale   SubCutaneous three times a day before meals  insulin lispro (HumaLOG) corrective regimen sliding scale   SubCutaneous at bedtime  lactobacillus acidophilus 1 Tablet(s) Oral two times a day with meals  linezolid    Tablet 600 milliGRAM(s) Oral every 12 hours  sodium chloride 0.9%. 1000 milliLiter(s) (80 mL/Hr) IV Continuous <Continuous>    MEDICATIONS  (PRN):  acetaminophen   Tablet 650 milliGRAM(s) Oral every 6 hours PRN For Temp greater than or equal to 38 C (100.4 F)  acetaminophen   Tablet. 650 milliGRAM(s) Oral every 6 hours PRN Moderate Pain (4 - 6)  dextrose Gel 1 Dose(s) Oral once PRN Blood Glucose LESS THAN 70 milliGRAM(s)/deciliter  glucagon  Injectable 1 milliGRAM(s) IntraMuscular once PRN Glucose LESS THAN 70 milligrams/deciliter        Labs:  LABS:                        13.3   13.6  )-----------( 202      ( 26 Jan 2018 06:44 )             40.9     01-26    140  |  101  |  41.0<H>  ----------------------------<  116<H>  4.1   |  22.0  |  1.85<H>    Ca    8.9      26 Jan 2018 06:44      PT/INR - ( 26 Jan 2018 06:44 )   PT: 25.3 sec;   INR: 2.26 ratio

## 2018-01-27 DIAGNOSIS — N18.3 CHRONIC KIDNEY DISEASE, STAGE 3 (MODERATE): ICD-10-CM

## 2018-01-27 DIAGNOSIS — I34.0 NONRHEUMATIC MITRAL (VALVE) INSUFFICIENCY: ICD-10-CM

## 2018-01-27 DIAGNOSIS — I10 ESSENTIAL (PRIMARY) HYPERTENSION: ICD-10-CM

## 2018-01-27 DIAGNOSIS — I50.32 CHRONIC DIASTOLIC (CONGESTIVE) HEART FAILURE: ICD-10-CM

## 2018-01-27 DIAGNOSIS — I48.2 CHRONIC ATRIAL FIBRILLATION: ICD-10-CM

## 2018-01-27 DIAGNOSIS — Z29.9 ENCOUNTER FOR PROPHYLACTIC MEASURES, UNSPECIFIED: ICD-10-CM

## 2018-01-27 LAB
ANION GAP SERPL CALC-SCNC: 18 MMOL/L — HIGH (ref 5–17)
BUN SERPL-MCNC: 45 MG/DL — HIGH (ref 8–20)
CALCIUM SERPL-MCNC: 9.1 MG/DL — SIGNIFICANT CHANGE UP (ref 8.6–10.2)
CHLORIDE SERPL-SCNC: 102 MMOL/L — SIGNIFICANT CHANGE UP (ref 98–107)
CO2 SERPL-SCNC: 21 MMOL/L — LOW (ref 22–29)
CREAT SERPL-MCNC: 1.97 MG/DL — HIGH (ref 0.5–1.3)
GLUCOSE SERPL-MCNC: 141 MG/DL — HIGH (ref 70–115)
HCT VFR BLD CALC: 40 % — SIGNIFICANT CHANGE UP (ref 37–47)
HGB BLD-MCNC: 12.7 G/DL — SIGNIFICANT CHANGE UP (ref 12–16)
INR BLD: 4.63 RATIO — HIGH (ref 0.88–1.16)
MCHC RBC-ENTMCNC: 31.8 G/DL — LOW (ref 32–36)
MCHC RBC-ENTMCNC: 32.1 PG — HIGH (ref 27–31)
MCV RBC AUTO: 101 FL — HIGH (ref 81–99)
PLATELET # BLD AUTO: 173 K/UL — SIGNIFICANT CHANGE UP (ref 150–400)
POTASSIUM SERPL-MCNC: 3.8 MMOL/L — SIGNIFICANT CHANGE UP (ref 3.5–5.3)
POTASSIUM SERPL-SCNC: 3.8 MMOL/L — SIGNIFICANT CHANGE UP (ref 3.5–5.3)
PROTHROM AB SERPL-ACNC: 52.5 SEC — HIGH (ref 9.8–12.7)
RBC # BLD: 3.96 M/UL — LOW (ref 4.4–5.2)
RBC # FLD: 15 % — SIGNIFICANT CHANGE UP (ref 11–15.6)
SODIUM SERPL-SCNC: 141 MMOL/L — SIGNIFICANT CHANGE UP (ref 135–145)
WBC # BLD: 8.5 K/UL — SIGNIFICANT CHANGE UP (ref 4.8–10.8)
WBC # FLD AUTO: 8.5 K/UL — SIGNIFICANT CHANGE UP (ref 4.8–10.8)

## 2018-01-27 PROCEDURE — 99233 SBSQ HOSP IP/OBS HIGH 50: CPT

## 2018-01-27 RX ORDER — FUROSEMIDE 40 MG
40 TABLET ORAL DAILY
Qty: 0 | Refills: 0 | Status: DISCONTINUED | OUTPATIENT
Start: 2018-01-27 | End: 2018-01-28

## 2018-01-27 RX ORDER — FUROSEMIDE 40 MG
80 TABLET ORAL DAILY
Qty: 0 | Refills: 0 | Status: DISCONTINUED | OUTPATIENT
Start: 2018-01-27 | End: 2018-01-27

## 2018-01-27 RX ADMIN — Medication 1 TABLET(S): at 10:39

## 2018-01-27 RX ADMIN — LINEZOLID 600 MILLIGRAM(S): 600 INJECTION, SOLUTION INTRAVENOUS at 05:47

## 2018-01-27 RX ADMIN — LINEZOLID 600 MILLIGRAM(S): 600 INJECTION, SOLUTION INTRAVENOUS at 18:11

## 2018-01-27 RX ADMIN — CEFTRIAXONE 100 GRAM(S): 500 INJECTION, POWDER, FOR SOLUTION INTRAMUSCULAR; INTRAVENOUS at 15:19

## 2018-01-27 RX ADMIN — Medication 1 TABLET(S): at 18:11

## 2018-01-27 NOTE — PROGRESS NOTE ADULT - ATTENDING COMMENTS
INR supratherapeutic - No coumadin. INR in am. Observe clinically.  Given moderate to severe VHD poor prognosis, as not a surgical candidate per .  Risk for cardiorenal syndrome.  Palliative care appropriate.  Need to address advance directives next meeting

## 2018-01-27 NOTE — PROGRESS NOTE ADULT - PROBLEM SELECTOR PLAN 1
Continue antibiotics - Will change to PO upon discharge.  Keep RLE elevated  Follow up with Vascular surgery upon discharge.

## 2018-01-27 NOTE — PROGRESS NOTE ADULT - SUBJECTIVE AND OBJECTIVE BOX
HOSPITALIST PROGRESS NOTE    RAGHU DAVIS  [id number]  85yFemale    Patient is a 85y old  Female who presents with a chief complaint of rt. lower ext. pain (2018 04:35)      SUBJECTIVE:   Chart reviewed since last visit.  Patient seen and examined at bedside.      OBJECTIVE:  Vital Signs Last 24 Hrs  T(C): 36.2 (2018 04:46), Max: 36.3 (2018 12:08)  T(F): 97.2 (2018 04:46), Max: 97.4 (2018 12:08)  HR: 82 (2018 04:46) (74 - 90)  BP: 142/69 (2018 04:46) (121/74 - 142/69)  BP(mean): --  RR: 16 (2018 04:46) (16 - 18)  SpO2: 97% (2018 04:46) (97% - 97%)    PHYSICAL EXAMINATION  General: NAD[]   nontoxic[]   ill-appearing[]  Obese[]  HEENT: AT/NC[]  PERRLA[]  EOMI[]   Moist oral mucosa[]  Pharyngeal exudates[]  NECK: Supple[]  JVD[] Carotid bruit[]  CVS: RRR[]  Irregular[]  S1+S2[]   Murmur[]  RESP: Fair air entry bilaterally[]   Clear sounds[]   poor effort []  wheeze[]   Crackles[]  GI: Soft[]  Nondistended[]   Nontender[]   Bowel Sounds[]  Mass[]   HSM[]   Ascites[]  : suprapubic tenderness[]   CVA Tenderness[]   Montez[]  MS: FROM[]   Edema[]  CNS: AAOx3[]    Motor strength /5     DTR +    Sensory    Coordination    Gait  INTEG: Skin is Warm[]  dry[] Lesion[] Decubitus[]  PSYCH: Mood, Affect    MONITOR:  CAPILLARY BLOOD GLUCOSE      POCT Blood Glucose.: 120 mg/dL (2018 11:25)        I&O's Summary                          13.3   13.6  )-----------( 202      ( 2018 06:44 )             40.9     PT/INR - ( 2018 06:44 )   PT: 25.3 sec;   INR: 2.26 ratio           -    140  |  101  |  41.0<H>  ----------------------------<  116<H>  4.1   |  22.0  |  1.85<H>    Ca    8.9      2018 06:44              Culture:  Culture - Blood (18 @ 19:36)    Specimen Source: .Blood Blood-Peripheral    Culture Results:   No growth at 48 hours    Culture - Blood (18 @ 19:36)    Specimen Source: .Blood Blood-Peripheral    Culture Results:   No growth at 48 hours        TTE:  < from: TTE Echo Complete w/Doppler (10.05.17 @ 16:28) >    EXAM:  ECHO TRANSTHORACIC COMP W DOPP      PROCEDURE DATE:  Oct  5 2017   .      INTERPRETATION:  REPORT:    TRANSTHORACIC ECHOCARDIOGRAM REPORT           Patient Name:   RAGHU DAVIS Patient Location: [facility] Rec #:  [id number]    Accession #:      [id number]  Account #:                    Height:           64.2 in 163.0 cm  YOB: 1932     Weight:           130.1 lb 59.00 kg  Patient Age:    84 years      BSA:              1.63 m²  Patient Gender: F             BP:          120/83 mmHg        Date of Exam:        10/5/2017 4:28:42 PM  Sonographer:         Malik Guallpa  Referring Physician: Kelsea Elizabeth MD     Procedure:   2D Echo/Doppler/Color Doppler Complete.  Indications: Unspecified combined systolic (congestive) and diastolic               (congestive) heart failure - I50.40  Diagnosis:   Unspecified combined systolic (congestive) and diastolic               (congestive) heart failure - I50.40           2D AND M-MODE MEASUREMENTS (normal ranges withinparentheses):  Left Ventricle:                  Normal   Aorta/Left Atrium:            Normal  IVSd (2D):              1.16 cm (0.7-1.1) Left Atrium (2D):  5.14 cm   (1.9-4.0)  LVPWd (2D):             1.21 cm (0.7-1.1) Right Ventricle:  LVIDd (2D):          3.87 cm (3.4-5.7) RVd (2D):        3.64 cm  LVIDs (2D):             2.49 cm  LV FS (2D):             35.7 %   (>25%)  Relative Wall Thickness  0.63    (<0.42)     SPECTRAL DOPPLER ANALYSIS (where applicable):  Aortic Valve: AoV Max Mundo: 2.01 m/s AoV Peak P.2 mmHg AoV Mean P.5 mmHg     LVOT Vmax: 1.08 m/s LVOT VTI: 0.170 m LVOT Diameter: 1.81 cm     AoV Area, Vmax: 1.38 cm² AoV Area, VTI: 1.36 cm² AoV Area, Vmn: 1.23 cm²  Ao VTI: 0.323  Aortic Insufficiency:  AI Half-time: 566msec     Tricuspid Valve and PA/RV Systolic Pressure: TR Max Velocity: 2.92 m/s   RA Pressure: 10 mmHg RVSP/PASP: 44.1 mmHg        PHYSICIAN INTERPRETATION:  Left Ventricle: The left ventricular internal cavity size is normal.   There is mild concentric left ventricular hypertrophy.  Global LV systolic function was normal. Left ventricular ejection   fraction, by visual estimation, is 60 to 65%.  Right Ventricle: Normal right ventricular size and function.  Left Atrium: Severely enlarged left atrium.  Right Atrium: The right atrium is normal in size. The right atrium is   severely dilated.  Pericardium: Trivial pericardial effusion is present.  Mitral Valve: Structurally normal mitral valve, with normal leaflet   excursion. Thickening of the anterior and posterior mitral valve   leaflets. There is mild to moderate mitral annular calcification.   Moderate to severe mitral valve regurgitation is seen. The MR jet is   eccentric posteriorly directed.  Tricuspid Valve: Structurally normal tricuspid valve, with normal leaflet   excursion. Severe tricuspid regurgitation is visualized. Estimated   pulmonary artery systolic pressure is 44.1 mmHg assuming a right atrial   pressure of 10 mmHg, which is consistent with mild pulmonary hypertension.  Aortic Valve: The aortic valve is trileaflet. Mild aortic stenosis is   present. Moderate aortic valve regurgitation is seen.  Pulmonic Valve: The pulmonic valve was not well visualized.        Summary:   1. Left ventricular ejection fraction, by visual estimation, is 60 to   65%.   2. Normal global left ventricular systolic function.   3. Normal left ventricular internal cavity size.   4. There is mild concentric left ventricular hypertrophy.   5. Normal right ventricular size and function.   6. Severely enlarged left atrium.   7. Severely dilated right atrium.   8. The right atrium is normal in size.   9. Mild to moderate mitral annular calcification.  10. Thickening of the anterior and posterior mitral valve leaflets.  11. Moderate to severe mitral valve regurgitation.  12. Mild aortic valve stenosis.  13. Moderate aortic regurgitation.  14. Severe tricuspid regurgitation.  15. Estimated pulmonary artery systolic pressure is 44.1 mmHg assuming a   right atrial pressure of 10 mmHg, which is consistent with mild pulmonary   hypertension.  16. Trivial pericardial effusion.     MD Michael Electronically signed on 10/5/2017 at 5:19:13 PM          *** Final ***                  MAGALI S FRANDY   This document has been electronically signed. Oct  5 2017  4:28PM    < end of copied text >      RADIOLOGY  < from: CT Lower Extremity w/ IV Cont, Right (18 @ 23:10) >     EXAM:  CT LWR EXT IC RT                          PROCEDURE DATE:  2018          INTERPRETATION:  Clinical indication: Right lower extremity cellulitis,   assess osteomyelitis.    Technique: CT axial images of the right leg were obtained following   administration of intravenous contrast. 90 mls of Omnipaque-350   administered without complication. 10 mls discarded. Coronal and sagittal   reformatted images were also obtained.    Comparison: Earlier radiographs of the same date.     Findings: Patient motion degrades images.    Soft tissue: Diffuse stranding without drainable fluid collection in the   visualized soft tissue. Right knee joint effusion. Diffuse muscle bulk   loss with fatty replacement. Atherosclerotic arterial calcification.   Quadriceps enthesopathy. Mild thickening of the Achilles tendon, which   may represent tendinosis although tear is not adequately assessed in this   study.    Bones: Osteopenia. No obvious fracture or dislocation given the extent of   artifact. No obvious bone erosion or periosteal reaction.   Tricompartmental degenerative changes of the right knee.    Impression:    Diffuse stranding in the visualized right leg soft tissue, corresponding   to known cellulitis. No obvious drainable collection in the visualized   right leg superficial soft tissue.    No obvious bone erosion or periosteal reaction. If there is continued   clinical suspicion for osteomyelitis, additional imaging (nuclear scan   and/or MRI) may be obtained for further evaluation.                GEE PERDOMO M.D., ATTENDING RADIOLOGIST  This document has been electronically signed. 2018 12:25AM    < end of copied text >    < from: Xray Tibia + Fibula 2 Views, Right (18 @ 18:20) >     EXAM:  ANKLE-RIGHT                         EXAM:  TIBIA FIBULA-RIGHT                          PROCEDURE DATE:  2018          INTERPRETATION:  History:   Necrotizing fasciitis. Cellulitis.    Technique: 2 x-rays of the right tibia/fibula and 3 x-rays of the right   ankle      Findings/  Impression:Calcified occasions are seen in the vasculature. There is   reticulation of the soft tissues. There is no evidence of subcutaneous   gas or lytic/blastic lesion. Tricompartment degenerative changes are seen   in the knee. No plain film evidence of necrotizing fasciitis.                HERMILA ESPINOSA M.D., ATTENDING RADIOLOGIST  This document has been electronically signed. 2018  1:34PM        < end of copied text >    < from: US Duplex Venous Lower Ext Ltd, Right (18 @ 01:07) >   EXAM:  US DPLX LWR EXT VEINS LTD RT                          PROCEDURE DATE:  2018          INTERPRETATION:  Clinical indication: Right lower extremity edema.    Technique:  Grayscale, color Doppler and spectral Doppler ultrasound was   utilized to evaluate the right lower extremity deep venous system.    Comparison: 10/9/2017..    Findings: There is no thrombosis in the right common femoral vein,   femoral vein, popliteal vein or tibioperoneal trunk. The remaining right   calf veins were not visualized due to edema, limiting evaluation.    Impression:      No thrombosis in the right common femoral vein, femoral vein, popliteal   vein or tibioperoneal trunk. The remaining right calf veins were not   visualized due to edema, limiting evaluation.                GEE PERDOMO M.D., ATTENDING RADIOLOGIST  This document has been electronically signed. 2018  1:10AM    < end of copied text >      MEDICATIONS  (STANDING):  ATENolol  Tablet 25 milliGRAM(s) Oral daily  cefTRIAXone   IVPB 1 Gram(s) IV Intermittent every 24 hours  digoxin     Tablet 0.125 milliGRAM(s) Oral every other day  lactobacillus acidophilus 1 Tablet(s) Oral two times a day with meals  linezolid    Tablet 600 milliGRAM(s) Oral every 12 hours  sodium chloride 0.9%. 1000 milliLiter(s) (80 mL/Hr) IV Continuous <Continuous>      MEDICATIONS  (PRN):  acetaminophen   Tablet 650 milliGRAM(s) Oral every 6 hours PRN For Temp greater than or equal to 38 C (100.4 F)  acetaminophen   Tablet. 650 milliGRAM(s) Oral every 6 hours PRN Moderate Pain (4 - 6) HOSPITALIST PROGRESS NOTE    RAGHU PADRONSONIDOLUANA  [id number]  85yFemale    Patient is a 85y old  Female who presents with a chief complaint of rt. lower ext. pain (2018 04:35)      SUBJECTIVE:   Chart reviewed since admission; discussed with Dr Horan  Patient seen and examined at bedside for cellulitis RLE.  Denies any pain, fever, chills or dyspnea.      OBJECTIVE:  Vital Signs Last 24 Hrs  T(C): 36.2 (2018 04:46), Max: 36.3 (2018 12:08)  T(F): 97.2 (2018 04:46), Max: 97.4 (2018 12:08)  HR: 82 (2018 04:46) (74 - 90)  BP: 142/69 (2018 04:46) (121/74 - 142/69)  RR: 16 (2018 04:46) (16 - 18)  SpO2: 97% (2018 04:46) (97% - 97%)    PHYSICAL EXAMINATION  General: Elderly, NAD[+]   nontoxic[]   ill-appearing[]  Obese[]  HEENT: AT/NC[+]  PERRLA[]  EOMI[+]   Moist oral mucosa[+]  Pharyngeal exudates[]  NECK: Supple[+]  JVD[] Carotid bruit[]  CVS: RRR[]  Irregular[+]  S1+S2[+]   Murmur[]  RESP: Fair air entry bilaterally[+]   Clear sounds[]   poor effort []  wheeze[]   Crackles[]  GI: Soft[+]  Nondistended[]   Nontender[+]   Bowel Sounds[+]  Mass[]   HSM[]   Ascites[]  : suprapubic tenderness[-]   CVA Tenderness[]   Montez[]  MS: RLE slightly swollen and shiny, with some oozing from medial wound. No erythema, though still with mild tenderness  CNS: AAOx2[+]   Answers appropriately, though seems lost at times  INTEG: Skin is Warm[+]    PSYCH: Fair Mood, Affect> Forgetful      POCT Blood Glucose.: 120 mg/dL (2018 11:25)        I&O's Summary                          13.3   13.6  )-----------( 202      ( 2018 06:44 )             40.9     PT/INR - ( 2018 06:44 )   PT: 25.3 sec;   INR: 2.26 ratio               140  |  101  |  41.0<H>  ----------------------------<  116<H>  4.1   |  22.0  |  1.85<H>    Ca    8.9      2018 06:44              Culture:  Culture - Blood (18 @ 19:36)    Specimen Source: .Blood Blood-Peripheral    Culture Results:   No growth at 48 hours    Culture - Blood (18 @ 19:36)    Specimen Source: .Blood Blood-Peripheral    Culture Results:   No growth at 48 hours        TTE:  < from: TTE Echo Complete w/Doppler (10.05.17 @ 16:28) >    EXAM:  ECHO TRANSTHORACIC COMP W DOPP      PROCEDURE DATE:  Oct  5 2017   .      INTERPRETATION:  REPORT:    TRANSTHORACIC ECHOCARDIOGRAM REPORT           Patient Name:   RAGHU DAVIS Patient Location: [facility]  Medical Rec #:  [id number]    Accession #:      [id number]  Account #:                    Height:           64.2 in 163.0 cm  YOB: 1932     Weight:           130.1 lb 59.00 kg  Patient Age:    84 years      BSA:              1.63 m²  Patient Gender: F             BP:          120/83 mmHg        Date of Exam:        10/5/2017 4:28:42 PM  Sonographer:         Malik Guallpa  Referring Physician: Kelsea Elizabeth MD     Procedure:   2D Echo/Doppler/Color Doppler Complete.  Indications: Unspecified combined systolic (congestive) and diastolic               (congestive) heart failure - I50.40  Diagnosis:   Unspecified combined systolic (congestive) and diastolic               (congestive) heart failure - I50.40           2D AND M-MODE MEASUREMENTS (normal ranges withinparentheses):  Left Ventricle:                  Normal   Aorta/Left Atrium:            Normal  IVSd (2D):              1.16 cm (0.7-1.1) Left Atrium (2D):  5.14 cm   (1.9-4.0)  LVPWd (2D):             1.21 cm (0.7-1.1) Right Ventricle:  LVIDd (2D):          3.87 cm (3.4-5.7) RVd (2D):        3.64 cm  LVIDs (2D):             2.49 cm  LV FS (2D):             35.7 %   (>25%)  Relative Wall Thickness  0.63    (<0.42)     SPECTRAL DOPPLER ANALYSIS (where applicable):  Aortic Valve: AoV Max Mundo: 2.01 m/s AoV Peak P.2 mmHg AoV Mean P.5 mmHg     LVOT Vmax: 1.08 m/s LVOT VTI: 0.170 m LVOT Diameter: 1.81 cm     AoV Area, Vmax: 1.38 cm² AoV Area, VTI: 1.36 cm² AoV Area, Vmn: 1.23 cm²  Ao VTI: 0.323  Aortic Insufficiency:  AI Half-time: 566msec     Tricuspid Valve and PA/RV Systolic Pressure: TR Max Velocity: 2.92 m/s   RA Pressure: 10 mmHg RVSP/PASP: 44.1 mmHg        PHYSICIAN INTERPRETATION:  Left Ventricle: The left ventricular internal cavity size is normal.   There is mild concentric left ventricular hypertrophy.  Global LV systolic function was normal. Left ventricular ejection   fraction, by visual estimation, is 60 to 65%.  Right Ventricle: Normal right ventricular size and function.  Left Atrium: Severely enlarged left atrium.  Right Atrium: The right atrium is normal in size. The right atrium is   severely dilated.  Pericardium: Trivial pericardial effusion is present.  Mitral Valve: Structurally normal mitral valve, with normal leaflet   excursion. Thickening of the anterior and posterior mitral valve   leaflets. There is mild to moderate mitral annular calcification.   Moderate to severe mitral valve regurgitation is seen. The MR jet is   eccentric posteriorly directed.  Tricuspid Valve: Structurally normal tricuspid valve, with normal leaflet   excursion. Severe tricuspid regurgitation is visualized. Estimated   pulmonary artery systolic pressure is 44.1 mmHg assuming a right atrial   pressure of 10 mmHg, which is consistent with mild pulmonary hypertension.  Aortic Valve: The aortic valve is trileaflet. Mild aortic stenosis is   present. Moderate aortic valve regurgitation is seen.  Pulmonic Valve: The pulmonic valve was not well visualized.        Summary:   1. Left ventricular ejection fraction, by visual estimation, is 60 to   65%.   2. Normal global left ventricular systolic function.   3. Normal left ventricular internal cavity size.   4. There is mild concentric left ventricular hypertrophy.   5. Normal right ventricular size and function.   6. Severely enlarged left atrium.   7. Severely dilated right atrium.   8. The right atrium is normal in size.   9. Mild to moderate mitral annular calcification.  10. Thickening of the anterior and posterior mitral valve leaflets.  11. Moderate to severe mitral valve regurgitation.  12. Mild aortic valve stenosis.  13. Moderate aortic regurgitation.  14. Severe tricuspid regurgitation.  15. Estimated pulmonary artery systolic pressure is 44.1 mmHg assuming a   right atrial pressure of 10 mmHg, which is consistent with mild pulmonary   hypertension.  16. Trivial pericardial effusion.     MD Micahel Electronically signed on 10/5/2017 at 5:19:13 PM          *** Final ***                  MAGALI WISE   This document has been electronically signed. Oct  5 2017  4:28PM    < end of copied text >      RADIOLOGY  < from: CT Lower Extremity w/ IV Cont, Right (18 @ 23:10) >     EXAM:  CT LWR EXT IC RT                          PROCEDURE DATE:  2018          INTERPRETATION:  Clinical indication: Right lower extremity cellulitis,   assess osteomyelitis.    Technique: CT axial images of the right leg were obtained following   administration of intravenous contrast. 90 mls of Omnipaque-350   administered without complication. 10 mls discarded. Coronal and sagittal   reformatted images were also obtained.    Comparison: Earlier radiographs of the same date.     Findings: Patient motion degrades images.    Soft tissue: Diffuse stranding without drainable fluid collection in the   visualized soft tissue. Right knee joint effusion. Diffuse muscle bulk   loss with fatty replacement. Atherosclerotic arterial calcification.   Quadriceps enthesopathy. Mild thickening of the Achilles tendon, which   may represent tendinosis although tear is not adequately assessed in this   study.    Bones: Osteopenia. No obvious fracture or dislocation given the extent of   artifact. No obvious bone erosion or periosteal reaction.   Tricompartmental degenerative changes of the right knee.    Impression:    Diffuse stranding in the visualized right leg soft tissue, corresponding   to known cellulitis. No obvious drainable collection in the visualized   right leg superficial soft tissue.    No obvious bone erosion or periosteal reaction. If there is continued   clinical suspicion for osteomyelitis, additional imaging (nuclear scan   and/or MRI) may be obtained for further evaluation.                GEE PERDOMO M.D., ATTENDING RADIOLOGIST  This document has been electronically signed. 2018 12:25AM    < end of copied text >    < from: Xray Tibia + Fibula 2 Views, Right (18 @ 18:20) >     EXAM:  ANKLE-RIGHT                         EXAM:  TIBIA FIBULA-RIGHT                          PROCEDURE DATE:  2018          INTERPRETATION:  History:   Necrotizing fasciitis. Cellulitis.    Technique: 2 x-rays of the right tibia/fibula and 3 x-rays of the right   ankle      Findings/  Impression:Calcified occasions are seen in the vasculature. There is   reticulation of the soft tissues. There is no evidence of subcutaneous   gas or lytic/blastic lesion. Tricompartment degenerative changes are seen   in the knee. No plain film evidence of necrotizing fasciitis.                HERMILA ESPINOSA M.D., ATTENDING RADIOLOGIST  This document has been electronically signed. 2018  1:34PM        < end of copied text >    < from: US Duplex Venous Lower Ext Ltd, Right (18 @ 01:07) >   EXAM:  US DPLX LWR EXT VEINS LTD RT                          PROCEDURE DATE:  2018          INTERPRETATION:  Clinical indication: Right lower extremity edema.    Technique:  Grayscale, color Doppler and spectral Doppler ultrasound was   utilized to evaluate the right lower extremity deep venous system.    Comparison: 10/9/2017..    Findings: There is no thrombosis in the right common femoral vein,   femoral vein, popliteal vein or tibioperoneal trunk. The remaining right   calf veins were not visualized due to edema, limiting evaluation.    Impression:      No thrombosis in the right common femoral vein, femoral vein, popliteal   vein or tibioperoneal trunk. The remaining right calf veins were not   visualized due to edema, limiting evaluation.                GEE PERDOMO M.D., ATTENDING RADIOLOGIST  This document has been electronically signed. 2018  1:10AM    < end of copied text >      MEDICATIONS  (STANDING):  ATENolol  Tablet 25 milliGRAM(s) Oral daily  cefTRIAXone   IVPB 1 Gram(s) IV Intermittent every 24 hours  digoxin     Tablet 0.125 milliGRAM(s) Oral every other day  lactobacillus acidophilus 1 Tablet(s) Oral two times a day with meals  linezolid    Tablet 600 milliGRAM(s) Oral every 12 hours  sodium chloride 0.9%. 1000 milliLiter(s) (80 mL/Hr) IV Continuous <Continuous>      MEDICATIONS  (PRN):  acetaminophen   Tablet 650 milliGRAM(s) Oral every 6 hours PRN For Temp greater than or equal to 38 C (100.4 F)  acetaminophen   Tablet. 650 milliGRAM(s) Oral every 6 hours PRN Moderate Pain (4 - 6)

## 2018-01-27 NOTE — PROGRESS NOTE ADULT - ASSESSMENT
85 year old female with PMH HTN, cAF on Coumadin, MR, CHFpEF presented with RLE pain, erythema and swelling. Noted to have confusion at admission (?encephalopathy), leukocytosis (WBC 22, lactic acidosis  3.1) and imaging including CT consistent with RLE Cellulitis without abscess. Initially treated with Azactam/Clindamycin/Vancomycin in ER, later switched to Ceftriaxone/Zyvox by ID. Also seen in consultation by Vascular surgery given possible history of PAD, skin graft with recommendations for follow up with primary vascular surgeon Dr Braswell upon discharge.  Improving leukocytosis, lactate cleared, remains afebrile.   Blood cultures negative.      HTN - well controlled on current regimen.    AF - rate controlled, INR therapeutic    CHFpEF, recent TTE (10/2017) with preserved EF 60-65%, moderate to severe MR, Mild AS, Moderate AR, severe TR, Mild PAH.    CKD 3    Hyponatremia, hypokalemia present at admission - resolved. 85 year old female with PMH HTN, cAF on Coumadin, MR, CHFpEF presented with RLE pain, erythema and swelling. Noted to have confusion at admission (?encephalopathy), leukocytosis (WBC 22, lactic acidosis  3.1) and imaging including CT consistent with RLE Cellulitis without abscess. Initially treated with Azactam/Clindamycin/Vancomycin in ER, later switched to Ceftriaxone/Zyvox by ID. Also seen in consultation by Vascular surgery given possible history of PAD, skin graft with recommendations for follow up with primary vascular surgeon Dr Braswell upon discharge.  Improving leukocytosis, lactate cleared, remains afebrile.   Blood cultures negative.      HTN - well controlled on current regimen.    AF - rate controlled, INR therapeutic    CHFpEF, recent TTE (10/2017) with preserved EF 60-65%, moderate to severe MR, Mild AS, Moderate AR, severe TR, Mild PAH.    CKD 3, renal insufficiency    Hyponatremia, hypokalemia present at admission - resolved.

## 2018-01-28 LAB
ANION GAP SERPL CALC-SCNC: 16 MMOL/L — SIGNIFICANT CHANGE UP (ref 5–17)
BUN SERPL-MCNC: 40 MG/DL — HIGH (ref 8–20)
CALCIUM SERPL-MCNC: 9.1 MG/DL — SIGNIFICANT CHANGE UP (ref 8.6–10.2)
CHLORIDE SERPL-SCNC: 102 MMOL/L — SIGNIFICANT CHANGE UP (ref 98–107)
CO2 SERPL-SCNC: 22 MMOL/L — SIGNIFICANT CHANGE UP (ref 22–29)
CREAT SERPL-MCNC: 1.61 MG/DL — HIGH (ref 0.5–1.3)
CULTURE RESULTS: SIGNIFICANT CHANGE UP
CULTURE RESULTS: SIGNIFICANT CHANGE UP
GLUCOSE BLDC GLUCOMTR-MCNC: 123 MG/DL — HIGH (ref 70–99)
GLUCOSE SERPL-MCNC: 91 MG/DL — SIGNIFICANT CHANGE UP (ref 70–115)
HCT VFR BLD CALC: 42.2 % — SIGNIFICANT CHANGE UP (ref 37–47)
HGB BLD-MCNC: 13.4 G/DL — SIGNIFICANT CHANGE UP (ref 12–16)
INR BLD: 7.01 RATIO — CRITICAL HIGH (ref 0.88–1.16)
MCHC RBC-ENTMCNC: 31.8 G/DL — LOW (ref 32–36)
MCHC RBC-ENTMCNC: 32.4 PG — HIGH (ref 27–31)
MCV RBC AUTO: 101.9 FL — HIGH (ref 81–99)
PLATELET # BLD AUTO: 168 K/UL — SIGNIFICANT CHANGE UP (ref 150–400)
POTASSIUM SERPL-MCNC: 4.6 MMOL/L — SIGNIFICANT CHANGE UP (ref 3.5–5.3)
POTASSIUM SERPL-SCNC: 4.6 MMOL/L — SIGNIFICANT CHANGE UP (ref 3.5–5.3)
PROTHROM AB SERPL-ACNC: 80.2 SEC — HIGH (ref 9.8–12.7)
RBC # BLD: 4.14 M/UL — LOW (ref 4.4–5.2)
RBC # FLD: 15 % — SIGNIFICANT CHANGE UP (ref 11–15.6)
SODIUM SERPL-SCNC: 140 MMOL/L — SIGNIFICANT CHANGE UP (ref 135–145)
SPECIMEN SOURCE: SIGNIFICANT CHANGE UP
SPECIMEN SOURCE: SIGNIFICANT CHANGE UP
WBC # BLD: 9.8 K/UL — SIGNIFICANT CHANGE UP (ref 4.8–10.8)
WBC # FLD AUTO: 9.8 K/UL — SIGNIFICANT CHANGE UP (ref 4.8–10.8)

## 2018-01-28 PROCEDURE — 93880 EXTRACRANIAL BILAT STUDY: CPT | Mod: 26

## 2018-01-28 PROCEDURE — 99233 SBSQ HOSP IP/OBS HIGH 50: CPT

## 2018-01-28 PROCEDURE — 70450 CT HEAD/BRAIN W/O DYE: CPT | Mod: 26

## 2018-01-28 PROCEDURE — 99232 SBSQ HOSP IP/OBS MODERATE 35: CPT

## 2018-01-28 RX ORDER — CEPHALEXIN 500 MG
500 CAPSULE ORAL EVERY 12 HOURS
Qty: 0 | Refills: 0 | Status: COMPLETED | OUTPATIENT
Start: 2018-01-28 | End: 2018-01-31

## 2018-01-28 RX ADMIN — Medication 0.12 MILLIGRAM(S): at 12:52

## 2018-01-28 RX ADMIN — LINEZOLID 600 MILLIGRAM(S): 600 INJECTION, SOLUTION INTRAVENOUS at 05:47

## 2018-01-28 RX ADMIN — Medication 500 MILLIGRAM(S): at 17:53

## 2018-01-28 RX ADMIN — Medication 1 TABLET(S): at 12:51

## 2018-01-28 RX ADMIN — Medication 1 TABLET(S): at 17:53

## 2018-01-28 RX ADMIN — Medication 40 MILLIGRAM(S): at 05:47

## 2018-01-28 RX ADMIN — ATENOLOL 25 MILLIGRAM(S): 25 TABLET ORAL at 05:47

## 2018-01-28 NOTE — PROGRESS NOTE ADULT - ATTENDING COMMENTS
INR supratherapeutic - No coumadin. INR in am. Observe clinically.  Given moderate to severe VHD poor prognosis, as not a surgical candidate per .  Risk for cardiorenal syndrome.  Palliative care appropriate.  Need to address advance directives next meeting Reviewed advance directives - patient unclear but will discuss with family

## 2018-01-28 NOTE — CHART NOTE - NSCHARTNOTEFT_GEN_A_CORE
Rapid Response PGY 2/ PGY 3 Note    [id number]  RAGHU DAVIS    Yamileth Caballero was called on a 85y year old Female patient for slurred speech  Patient has a past medical history of HTN, cAF on Coumadin, MR, CHFpEF & cellulitis.     Patient was seen and examined at the bedside by the rapid response team. As per the nurse at bedside, rapid response was called because the patient was noted to have acute onset slurring of speech compared to her baseline.     Allergies    allopurinol (Rash)  aspirin (Unknown)  azithromycin (Diarrhea)  clindamycin (Diarrhea)  latex (Unknown)  morphine (Other; Short breath)  penicillins (Unknown)  sulfa drugs (Unknown)    Intolerances        PAST MEDICAL & SURGICAL HISTORY:  Clostridium difficile diarrhea  CHF (congestive heart failure)  Gout  Mitral valve regurgitation  Hypertension  Atrial fibrillation  H/O skin graft      Vital Signs  T(F): 97.7  BP: 133/87  HR: 74  RR: 20  SpO2: 93% on RA    PHYSICAL EXAM:    GENERAL: NAD, well-groomed, well-developed  HEAD:  Atraumatic, Normocephalic  EYES: EOMI, PERRLA, conjunctiva and sclera clear  ENMT: No tonsillar erythema, exudates, or enlargement; Moist mucous membranes, Good dentition, No lesions  NECK: Supple, No JVD, Normal thyroid  NERVOUS SYSTEM:  Alert & Oriented X3, Good concentration; Motor Strength 5/5 B/L upper and lower extremities; DTRs 2+ intact and symmetric, NIHSS = 0  CHEST/LUNG: Clear to percussion bilaterally; No rales, rhonchi, wheezing, or rubs  HEART: Regular rate and rhythm; No murmurs, rubs, or gallops  ABDOMEN: Soft, Nontender, Nondistended; Bowel sounds present  EXTREMITIES:  2+ Peripheral Pulses, No clubbing, cyanosis, or edema,  RLE cellulitis w/ mild tenderness   SKIN: No rashes or lesions      01-27 @ 07:01  -  01-28 @ 07:00  --------------------------------------------------------  IN: 0 mL / OUT: 2 mL / NET: -2 mL                              13.4   9.8   )-----------( 168      ( 28 Jan 2018 09:46 )             42.2     01-28    140  |  102  |  40.0<H>  ----------------------------<  91  4.6   |  22.0  |  1.61<H>    Ca    9.1      28 Jan 2018 09:46      PT/INR - ( 28 Jan 2018 09:46 )   PT: 80.2 sec;   INR: 7.01 ratio             Vital Signs Last 24 Hrs*       Assessment- 85 year old female w/ PMH HTN, chronic AF (on Coumadin), MR, CHFpEF presented with RLE pain, erythema and swelling admitted for RLE cellulitis found to have acute onset slurred speech concerning for stroke/TIA. NIHSS was 0.     Plan-  Slurred speech 2/2 to stroke/TIA vs   -Unlikely to be stroke/TIA as NIHSS was 0 but will order head CT & Carotid US to exclude stroke  -Finger stick at bedside was 123.   -Plan was discussed  w/ family at bedside who appreciated the update  -Case was discussed w/ the attending (Dr. Valentin) & SROC (Dr. Zamorano) who agrees with the plan Rapid Response PGY 2/ PGY 3 Note    [id number]  RAGHU DAVIS    Yamileth Caballero was called on a 85y year old Female patient for slurred speech  Patient has a past medical history of HTN, cAF on Coumadin, MR, CHFpEF & cellulitis.     Patient was seen and examined at the bedside by the rapid response team. As per the nurse at bedside, rapid response was called because the patient was noted to have acute onset slurring of speech compared to her baseline by her . Patient was awake & alert x4 (person, time, place, situation) & responsive to verbal commands. NIHSS score performed at beside was 0.      Allergies    allopurinol (Rash)  aspirin (Unknown)  azithromycin (Diarrhea)  clindamycin (Diarrhea)  latex (Unknown)  morphine (Other; Short breath)  penicillins (Unknown)  sulfa drugs (Unknown)    Intolerances        PAST MEDICAL & SURGICAL HISTORY:  Clostridium difficile diarrhea  CHF (congestive heart failure)  Gout  Mitral valve regurgitation  Hypertension  Atrial fibrillation  H/O skin graft      Vital Signs  T(F): 97.7  BP: 133/87  HR: 74  RR: 20  SpO2: 93% on RA    PHYSICAL EXAM:    GENERAL: NAD, well-groomed, well-developed  HEAD:  Atraumatic, Normocephalic  EYES: EOMI, PERRLA, conjunctiva and sclera clear  ENMT: No tonsillar erythema, exudates, or enlargement; Moist mucous membranes, Good dentition, No lesions  NECK: Supple, No JVD, Normal thyroid  NERVOUS SYSTEM:  Alert & Oriented X3, Good concentration; Motor Strength 5/5 B/L upper and lower extremities; DTRs 2+ intact and symmetric, NIHSS = 0  CHEST/LUNG: Clear to percussion bilaterally; No rales, rhonchi, wheezing, or rubs  HEART: Regular rate and rhythm; No murmurs, rubs, or gallops  ABDOMEN: Soft, Nontender, Nondistended; Bowel sounds present  EXTREMITIES:  2+ Peripheral Pulses, No clubbing, cyanosis, or edema,  RLE cellulitis w/ mild tenderness   SKIN: No rashes or lesions      01-27 @ 07:01  -  01-28 @ 07:00  --------------------------------------------------------  IN: 0 mL / OUT: 2 mL / NET: -2 mL                              13.4   9.8   )-----------( 168      ( 28 Jan 2018 09:46 )             42.2     01-28    140  |  102  |  40.0<H>  ----------------------------<  91  4.6   |  22.0  |  1.61<H>    Ca    9.1      28 Jan 2018 09:46      PT/INR - ( 28 Jan 2018 09:46 )   PT: 80.2 sec;   INR: 7.01 ratio             Vital Signs Last 24 Hrs*       Assessment- 85 year old female w/ PMH HTN, chronic AF (on Coumadin), MR, CHFpEF presented with RLE pain, erythema and swelling admitted for RLE cellulitis found to have acute onset slurred speech concerning for stroke/TIA. NIHSS was 0.     Plan-  Slurred speech 2/2 to stroke/TIA vs   -Unlikely to be stroke/TIA as NIHSS was 0 but will order head CT & Carotid US to exclude stroke  -Finger stick at bedside was 123.   -Plan was discussed  w/ family at bedside who appreciated the update  -Case was discussed w/ the attending (Dr. Valentin) & SROC (Dr. Zamorano) who agrees with the plan Rapid Response PGY 2/ PGY 3 Note    [id number]  RAGHU DAVIS    Yamileth Caballero was called on a 85y year old Female patient for slurred speech  Patient has a past medical history of HTN, cAF on Coumadin, MR, CHFpEF & cellulitis.     Patient was seen and examined at the bedside by the rapid response team. As per the nurse at bedside, rapid response was called because the patient was noted to have acute onset slurring of speech compared to her baseline by her . Patient was awake & alert x3 (person, time, place) & responsive to verbal commands. NIHSS score performed at beside was 0.      Allergies    allopurinol (Rash)  aspirin (Unknown)  azithromycin (Diarrhea)  clindamycin (Diarrhea)  latex (Unknown)  morphine (Other; Short breath)  penicillins (Unknown)  sulfa drugs (Unknown)    Intolerances        PAST MEDICAL & SURGICAL HISTORY:  Clostridium difficile diarrhea  CHF (congestive heart failure)  Gout  Mitral valve regurgitation  Hypertension  Atrial fibrillation  H/O skin graft      Vital Signs  T(F): 97.7  BP: 133/87  HR: 74  RR: 20  SpO2: 93% on RA    PHYSICAL EXAM:    GENERAL: NAD, well-groomed, well-developed  HEAD:  Atraumatic, Normocephalic  EYES: EOMI, PERRLA, conjunctiva and sclera clear  ENMT: No tonsillar erythema, exudates, or enlargement; Moist mucous membranes, Good dentition, No lesions  NECK: Supple, No JVD, Normal thyroid  NERVOUS SYSTEM:  Alert & Oriented X3, Good concentration; Motor Strength 5/5 B/L upper and lower extremities; DTRs 2+ intact and symmetric, NIHSS = 0  CHEST/LUNG: Clear to percussion bilaterally; No rales, rhonchi, wheezing, or rubs  HEART: Regular rate and rhythm; No murmurs, rubs, or gallops  ABDOMEN: Soft, Nontender, Nondistended; Bowel sounds present  EXTREMITIES:  2+ Peripheral Pulses, No clubbing, cyanosis, or edema,  RLE cellulitis w/ mild tenderness   SKIN: No rashes or lesions      01-27 @ 07:01  -  01-28 @ 07:00  --------------------------------------------------------  IN: 0 mL / OUT: 2 mL / NET: -2 mL                              13.4   9.8   )-----------( 168      ( 28 Jan 2018 09:46 )             42.2     01-28    140  |  102  |  40.0<H>  ----------------------------<  91  4.6   |  22.0  |  1.61<H>    Ca    9.1      28 Jan 2018 09:46      PT/INR - ( 28 Jan 2018 09:46 )   PT: 80.2 sec;   INR: 7.01 ratio             Vital Signs Last 24 Hrs*       Assessment- 85 year old female w/ PMH HTN, chronic AF (on Coumadin), MR, CHFpEF presented with RLE pain, erythema and swelling admitted for RLE cellulitis found to have acute onset slurred speech concerning for stroke/TIA. NIHSS was 0.     Plan-  Slurred speech 2/2 to stroke/TIA vs   -Unlikely to be stroke/TIA as NIHSS was 0 but will order head CT & Carotid US to exclude stroke  -Finger stick at bedside was 123.   -Plan was discussed  w/ family at bedside who appreciated the update  -Case was discussed w/ the attending (Dr. Valentin) & SROC (Dr. Zamorano) who agrees with the plan Rapid Response PGY 2/ PGY 3 Note    [id number]  RAGHU DAVIS    Yamileth Caballero was called on a 85y year old Female patient for slurred speech  Patient has a past medical history of HTN, cAF on Coumadin, MR, CHFpEF & cellulitis.     Patient was seen and examined at the bedside by the rapid response team. As per the nurse at bedside, rapid response was called because the patient was noted to have acute onset slurring of speech compared to her baseline by her . Patient was awake & alert x3 (person, time, place) & responsive to verbal commands. NIHSS score performed at beside was 0.      Allergies    allopurinol (Rash)  aspirin (Unknown)  azithromycin (Diarrhea)  clindamycin (Diarrhea)  latex (Unknown)  morphine (Other; Short breath)  penicillins (Unknown)  sulfa drugs (Unknown)    Intolerances        PAST MEDICAL & SURGICAL HISTORY:  Clostridium difficile diarrhea  CHF (congestive heart failure)  Gout  Mitral valve regurgitation  Hypertension  Atrial fibrillation  H/O skin graft      Vital Signs  T(F): 97.7  BP: 133/87  HR: 74  RR: 20  SpO2: 93% on RA    PHYSICAL EXAM:    GENERAL: NAD, well-groomed, well-developed  HEAD:  Atraumatic, Normocephalic  EYES: EOMI, PERRLA, conjunctiva and sclera clear  NECK: Supple, No JVD, Normal thyroid  NERVOUS SYSTEM:  Alert & Oriented X3, Good concentration; Motor Strength 5/5 B/L upper and lower extremities; DTRs 2+ intact and symmetric, NIHSS = 0  CHEST/LUNG: Clear to percussion bilaterally; No rales, rhonchi, wheezing, or rubs  HEART: Regular rate and rhythm; No murmurs, rubs, or gallops  ABDOMEN: Soft, Nontender, Nondistended; Bowel sounds present  EXTREMITIES:  2+ Peripheral Pulses, No clubbing, cyanosis, or edema,  RLE cellulitis w/ mild tenderness   SKIN: No rashes or lesions      01-27 @ 07:01  -  01-28 @ 07:00  --------------------------------------------------------  IN: 0 mL / OUT: 2 mL / NET: -2 mL                              13.4   9.8   )-----------( 168      ( 28 Jan 2018 09:46 )             42.2     01-28    140  |  102  |  40.0<H>  ----------------------------<  91  4.6   |  22.0  |  1.61<H>    Ca    9.1      28 Jan 2018 09:46      PT/INR - ( 28 Jan 2018 09:46 )   PT: 80.2 sec;   INR: 7.01 ratio             Vital Signs Last 24 Hrs*       Assessment- 85 year old female w/ PMH HTN, chronic AF (on Coumadin), MR, CHFpEF presented with RLE pain, erythema and swelling admitted for RLE cellulitis found to have acute onset slurred speech concerning for stroke/TIA. NIHSS was 0.     Plan-  Slurred speech 2/2 to stroke/TIA vs   -Unlikely to be stroke/TIA as NIHSS was 0 but will order head CT & Carotid US to exclude stroke  -Finger stick at bedside was 123.   -Plan was discussed  w/ family at bedside who appreciated the update  -Case was discussed w/ the attending (Dr. Valentin) & SROC (Dr. Zamorano) who agrees with the plan Rapid Response PGY 2/ PGY 3 Note    [id number]  RAGHU DAVIS    Yamileth Caballero was called on a 85y year old Female patient for slurred speech  Patient has a past medical history of HTN, cAF on Coumadin, MR, CHFpEF & cellulitis.     Patient was seen and examined at the bedside by the rapid response team. As per the nurse at bedside, rapid response was called because the patient was noted to have acute onset slurring of speech compared to her baseline by her . Patient was awake & alert x3 (person, time, place) & responsive to verbal commands. NIHSS score performed at beside was 0. Finger stick was 123.     Allergies    allopurinol (Rash)  aspirin (Unknown)  azithromycin (Diarrhea)  clindamycin (Diarrhea)  latex (Unknown)  morphine (Other; Short breath)  penicillins (Unknown)  sulfa drugs (Unknown)    Intolerances        PAST MEDICAL & SURGICAL HISTORY:  Clostridium difficile diarrhea  CHF (congestive heart failure)  Gout  Mitral valve regurgitation  Hypertension  Atrial fibrillation  H/O skin graft      Vital Signs  T(F): 97.7  BP: 133/87  HR: 74  RR: 20  SpO2: 93% on RA    PHYSICAL EXAM:    GENERAL: NAD, well-groomed, well-developed  HEAD:  Atraumatic, Normocephalic  EYES: EOMI, PERRLA, conjunctiva and sclera clear  NECK: Supple, No JVD, Normal thyroid  NERVOUS SYSTEM:  Alert & Oriented X3, Good concentration; Motor Strength 5/5 B/L upper and lower extremities; DTRs 2+ intact and symmetric, NIHSS = 0  CHEST/LUNG: Clear to percussion bilaterally; No rales, rhonchi, wheezing, or rubs  HEART: Regular rate and rhythm; No murmurs, rubs, or gallops  ABDOMEN: Soft, Nontender, Nondistended; Bowel sounds present  EXTREMITIES:  2+ Peripheral Pulses, No clubbing, cyanosis, or edema,  RLE cellulitis w/ mild tenderness   SKIN: No rashes or lesions      01-27 @ 07:01  -  01-28 @ 07:00  --------------------------------------------------------  IN: 0 mL / OUT: 2 mL / NET: -2 mL                              13.4   9.8   )-----------( 168      ( 28 Jan 2018 09:46 )             42.2     01-28    140  |  102  |  40.0<H>  ----------------------------<  91  4.6   |  22.0  |  1.61<H>    Ca    9.1      28 Jan 2018 09:46      PT/INR - ( 28 Jan 2018 09:46 )   PT: 80.2 sec;   INR: 7.01 ratio             Vital Signs Last 24 Hrs*       Assessment- 85 year old female w/ PMH HTN, chronic AF (on Coumadin), MR, CHFpEF presented with RLE pain, erythema and swelling admitted for RLE cellulitis found to have acute onset slurred speech concerning for stroke/TIA. NIHSS was 0.     Plan-  Slurred speech 2/2 to stroke/TIA vs   -Unlikely to be stroke/TIA as NIHSS was 0 but will order head CT & Carotid US to exclude stroke  -Finger stick at bedside was 123.   -Plan was discussed  w/ family at bedside who appreciated the update  -Case was discussed w/ the attending (Dr. Valentin) & SROC (Dr. Zamorano) who agrees with the plan Rapid Response PGY 2/ PGY 3 Note    [id number]  RAGHU DAVIS    Yamileth Caballero was called on a 85y year old Female patient for slurred speech  Patient has a past medical history of HTN, cAF on Coumadin, MR, CHFpEF & cellulitis.     Patient was seen and examined at the bedside by the rapid response team. As per the nurse at bedside, rapid response was called because the patient was noted to have acute onset slurring of speech compared to her baseline by her . Patient was awake & alert x3 (person, time, place) & responsive to verbal commands. NIHSS score performed at beside was 0. Finger stick was 123.     Allergies    allopurinol (Rash)  aspirin (Unknown)  azithromycin (Diarrhea)  clindamycin (Diarrhea)  latex (Unknown)  morphine (Other; Short breath)  penicillins (Unknown)  sulfa drugs (Unknown)    Intolerances        PAST MEDICAL & SURGICAL HISTORY:  Clostridium difficile diarrhea  CHF (congestive heart failure)  Gout  Mitral valve regurgitation  Hypertension  Atrial fibrillation  H/O skin graft      Vital Signs  T(F): 97.7  BP: 133/87  HR: 74  RR: 20  SpO2: 93% on RA    PHYSICAL EXAM:    GENERAL: NAD, well-groomed, well-developed  HEAD:  Atraumatic, Normocephalic  EYES: EOMI, PERRLA, conjunctiva and sclera clear  NECK: Supple, No JVD, Normal thyroid  NERVOUS SYSTEM:  Alert & Oriented X3, Good concentration; Motor Strength 5/5 B/L upper and lower extremities; DTRs 2+ intact and symmetric, NIHSS = 0  CHEST/LUNG: Clear to percussion bilaterally; No rales, rhonchi, wheezing, or rubs  HEART: Regular rate and rhythm; No murmurs, rubs, or gallops  ABDOMEN: Soft, Nontender, Nondistended; Bowel sounds present  EXTREMITIES:  2+ Peripheral Pulses, No clubbing, cyanosis, or edema,  RLE cellulitis w/ mild tenderness   SKIN: No rashes or lesions      01-27 @ 07:01  -  01-28 @ 07:00  --------------------------------------------------------  IN: 0 mL / OUT: 2 mL / NET: -2 mL                              13.4   9.8   )-----------( 168      ( 28 Jan 2018 09:46 )             42.2     01-28    140  |  102  |  40.0<H>  ----------------------------<  91  4.6   |  22.0  |  1.61<H>    Ca    9.1      28 Jan 2018 09:46      PT/INR - ( 28 Jan 2018 09:46 )   PT: 80.2 sec;   INR: 7.01 ratio             Vital Signs Last 24 Hrs*       Assessment- 85 year old female w/ PMH HTN, chronic AF (on Coumadin), MR, CHFpEF presented with RLE pain, erythema and swelling admitted for RLE cellulitis found to have acute onset slurred speech concerning for stroke/TIA. NIHSS was 0. Finger stick was 123.    Plan-  Slurred speech 2/2 to stroke/TIA vs underlying infection vs other etiologies   -Unlikely to be stroke/TIA given NIHSS was 0 w/ supratherapeutic INR but will order head CT & Carotid US to exclude stroke  -Finger stick at bedside was 123.   -Plan was discussed  w/ family at bedside who appreciated the update  -Case was discussed w/ the attending (Dr. Valentin) & SROC (Dr. Zamorano) who agrees with the plan

## 2018-01-28 NOTE — PROGRESS NOTE ADULT - SUBJECTIVE AND OBJECTIVE BOX
ID FOLLOW UP      ALL C/S NEGATIVE    ANY CELLULITIS IS TOTALLY RESOLVED BILAT LEGS    CHANGE TO PO KEFLEX X 72 HRS    WILL NO LONGER SEE    LILLIANA HUMPHRIES md

## 2018-01-28 NOTE — PROGRESS NOTE ADULT - SUBJECTIVE AND OBJECTIVE BOX
HOSPITALIST PROGRESS NOTE    RAGHU DAVIS  [id number]  85yFemale    Patient is a 85y old  Female who presents with a chief complaint of rt. lower ext. pain (24 Jan 2018 04:35)      SUBJECTIVE:   Chart reviewed since last visit.  Patient seen and examined at bedside.      PHYSICAL EXAMINATION  General: Elderly, NAD[+]   nontoxic[]   ill-appearing[]  Obese[]  HEENT: AT/NC[+]  PERRLA[]  EOMI[+]   Moist oral mucosa[+]  Pharyngeal exudates[]  NECK: Supple[+]  JVD[] Carotid bruit[]  CVS: RRR[]  Irregular[+]  S1+S2[+]   Murmur[]  RESP: Fair air entry bilaterally[+]   Clear sounds[]   poor effort []  wheeze[]   Crackles[]  GI: Soft[+]  Nondistended[]   Nontender[+]   Bowel Sounds[+]  Mass[]   HSM[]   Ascites[]  : suprapubic tenderness[-]   CVA Tenderness[]   Montez[]  MS: RLE slightly swollen and shiny, with some oozing from medial wound. No erythema, though still with mild tenderness  CNS: AAOx2[+]   Answers appropriately, though seems lost at times  INTEG: Skin is Warm[+]    PSYCH: Fair Mood, Affect> Forgetful    MONITOR:  CAPILLARY BLOOD GLUCOSE            I&O's Summary    27 Jan 2018 07:01  -  28 Jan 2018 07:00  --------------------------------------------------------  IN: 0 mL / OUT: 2 mL / NET: -2 mL                            13.4   9.8   )-----------( 168      ( 28 Jan 2018 09:46 )             42.2     PT/INR - ( 28 Jan 2018 09:46 )   PT: 80.2 sec;   INR: 7.01 ratio           01-28    140  |  102  |  40.0<H>  ----------------------------<  91  4.6   |  22.0  |  1.61<H>    Ca    9.1      28 Jan 2018 09:46              Culture:    TTE:    RADIOLOGY        MEDICATIONS  (STANDING):  ATENolol  Tablet 25 milliGRAM(s) Oral daily  cephalexin 500 milliGRAM(s) Oral every 12 hours  digoxin     Tablet 0.125 milliGRAM(s) Oral every other day  lactobacillus acidophilus 1 Tablet(s) Oral two times a day with meals      MEDICATIONS  (PRN):  acetaminophen   Tablet 650 milliGRAM(s) Oral every 6 hours PRN For Temp greater than or equal to 38 C (100.4 F)  acetaminophen   Tablet. 650 milliGRAM(s) Oral every 6 hours PRN Moderate Pain (4 - 6) HOSPITALIST PROGRESS NOTE    RAGHU DAVIS  [id number]  85yFemale    Patient is a 85y old  Female who presents with a chief complaint of rt. lower ext. pain (24 Jan 2018 04:35)      SUBJECTIVE:   Chart reviewed since last visit.  Patient seen and examined at bedside for cellulitis.  Denies any fever, chills or leg pain.  Denies any bleeding from anywhere      PHYSICAL EXAMINATION  General: Elderly, NAD[+]   nontoxic[]   ill-appearing[]  Obese[]  HEENT: AT/NC[+]  PERRLA[]  EOMI[+]   Moist oral mucosa[+]  Pharyngeal exudates[]  NECK: Supple[+]  JVD[] Carotid bruit[]  CVS: RRR[]  Irregular[+]  S1+S2[+]   Murmur[]  RESP: Fair air entry bilaterally[+]   Clear sounds[]   poor effort []  wheeze[]   Crackles[]  GI: Soft[+]  Nondistended[]   Nontender[+]   Bowel Sounds[+]  Mass[]   HSM[]   Ascites[]  : suprapubic tenderness[-]   CVA Tenderness[]   Montez[]  MS: RLE slightly swollen and shiny, with some oozing from medial wound. No erythema, though still with mild tenderness  CNS: AAOx2[+]   Answers appropriately, though seems lost at times  INTEG: Skin is Warm[+]    PSYCH: Fair Mood, Affect> Forgetful    MONITOR:  CAPILLARY BLOOD GLUCOSE            I&O's Summary    27 Jan 2018 07:01  -  28 Jan 2018 07:00  --------------------------------------------------------  IN: 0 mL / OUT: 2 mL / NET: -2 mL                            13.4   9.8   )-----------( 168      ( 28 Jan 2018 09:46 )             42.2     PT/INR - ( 28 Jan 2018 09:46 )   PT: 80.2 sec;   INR: 7.01 ratio           01-28    140  |  102  |  40.0<H>  ----------------------------<  91  4.6   |  22.0  |  1.61<H>    Ca    9.1      28 Jan 2018 09:46            MEDICATIONS  (STANDING):  ATENolol  Tablet 25 milliGRAM(s) Oral daily  cephalexin 500 milliGRAM(s) Oral every 12 hours  digoxin     Tablet 0.125 milliGRAM(s) Oral every other day  lactobacillus acidophilus 1 Tablet(s) Oral two times a day with meals      MEDICATIONS  (PRN):  acetaminophen   Tablet 650 milliGRAM(s) Oral every 6 hours PRN For Temp greater than or equal to 38 C (100.4 F)  acetaminophen   Tablet. 650 milliGRAM(s) Oral every 6 hours PRN Moderate Pain (4 - 6)

## 2018-01-28 NOTE — PROGRESS NOTE ADULT - ASSESSMENT
85 year old female with PMH HTN, cAF on Coumadin, MR, CHFpEF presented with RLE pain, erythema and swelling. Noted to have confusion at admission (?encephalopathy), leukocytosis (WBC 22, lactic acidosis  3.1) and imaging including CT consistent with RLE Cellulitis without abscess. Initially treated with Azactam/Clindamycin/Vancomycin in ER, later switched to Ceftriaxone/Zyvox by ID. Also seen in consultation by Vascular surgery given possible history of PAD, skin graft with recommendations for follow up with primary vascular surgeon Dr Braswell upon discharge.  Improving leukocytosis, lactate cleared, remains afebrile.   Blood cultures negative.      HTN - well controlled on current regimen.    AF - rate controlled, INR therapeutic    CHFpEF, recent TTE (10/2017) with preserved EF 60-65%, moderate to severe MR, Mild AS, Moderate AR, severe TR, Mild PAH.    CKD 3, renal insufficiency    Hyponatremia, hypokalemia present at admission - resolved. 85 year old female with PMH HTN, cAF on Coumadin, MR, CHFpEF presented with RLE pain, erythema and swelling. Noted to have confusion at admission (?encephalopathy), leukocytosis (WBC 22, lactic acidosis  3.1) and imaging including CT consistent with RLE Cellulitis without abscess. Initially treated with Azactam/Clindamycin/Vancomycin in ER, later switched to Ceftriaxone/Zyvox by ID. Also seen in consultation by Vascular surgery given possible history of PAD, skin graft with recommendations for follow up with primary vascular surgeon Dr Braswell upon discharge.  Improving leukocytosis, lactate cleared, remains afebrile. Cellulitis resolving  Blood cultures negative.      HTN - well controlled on current regimen.    AF - rate controlled, INR supra therapeutic    CHFpEF, recent TTE (10/2017) with preserved EF 60-65%, moderate to severe MR, Mild AS, Moderate AR, severe TR, Mild PAH.    CKD 3, renal insufficiency, improved SCr today    Hyponatremia, hypokalemia present at admission - resolved.

## 2018-01-28 NOTE — PROGRESS NOTE ADULT - PROBLEM SELECTOR PLAN 1
Continue antibiotics - Will change to PO upon discharge.  Keep RLE elevated  Follow up with Vascular surgery upon discharge. Continue antibiotics -change to PO   Keep RLE elevated  Follow up with Vascular surgery upon discharge.  PT evaluation

## 2018-01-28 NOTE — PROGRESS NOTE ADULT - PROBLEM SELECTOR PLAN 4
Continue Atenolol, Digoxin  Continue Coumadin Continue Atenolol, Digoxin  Hold Coumadin  Monitor INR

## 2018-01-28 NOTE — PROGRESS NOTE ADULT - PROBLEM SELECTOR PLAN 2
Discontinue IVF  Resume Lasix  weights, intake output Discontinued IVF  Resume Lasix  Monitor SCr  weights, intake output

## 2018-01-29 DIAGNOSIS — I65.21 OCCLUSION AND STENOSIS OF RIGHT CAROTID ARTERY: ICD-10-CM

## 2018-01-29 LAB
ANION GAP SERPL CALC-SCNC: 17 MMOL/L — SIGNIFICANT CHANGE UP (ref 5–17)
BUN SERPL-MCNC: 38 MG/DL — HIGH (ref 8–20)
CALCIUM SERPL-MCNC: 8.8 MG/DL — SIGNIFICANT CHANGE UP (ref 8.6–10.2)
CHLORIDE SERPL-SCNC: 103 MMOL/L — SIGNIFICANT CHANGE UP (ref 98–107)
CO2 SERPL-SCNC: 22 MMOL/L — SIGNIFICANT CHANGE UP (ref 22–29)
CREAT SERPL-MCNC: 1.42 MG/DL — HIGH (ref 0.5–1.3)
GLUCOSE SERPL-MCNC: 98 MG/DL — SIGNIFICANT CHANGE UP (ref 70–115)
INR BLD: 7.19 RATIO — CRITICAL HIGH (ref 0.88–1.16)
POTASSIUM SERPL-MCNC: 4.6 MMOL/L — SIGNIFICANT CHANGE UP (ref 3.5–5.3)
POTASSIUM SERPL-SCNC: 4.6 MMOL/L — SIGNIFICANT CHANGE UP (ref 3.5–5.3)
PROTHROM AB SERPL-ACNC: 82.3 SEC — HIGH (ref 9.8–12.7)
SODIUM SERPL-SCNC: 142 MMOL/L — SIGNIFICANT CHANGE UP (ref 135–145)

## 2018-01-29 PROCEDURE — 99233 SBSQ HOSP IP/OBS HIGH 50: CPT

## 2018-01-29 PROCEDURE — 70551 MRI BRAIN STEM W/O DYE: CPT | Mod: 26

## 2018-01-29 RX ORDER — ATORVASTATIN CALCIUM 80 MG/1
10 TABLET, FILM COATED ORAL AT BEDTIME
Qty: 0 | Refills: 0 | Status: DISCONTINUED | OUTPATIENT
Start: 2018-01-29 | End: 2018-02-14

## 2018-01-29 RX ADMIN — Medication 1 TABLET(S): at 08:11

## 2018-01-29 RX ADMIN — Medication 500 MILLIGRAM(S): at 05:32

## 2018-01-29 RX ADMIN — Medication 1 TABLET(S): at 17:06

## 2018-01-29 RX ADMIN — Medication 500 MILLIGRAM(S): at 17:06

## 2018-01-29 RX ADMIN — ATORVASTATIN CALCIUM 10 MILLIGRAM(S): 80 TABLET, FILM COATED ORAL at 21:58

## 2018-01-29 RX ADMIN — ATENOLOL 25 MILLIGRAM(S): 25 TABLET ORAL at 05:32

## 2018-01-29 NOTE — PROGRESS NOTE ADULT - ASSESSMENT
85 year old female with PMH HTN, cAF on Coumadin, MR, CHFpEF presented with RLE pain, erythema and swelling. Noted to have confusion at admission (?encephalopathy), leukocytosis (WBC 22, lactic acidosis  3.1) and imaging including CT consistent with RLE Cellulitis without abscess. Initially treated with Azactam/Clindamycin/Vancomycin in ER, later switched to Ceftriaxone/Zyvox by ID. Now on PO keflex.  Also seen in consultation by Vascular surgery given possible history of PAD, skin graft with recommendations for follow up with primary vascular surgeon Dr Braswell upon discharge.  Improving leukocytosis, lactate cleared, remains afebrile. Cellulitis resolving  Blood cultures negative.      HTN - well controlled on current regimen.    AF - rate controlled, INR supra therapeutic, no evidence of bleeding.    CHFpEF, recent TTE (10/2017) with preserved EF 60-65%, moderate to severe MR, Mild AS, Moderate AR, severe TR, Mild PAH.    CKD 3, renal insufficiency, improved SCr today    Hyponatremia, hypokalemia present at admission - resolved. 85 year old female with PMH HTN, cAF on Coumadin, MR, CHFpEF presented with RLE pain, erythema and swelling. Noted to have confusion at admission (?encephalopathy), leukocytosis (WBC 22, lactic acidosis  3.1) and imaging including CT consistent with RLE Cellulitis without abscess. Initially treated with Azactam/Clindamycin/Vancomycin in ER, later switched to Ceftriaxone/Zyvox by ID. Now on PO keflex.  Also seen in consultation by Vascular surgery given possible history of PAD, skin graft with recommendations for follow up with primary vascular surgeon Dr Braswell upon discharge.  Improving leukocytosis, lactate cleared, remains afebrile. Cellulitis resolving  Blood cultures negative.      HTN - well controlled on current regimen.    AF - rate controlled, INR supra therapeutic, no evidence of bleeding.    CHFpEF, recent TTE (10/2017) with preserved EF 60-65%, moderate to severe MR, Mild AS, Moderate AR, severe TR, Mild PAH.    CKD 3, renal insufficiency, improved SCr today    Hyponatremia, hypokalemia present at admission - resolved.    Medically improved and stable - may discharge ?JESSICA 85 year old female with PMH HTN, cAF on Coumadin, MR, CHFpEF presented with RLE pain, erythema and swelling. Noted to have confusion at admission (?encephalopathy), leukocytosis (WBC 22, lactic acidosis  3.1) and imaging including CT consistent with RLE Cellulitis without abscess. Initially treated with Azactam/Clindamycin/Vancomycin in ER, later switched to Ceftriaxone/Zyvox by ID. Now on PO keflex.  Also seen in consultation by Vascular surgery given possible history of PAD, skin graft with recommendations for follow up with primary vascular surgeon Dr Braswell upon discharge.  Improving leukocytosis, lactate cleared, remains afebrile. Cellulitis resolving  Blood cultures negative.      HTN - well controlled on current regimen.    AF - rate controlled, INR supra therapeutic, no evidence of bleeding.    CHFpEF, recent TTE (10/2017) with preserved EF 60-65%, moderate to severe MR, Mild AS, Moderate AR, severe TR, Mild PAH.    CKD 3, renal insufficiency, improved SCr today    Hyponatremia, hypokalemia present at admission - resolved.    MARIA GUADALUPE stenosis 50-69% on ultrasound - patient already on Coumadin, unclear allergic to ASA. Will add statin, Check FLP. Follow up with Dr cordova after discharge    Medically improved and stable - may discharge ?JESSICA

## 2018-01-29 NOTE — PROGRESS NOTE ADULT - PROBLEM SELECTOR PLAN 2
Discontinued IVF  Resume Lasix  Monitor SCr  weights, intake output Discontinued IVF  Resumed Lasix  Monitor SCr  weights, intake output

## 2018-01-29 NOTE — PROGRESS NOTE ADULT - PROBLEM SELECTOR PLAN 7
VTE ppx - Supratherapeutic INR  CDiff ppx - Probiotics    PT evaluation Monitor SCr.  Discontinued IVF  Resumed Lasix

## 2018-01-29 NOTE — PROGRESS NOTE ADULT - SUBJECTIVE AND OBJECTIVE BOX
HOSPITALIST PROGRESS NOTE    RAGHU DAVIS  [id number]  85yFemale    Patient is a 85y old  Female who presents with a chief complaint of rt. lower ext. pain (24 Jan 2018 04:35)      SUBJECTIVE:   Chart reviewed since last visit.  Patient seen and examined at bedside for cellulitis.      OBJECTIVE:  Vital Signs Last 24 Hrs  T(C): 36.3 (29 Jan 2018 08:01), Max: 37.1 (29 Jan 2018 05:38)  T(F): 97.4 (29 Jan 2018 08:01), Max: 98.7 (29 Jan 2018 05:38)  HR: 99 (29 Jan 2018 08:01) (79 - 99)  BP: 158/98 (29 Jan 2018 08:01) (145/89 - 158/98)  RR: 17 (29 Jan 2018 08:01) (17 - 18)  SpO2: 96% (29 Jan 2018 08:01) (96% - 97%)    PHYSICAL EXAMINATION  General: Elderly, NAD[+]   nontoxic[]   ill-appearing[]  Obese[]  HEENT: AT/NC[+]  PERRLA[]  EOMI[+]   Moist oral mucosa[+]  Pharyngeal exudates[]  NECK: Supple[+]  JVD[] Carotid bruit[]  CVS: RRR[]  Irregular[+]  S1+S2[+]   Murmur[]  RESP: Fair air entry bilaterally[+]   Clear sounds[]   poor effort []  wheeze[]   Crackles[]  GI: Soft[+]  Nondistended[]   Nontender[+]   Bowel Sounds[+]  Mass[]   HSM[]   Ascites[]  : suprapubic tenderness[-]   CVA Tenderness[]   Montez[]  MS: RLE slightly swollen and shiny, with some oozing from medial wound. No erythema, though still with mild tenderness  CNS: AAOx2[+]   Answers appropriately, though seems lost at times  INTEG: Skin is Warm[+]    PSYCH: Fair Mood, Affect> Forgetful       CAPILLARY BLOOD GLUCOSE      POCT Blood Glucose.: 123 mg/dL (28 Jan 2018 15:37)        I&O's Summary                          13.4   9.8   )-----------( 168      ( 28 Jan 2018 09:46 )             42.2     PT/INR - ( 29 Jan 2018 08:06 )   PT: 82.3 sec;   INR: 7.19 ratio           01-29    142  |  103  |  38.0<H>  ----------------------------<  98  4.6   |  22.0  |  1.42<H>    Ca    8.8      29 Jan 2018 08:06    < from: CT Head No Cont (01.28.18 @ 16:40) >     EXAM:  CT BRAIN                          PROCEDURE DATE:  01/28/2018          INTERPRETATION:  CT HEAD WITHOUT CONTRAST    CLINICAL STATEMENT: Alteration of Consciousness.    COMPARISON: None available.    TECHNIQUE: Noncontrast axial CT head was obtained from the skull base to   vertex.    FINDINGS:  There is no evidence of acute intracranial hemorrhage, mass effect or   midline shift. No CT evidence of acute large territory vascular infarct.   The ventricles and cortical sulci are prominent reflecting parenchymal   volume loss. Extensive patchy and more confluent hypodensities in the   periventricular white matter are nonspecific, but likely sequela of small   vessel ischemic disease. Probable microvascular ischemic changes in the   bilateral basal ganglia, subinsular white matter and internal capsules.   Intracranial atherosclerotic calcifications are present.    The visualized paranasal sinuses and mastoid air cells are well aerated.   The native ocular lenses are surgically absent.    IMPRESSION:  No acute intracranial hemorrhage, mass effect or midline shift.    Extensive nonspecific white matter changes, likely chronic microvascular   ischemia.                ZULEYMA VALERO MD., ATTENDING RADIOLOGIST  This document has been electronically signed. Jan 28 2018  4:49PM        < end of copied text >      < from: US Duplex Carotid Arteries Complete, Bilateral (01.28.18 @ 16:40) >  EXAM:  US DPLX CAROTIDS COMPL BI                          PROCEDURE DATE:  01/28/2018          INTERPRETATION:  History: Slurred speech.    Findings:     Technically difficult examination.    There is moderate to severe plaque in the proximal right internal carotid   artery with elevated velocities to 214 cm/s compatible with 50-69%   stenosis. There is mild plaque in the left common carotid artery and left   external carotid artery.  There are no additional elevated velocities.    Vertebral arteries demonstrate antegrade flow. There are findings   compatible with cardiac arrhythmia.      Blood flow velocities are as follows:    RIGHT:    PROX CCA = 55 ;  DIST CCA = 43 ;  PROX ICA = 214 ;  DIST ICA =   44 ;  ECA = 53    LEFT   :    PROX CCA = 71 ;  DIST CCA = 53 ;  PROX ICA = 45 ;  DIST ICA =   24 ;  ECA = 58    Impression:    Technically difficult study.    50-69% stenosis right internal carotid artery.    No evidence of hemodynamically significant stenosis in the left internal   carotid artery.                MIGUEL LLOYD M.D., ATTENDING RADIOLOGIST  This document has been electronically signed. Jan 28 2018  5:09PM    < end of copied text >        MEDICATIONS  (STANDING):  ATENolol  Tablet 25 milliGRAM(s) Oral daily  cephalexin 500 milliGRAM(s) Oral every 12 hours  digoxin     Tablet 0.125 milliGRAM(s) Oral every other day  lactobacillus acidophilus 1 Tablet(s) Oral two times a day with meals      MEDICATIONS  (PRN):  acetaminophen   Tablet 650 milliGRAM(s) Oral every 6 hours PRN For Temp greater than or equal to 38 C (100.4 F)  acetaminophen   Tablet. 650 milliGRAM(s) Oral every 6 hours PRN Moderate Pain (4 - 6) HOSPITALIST PROGRESS NOTE    RAGHU DAVIS  [id number]  85yFemale    Patient is a 85y old  Female who presents with a chief complaint of rt. lower ext. pain (24 Jan 2018 04:35)      SUBJECTIVE:   Chart reviewed since last visit.  Rapid response called as family concerned - evaluated for CVA, normal NIH, CTH  Patient seen and examined at bedside for cellulitis.  Denies any pain, fever, chills.  Occasional dyspnea, cough - denies any chest pain, palpitations.  Awaiting PT evaluation      OBJECTIVE:  Vital Signs Last 24 Hrs  T(C): 36.3 (29 Jan 2018 08:01), Max: 37.1 (29 Jan 2018 05:38)  T(F): 97.4 (29 Jan 2018 08:01), Max: 98.7 (29 Jan 2018 05:38)  HR: 99 (29 Jan 2018 08:01) (79 - 99)  BP: 158/98 (29 Jan 2018 08:01) (145/89 - 158/98)  RR: 17 (29 Jan 2018 08:01) (17 - 18)  SpO2: 96% (29 Jan 2018 08:01) (96% - 97%)    PHYSICAL EXAMINATION  General: Elderly, NAD[+]   nontoxic[]   ill-appearing[]  Obese[]  HEENT: AT/NC[+]  PERRLA[]  EOMI[+]   Moist oral mucosa[+]  Pharyngeal exudates[]  NECK: Supple[+]  JVD[] Carotid bruit[]  CVS: RRR[]  Irregular[+]  S1+S2[+]   Murmur[]  RESP: Fair air entry bilaterally[+]   Clear sounds[]   poor effort []  wheeze[]   Crackles[]  GI: Soft[+]  Nondistended[]   Nontender[+]   Bowel Sounds[+]  Mass[]   HSM[]   Ascites[]  : suprapubic tenderness[-]   CVA Tenderness[]   Montez[]  MS: RLE slightly swollen and shiny, with some oozing from medial wound. No erythema, though still with mild tenderness  CNS: AAOx2[+]   Answers appropriately, though seems lost at times  INTEG: Skin is Warm[+]    PSYCH: Fair Mood, Affect> Forgetful       CAPILLARY BLOOD GLUCOSE      POCT Blood Glucose.: 123 mg/dL (28 Jan 2018 15:37)        I&O's Summary                          13.4   9.8   )-----------( 168      ( 28 Jan 2018 09:46 )             42.2     PT/INR - ( 29 Jan 2018 08:06 )   PT: 82.3 sec;   INR: 7.19 ratio           01-29    142  |  103  |  38.0<H>  ----------------------------<  98  4.6   |  22.0  |  1.42<H>    Ca    8.8      29 Jan 2018 08:06    < from: CT Head No Cont (01.28.18 @ 16:40) >     EXAM:  CT BRAIN                          PROCEDURE DATE:  01/28/2018          INTERPRETATION:  CT HEAD WITHOUT CONTRAST    CLINICAL STATEMENT: Alteration of Consciousness.    COMPARISON: None available.    TECHNIQUE: Noncontrast axial CT head was obtained from the skull base to   vertex.    FINDINGS:  There is no evidence of acute intracranial hemorrhage, mass effect or   midline shift. No CT evidence of acute large territory vascular infarct.   The ventricles and cortical sulci are prominent reflecting parenchymal   volume loss. Extensive patchy and more confluent hypodensities in the   periventricular white matter are nonspecific, but likely sequela of small   vessel ischemic disease. Probable microvascular ischemic changes in the   bilateral basal ganglia, subinsular white matter and internal capsules.   Intracranial atherosclerotic calcifications are present.    The visualized paranasal sinuses and mastoid air cells are well aerated.   The native ocular lenses are surgically absent.    IMPRESSION:  No acute intracranial hemorrhage, mass effect or midline shift.    Extensive nonspecific white matter changes, likely chronic microvascular   ischemia.                ZULEYMA VALERO MD., ATTENDING RADIOLOGIST  This document has been electronically signed. Jan 28 2018  4:49PM        < end of copied text >      < from: US Duplex Carotid Arteries Complete, Bilateral (01.28.18 @ 16:40) >  EXAM:  US DPLX CAROTIDS COMPL BI                          PROCEDURE DATE:  01/28/2018          INTERPRETATION:  History: Slurred speech.    Findings:     Technically difficult examination.    There is moderate to severe plaque in the proximal right internal carotid   artery with elevated velocities to 214 cm/s compatible with 50-69%   stenosis. There is mild plaque in the left common carotid artery and left   external carotid artery.  There are no additional elevated velocities.    Vertebral arteries demonstrate antegrade flow. There are findings   compatible with cardiac arrhythmia.      Blood flow velocities are as follows:    RIGHT:    PROX CCA = 55 ;  DIST CCA = 43 ;  PROX ICA = 214 ;  DIST ICA =   44 ;  ECA = 53    LEFT   :    PROX CCA = 71 ;  DIST CCA = 53 ;  PROX ICA = 45 ;  DIST ICA =   24 ;  ECA = 58    Impression:    Technically difficult study.    50-69% stenosis right internal carotid artery.    No evidence of hemodynamically significant stenosis in the left internal   carotid artery.                MIGUEL LLOYD M.D., ATTENDING RADIOLOGIST  This document has been electronically signed. Jan 28 2018  5:09PM    < end of copied text >        MEDICATIONS  (STANDING):  ATENolol  Tablet 25 milliGRAM(s) Oral daily  cephalexin 500 milliGRAM(s) Oral every 12 hours  digoxin     Tablet 0.125 milliGRAM(s) Oral every other day  lactobacillus acidophilus 1 Tablet(s) Oral two times a day with meals      MEDICATIONS  (PRN):  acetaminophen   Tablet 650 milliGRAM(s) Oral every 6 hours PRN For Temp greater than or equal to 38 C (100.4 F)  acetaminophen   Tablet. 650 milliGRAM(s) Oral every 6 hours PRN Moderate Pain (4 - 6)

## 2018-01-29 NOTE — PROGRESS NOTE ADULT - PROBLEM SELECTOR PLAN 1
Continue antibiotics -change to PO   Keep RLE elevated  Follow up with Vascular surgery upon discharge.  PT evaluation

## 2018-01-29 NOTE — PROGRESS NOTE ADULT - ATTENDING COMMENTS
Discussed with patient, son over telephone (Joni ).  Plan for discharge likely in 24 hours to [facility] (pending PT) -

## 2018-01-30 LAB
ANION GAP SERPL CALC-SCNC: 14 MMOL/L — SIGNIFICANT CHANGE UP (ref 5–17)
BUN SERPL-MCNC: 35 MG/DL — HIGH (ref 8–20)
CALCIUM SERPL-MCNC: 9 MG/DL — SIGNIFICANT CHANGE UP (ref 8.6–10.2)
CHLORIDE SERPL-SCNC: 104 MMOL/L — SIGNIFICANT CHANGE UP (ref 98–107)
CHOLEST SERPL-MCNC: 125 MG/DL — SIGNIFICANT CHANGE UP (ref 110–199)
CO2 SERPL-SCNC: 26 MMOL/L — SIGNIFICANT CHANGE UP (ref 22–29)
CREAT SERPL-MCNC: 1.24 MG/DL — SIGNIFICANT CHANGE UP (ref 0.5–1.3)
GLUCOSE SERPL-MCNC: 107 MG/DL — SIGNIFICANT CHANGE UP (ref 70–115)
HDLC SERPL-MCNC: 35 MG/DL — LOW
INR BLD: 5.55 RATIO — CRITICAL HIGH (ref 0.88–1.16)
LIPID PNL WITH DIRECT LDL SERPL: 72 MG/DL — SIGNIFICANT CHANGE UP
POTASSIUM SERPL-MCNC: 4.4 MMOL/L — SIGNIFICANT CHANGE UP (ref 3.5–5.3)
POTASSIUM SERPL-SCNC: 4.4 MMOL/L — SIGNIFICANT CHANGE UP (ref 3.5–5.3)
PROTHROM AB SERPL-ACNC: 63.2 SEC — HIGH (ref 9.8–12.7)
SODIUM SERPL-SCNC: 144 MMOL/L — SIGNIFICANT CHANGE UP (ref 135–145)
TOTAL CHOLESTEROL/HDL RATIO MEASUREMENT: 4 RATIO — SIGNIFICANT CHANGE UP (ref 3.3–7.1)
TRIGL SERPL-MCNC: 91 MG/DL — SIGNIFICANT CHANGE UP (ref 10–200)

## 2018-01-30 PROCEDURE — 99233 SBSQ HOSP IP/OBS HIGH 50: CPT

## 2018-01-30 RX ORDER — HYDRALAZINE HCL 50 MG
10 TABLET ORAL ONCE
Qty: 0 | Refills: 0 | Status: COMPLETED | OUTPATIENT
Start: 2018-01-30 | End: 2018-01-30

## 2018-01-30 RX ADMIN — ATENOLOL 25 MILLIGRAM(S): 25 TABLET ORAL at 05:12

## 2018-01-30 RX ADMIN — Medication 500 MILLIGRAM(S): at 17:35

## 2018-01-30 RX ADMIN — Medication 10 MILLIGRAM(S): at 21:37

## 2018-01-30 RX ADMIN — Medication 1 TABLET(S): at 17:35

## 2018-01-30 RX ADMIN — Medication 500 MILLIGRAM(S): at 05:12

## 2018-01-30 RX ADMIN — ATORVASTATIN CALCIUM 10 MILLIGRAM(S): 80 TABLET, FILM COATED ORAL at 21:36

## 2018-01-30 RX ADMIN — Medication 0.12 MILLIGRAM(S): at 12:20

## 2018-01-30 RX ADMIN — Medication 650 MILLIGRAM(S): at 22:30

## 2018-01-30 RX ADMIN — Medication 650 MILLIGRAM(S): at 21:36

## 2018-01-30 RX ADMIN — Medication 1 TABLET(S): at 09:01

## 2018-01-30 NOTE — PROGRESS NOTE ADULT - ASSESSMENT
85 year old female with PMH HTN, cAF on Coumadin, MR, CHFpEF presented with RLE pain, erythema and swelling. Noted to have confusion at admission (?encephalopathy), leukocytosis (WBC 22, lactic acidosis  3.1) and imaging including CT consistent with RLE Cellulitis without abscess. Initially treated with Azactam/Clindamycin/Vancomycin in ER, later switched to Ceftriaxone/Zyvox by ID. Now on PO keflex.  Also seen in consultation by Vascular surgery given possible history of PAD, skin graft with recommendations for follow up with primary vascular surgeon Dr Braswell upon discharge.  Improving leukocytosis, lactate cleared, remains afebrile. Cellulitis resolving  Blood cultures negative.      HTN - well controlled on current regimen.    AF - rate controlled, INR supra therapeutic, no evidence of bleeding.    CHFpEF, recent TTE (10/2017) with preserved EF 60-65%, moderate to severe MR, Mild AS, Moderate AR, severe TR, Mild PAH.    CKD 3, renal insufficiency, improved SCr today    Hyponatremia, hypokalemia present at admission - resolved.    MARIA GUADALUPE stenosis 50-69% on ultrasound - patient already on Coumadin, unclear allergic to ASA. Will add statin, Check FLP. Follow up with Dr cordova after discharge    Slurred speech as per  - CTH, MRI brain negative for stroke    Medically improved. Awaiting INR to come down - likely discharge to [facility] in next 24-48 hours 85 year old female with PMH HTN, cAF on Coumadin, MR, CHFpEF presented with RLE pain, erythema and swelling. Noted to have confusion at admission (?encephalopathy), leukocytosis (WBC 22, lactic acidosis  3.1) and imaging including CT consistent with RLE Cellulitis without abscess. Initially treated with Azactam/Clindamycin/Vancomycin in ER, later switched to Ceftriaxone/Zyvox by ID. Now on PO keflex.  Also seen in consultation by Vascular surgery given possible history of PAD, skin graft with recommendations for follow up with primary vascular surgeon Dr Braswell upon discharge.  Improving leukocytosis, lactate cleared, remains afebrile. Cellulitis resolving  Blood cultures negative.      HTN - well controlled on current regimen.    AF - rate controlled, INR supra therapeutic, no evidence of bleeding.    CHFpEF, recent TTE (10/2017) with preserved EF 60-65%, moderate to severe MR, Mild AS, Moderate AR, severe TR, Mild PAH.    CKD 3, renal insufficiency, improved SCr today    Hyponatremia, hypokalemia present at admission - resolved.    MARIA GUADALUPE stenosis 50-69% on ultrasound - patient already on Coumadin, unclear allergic to ASA. Will add statin, Check FLP. Follow up with Dr cordova after discharge    Slurred speech as per  - CTH, MRI brain negative for stroke. Secondary to dry mouth - RN advised to provide with oral care    Medically improved. Awaiting INR to come down - likely discharge to [facility] in next 24-48 hours 85 year old female with PMH HTN, cAF on Coumadin, MR, CHFpEF presented with RLE pain, erythema and swelling. Noted to have confusion at admission (?encephalopathy), leukocytosis (WBC 22, lactic acidosis  3.1) and imaging including CT consistent with RLE Cellulitis without abscess. Initially treated with Azactam/Clindamycin/Vancomycin in ER, later switched to Ceftriaxone/Zyvox by ID. Now on PO keflex.  Also seen in consultation by Vascular surgery given possible history of PAD, skin graft with recommendations for follow up with primary vascular surgeon Dr Braswell upon discharge.  Improving leukocytosis, lactate cleared, remains afebrile. Cellulitis resolving  Blood cultures negative.      HTN - well controlled on current regimen.    AF - rate controlled, INR supra therapeutic, no evidence of bleeding.    CHFpEF, recent TTE (10/2017) with preserved EF 60-65%, moderate to severe MR, Mild AS, Moderate AR, severe TR, Mild PAH.    CKD 3, renal insufficiency, improved SCr today    Hyponatremia, hypokalemia present at admission - resolved.    MARIA GUADALUPE stenosis 50-69% on ultrasound - patient already on Coumadin, unclear allergic to ASA. Will add statin, Check FLP. Follow up with Dr cordova after discharge    Slurred speech as per  - CTH, MRI brain negative for stroke. Secondary to dry mouth - RN advised to provide with oral care    Diarrhea - received antibiotics, is on Probiotic. If persistent then check for CDiff. patient not on any stool softeners. Check lytes    Medically improved. Awaiting INR to come down - likely discharge to [facility] in next 24-48 hours

## 2018-01-30 NOTE — DIETITIAN INITIAL EVALUATION ADULT. - OTHER INFO
Unable to conduct interview with pt as pt is confused with AMS. Pt currently on DASH/TLC, with Ensure Enlive TID. Per chart, pt with fair PO intake.

## 2018-01-30 NOTE — PROGRESS NOTE ADULT - PROBLEM SELECTOR PLAN 1
Continue antibiotics -changed to PO   Keep RLE elevated  Follow up with Vascular surgery upon discharge.  PT evaluation

## 2018-01-30 NOTE — PROGRESS NOTE ADULT - SUBJECTIVE AND OBJECTIVE BOX
HOSPITALIST PROGRESS NOTE    RAGHU DAVIS  [id number]  85yFemale    Patient is a 85y old  Female who presents with a chief complaint of rt. lower ext. pain (24 Jan 2018 04:35)      SUBJECTIVE:   Chart reviewed since last visit.  Patient seen and examined at bedside for cellulitis RLE, slurring      OBJECTIVE:  Vital Signs Last 24 Hrs  T(C): 36.3 (30 Jan 2018 08:21), Max: 36.7 (29 Jan 2018 16:26)  T(F): 97.4 (30 Jan 2018 08:21), Max: 98 (29 Jan 2018 16:26)  HR: 82 (30 Jan 2018 08:21) (80 - 102)  BP: 151/97 (30 Jan 2018 08:21) (140/91 - 156/78)  RR: 18 (30 Jan 2018 08:21) (16 - 18)  SpO2: 97% (30 Jan 2018 08:21) (93% - 97%)    PHYSICAL EXAMINATION  General: Elderly, NAD[+]   nontoxic[]   ill-appearing[]  Obese[]  HEENT: AT/NC[+]  PERRLA[]  EOMI[+]   Moist oral mucosa[+]  Pharyngeal exudates[]  NECK: Supple[+]  JVD[] Carotid bruit[]  CVS: RRR[]  Irregular[+]  S1+S2[+]   Murmur[]  RESP: Fair air entry bilaterally[+]   Clear sounds[]   poor effort []  wheeze[]   Crackles[]  GI: Soft[+]  Nondistended[]   Nontender[+]   Bowel Sounds[+]  Mass[]   HSM[]   Ascites[]  : suprapubic tenderness[-]   CVA Tenderness[]   Montez[]  MS: RLE slightly swollen and shiny, with some oozing from medial wound. No erythema, though still with mild tenderness  CNS: AAOx2[+]   Answers appropriately, though seems lost at times  INTEG: Skin is Warm[+]    PSYCH: Fair Mood, Affect> Forgetful      MONITOR:  CAPILLARY BLOOD GLUCOSE            I&O's Summary      PT/INR - ( 30 Jan 2018 08:03 )   PT: 63.2 sec;   INR: 5.55 ratio           01-29    142  |  103  |  38.0<H>  ----------------------------<  98  4.6   |  22.0  |  1.42<H>    Ca    8.8      29 Jan 2018 08:06       RADIOLOGY  < from: MR Head No Cont (01.29.18 @ 19:59) >   EXAM:  MR BRAIN                          PROCEDURE DATE:  01/29/2018          INTERPRETATION:      EXAM:  MR Head Without Intravenous Contrast    CLINICAL HISTORY:  85 years old, female; Signs and symptoms; Altered   mental status/memory loss, slurring speech, cellulitis of right lower   lobe. Patient was combative.    TECHNIQUE:  Magnetic resonance images of the head/brain without   intravenous contrast in multiple planes. Incomplete protocol secondary to   the patient's inability to comply with the examination.    COMPARISON:  CT HEAD 2018-01-28 16:31    FINDINGS:    Artifacts:  There is motion artifact on multiple pulse sequences, which   somewhat limits evaluation.    Brain:  There is severe periventricular increased flair signal   consistent with severe small vessel disease of aging.  There is no   abnormal diffusion-weighted signal to suggest acute infarction.  No   hemorrhage.    Ventricles:  There is moderate to severe diffuse involutional change   with moderate associated ventriculomegaly.    Bones/joints:  Unremarkable.    Sinuses:  Unremarkable as visualized.  No acute sinusitis.    Mastoid air cells:  Unremarkable as visualized.  No mastoid effusion.    Orbits:  The patient is status post bilateral ocular lens replacement   surgery.     IMPRESSION:       Limited incomplete study. Consider follow-up imaging with anesthesia   sedation.  1. Motion artifact on multiple pulse sequences, which somewhat limits   evaluation.  2. Moderate to severe diffuse involutional change with moderate   associated ventriculomegaly.  3. Severe periventricular increased FLAIR signal consistent with severe   small vessel disease of aging.  4. No abnormal diffusion-weighted signal to suggest acute infarction.  Preliminary report provided by TISH FISHMAN M.D.;AD RADIOLOGIST     .                NAYA CAMACHO M.D., ATTENDING RADIOLOGIST  This document has been electronically signed. Jan 30 2018  9:02AM    < end of copied text >        MEDICATIONS  (STANDING):  ATENolol  Tablet 25 milliGRAM(s) Oral daily  atorvastatin 10 milliGRAM(s) Oral at bedtime  cephalexin 500 milliGRAM(s) Oral every 12 hours  digoxin     Tablet 0.125 milliGRAM(s) Oral every other day  lactobacillus acidophilus 1 Tablet(s) Oral two times a day with meals      MEDICATIONS  (PRN):  acetaminophen   Tablet 650 milliGRAM(s) Oral every 6 hours PRN For Temp greater than or equal to 38 C (100.4 F)  acetaminophen   Tablet. 650 milliGRAM(s) Oral every 6 hours PRN Moderate Pain (4 - 6) HOSPITALIST PROGRESS NOTE    RAGHU DAVIS  [id number]  85yFemale    Patient is a 85y old  Female who presents with a chief complaint of rt. lower ext. pain (24 Jan 2018 04:35)      SUBJECTIVE:   Chart reviewed since last visit.  Patient seen and examined at bedside for cellulitis RLE, slurring speech.  Per RN having multiple mushy BM (4 in last 24 hours)  Patient denies any leg pain, fever, chills, dyspnea, chest pain, abdominal pain, nausea or vomiting.      OBJECTIVE:  Vital Signs Last 24 Hrs  T(C): 36.3 (30 Jan 2018 08:21), Max: 36.7 (29 Jan 2018 16:26)  T(F): 97.4 (30 Jan 2018 08:21), Max: 98 (29 Jan 2018 16:26)  HR: 82 (30 Jan 2018 08:21) (80 - 102)  BP: 151/97 (30 Jan 2018 08:21) (140/91 - 156/78)  RR: 18 (30 Jan 2018 08:21) (16 - 18)  SpO2: 97% (30 Jan 2018 08:21) (93% - 97%)    PHYSICAL EXAMINATION  General: Elderly, NAD[+]   nontoxic[]   ill-appearing[]  Obese[]  HEENT: AT/NC[+]  dried mouth, lips and tongue  NECK: Supple[+]  JVD[] Carotid bruit[]  CVS: RRR[]  Irregular[+]  S1+S2[+]   Murmur[]  RESP: Fair air entry bilaterally[+]   Clear sounds[]   poor effort []  wheeze[]   Crackles[]  GI: Soft[+]  questionable right sided tenderness   Bowel Sounds[+]  Mass[]   HSM[]   Ascites[]  : suprapubic tenderness[-]   CVA Tenderness[]   Montez[-]  MS: RLE slightly swollen and shiny, with some oozing from medial wound. No erythema, though still with minimal tenderness  CNS: AAOx3[+]   Answers appropriately, though seems lost at times. prefers to keep eyes closed. knows shes in [facility], kristen is president  INTEG: Skin is Warm[+]    PSYCH: Fair Mood, Affect> Forgetful      MONITOR:  CAPILLARY BLOOD GLUCOSE            I&O's Summary      PT/INR - ( 30 Jan 2018 08:03 )   PT: 63.2 sec;   INR: 5.55 ratio          RADIOLOGY  < from: MR Head No Cont (01.29.18 @ 19:59) >   EXAM:  MR BRAIN                          PROCEDURE DATE:  01/29/2018          INTERPRETATION:      EXAM:  MR Head Without Intravenous Contrast    CLINICAL HISTORY:  85 years old, female; Signs and symptoms; Altered   mental status/memory loss, slurring speech, cellulitis of right lower   lobe. Patient was combative.    TECHNIQUE:  Magnetic resonance images of the head/brain without   intravenous contrast in multiple planes. Incomplete protocol secondary to   the patient's inability to comply with the examination.    COMPARISON:  CT HEAD 2018-01-28 16:31    FINDINGS:    Artifacts:  There is motion artifact on multiple pulse sequences, which   somewhat limits evaluation.    Brain:  There is severe periventricular increased flair signal   consistent with severe small vessel disease of aging.  There is no   abnormal diffusion-weighted signal to suggest acute infarction.  No   hemorrhage.    Ventricles:  There is moderate to severe diffuse involutional change   with moderate associated ventriculomegaly.    Bones/joints:  Unremarkable.    Sinuses:  Unremarkable as visualized.  No acute sinusitis.    Mastoid air cells:  Unremarkable as visualized.  No mastoid effusion.    Orbits:  The patient is status post bilateral ocular lens replacement   surgery.     IMPRESSION:       Limited incomplete study. Consider follow-up imaging with anesthesia   sedation.  1. Motion artifact on multiple pulse sequences, which somewhat limits   evaluation.  2. Moderate to severe diffuse involutional change with moderate   associated ventriculomegaly.  3. Severe periventricular increased FLAIR signal consistent with severe   small vessel disease of aging.  4. No abnormal diffusion-weighted signal to suggest acute infarction.  Preliminary report provided by TISH FISHMAN M.D.;AD RADIOLOGIST     .                NAYA CAMACHO M.D., ATTENDING RADIOLOGIST  This document has been electronically signed. Jan 30 2018  9:02AM    < end of copied text >        MEDICATIONS  (STANDING):  ATENolol  Tablet 25 milliGRAM(s) Oral daily  atorvastatin 10 milliGRAM(s) Oral at bedtime  cephalexin 500 milliGRAM(s) Oral every 12 hours  digoxin     Tablet 0.125 milliGRAM(s) Oral every other day  lactobacillus acidophilus 1 Tablet(s) Oral two times a day with meals      MEDICATIONS  (PRN):  acetaminophen   Tablet 650 milliGRAM(s) Oral every 6 hours PRN For Temp greater than or equal to 38 C (100.4 F)  acetaminophen   Tablet. 650 milliGRAM(s) Oral every 6 hours PRN Moderate Pain (4 - 6)

## 2018-01-31 DIAGNOSIS — R53.83 OTHER FATIGUE: ICD-10-CM

## 2018-01-31 DIAGNOSIS — F05 DELIRIUM DUE TO KNOWN PHYSIOLOGICAL CONDITION: ICD-10-CM

## 2018-01-31 LAB
AMMONIA BLD-MCNC: 36 UMOL/L — SIGNIFICANT CHANGE UP (ref 11–55)
ANION GAP SERPL CALC-SCNC: 15 MMOL/L — SIGNIFICANT CHANGE UP (ref 5–17)
APPEARANCE UR: CLEAR — SIGNIFICANT CHANGE UP
BACTERIA # UR AUTO: ABNORMAL
BILIRUB UR-MCNC: NEGATIVE — SIGNIFICANT CHANGE UP
BUN SERPL-MCNC: 39 MG/DL — HIGH (ref 8–20)
C DIFF BY PCR RESULT: SIGNIFICANT CHANGE UP
C DIFF TOX GENS STL QL NAA+PROBE: SIGNIFICANT CHANGE UP
CALCIUM SERPL-MCNC: 9.3 MG/DL — SIGNIFICANT CHANGE UP (ref 8.6–10.2)
CHLORIDE SERPL-SCNC: 106 MMOL/L — SIGNIFICANT CHANGE UP (ref 98–107)
CO2 SERPL-SCNC: 23 MMOL/L — SIGNIFICANT CHANGE UP (ref 22–29)
COLOR SPEC: YELLOW — SIGNIFICANT CHANGE UP
CREAT SERPL-MCNC: 1.19 MG/DL — SIGNIFICANT CHANGE UP (ref 0.5–1.3)
DIFF PNL FLD: ABNORMAL
EPI CELLS # UR: SIGNIFICANT CHANGE UP
GLUCOSE SERPL-MCNC: 101 MG/DL — SIGNIFICANT CHANGE UP (ref 70–115)
GLUCOSE UR QL: NEGATIVE MG/DL — SIGNIFICANT CHANGE UP
HCT VFR BLD CALC: 43.5 % — SIGNIFICANT CHANGE UP (ref 37–47)
HGB BLD-MCNC: 13.9 G/DL — SIGNIFICANT CHANGE UP (ref 12–16)
INR BLD: 3.99 RATIO — HIGH (ref 0.88–1.16)
KETONES UR-MCNC: ABNORMAL
LEUKOCYTE ESTERASE UR-ACNC: ABNORMAL
MCHC RBC-ENTMCNC: 32 G/DL — SIGNIFICANT CHANGE UP (ref 32–36)
MCHC RBC-ENTMCNC: 32.3 PG — HIGH (ref 27–31)
MCV RBC AUTO: 100.9 FL — HIGH (ref 81–99)
NITRITE UR-MCNC: NEGATIVE — SIGNIFICANT CHANGE UP
PH UR: 6 — SIGNIFICANT CHANGE UP (ref 5–8)
PLATELET # BLD AUTO: 129 K/UL — LOW (ref 150–400)
POTASSIUM SERPL-MCNC: 4.6 MMOL/L — SIGNIFICANT CHANGE UP (ref 3.5–5.3)
POTASSIUM SERPL-SCNC: 4.6 MMOL/L — SIGNIFICANT CHANGE UP (ref 3.5–5.3)
PROT UR-MCNC: 100 MG/DL
PROTHROM AB SERPL-ACNC: 45.1 SEC — HIGH (ref 9.8–12.7)
RBC # BLD: 4.31 M/UL — LOW (ref 4.4–5.2)
RBC # FLD: 14.9 % — SIGNIFICANT CHANGE UP (ref 11–15.6)
RBC CASTS # UR COMP ASSIST: ABNORMAL /HPF (ref 0–4)
SODIUM SERPL-SCNC: 144 MMOL/L — SIGNIFICANT CHANGE UP (ref 135–145)
SP GR SPEC: 1.02 — SIGNIFICANT CHANGE UP (ref 1.01–1.02)
TSH SERPL-MCNC: 8.01 UIU/ML — HIGH (ref 0.27–4.2)
UROBILINOGEN FLD QL: NEGATIVE MG/DL — SIGNIFICANT CHANGE UP
VIT B12 SERPL-MCNC: 1296 PG/ML — HIGH (ref 180–914)
WBC # BLD: 10.6 K/UL — SIGNIFICANT CHANGE UP (ref 4.8–10.8)
WBC # FLD AUTO: 10.6 K/UL — SIGNIFICANT CHANGE UP (ref 4.8–10.8)
WBC UR QL: ABNORMAL

## 2018-01-31 PROCEDURE — 71045 X-RAY EXAM CHEST 1 VIEW: CPT | Mod: 26

## 2018-01-31 PROCEDURE — 99233 SBSQ HOSP IP/OBS HIGH 50: CPT

## 2018-01-31 PROCEDURE — 99222 1ST HOSP IP/OBS MODERATE 55: CPT

## 2018-01-31 RX ORDER — WARFARIN SODIUM 2.5 MG/1
2 TABLET ORAL ONCE
Qty: 0 | Refills: 0 | Status: COMPLETED | OUTPATIENT
Start: 2018-01-31 | End: 2018-01-31

## 2018-01-31 RX ADMIN — Medication 500 MILLIGRAM(S): at 05:27

## 2018-01-31 RX ADMIN — Medication 1 TABLET(S): at 08:06

## 2018-01-31 RX ADMIN — ATORVASTATIN CALCIUM 10 MILLIGRAM(S): 80 TABLET, FILM COATED ORAL at 21:37

## 2018-01-31 RX ADMIN — WARFARIN SODIUM 2 MILLIGRAM(S): 2.5 TABLET ORAL at 21:37

## 2018-01-31 RX ADMIN — Medication 1 TABLET(S): at 17:19

## 2018-01-31 RX ADMIN — ATENOLOL 25 MILLIGRAM(S): 25 TABLET ORAL at 05:27

## 2018-01-31 NOTE — PROGRESS NOTE ADULT - PROBLEM SELECTOR PLAN 3
Continue Atenolol, Digoxin  Hold Coumadin  Monitor INR Continue Atenolol, Digoxin  Coumadin 2mg tonight  Monitor INR

## 2018-01-31 NOTE — PROGRESS NOTE ADULT - PROBLEM SELECTOR PLAN 2
Continue to monitor  Enhanced supervision  mobilise, OOBTC  Psychiatry consult  Metabolic work up - NH3, B12, TSH, UA, Chest X-Ray -

## 2018-01-31 NOTE — BEHAVIORAL HEALTH ASSESSMENT NOTE - HPI (INCLUDE ILLNESS QUALITY, SEVERITY, DURATION, TIMING, CONTEXT, MODIFYING FACTORS, ASSOCIATED SIGNS AND SYMPTOMS)
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 Pt is being followed by ID. Pt was noted to be confused at time of admission (1/23). On 1/28 patient had rapid response for possible stroke - pt was noted to have acute onset slurring of speech as per  at bedside however NIHSS = 0, head CT was negative, carotid doppler was shown to have R ICA stenosis 50-69%. I was consulted for increasing lethargy as per primary team. Pt was barely arousable to verbal stimuli, mumbled unintelligible words when asked her name. Pt was unable to participate in interview. Pts  and daughter were called for collateral but neither were available.

## 2018-01-31 NOTE — PHYSICAL THERAPY INITIAL EVALUATION ADULT - ADDITIONAL COMMENTS
Pt presents with intermittent changes in attention and ability to speak clearly, History should be confirmed by family when present.  Pt lives in a house with 2 steps to enter with 2  rails and  2 stairs inside with  1 rails.  Pt owns medical equipment: RW  Pt lives with: Spouse: They are available to provide 24hr care

## 2018-01-31 NOTE — BEHAVIORAL HEALTH ASSESSMENT NOTE - NSBHCHARTREVIEWINVESTIGATE_PSY_A_CORE FT
< from: 12 Lead ECG (01.24.18 @ 07:48) >    Diagnosis Line Atrial fibrillation  Possible Anteroseptal infarct , age undetermined  Abnormal ECG

## 2018-01-31 NOTE — BEHAVIORAL HEALTH ASSESSMENT NOTE - NSBHMEDSOTHERFT_PSY_A_CORE
Acidophilus   potassium chloride 20 mEq oral  digoxin 125 mcg   furosemide 40 mg   atenolol 25 mg   insulin   Coumadin

## 2018-01-31 NOTE — PROGRESS NOTE ADULT - ASSESSMENT
85 year old female with PMH HTN, cAF on Coumadin, MR, CHFpEF presented with RLE pain, erythema and swelling. Noted to have confusion at admission (?encephalopathy), leukocytosis (WBC 22, lactic acidosis  3.1) and imaging including CT consistent with RLE Cellulitis without abscess. Initially treated with Azactam/Clindamycin/Vancomycin in ER, later switched to Ceftriaxone/Zyvox by ID. Now on PO keflex.    Also seen in consultation by Vascular surgery given possible history of PAD, skin graft with recommendations for follow up with primary vascular surgeon Dr Braswell upon discharge.    Leukocytosis resolved, lactate cleared, remains afebrile. Cellulitis resolving  Blood cultures negative.    HTN - well controlled on current regimen.    AF - rate controlled, INR supra therapeutic, no evidence of bleeding.    CHFpEF, recent TTE (10/2017) with preserved EF 60-65%, moderate to severe MR, Mild AS, Moderate AR, severe TR, Mild PAH. No evidence of decompensated CHF currently.    CKD 3, renal insufficiency, improved SCr today - in fact normalized    Hyponatremia, hypokalemia present at admission - resolved.    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 Added statin, Check FLP. Follow up with Dr Garcia after discharge    Slurred speech as per  - CTH, MRI brain negative for stroke. Secondary to dry mouth - RN advised to provide with oral care    Diarrhea - received antibiotics, is on Probiotic. If persistent then check for CDiff. patient not on any stool softeners. Diarhhea resolved. 85 year old female with PMH HTN, cAF on Coumadin, MR, CHFpEF presented with RLE pain, erythema and swelling. Noted to have confusion at admission (?encephalopathy), leukocytosis (WBC 22, lactic acidosis  3.1) and imaging including CT consistent with RLE Cellulitis without abscess. Initially treated with Azactam/Clindamycin/Vancomycin in ER, later switched to Ceftriaxone/Zyvox by ID. Now on PO keflex.    Also seen in consultation by Vascular surgery given possible history of PAD, skin graft with recommendations for follow up with primary vascular surgeon Dr Braswell upon discharge.    Leukocytosis resolved, lactate cleared, remains afebrile. Cellulitis resolving  Blood cultures negative.    HTN - well controlled on current regimen.    AF - rate controlled, INR supra therapeutic, no evidence of bleeding.    CHFpEF, recent TTE (10/2017) with preserved EF 60-65%, moderate to severe MR, Mild AS, Moderate AR, severe TR, Mild PAH. No evidence of decompensated CHF currently.    CKD 3, renal insufficiency, improved SCr today - in fact normalized    Hyponatremia, hypokalemia present at admission - resolved.    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 Added statin, Check FLP. Follow up with Dr Garcia after discharge    Slurred speech as per  - CTH, MRI brain negative for stroke. Secondary to dry mouth - RN advised to provide with oral care    Diarrhea - received antibiotics, is on Probiotic. If persistent then check for CDiff. patient not on any stool softeners. Diarrhea resolved.    Lethargy secondary to delirium vs dementia  Encephalopathy work up  Psych input

## 2018-01-31 NOTE — BEHAVIORAL HEALTH ASSESSMENT NOTE - SUMMARY
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 Patient was unarousable during our visit. Collateral was attempted but  and daughter could not be reached.

## 2018-01-31 NOTE — BEHAVIORAL HEALTH ASSESSMENT NOTE - NSBHCHARTREVIEWLAB_PSY_A_CORE FT
13.9   10.6  )-----------( 129      ( 31 Jan 2018 08:00 )             43.5     01-31    144  |  106  |  39.0<H>  ----------------------------<  101  4.6   |  23.0  |  1.19    Ca    9.3      31 Jan 2018 08:00        PT/INR - ( 31 Jan 2018 08:00 )   PT: 45.1 sec;   INR: 3.99 ratio

## 2018-01-31 NOTE — PROGRESS NOTE ADULT - SUBJECTIVE AND OBJECTIVE BOX
HOSPITALIST PROGRESS NOTE    RAGHU DAVIS  [id number]  85yFemale    Patient is a 85y old  Female who presents with a chief complaint of rt. lower ext. pain (24 Jan 2018 04:35)      SUBJECTIVE:   Chart reviewed since last visit.  Patient seen and examined at bedside for RLE cellulitis.  Patient very somnolent, groaning and moaning in response to questioning.  As per RN she got up and walked with PT a little while ago.  RN denies any further diarrhea      OBJECTIVE:  Vital Signs Last 24 Hrs  T(C): 36.4 (31 Jan 2018 07:46), Max: 37.1 (31 Jan 2018 05:10)  T(F): 97.5 (31 Jan 2018 07:46), Max: 98.8 (31 Jan 2018 05:10)  HR: 97 (31 Jan 2018 07:46) (72 - 101)  BP: 166/106 (31 Jan 2018 07:46) (152/97 - 166/106)  RR: 20 (31 Jan 2018 07:46) (18 - 20)  SpO2: 95% (31 Jan 2018 07:46) (94% - 96%)    PHYSICAL EXAMINATION  General: Elderly, NAD[+]   somnolent  HEENT: AT/NC[+]  dried mouth, lips and tongue  NECK: Supple[+]  JVD[] Carotid bruit[]  CVS: RRR[]  Irregular[+]  S1+S2[+]   Murmur[]  RESP: Fair air entry bilaterally[+]   Clear sounds[]   poor effort [+]  wheeze[]   Crackles[]  GI: Soft[+]  nondistended NT   Bowel Sounds[+]  Mass[]   HSM[]   Ascites[]  : suprapubic tenderness[-]   CVA Tenderness[]   Montez[-]  MS: RLE slightly swollen and shiny, with some oozing from medial wound. No erythema, though still with minimal tenderness  CNS: lethargic, not cooperating with examination  INTEG: Skin is Warm[+]    PSYCH: lethargic      MONITOR:  CAPILLARY BLOOD GLUCOSE            I&O's Summary    30 Jan 2018 07:01  -  31 Jan 2018 07:00  --------------------------------------------------------  IN: 0 mL / OUT: 4 mL / NET: -4 mL    31 Jan 2018 07:01  -  31 Jan 2018 11:23  --------------------------------------------------------  IN: 0 mL / OUT: 1 mL / NET: -1 mL                            13.9   10.6  )-----------( 129      ( 31 Jan 2018 08:00 )             43.5     PT/INR - ( 31 Jan 2018 08:00 )   PT: 45.1 sec;   INR: 3.99 ratio           01-31    144  |  106  |  39.0<H>  ----------------------------<  101  4.6   |  23.0  |  1.19    Ca    9.3      31 Jan 2018 08:00            MEDICATIONS  (STANDING):  ATENolol  Tablet 25 milliGRAM(s) Oral daily  atorvastatin 10 milliGRAM(s) Oral at bedtime  digoxin     Tablet 0.125 milliGRAM(s) Oral every other day  lactobacillus acidophilus 1 Tablet(s) Oral two times a day with meals      MEDICATIONS  (PRN):  acetaminophen   Tablet 650 milliGRAM(s) Oral every 6 hours PRN For Temp greater than or equal to 38 C (100.4 F)  acetaminophen   Tablet. 650 milliGRAM(s) Oral every 6 hours PRN Moderate Pain (4 - 6)

## 2018-01-31 NOTE — BEHAVIORAL HEALTH ASSESSMENT NOTE - NSBHCHARTREVIEWIMAGING_PSY_A_CORE FT
< from: CT Head No Cont (01.28.18 @ 16:40) >  IMPRESSION:  No acute intracranial hemorrhage, mass effect or midline shift.  Extensive nonspecific white matter changes, likely chronic microvascular   ischemia.      < from: MR Head No Cont (01.29.18 @ 19:59) >  IMPRESSION:       Limited incomplete study. Consider follow-up imaging with anesthesia   sedation.  1. Motion artifact on multiple pulse sequences, which somewhat limits   evaluation.  2. Moderate to severe diffuse involutional change with moderate   associated ventriculomegaly.  3. Severe periventricular increased FLAIR signal consistent with severe   small vessel disease of aging.  4. No abnormal diffusion-weighted signal to suggest acute infarction.

## 2018-01-31 NOTE — PROGRESS NOTE ADULT - ATTENDING COMMENTS
Discussed with patient son and  over telephone.  Will need Rehab upon discharge -     Also discussed with Dr Soliz earlier today as per family request.  Familt also requesting Dr Mcdonald to see patient; however no evidence of worsening renal failure. Will call as needed Discussed with patient son and  over telephone.  Will need Rehab upon discharge -     Also discussed with Dr Soliz earlier today as per family request.  Family also requesting Dr Mcdonald to see patient; however no evidence of worsening renal failure. Will call as needed

## 2018-01-31 NOTE — BEHAVIORAL HEALTH ASSESSMENT NOTE - NSBHCHARTREVIEWVS_PSY_A_CORE FT
T(C): 36.4 (31 Jan 2018 07:46), Max: 37.1 (31 Jan 2018 05:10)  T(F): 97.5 (31 Jan 2018 07:46), Max: 98.8 (31 Jan 2018 05:10)  HR: 97 (31 Jan 2018 07:46) (85 - 101)  BP: 166/106 (31 Jan 2018 07:46) (153/91 - 166/106)  RR: 20 (31 Jan 2018 07:46) (18 - 20)  SpO2: 95% (31 Jan 2018 07:46) (94% - 96%)

## 2018-01-31 NOTE — PROGRESS NOTE ADULT - PROBLEM SELECTOR PLAN 4
Added statin  Check FLP  Resume Coumadin (once INR comes down)  ASA - unclear allergic reaction Added statin  Resume Coumadin (once INR comes down)  ASA - unclear allergic reaction  Follow up with vascular surgery after discharge

## 2018-02-01 LAB
ANION GAP SERPL CALC-SCNC: 12 MMOL/L — SIGNIFICANT CHANGE UP (ref 5–17)
BUN SERPL-MCNC: 42 MG/DL — HIGH (ref 8–20)
CALCIUM SERPL-MCNC: 9 MG/DL — SIGNIFICANT CHANGE UP (ref 8.6–10.2)
CHLORIDE SERPL-SCNC: 108 MMOL/L — HIGH (ref 98–107)
CO2 SERPL-SCNC: 25 MMOL/L — SIGNIFICANT CHANGE UP (ref 22–29)
CREAT SERPL-MCNC: 1.11 MG/DL — SIGNIFICANT CHANGE UP (ref 0.5–1.3)
GLUCOSE SERPL-MCNC: 104 MG/DL — SIGNIFICANT CHANGE UP (ref 70–115)
HCT VFR BLD CALC: 39.3 % — SIGNIFICANT CHANGE UP (ref 37–47)
HGB BLD-MCNC: 12.2 G/DL — SIGNIFICANT CHANGE UP (ref 12–16)
INR BLD: 3.05 RATIO — HIGH (ref 0.88–1.16)
MCHC RBC-ENTMCNC: 31 G/DL — LOW (ref 32–36)
MCHC RBC-ENTMCNC: 31.5 PG — HIGH (ref 27–31)
MCV RBC AUTO: 101.6 FL — HIGH (ref 81–99)
PLATELET # BLD AUTO: 117 K/UL — LOW (ref 150–400)
POTASSIUM SERPL-MCNC: 4.4 MMOL/L — SIGNIFICANT CHANGE UP (ref 3.5–5.3)
POTASSIUM SERPL-SCNC: 4.4 MMOL/L — SIGNIFICANT CHANGE UP (ref 3.5–5.3)
PROCALCITONIN SERPL-MCNC: 0.24 NG/ML — HIGH (ref 0–0.04)
PROTHROM AB SERPL-ACNC: 34.3 SEC — HIGH (ref 9.8–12.7)
RBC # BLD: 3.87 M/UL — LOW (ref 4.4–5.2)
RBC # FLD: 15.1 % — SIGNIFICANT CHANGE UP (ref 11–15.6)
SODIUM SERPL-SCNC: 145 MMOL/L — SIGNIFICANT CHANGE UP (ref 135–145)
WBC # BLD: 8.4 K/UL — SIGNIFICANT CHANGE UP (ref 4.8–10.8)
WBC # FLD AUTO: 8.4 K/UL — SIGNIFICANT CHANGE UP (ref 4.8–10.8)

## 2018-02-01 PROCEDURE — 99231 SBSQ HOSP IP/OBS SF/LOW 25: CPT

## 2018-02-01 PROCEDURE — 71250 CT THORAX DX C-: CPT | Mod: 26

## 2018-02-01 PROCEDURE — 99233 SBSQ HOSP IP/OBS HIGH 50: CPT

## 2018-02-01 RX ORDER — IPRATROPIUM/ALBUTEROL SULFATE 18-103MCG
3 AEROSOL WITH ADAPTER (GRAM) INHALATION EVERY 6 HOURS
Qty: 0 | Refills: 0 | Status: DISCONTINUED | OUTPATIENT
Start: 2018-02-01 | End: 2018-02-08

## 2018-02-01 RX ORDER — FUROSEMIDE 40 MG
40 TABLET ORAL DAILY
Qty: 0 | Refills: 0 | Status: DISCONTINUED | OUTPATIENT
Start: 2018-02-01 | End: 2018-02-03

## 2018-02-01 RX ORDER — WARFARIN SODIUM 2.5 MG/1
1 TABLET ORAL ONCE
Qty: 0 | Refills: 0 | Status: COMPLETED | OUTPATIENT
Start: 2018-02-01 | End: 2018-02-01

## 2018-02-01 RX ORDER — ATENOLOL 25 MG/1
25 TABLET ORAL
Qty: 0 | Refills: 0 | Status: DISCONTINUED | OUTPATIENT
Start: 2018-02-01 | End: 2018-02-14

## 2018-02-01 RX ADMIN — Medication 0.12 MILLIGRAM(S): at 13:50

## 2018-02-01 RX ADMIN — Medication 1 TABLET(S): at 07:54

## 2018-02-01 RX ADMIN — ATENOLOL 25 MILLIGRAM(S): 25 TABLET ORAL at 05:08

## 2018-02-01 RX ADMIN — Medication 1 TABLET(S): at 18:11

## 2018-02-01 RX ADMIN — Medication 1 TABLET(S): at 13:50

## 2018-02-01 RX ADMIN — ATORVASTATIN CALCIUM 10 MILLIGRAM(S): 80 TABLET, FILM COATED ORAL at 21:27

## 2018-02-01 RX ADMIN — Medication 650 MILLIGRAM(S): at 05:08

## 2018-02-01 RX ADMIN — Medication 3 MILLILITER(S): at 14:59

## 2018-02-01 RX ADMIN — ATENOLOL 25 MILLIGRAM(S): 25 TABLET ORAL at 18:11

## 2018-02-01 RX ADMIN — WARFARIN SODIUM 1 MILLIGRAM(S): 2.5 TABLET ORAL at 21:27

## 2018-02-01 RX ADMIN — Medication 650 MILLIGRAM(S): at 06:00

## 2018-02-01 RX ADMIN — Medication 3 MILLILITER(S): at 21:02

## 2018-02-01 RX ADMIN — Medication 40 MILLIGRAM(S): at 13:50

## 2018-02-01 NOTE — PROGRESS NOTE ADULT - ASSESSMENT
Improving hypoactive delirium. Pt appears to have a UTI as well as elevated TSH and B12. Possible delirium 2/2 metabolic encephalopathy vs. delirium 2/2 UTI.

## 2018-02-01 NOTE — PROGRESS NOTE ADULT - SUBJECTIVE AND OBJECTIVE BOX
Pt seen and examined at bedside. Pt was more arousable today. She states that she is feeling better today. She states she is "breathing better". Pt endorses tiredness, still does not want to open eyes. Pt denies ABARCA, pain, and has no appetite. Remainder of ROS negative.  MSE:  Pt was laying in bed with eyes closed. Pt was more responsive than yesterday. Pt was alert and responsive to verbal stimuli and is oriented to person, place, and situation. Pt speech was slow and soft. Motor response appears adequate, waved to us.   Vital Signs Last 24 Hrs  T(C): 36.4 (2018 08:06), Max: 36.7 (2018 17:24)  T(F): 97.6 (2018 08:06), Max: 98 (2018 17:24)  HR: 79 (2018 08:06) (79 - 105)  BP: 152/96 (2018 08:06) (148/79 - 160/80)  RR: 17 (2018 08:06) (17 - 19)  SpO2: 96% (2018 08:06) (92% - 96%)                   12.2   8.4   )-----------( 117      ( 2018 07:13 )             39.3   02-01    145  |  108<H>  |  42.0<H>  ----------------------------<  104  4.4   |  25.0  |  1.11    Ca    9.0      2018 07:13    PT/INR - ( 2018 07:13 )   PT: 34.3 sec;   INR: 3.05 ratio      Urinalysis Basic - ( 2018 17:51 )    Color: Yellow / Appearance: Clear / S.020 / pH: x  Gluc: x / Ketone: Trace  / Bili: Negative / Urobili: Negative mg/dL   Blood: x / Protein: 100 mg/dL / Nitrite: Negative   Leuk Esterase: Moderate / RBC: 3-5 /HPF / WBC 6-10   Sq Epi: x / Non Sq Epi: Few / Bacteria: Many    Thyroid Stimulating Hormone, Serum: 8.01 uIU/mL (18 @ 13:43)

## 2018-02-01 NOTE — PROGRESS NOTE ADULT - SUBJECTIVE AND OBJECTIVE BOX
HOSPITALIST PROGRESS NOTE    RAGHU DAVIS  [id number]  85yFemale    Patient is a 85y old  Female who presents with a chief complaint of rt. lower ext. pain (2018 04:35)      SUBJECTIVE:   Chart reviewed since last visit.  Patient seen and examined at bedside for cellulitis, lethargy.  Somewhat more responsive today.  Feels ok, denies any leg pain, fever, chills, dyspnea, chest or back pain.  Mild cough      OBJECTIVE:  Vital Signs Last 24 Hrs  T(C): 36.4 (2018 08:06), Max: 36.7 (2018 17:24)  T(F): 97.6 (2018 08:06), Max: 98 (2018 17:24)  HR: 79 (2018 08:06) (79 - 105)  BP: 152/96 (2018 08:06) (148/79 - 160/80)  RR: 17 (2018 08:06) (17 - 19)  SpO2: 96% (2018 08:06) (92% - 96%)    PHYSICAL EXAMINATION  General: Elderly, NAD[+]   somnolent, arousable  HEENT: AT/NC[+]  dried mouth, lips and tongue  NECK: Supple[+]  JVD[] Carotid bruit[]  CVS: RRR[]  Irregular[+]  S1+S2[+]   Murmur[]  RESP: Fair air entry bilaterally[+]   Clear sounds[]   poor effort [+]  wheeze[]   Crackles[] Decreases sounds at bases  GI: Soft[+]  nondistended NT   Bowel Sounds[+]  Mass[]   HSM[]   Ascites[]  : suprapubic tenderness[-]   CVA Tenderness[]   Montez[-]  MS: RLE slightly swollen and shiny, with medial wound. No erythema, though still with minimal tenderness  CNS: lethargic,, answers appropriately, follow commans  INTEG: Skin is Warm[+]    PSYCH: lethargic    MONITOR:  CAPILLARY BLOOD GLUCOSE            I&O's Summary    2018 07:01  -  2018 07:00  --------------------------------------------------------  IN: 0 mL / OUT: 1 mL / NET: -1 mL                            12.2   8.4   )-----------( 117      ( 2018 07:13 )             39.3     PT/INR - ( 2018 07:13 )   PT: 34.3 sec;   INR: 3.05 ratio               145  |  108<H>  |  42.0<H>  ----------------------------<  104  4.4   |  25.0  |  1.11    Ca    9.0      2018 07:13    Thyroid Stimulating Hormone, Serum (18 @ 13:43)    Thyroid Stimulating Hormone, Serum: 8.01 uIU/mL    Vitamin B12, Serum (18 @ 13:43)    Vitamin B12, Serum: 1296 pg/mL    Ammonia, Serum (18 @ 13:43)    Ammonia, Serum: 36: Ammonia levels will be falsely elevated if specimen is NOT collected,  processed and maintained at 4-8 C umol/L          Urinalysis Basic - ( 2018 17:51 )    Color: Yellow / Appearance: Clear / S.020 / pH: x  Gluc: x / Ketone: Trace  / Bili: Negative / Urobili: Negative mg/dL   Blood: x / Protein: 100 mg/dL / Nitrite: Negative   Leuk Esterase: Moderate / RBC: 3-5 /HPF / WBC 6-10   Sq Epi: x / Non Sq Epi: Few / Bacteria: Many       RADIOLOGY  < from: Xray Chest 1 View- PORTABLE-Routine (18 @ 13:52) >   EXAM:  XR CHEST PORTABLE ROUTINE 1V                          PROCEDURE DATE:  2018          INTERPRETATION:  Portable chest radiograph        CLINICAL INFORMATION:   Encephalopathy. Assess for pneumonia.  Chest   x-ray ordered as part of standard stroke protocol /altered mental status   workup at [facility] emergency department .      TECHNIQUE:  Portable  AP view of the chest was obtained.    COMPARISON: 2018 available for review.    FINDINGS:   The lungs  show left lower lobe airspace consolidation with air   bronchograms. A left upper lobe and right lung parenchyma grossly clear.   There is gross cardiomegaly.         Visualized osseous structures are intact.        IMPRESSION:   Gross cardiomegaly with left lower lobe airspace   consolidation, air bronchograms obscuring left diaphragmatic contour..                        NTAHALIE COX M.D., ATTENDING RADIOLOGIST  This document has been electronically signed. 2018  3:35PM    < end of copied text >        MEDICATIONS  (STANDING):  ALBUTerol/ipratropium for Nebulization 3 milliLiter(s) Nebulizer every 6 hours  ATENolol  Tablet 25 milliGRAM(s) Oral daily  atorvastatin 10 milliGRAM(s) Oral at bedtime  digoxin     Tablet 0.125 milliGRAM(s) Oral every other day  furosemide    Tablet 40 milliGRAM(s) Oral daily  lactobacillus acidophilus 1 Tablet(s) Oral two times a day with meals  multivitamin 1 Tablet(s) Oral daily  warfarin 1 milliGRAM(s) Oral once      MEDICATIONS  (PRN):  acetaminophen   Tablet 650 milliGRAM(s) Oral every 6 hours PRN For Temp greater than or equal to 38 C (100.4 F)  acetaminophen   Tablet. 650 milliGRAM(s) Oral every 6 hours PRN Moderate Pain (4 - 6)

## 2018-02-01 NOTE — PROGRESS NOTE ADULT - PROBLEM SELECTOR PLAN 2
Continue to monitor  Enhanced supervision  mobilise, OOBTC  CT Chest, Blood culture, urine culture, PCT.  Defer antibiotics for now

## 2018-02-01 NOTE — PROGRESS NOTE ADULT - ASSESSMENT
85 year old female with PMH HTN, cAF on Coumadin, MR, CHFpEF presented with RLE pain, erythema and swelling. Noted to have confusion at admission (?encephalopathy), leukocytosis (WBC 22, lactic acidosis  3.1) and imaging including CT consistent with RLE Cellulitis without abscess. Initially treated with Azactam/Clindamycin/Vancomycin in ER, later switched to Ceftriaxone/Zyvox by ID. Now on PO keflex.    Also seen in consultation by Vascular surgery given possible history of PAD, skin graft with recommendations for follow up with primary vascular surgeon Dr Braswell upon discharge.    Leukocytosis resolved, lactate cleared, remains afebrile. Cellulitis resolving  Blood cultures negative.    HTN - well controlled on current regimen.    AF - rate controlled, INR supra therapeutic, no evidence of bleeding.    CHFpEF, recent TTE (10/2017) with preserved EF 60-65%, moderate to severe MR, Mild AS, Moderate AR, severe TR, Mild PAH. No evidence of decompensated CHF currently.    CKD 3, renal insufficiency, improved SCr today - in fact normalized    Hyponatremia, hypokalemia present at admission - resolved.    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 Added statin, Check FLP. Follow up with Dr Garcia after discharge    Slurred speech as per  - CTH, MRI brain negative for stroke. Secondary to dry mouth - RN advised to provide with oral care    Diarrhea - received antibiotics, is on Probiotic. Cdiff negative. Diarrhea resolved.    Lethargy secondary to delirium vs dementia  Encephalopathy work up with elevated TSH.  UA dirty, Chest X-Ray suggestive of LLL consolidation; however remains without fever or leukocytosis so unclear if infectious; could be masked by Keflex  Psych input appreciated

## 2018-02-01 NOTE — PROGRESS NOTE ADULT - PROBLEM SELECTOR PLAN 4
Added statin  Resume Coumadin  ASA - unclear allergic reaction  Follow up with vascular surgery after discharge

## 2018-02-02 LAB
ALBUMIN SERPL ELPH-MCNC: 3.4 G/DL — SIGNIFICANT CHANGE UP (ref 3.3–5.2)
ALP SERPL-CCNC: 129 U/L — HIGH (ref 40–120)
ALT FLD-CCNC: 19 U/L — SIGNIFICANT CHANGE UP
ANION GAP SERPL CALC-SCNC: 16 MMOL/L — SIGNIFICANT CHANGE UP (ref 5–17)
AST SERPL-CCNC: 36 U/L — HIGH
BILIRUB SERPL-MCNC: 1.8 MG/DL — SIGNIFICANT CHANGE UP (ref 0.4–2)
BUN SERPL-MCNC: 39 MG/DL — HIGH (ref 8–20)
CALCIUM SERPL-MCNC: 9.2 MG/DL — SIGNIFICANT CHANGE UP (ref 8.6–10.2)
CHLORIDE SERPL-SCNC: 104 MMOL/L — SIGNIFICANT CHANGE UP (ref 98–107)
CO2 SERPL-SCNC: 28 MMOL/L — SIGNIFICANT CHANGE UP (ref 22–29)
CREAT SERPL-MCNC: 1.19 MG/DL — SIGNIFICANT CHANGE UP (ref 0.5–1.3)
GLUCOSE SERPL-MCNC: 93 MG/DL — SIGNIFICANT CHANGE UP (ref 70–115)
HCT VFR BLD CALC: 44 % — SIGNIFICANT CHANGE UP (ref 37–47)
HGB BLD-MCNC: 13.5 G/DL — SIGNIFICANT CHANGE UP (ref 12–16)
INR BLD: 3.83 RATIO — HIGH (ref 0.88–1.16)
MCHC RBC-ENTMCNC: 30.7 G/DL — LOW (ref 32–36)
MCHC RBC-ENTMCNC: 32.1 PG — HIGH (ref 27–31)
MCV RBC AUTO: 104.5 FL — HIGH (ref 81–99)
PLATELET # BLD AUTO: 95 K/UL — LOW (ref 150–400)
POTASSIUM SERPL-MCNC: 5 MMOL/L — SIGNIFICANT CHANGE UP (ref 3.5–5.3)
POTASSIUM SERPL-SCNC: 5 MMOL/L — SIGNIFICANT CHANGE UP (ref 3.5–5.3)
PROT SERPL-MCNC: 6.6 G/DL — SIGNIFICANT CHANGE UP (ref 6.6–8.7)
PROTHROM AB SERPL-ACNC: 43.3 SEC — HIGH (ref 9.8–12.7)
RBC # BLD: 4.21 M/UL — LOW (ref 4.4–5.2)
RBC # FLD: 15.2 % — SIGNIFICANT CHANGE UP (ref 11–15.6)
SODIUM SERPL-SCNC: 148 MMOL/L — HIGH (ref 135–145)
WBC # BLD: 10.6 K/UL — SIGNIFICANT CHANGE UP (ref 4.8–10.8)
WBC # FLD AUTO: 10.6 K/UL — SIGNIFICANT CHANGE UP (ref 4.8–10.8)

## 2018-02-02 PROCEDURE — 99233 SBSQ HOSP IP/OBS HIGH 50: CPT

## 2018-02-02 PROCEDURE — 99222 1ST HOSP IP/OBS MODERATE 55: CPT

## 2018-02-02 RX ADMIN — Medication 40 MILLIGRAM(S): at 05:19

## 2018-02-02 RX ADMIN — Medication 3 MILLILITER(S): at 08:49

## 2018-02-02 RX ADMIN — Medication 3 MILLILITER(S): at 15:27

## 2018-02-02 RX ADMIN — Medication 1 TABLET(S): at 07:47

## 2018-02-02 RX ADMIN — ATENOLOL 25 MILLIGRAM(S): 25 TABLET ORAL at 05:19

## 2018-02-02 RX ADMIN — ATORVASTATIN CALCIUM 10 MILLIGRAM(S): 80 TABLET, FILM COATED ORAL at 21:30

## 2018-02-02 RX ADMIN — Medication 1 TABLET(S): at 12:20

## 2018-02-02 RX ADMIN — Medication 3 MILLILITER(S): at 20:09

## 2018-02-02 RX ADMIN — ATENOLOL 25 MILLIGRAM(S): 25 TABLET ORAL at 19:03

## 2018-02-02 RX ADMIN — Medication 3 MILLILITER(S): at 03:21

## 2018-02-02 NOTE — PROGRESS NOTE ADULT - ASSESSMENT
85 year old female with PMH HTN, cAF on Coumadin, MR, CHFpEF presented with RLE pain, erythema and swelling. Noted to have confusion at admission (?encephalopathy), leukocytosis (WBC 22, lactic acidosis  3.1) and imaging including CT consistent with RLE Cellulitis without abscess. Initially treated with Azactam/Clindamycin/Vancomycin in ER, later switched to Ceftriaxone/Zyvox by ID and then oral Kefelx    Also seen in consultation by Vascular surgery given possible history of PAD, skin graft with recommendations for follow up with primary vascular surgeon Dr Braswell upon discharge.    Leukocytosis resolved, lactate cleared, remains afebrile. Cellulitis resolving  Blood cultures negative.    HTN - well controlled on current regimen.    AF - rate controlled, INR supra therapeutic, no evidence of bleeding.    CHFpEF, recent TTE (10/2017) with preserved EF 60-65%, moderate to severe MR, Mild AS, Moderate AR, severe TR, Mild PAH. No evidence of decompensated CHF currently. However with bilateral pleural effusion on CT.    CKD 3, renal insufficiency, improved SCr today - in fact normalized    Hyponatremia, hypokalemia present at admission - resolved.    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 Added statin, Check FLP. Follow up with Dr Garcia after discharge    Slurred speech as per  - CTH, MRI brain negative for stroke. Secondary to dry mouth - RN advised to provide with oral care    Diarrhea - received antibiotics, is on Probiotic. Cdiff negative. Diarrhea resolved.    Lethargy secondary to delirium vs dementia  Encephalopathy work up with elevated TSH. Unclear significance, as may be abnormal secondary to metabolic processes.  UA dirty, Chest X-Ray suggestive of LLL consolidation; however remains without fever or leukocytosis so unclear if infectious; could be masked by Keflex. Doubt UTI, likely inappropriately collected sample, patient was on antibiotics unless MDR. Cultures pending. CT Chest with bilateral effusion, no infiltrate.  Psych input appreciated

## 2018-02-02 NOTE — PROGRESS NOTE ADULT - PROBLEM SELECTOR PLAN 1
Completed antibiotics  Keep RLE elevated  Follow up with Vascular surgery upon discharge.  PT follow

## 2018-02-02 NOTE — PROGRESS NOTE ADULT - SUBJECTIVE AND OBJECTIVE BOX
HOSPITALIST PROGRESS NOTE    RAGHU DAVIS  [id number]  85yFemale    Patient is a 85y old  Female who presents with a chief complaint of rt. lower ext. pain (2018 04:35)      SUBJECTIVE:   Chart reviewed since last visit.  Patient seen and examined at bedside for lethargy  Feels better, denies any dyspnea, chills, fevers, cough, leg pain, abdominal pain or dysuria.      OBJECTIVE:  Vital Signs Last 24 Hrs  T(C): 36.7 (2018 09:09), Max: 36.8 (2018 05:17)  T(F): 98.1 (2018 09:09), Max: 98.3 (2018 05:17)  HR: 99 (2018 09:09) (84 - 103)  BP: 167/108 (2018 09:09) (142/88 - 167/108)  RR: 20 (2018 09:09) (17 - 20)  SpO2: 93% (2018 09:09) (93% - 99%)    PHYSICAL EXAMINATION  General: Elderly, NAD[+]   sitting in chair  HEENT: AT/NC[+]  dried mouth, lips and tongue  NECK: Supple[+]  JVD[] Carotid bruit[]  CVS: RRR[]  Irregular[+]  S1+S2[+]   Murmur[]  RESP: Fair air entry bilaterally[+]   Clear sounds[]   poor effort [+]  wheeze[]   Crackles[] Decreases sounds at bases  GI: Soft[+]  nondistended NT   Bowel Sounds[+]  Mass[]   HSM[]   Ascites[]  : suprapubic tenderness[-]   CVA Tenderness[]   Montez[-]  MS: RLE slightly swollen and shiny, with medial wound. No erythema, though still with minimal tenderness  CNS: less lethargic, answers appropriately, follow commands  INTEG: Skin is Warm[+]    PSYCH: oriented x 3.              I&O's Summary    2018 07:01  -  2018 07:00  --------------------------------------------------------  IN: 120 mL / OUT: 300 mL / NET: -180 mL                            13.5   10.6  )-----------( 95       ( 2018 06:04 )             44.0     PT/INR - ( 2018 06:04 )   PT: 43.3 sec;   INR: 3.83 ratio               148<H>  |  104  |  39.0<H>  ----------------------------<  93  5.0   |  28.0  |  1.19    Ca    9.2      2018 06:04    TPro  6.6  /  Alb  3.4  /  TBili  1.8  /  DBili  x   /  AST  36<H>  /  ALT  19  /  AlkPhos  129<H>  02        Urinalysis Basic - ( 2018 17:51 )    Color: Yellow / Appearance: Clear / S.020 / pH: x  Gluc: x / Ketone: Trace  / Bili: Negative / Urobili: Negative mg/dL   Blood: x / Protein: 100 mg/dL / Nitrite: Negative   Leuk Esterase: Moderate / RBC: 3-5 /HPF / WBC 6-10   Sq Epi: x / Non Sq Epi: Few / Bacteria: Many        Culture:       RADIOLOGY  < from: CT Chest No Cont (18 @ 17:34) >     EXAM:  CT CHEST                          PROCEDURE DATE:  2018          INTERPRETATION:  TECHNIQUE: Noncontrast CT chest. 2-D MIP images.    COMPARISON: CT head 10/4/2017    CLINICAL HISTORY: Dyspnea, rule out pneumonia    FINDINGS:    The visualized thyroid gland is unremarkable in appearance. Moderate   bilateral upper lobe centrilobular emphysema changes. Small right-sided   pleural effusion. The heart is markedly enlarged. Dense coronary artery   and valvular calcifications. Moderateaortic calcifications. Small   pericardial effusion. Mild left lower lobe bronchiectasis.    Left kidney cyst measures 3.8 cm. Mild perihepatic ascites. No acute   osseous abnormality.    IMPRESSION:  Marked cardiomegaly. Small pericardial effusion. Small   right-sided pleural effusion. Mild perihepatic ascites. No pneumonia.                NAYA CAMACHO M.D., ATTENDING RADIOLOGIST  This document has been electronically signed. 2018  6:00PM          < end of copied text >        MEDICATIONS  (STANDING):  ALBUTerol/ipratropium for Nebulization 3 milliLiter(s) Nebulizer every 6 hours  ATENolol  Tablet 25 milliGRAM(s) Oral two times a day  atorvastatin 10 milliGRAM(s) Oral at bedtime  digoxin     Tablet 0.125 milliGRAM(s) Oral every other day  furosemide    Tablet 40 milliGRAM(s) Oral daily  lactobacillus acidophilus 1 Tablet(s) Oral two times a day with meals  multivitamin 1 Tablet(s) Oral daily      MEDICATIONS  (PRN):  acetaminophen   Tablet 650 milliGRAM(s) Oral every 6 hours PRN For Temp greater than or equal to 38 C (100.4 F)  acetaminophen   Tablet. 650 milliGRAM(s) Oral every 6 hours PRN Moderate Pain (4 - 6)

## 2018-02-02 NOTE — PROGRESS NOTE ADULT - ATTENDING COMMENTS
Jackie discharge home with home care in am. Discussed with patient spouse Gael on telephone; likely discharge in am to [facility].  Gael states he wants to take her home with home care.

## 2018-02-02 NOTE — CONSULT NOTE ADULT - SUBJECTIVE AND OBJECTIVE BOX
CC: Patient is a 85y old  Female who presents with a chief complaint of rt. lower ext. pain      HPI:  86 y/o female with PMH as below, came to ER on 18 for rt. lower ext. pain, swelling skin discoloration,  Labs showed leucocytosis and elevated lactate. Pt. was seen by surgery and vascular surgery, ct scan suggestive of cellulitis. No surgical intervention needed. Seen by ID and started on antibiotics. Possible UTI, Seen by Psych for delerium which is thought to be multifactorial.       PAST MEDICAL & SURGICAL HISTORY:  Clostridium difficile diarrhea  CHF (congestive heart failure)  Gout  Mitral valve regurgitation  Hypertension  Atrial fibrillation  H/O skin graft      FAMILY HISTORY:  No pertinent family history in first degree relatives      SOCIAL HISTORY:  TOBACCO: denies history  ALCOHOL: denies abuse  IVDA: denies history    FUNCTIONAL, ENVIRONMENTAL HISTORY:  WORK HISTORY:   LIVES WITH:   HOME LAYOUT:   STAIRS TO ENTER:   STAIRS INSIDE:   FUNCTIONAL HISTORY: independent with ambulation and ADLs    Allergies    allopurinol (Rash)  aspirin (Unknown)  azithromycin (Diarrhea)  clindamycin (Diarrhea)  latex (Unknown)  morphine (Other; Short breath)  penicillins (Unknown)  sulfa drugs (Unknown)    Intolerances        MEDICATIONS  (STANDING):  ALBUTerol/ipratropium for Nebulization 3 milliLiter(s) Nebulizer every 6 hours  ATENolol  Tablet 25 milliGRAM(s) Oral two times a day  atorvastatin 10 milliGRAM(s) Oral at bedtime  digoxin     Tablet 0.125 milliGRAM(s) Oral every other day  furosemide    Tablet 40 milliGRAM(s) Oral daily  lactobacillus acidophilus 1 Tablet(s) Oral two times a day with meals  multivitamin 1 Tablet(s) Oral daily    MEDICATIONS  (PRN):  acetaminophen   Tablet 650 milliGRAM(s) Oral every 6 hours PRN For Temp greater than or equal to 38 C (100.4 F)  acetaminophen   Tablet. 650 milliGRAM(s) Oral every 6 hours PRN Moderate Pain (4 - 6)      REVIEW OF SYSTEMS:    CONSTITUTIONAL: No fever,   EYES: No eye pain, visual disturbances, or discharge  RESPIRATORY: No cough,   CARDIOVASCULAR: No chest pain,   GASTROINTESTINAL: No abdominal or epigastric pain.  GENITOURINARY: No dysuria,   NEUROLOGICAL: No headaches,  SKIN: No itching, burning, rashes, or lesions   MUSCULOSKELETAL: No joint pain   PSYCHIATRIC: No depression,       Vital Signs Last 24 Hrs  T(C): 36.8 (2018 05:17), Max: 36.8 (2018 05:17)  T(F): 98.3 (2018 05:17), Max: 98.3 (2018 05:17)  HR: 103 (2018 05:17) (79 - 103)  BP: 148/94 (2018 05:18) (142/88 - 157/89)  BP(mean): --  RR: 20 (2018 05:17) (17 - 20)  SpO2: 93% (2018 05:17) (93% - 99%)    PHYSICAL EXAM:    GENERAL: NAD,   HEAD:  Atraumatic, Normocephalic  EYES: EOMI, PERRLA, conjunctiva and sclera clear  HEART: S1S2+  CHEST/LUNG: Clear   ABDOMEN: Soft,   EXTREMITIES:  2+ Peripheral Pulses    FUNCTIONAL EXAM:     LABS:                        13.5   10.6  )-----------( 95       ( 2018 06:04 )             44.0     02-    148<H>  |  104  |  39.0<H>  ----------------------------<  93  5.0   |  28.0  |  1.19    Ca    9.2      2018 06:04    TPro  6.6  /  Alb  3.4  /  TBili  1.8  /  DBili  x   /  AST  36<H>  /  ALT  19  /  AlkPhos  129<H>  02-02    PT/INR - ( 2018 06:04 )   PT: 43.3 sec;   INR: 3.83 ratio           Urinalysis Basic - ( 2018 17:51 )    Color: Yellow / Appearance: Clear / S.020 / pH: x  Gluc: x / Ketone: Trace  / Bili: Negative / Urobili: Negative mg/dL   Blood: x / Protein: 100 mg/dL / Nitrite: Negative   Leuk Esterase: Moderate / RBC: 3-5 /HPF / WBC 6-10   Sq Epi: x / Non Sq Epi: Few / Bacteria: Many    RADIOLOGY & ADDITIONAL STUDIES: CC: Patient is a 85y old  Female who presents with a chief complaint of rt. lower ext. pain      HPI:  84 y/o female with PMH as below, came to ER on 18 for rt. lower ext. pain, swelling skin discoloration,  Labs showed leucocytosis and elevated lactate. Pt. was seen by surgery and vascular surgery, - for possible PAD. CT scan suggestive of cellulitis. No surgical intervention needed. Seen by ID and started on antibiotics. Possible UTI, Seen by Psych for delerium which is thought to be multifactorial. Rehab evaluation requested for further recommendations to improve function and post acute care. Patient states that she has been in momentum recently ( EMR review- pt. d/c to [facility] in 2017)      PAST MEDICAL & SURGICAL HISTORY:  Clostridium difficile diarrhea  CHF (congestive heart failure)  Gout  Mitral valve regurgitation  Hypertension  Atrial fibrillation  H/O skin graft      FAMILY HISTORY:  No pertinent family history in first degree relatives      SOCIAL HISTORY:  TOBACCO: denies history  ALCOHOL: denies abuse      FUNCTIONAL, ENVIRONMENTAL HISTORY:  WORK HISTORY:   LIVES WITH:   STAIRS TO ENTER: Has stairs    FUNCTIONAL HISTORY: independent with ambulation with RW and states  assisted with ADLs    Allergies    allopurinol (Rash)  aspirin (Unknown)  azithromycin (Diarrhea)  clindamycin (Diarrhea)  latex (Unknown)  morphine (Other; Short breath)  penicillins (Unknown)  sulfa drugs (Unknown)    Intolerances        MEDICATIONS  (STANDING):  ALBUTerol/ipratropium for Nebulization 3 milliLiter(s) Nebulizer every 6 hours  ATENolol  Tablet 25 milliGRAM(s) Oral two times a day  atorvastatin 10 milliGRAM(s) Oral at bedtime  digoxin     Tablet 0.125 milliGRAM(s) Oral every other day  furosemide    Tablet 40 milliGRAM(s) Oral daily  lactobacillus acidophilus 1 Tablet(s) Oral two times a day with meals  multivitamin 1 Tablet(s) Oral daily    MEDICATIONS  (PRN):  acetaminophen   Tablet 650 milliGRAM(s) Oral every 6 hours PRN For Temp greater than or equal to 38 C (100.4 F)  acetaminophen   Tablet. 650 milliGRAM(s) Oral every 6 hours PRN Moderate Pain (4 - 6)      REVIEW OF SYSTEMS:    CONSTITUTIONAL: No fever,   EYES: No eye pain, visual disturbances, or discharge  RESPIRATORY: No cough,   CARDIOVASCULAR: No chest pain,   GASTROINTESTINAL: No abdominal or epigastric pain.  GENITOURINARY: No dysuria,   NEUROLOGICAL: No headaches,  SKIN:+ pain in LE   MUSCULOSKELETAL: No joint pain   PSYCHIATRIC: No depression,       Vital Signs Last 24 Hrs  T(C): 36.8 (2018 05:17), Max: 36.8 (2018 05:17)  T(F): 98.3 (2018 05:17), Max: 98.3 (2018 05:17)  HR: 103 (2018 05:17) (79 - 103)  BP: 148/94 (2018 05:18) (142/88 - 157/89)  BP(mean): --  RR: 20 (2018 05:17) (17 - 20)  SpO2: 93% (2018 05:17) (93% - 99%)    PHYSICAL EXAM:    GENERAL: NAD, appears dyspneic, - but states that she feels better. Is on nasal o2  HEAD:  Atraumatic, Normocephalic  EYES: EOMI,   HEART: S1S2+  CHEST/LUNG: Clear, poor air entry  ABDOMEN: Soft, NT  EXTREMITIES:  LE - legs, discoloration,   Motor 4/5 UE, LE 3/5, sensory impaired to LT in LE      FUNCTIONAL EXAM: Mod assist with therapy    LABS:                        13.5   10.6  )-----------( 95       ( 2018 06:04 )             44.0     02    148<H>  |  104  |  39.0<H>  ----------------------------<  93  5.0   |  28.0  |  1.19    Ca    9.2      2018 06:04    TPro  6.6  /  Alb  3.4  /  TBili  1.8  /  DBili  x   /  AST  36<H>  /  ALT  19  /  AlkPhos  129<H>  02-02    PT/INR - ( 2018 06:04 )   PT: 43.3 sec;   INR: 3.83 ratio           Urinalysis Basic - ( 2018 17:51 )    Color: Yellow / Appearance: Clear / S.020 / pH: x  Gluc: x / Ketone: Trace  / Bili: Negative / Urobili: Negative mg/dL   Blood: x / Protein: 100 mg/dL / Nitrite: Negative   Leuk Esterase: Moderate / RBC: 3-5 /HPF / WBC 6-10   Sq Epi: x / Non Sq Epi: Few / Bacteria: Many    RADIOLOGY & ADDITIONAL STUDIES:  < from: Xray Chest 1 View- PORTABLE-Routine (18 @ 13:52) >  EXAM:  XR CHEST PORTABLE ROUTINE 1V                          PROCEDURE DATE:  2018          INTERPRETATION:  Portable chest radiograph        CLINICAL INFORMATION:   Encephalopathy. Assess for pneumonia.  Chest   x-ray ordered as part of standard stroke protocol /altered mental status   workup at [facility] emergency department .      TECHNIQUE:  Portable  AP view of the chest was obtained.    COMPARISON: 2018 available for review.    FINDINGS:   The lungs  show left lower lobe airspace consolidation with air   bronchograms. A left upper lobe and right lung parenchyma grossly clear.   There is gross cardiomegaly.         Visualized osseous structures are intact.        IMPRESSION:   Gross cardiomegaly with left lower lobe airspace   consolidation, air bronchograms obscuring left diaphragmatic contour..          < end of copied text >      A/P:    85 year old female with medical history as listed above admitted with LE pain/ cellulitis and possible encephalopathy- ? Hypoactive delerium - multifactorial that is improving slowly .  Debility from ongoing medical course and fraility  Will continue bedside PT as tolerated.   Patient appears more appropriate for [facility] program, as tolerance for a more intense program is limited and patient more agreeable to [facility].  D/W Dr. Valentin and with SW  Thank you

## 2018-02-03 DIAGNOSIS — J90 PLEURAL EFFUSION, NOT ELSEWHERE CLASSIFIED: ICD-10-CM

## 2018-02-03 LAB
ANION GAP SERPL CALC-SCNC: 14 MMOL/L — SIGNIFICANT CHANGE UP (ref 5–17)
BASE EXCESS BLDA CALC-SCNC: 8.6 MMOL/L — HIGH (ref -2–2)
BLOOD GAS COMMENTS ARTERIAL: SIGNIFICANT CHANGE UP
BUN SERPL-MCNC: 36 MG/DL — HIGH (ref 8–20)
CALCIUM SERPL-MCNC: 8.7 MG/DL — SIGNIFICANT CHANGE UP (ref 8.6–10.2)
CHLORIDE SERPL-SCNC: 106 MMOL/L — SIGNIFICANT CHANGE UP (ref 98–107)
CO2 SERPL-SCNC: 30 MMOL/L — HIGH (ref 22–29)
CREAT SERPL-MCNC: 1.13 MG/DL — SIGNIFICANT CHANGE UP (ref 0.5–1.3)
FLUAV H1 2009 PAND RNA SPEC QL NAA+PROBE: DETECTED
GAS PNL BLDA: SIGNIFICANT CHANGE UP
GLUCOSE SERPL-MCNC: 175 MG/DL — HIGH (ref 70–115)
HCO3 BLDA-SCNC: 32 MMOL/L — HIGH (ref 20–26)
HOROWITZ INDEX BLDA+IHG-RTO: 4 — SIGNIFICANT CHANGE UP
INR BLD: 2.03 RATIO — HIGH (ref 0.88–1.16)
NT-PROBNP SERPL-SCNC: HIGH PG/ML (ref 0–300)
NT-PROBNP SERPL-SCNC: HIGH PG/ML (ref 0–300)
PCO2 BLDA: 48 MMHG — HIGH (ref 35–45)
PH BLDA: 7.45 — SIGNIFICANT CHANGE UP (ref 7.35–7.45)
PO2 BLDA: 62 MMHG — LOW (ref 83–108)
POTASSIUM SERPL-MCNC: 4.1 MMOL/L — SIGNIFICANT CHANGE UP (ref 3.5–5.3)
POTASSIUM SERPL-SCNC: 4.1 MMOL/L — SIGNIFICANT CHANGE UP (ref 3.5–5.3)
PROTHROM AB SERPL-ACNC: 22.7 SEC — HIGH (ref 9.8–12.7)
RAPID RVP RESULT: DETECTED
SAO2 % BLDA: 93 % — LOW (ref 95–99)
SODIUM SERPL-SCNC: 150 MMOL/L — HIGH (ref 135–145)
TROPONIN T SERPL-MCNC: <0.01 NG/ML — SIGNIFICANT CHANGE UP (ref 0–0.06)

## 2018-02-03 PROCEDURE — 99223 1ST HOSP IP/OBS HIGH 75: CPT

## 2018-02-03 PROCEDURE — 99222 1ST HOSP IP/OBS MODERATE 55: CPT

## 2018-02-03 PROCEDURE — 71045 X-RAY EXAM CHEST 1 VIEW: CPT | Mod: 26

## 2018-02-03 PROCEDURE — 99233 SBSQ HOSP IP/OBS HIGH 50: CPT

## 2018-02-03 PROCEDURE — 93010 ELECTROCARDIOGRAM REPORT: CPT

## 2018-02-03 RX ORDER — METOPROLOL TARTRATE 50 MG
5 TABLET ORAL EVERY 6 HOURS
Qty: 0 | Refills: 0 | Status: DISCONTINUED | OUTPATIENT
Start: 2018-02-03 | End: 2018-02-04

## 2018-02-03 RX ORDER — METOPROLOL TARTRATE 50 MG
5 TABLET ORAL ONCE
Qty: 0 | Refills: 0 | Status: COMPLETED | OUTPATIENT
Start: 2018-02-03 | End: 2018-02-03

## 2018-02-03 RX ORDER — FUROSEMIDE 40 MG
40 TABLET ORAL
Qty: 0 | Refills: 0 | Status: DISCONTINUED | OUTPATIENT
Start: 2018-02-03 | End: 2018-02-06

## 2018-02-03 RX ORDER — ACETAZOLAMIDE 250 MG/1
500 TABLET ORAL EVERY 8 HOURS
Qty: 0 | Refills: 0 | Status: COMPLETED | OUTPATIENT
Start: 2018-02-03 | End: 2018-02-04

## 2018-02-03 RX ORDER — NITROGLYCERIN 6.5 MG
1 CAPSULE, EXTENDED RELEASE ORAL THREE TIMES A DAY
Qty: 0 | Refills: 0 | Status: DISCONTINUED | OUTPATIENT
Start: 2018-02-03 | End: 2018-02-14

## 2018-02-03 RX ORDER — WARFARIN SODIUM 2.5 MG/1
3 TABLET ORAL ONCE
Qty: 0 | Refills: 0 | Status: COMPLETED | OUTPATIENT
Start: 2018-02-03 | End: 2018-02-03

## 2018-02-03 RX ORDER — FUROSEMIDE 40 MG
40 TABLET ORAL ONCE
Qty: 0 | Refills: 0 | Status: COMPLETED | OUTPATIENT
Start: 2018-02-03 | End: 2018-02-03

## 2018-02-03 RX ORDER — LEVOTHYROXINE SODIUM 125 MCG
25 TABLET ORAL DAILY
Qty: 0 | Refills: 0 | Status: DISCONTINUED | OUTPATIENT
Start: 2018-02-03 | End: 2018-02-11

## 2018-02-03 RX ADMIN — Medication 40 MILLIGRAM(S): at 11:18

## 2018-02-03 RX ADMIN — ATENOLOL 25 MILLIGRAM(S): 25 TABLET ORAL at 05:24

## 2018-02-03 RX ADMIN — Medication 25 MICROGRAM(S): at 21:52

## 2018-02-03 RX ADMIN — Medication 1 TABLET(S): at 12:07

## 2018-02-03 RX ADMIN — ACETAZOLAMIDE 500 MILLIGRAM(S): 250 TABLET ORAL at 21:52

## 2018-02-03 RX ADMIN — Medication 3 MILLILITER(S): at 15:13

## 2018-02-03 RX ADMIN — Medication 0.12 MILLIGRAM(S): at 12:07

## 2018-02-03 RX ADMIN — Medication 5 MILLIGRAM(S): at 23:45

## 2018-02-03 RX ADMIN — Medication 1 INCH(S): at 21:51

## 2018-02-03 RX ADMIN — Medication 3 MILLILITER(S): at 09:46

## 2018-02-03 RX ADMIN — Medication 40 MILLIGRAM(S): at 05:24

## 2018-02-03 RX ADMIN — Medication 3 MILLILITER(S): at 02:50

## 2018-02-03 RX ADMIN — Medication 30 MILLIGRAM(S): at 21:50

## 2018-02-03 RX ADMIN — ATORVASTATIN CALCIUM 10 MILLIGRAM(S): 80 TABLET, FILM COATED ORAL at 21:51

## 2018-02-03 RX ADMIN — Medication 1 TABLET(S): at 08:21

## 2018-02-03 RX ADMIN — Medication 5 MILLIGRAM(S): at 22:37

## 2018-02-03 RX ADMIN — Medication 40 MILLIGRAM(S): at 17:55

## 2018-02-03 RX ADMIN — Medication 3 MILLILITER(S): at 19:52

## 2018-02-03 RX ADMIN — WARFARIN SODIUM 3 MILLIGRAM(S): 2.5 TABLET ORAL at 21:50

## 2018-02-03 RX ADMIN — Medication 1 MILLIGRAM(S): at 23:33

## 2018-02-03 NOTE — SWALLOW BEDSIDE ASSESSMENT ADULT - ORAL PHASE
Delayed oral transit time Decreased anterior-posterior movement of the bolus/Delayed oral transit time/Pt required cues to trigger swallow suspected posterior loss of bolus suspect premature loss of bolus

## 2018-02-03 NOTE — PROGRESS NOTE ADULT - PROBLEM SELECTOR PLAN 2
Likely secondary to delirium, improved  Continue to monitor  Enhanced supervision  mobilise, OOBTC  Defer antibiotics for now

## 2018-02-03 NOTE — CHART NOTE - NSCHARTNOTEFT_GEN_A_CORE
Pt seen and examined at bedside, somnolent but arousable to voice and touch, on Bipap 12/6, 35% satting 95-97%, RR 20.  /90, giving IV Lasix 40 as ordered.    Pt noted to have TSH 8.0 w/ previous low T3, normal low T4, possibly a factor in CHF exacerbation, as EF is preserved.  Will trial low dose synthroid, 1/2 mcg/kg.  Free T3, T4 pending.  Continuing mgmt otherwise.

## 2018-02-03 NOTE — SWALLOW BEDSIDE ASSESSMENT ADULT - SWALLOW EVAL: RECOMMENDED FEEDING/EATING TECHNIQUES
maintain upright posture during/after eating for 30 mins/oral hygiene/small sips/bites/crush medication (when feasible)/no straws

## 2018-02-03 NOTE — SWALLOW BEDSIDE ASSESSMENT ADULT - PHARYNGEAL PHASE
Within functional limits Wet vocal quality post oral intake/increased labored respiration post honey trials/Delayed throat clear post oral intake Wet vocal quality post oral intake/increased labored respiration and audible upper airway secretions post nectar trials increased labored respiration and audible upper airway secretions post thin liquid trials. +cough/Cough post oral intake

## 2018-02-03 NOTE — CONSULT NOTE ADULT - SUBJECTIVE AND OBJECTIVE BOX
Patient is a 85y old  Female who presents with a chief complaint of rt. lower ext. pain (24 Jan 2018 04:35)      BRIEF HOSPITAL COURSE: 85 yof originally admitted for cellulitis, ALIDA sepsis, received antibiotics with resolution of her cellulitis, during her stay she has had issues with delerium and today she became more SOB. She has mod/severe MR and CHF (perserved EF), today she had increasing wheezing, SOB on went from 2 liters NC to 4 l. Currently she is conversant but sleepy knows she is in [facility], states breathing has improved. She was given an lasix and nebs. Talked with daughter mary Monson has likely mild dementia and has increased delerium with change of environment and illness. She states her mother lives with  but has been in rehab up until dec of 2017. She feels her father has some denial    PAST MEDICAL & SURGICAL HISTORY:  Clostridium difficile diarrhea  CHF (congestive heart failure)  Gout  Mitral valve regurgitation  Hypertension  Atrial fibrillation  H/O skin graft      Review of Systems:  CONSTITUTIONAL: No fever, chills, or fatigue  EYES: No eye pain, visual disturbances, or discharge  ENMT:  No difficulty hearing, tinnitus, vertigo; No sinus or throat pain  NECK: No pain or stiffness  RESPIRATORY: No cough, wheezing, chills or hemoptysis; No shortness of breath  CARDIOVASCULAR: No chest pain, palpitations, dizziness, or leg swelling  GASTROINTESTINAL: No abdominal or epigastric pain. No nausea, vomiting, or hematemesis; No diarrhea or constipation. No melena or hematochezia.  GENITOURINARY: No dysuria, frequency, hematuria, or incontinence  NEUROLOGICAL: No headaches, memory loss, loss of strength, numbness, or tremors  SKIN: No itching, burning, rashes, or lesions   MUSCULOSKELETAL: No joint pain or swelling; No muscle, back, or extremity pain  PSYCHIATRIC: No depression, anxiety, mood swings, or difficulty sleeping      Medications:    acetazolamide Injectable 500 milliGRAM(s) IV Push every 8 hours  ATENolol  Tablet 25 milliGRAM(s) Oral two times a day  digoxin     Tablet 0.125 milliGRAM(s) Oral every other day  furosemide   Injectable 40 milliGRAM(s) IV Push two times a day  nitroglycerin    2% Ointment 1 Inch(s) Transdermal three times a day    ALBUTerol/ipratropium for Nebulization 3 milliLiter(s) Nebulizer every 6 hours    acetaminophen   Tablet 650 milliGRAM(s) Oral every 6 hours PRN  acetaminophen   Tablet. 650 milliGRAM(s) Oral every 6 hours PRN      warfarin 3 milliGRAM(s) Oral once        atorvastatin 10 milliGRAM(s) Oral at bedtime    multivitamin 1 Tablet(s) Oral daily        lactobacillus acidophilus 1 Tablet(s) Oral two times a day with meals          ICU Vital Signs Last 24 Hrs  T(C): 36.6 (03 Feb 2018 16:31), Max: 36.8 (02 Feb 2018 22:08)  T(F): 97.8 (03 Feb 2018 16:31), Max: 98.3 (02 Feb 2018 22:08)  HR: 86 (03 Feb 2018 16:31) (86 - 102)  BP: 155/94 (03 Feb 2018 16:31) (140/78 - 155/94)  BP(mean): 112 (03 Feb 2018 16:31) (112 - 112)  ABP: --  ABP(mean): --  RR: 26 (03 Feb 2018 16:31) (20 - 32)  SpO2: 93% (03 Feb 2018 16:31) (88% - 96%)      ABG - ( 03 Feb 2018 13:13 )  pH: 7.45  /  pCO2: 48    /  pO2: 62    / HCO3: 32    / Base Excess: 8.6   /  SaO2: 93                  I&O's Detail    03 Feb 2018 07:01  -  03 Feb 2018 17:02  --------------------------------------------------------  IN:    Oral Fluid: 357 mL  Total IN: 357 mL    OUT:  Total OUT: 0 mL    Total NET: 357 mL            LABS:                        13.5   10.6  )-----------( 95       ( 02 Feb 2018 06:04 )             44.0     02-03    150<H>  |  106  |  36.0<H>  ----------------------------<  175<H>  4.1   |  30.0<H>  |  1.13    Ca    8.7      03 Feb 2018 11:50    TPro  6.6  /  Alb  3.4  /  TBili  1.8  /  DBili  x   /  AST  36<H>  /  ALT  19  /  AlkPhos  129<H>  02-02      CARDIAC MARKERS ( 03 Feb 2018 15:34 )  x     / <0.01 ng/mL / x     / x     / x          CAPILLARY BLOOD GLUCOSE        PT/INR - ( 03 Feb 2018 08:11 )   PT: 22.7 sec;   INR: 2.03 ratio             CULTURES:  Culture Results:   10,000 - 49,000 CFU/mL Gram Negative Rods  Culture in progress (02-02-18 @ 08:46)  Culture Results:   No growth at 48 hours (02-01-18 @ 10:10)  Culture Results:   No growth at 48 hours (02-01-18 @ 10:10)  C Diff by PCR Result: NotDetec (01-31-18 @ 07:35)      Physical Examination:    General: No acute distress.  Alert, oriented, interactive, nonfocal    HEENT: Pupils equal, reactive to light.  Symmetric.    PULM: Clear to auscultation bilaterally, no significant sputum production    CVS: Regular rate and rhythm, no murmurs, rubs, or gallops    ABD: Soft, nondistended, nontender, normoactive bowel sounds, no masses    EXT: No edema, nontender    SKIN: Warm and well perfused, no rashes noted.    RADIOLOGY: ***    CRITICAL CARE TIME SPENT: *** Patient is a 85y old  Female who presents with a chief complaint of rt. lower ext. pain (24 Jan 2018 04:35)      BRIEF HOSPITAL COURSE: 85 yof originally admitted for cellulitis, ALIDA sepsis, received antibiotics with resolution of her cellulitis, during her stay she has had issues with delerium and today she became more SOB. She has mod/severe MR and CHF (perserved EF), today she had increasing wheezing, SOB on went from 2 liters NC to 4 l. Currently she is conversant but sleepy knows she is in [facility] [place], states breathing has improved. She was given an lasix and nebs. Talked with daughter mary Monson has likely mild dementia and has increased delerium with change of environment and illness. She states her mother lives with  but has been in rehab up until dec of 2017. She feels her father has some denial on his ability to     PAST MEDICAL & SURGICAL HISTORY:  Clostridium difficile diarrhea  CHF (congestive heart failure)  Gout  Mitral valve regurgitation  Hypertension  Atrial fibrillation  H/O skin graft      Review of Systems:  CONSTITUTIONAL: No fever, chills, or fatigue  EYES: No eye pain, visual disturbances, or discharge  ENMT:  No difficulty hearing, tinnitus, vertigo; No sinus or throat pain  NECK: No pain or stiffness  RESPIRATORY: No cough, wheezing, chills or hemoptysis; No shortness of breath  CARDIOVASCULAR: No chest pain, palpitations, dizziness, or leg swelling  GASTROINTESTINAL: No abdominal or epigastric pain. No nausea, vomiting, or hematemesis; No diarrhea or constipation. No melena or hematochezia.  GENITOURINARY: No dysuria, frequency, hematuria, or incontinence  NEUROLOGICAL: No headaches, memory loss, loss of strength, numbness, or tremors  SKIN: No itching, burning, rashes, or lesions   MUSCULOSKELETAL: No joint pain or swelling; No muscle, back, or extremity pain  PSYCHIATRIC: No depression, anxiety, mood swings, or difficulty sleeping      Medications:    acetazolamide Injectable 500 milliGRAM(s) IV Push every 8 hours  ATENolol  Tablet 25 milliGRAM(s) Oral two times a day  digoxin     Tablet 0.125 milliGRAM(s) Oral every other day  furosemide   Injectable 40 milliGRAM(s) IV Push two times a day  nitroglycerin    2% Ointment 1 Inch(s) Transdermal three times a day    ALBUTerol/ipratropium for Nebulization 3 milliLiter(s) Nebulizer every 6 hours    acetaminophen   Tablet 650 milliGRAM(s) Oral every 6 hours PRN  acetaminophen   Tablet. 650 milliGRAM(s) Oral every 6 hours PRN      warfarin 3 milliGRAM(s) Oral once        atorvastatin 10 milliGRAM(s) Oral at bedtime    multivitamin 1 Tablet(s) Oral daily        lactobacillus acidophilus 1 Tablet(s) Oral two times a day with meals          ICU Vital Signs Last 24 Hrs  T(C): 36.6 (03 Feb 2018 16:31), Max: 36.8 (02 Feb 2018 22:08)  T(F): 97.8 (03 Feb 2018 16:31), Max: 98.3 (02 Feb 2018 22:08)  HR: 86 (03 Feb 2018 16:31) (86 - 102)  BP: 155/94 (03 Feb 2018 16:31) (140/78 - 155/94)  BP(mean): 112 (03 Feb 2018 16:31) (112 - 112)  ABP: --  ABP(mean): --  RR: 26 (03 Feb 2018 16:31) (20 - 32)  SpO2: 93% (03 Feb 2018 16:31) (88% - 96%)      ABG - ( 03 Feb 2018 13:13 )  pH: 7.45  /  pCO2: 48    /  pO2: 62    / HCO3: 32    / Base Excess: 8.6   /  SaO2: 93                  I&O's Detail    03 Feb 2018 07:01  -  03 Feb 2018 17:02  --------------------------------------------------------  IN:    Oral Fluid: 357 mL  Total IN: 357 mL    OUT:  Total OUT: 0 mL    Total NET: 357 mL            LABS:                        13.5   10.6  )-----------( 95       ( 02 Feb 2018 06:04 )             44.0     02-03    150<H>  |  106  |  36.0<H>  ----------------------------<  175<H>  4.1   |  30.0<H>  |  1.13    Ca    8.7      03 Feb 2018 11:50    TPro  6.6  /  Alb  3.4  /  TBili  1.8  /  DBili  x   /  AST  36<H>  /  ALT  19  /  AlkPhos  129<H>  02-02      CARDIAC MARKERS ( 03 Feb 2018 15:34 )  x     / <0.01 ng/mL / x     / x     / x          CAPILLARY BLOOD GLUCOSE        PT/INR - ( 03 Feb 2018 08:11 )   PT: 22.7 sec;   INR: 2.03 ratio             CULTURES:  Culture Results:   10,000 - 49,000 CFU/mL Gram Negative Rods  Culture in progress (02-02-18 @ 08:46)  Culture Results:   No growth at 48 hours (02-01-18 @ 10:10)  Culture Results:   No growth at 48 hours (02-01-18 @ 10:10)  C Diff by PCR Result: NotDetec (01-31-18 @ 07:35)      Physical Examination:    General: No acute distress.  Alert, oriented, interactive, nonfocal    HEENT: Pupils equal, reactive to light.  Symmetric.    PULM: Clear to auscultation bilaterally, no significant sputum production    CVS: Regular rate and rhythm, no murmurs, rubs, or gallops    ABD: Soft, nondistended, nontender, normoactive bowel sounds, no masses    EXT: No edema, nontender    SKIN: Warm and well perfused, no rashes noted.    RADIOLOGY: ***    CRITICAL CARE TIME SPENT: *** Patient is a 85y old  Female who presents with a chief complaint of rt. lower ext. pain (24 Jan 2018 04:35)      BRIEF HOSPITAL COURSE: 85 yof originally admitted for cellulitis, ALIDA sepsis, received antibiotics with resolution of her cellulitis, during her stay she has had issues with delerium and today she became more SOB. She has mod/severe MR and CHF (perserved EF), today she had increasing wheezing, SOB on went from 2 liters NC to 4 l. Currently she is conversant but sleepy knows she is in [facility], states breathing has improved. She was given an lasix and nebs. Talked with daughter Iona, patient has likely mild dementia and has increased delerium with change of environment and illness. She states her mother lives with  but has been in rehab up until dec of 2017. She feels her father has some denial on his ability to take care of mother by himself. Both voiced wanting her to be at home if possible. Long discussion about advanced directives mother would never want intubation, no cpr. No surgery for valves.      PAST MEDICAL & SURGICAL HISTORY:  Clostridium difficile diarrhea  CHF (congestive heart failure)  Gout  Mitral valve regurgitation  Hypertension  Atrial fibrillation  H/O skin graft      Review of Systems:  limited due to mental status      Medications:    acetazolamide Injectable 500 milliGRAM(s) IV Push every 8 hours  ATENolol  Tablet 25 milliGRAM(s) Oral two times a day  digoxin     Tablet 0.125 milliGRAM(s) Oral every other day  furosemide   Injectable 40 milliGRAM(s) IV Push two times a day  nitroglycerin    2% Ointment 1 Inch(s) Transdermal three times a day    ALBUTerol/ipratropium for Nebulization 3 milliLiter(s) Nebulizer every 6 hours    acetaminophen   Tablet 650 milliGRAM(s) Oral every 6 hours PRN  acetaminophen   Tablet. 650 milliGRAM(s) Oral every 6 hours PRN  warfarin 3 milliGRAM(s) Oral once  atorvastatin 10 milliGRAM(s) Oral at bedtime  multivitamin 1 Tablet(s) Oral daily  lactobacillus acidophilus 1 Tablet(s) Oral two times a day with meals    ICU Vital Signs Last 24 Hrs  T(C): 36.6 (03 Feb 2018 16:31), Max: 36.8 (02 Feb 2018 22:08)  T(F): 97.8 (03 Feb 2018 16:31), Max: 98.3 (02 Feb 2018 22:08)  HR: 86 (03 Feb 2018 16:31) (86 - 102)  BP: 155/94 (03 Feb 2018 16:31) (140/78 - 155/94)  BP(mean): 112 (03 Feb 2018 16:31) (112 - 112)  ABP: --  ABP(mean): --  RR: 26 (03 Feb 2018 16:31) (20 - 32)  SpO2: 93% (03 Feb 2018 16:31) (88% - 96%)      ABG - ( 03 Feb 2018 13:13 )  pH: 7.45  /  pCO2: 48    /  pO2: 62    / HCO3: 32    / Base Excess: 8.6   /  SaO2: 93        I&O's Detail    03 Feb 2018 07:01  -  03 Feb 2018 17:02  --------------------------------------------------------  IN:    Oral Fluid: 357 mL  Total IN: 357 mL    OUT:  Total OUT: 0 mL    Total NET: 357 mL    LABS:                        13.5   10.6  )-----------( 95       ( 02 Feb 2018 06:04 )             44.0     02-03    150<H>  |  106  |  36.0<H>  ----------------------------<  175<H>  4.1   |  30.0<H>  |  1.13    Ca    8.7      03 Feb 2018 11:50    TPro  6.6  /  Alb  3.4  /  TBili  1.8  /  DBili  x   /  AST  36<H>  /  ALT  19  /  AlkPhos  129<H>  02-02      CARDIAC MARKERS ( 03 Feb 2018 15:34 )  x     / <0.01 ng/mL / x     / x     / x          CAPILLARY BLOOD GLUCOSE        PT/INR - ( 03 Feb 2018 08:11 )   PT: 22.7 sec;   INR: 2.03 ratio        CULTURES:  Culture Results:   10,000 - 49,000 CFU/mL Gram Negative Rods  Culture in progress (02-02-18 @ 08:46)  Culture Results:   No growth at 48 hours (02-01-18 @ 10:10)  Culture Results:   No growth at 48 hours (02-01-18 @ 10:10)  C Diff by PCR Result: NotDetec (01-31-18 @ 07:35)      Physical Examination:    General: No acute distress.  arousable, conversant    HEENT: Pupils equal, reactive to light.  Symmetric.    PULM:+ wheeze mild distress    CVS: afib, + holisystolic murmer    ABD: Soft, nondistended, nontender, normoactive bowel sounds, no masses    EXT: trace    SKIN: Warm and well perfused, no rashes noted.

## 2018-02-03 NOTE — CHART NOTE - NSCHARTNOTEFT_GEN_A_CORE
Full note to follow asked to evaluate for hypoxic respiratory failure, fluid overload. Currently patient in mild distress with + wheeze. Agree with lasix would add bipap, diamox, repeat TTE. Discussion with  Chip and daughter Iona hernandez would not want intubation nor CPR, she would also not want surgery if her valves became worse. She has baseline dementia but mild per daughter. Ok with bipap and vasopressors, recommend bipap, lasix, ck RVP, also consider palliative care consult Iona stressed improtance of having mary at home.

## 2018-02-03 NOTE — SWALLOW BEDSIDE ASSESSMENT ADULT - ORAL PREPARATORY PHASE
Reduced oral grading/reduced attention to bolus Reduced oral grading/reduced attention to bolus; slow mastication further - impacted by lethargy. Reduced oral grading Reduced oral grading/suspected posterior loss of bolus

## 2018-02-03 NOTE — PROVIDER CONTACT NOTE (OTHER) - ASSESSMENT
Pulse ox q6h initiated. Pulmonology consult complete, MD to speak with Dr. Valentin for recommendations. Patient continues to have labored breathing.

## 2018-02-03 NOTE — CONSULT NOTE ADULT - SUBJECTIVE AND OBJECTIVE BOX
RAGHU DAVIS  [id number]      Patient is a 85y old  Female who presents with a chief complaint of rt. lower ext. pain (24 Jan 2018 04:35)      HPI:  86 y/o femal with PMH of Atrial fibrillation    CHF (congestive heart failure)    Clostridium difficile diarrhea    Gout    Hypertension    Mitral valve regurgitation    came to ER for rt. lower ext. pain, swelling skin discoloration which as per history and ER chart is acute in onset. pt. is poor historian and not giving much history. pt's wbc count  and lactate level was elevated. pt. was seen by surgery and vascular surgery but on ct scan there has been no free subcut air or any drainable collection, cellulitis is noted. no fever. no n/v/d , no cp. no sob as per history. it. is not clear how rt. lower xet. cellulitis started. pt. is on coumadin for her afib. (24 Jan 2018 04:35).          PAST MEDICAL & SURGICAL HISTORY:  Clostridium difficile diarrhea  CHF (congestive heart failure)  Gout  Mitral valve regurgitation  Hypertension  Atrial fibrillation  H/O skin graft      MEDICATIONS  (STANDING):  ALBUTerol/ipratropium for Nebulization 3 milliLiter(s) Nebulizer every 6 hours  ATENolol  Tablet 25 milliGRAM(s) Oral two times a day  atorvastatin 10 milliGRAM(s) Oral at bedtime  digoxin     Tablet 0.125 milliGRAM(s) Oral every other day  furosemide   Injectable 40 milliGRAM(s) IV Push two times a day  lactobacillus acidophilus 1 Tablet(s) Oral two times a day with meals  multivitamin 1 Tablet(s) Oral daily  warfarin 3 milliGRAM(s) Oral once      ALLERGIES:  allopurinol (Rash)  aspirin (Unknown)  azithromycin (Diarrhea)  clindamycin (Diarrhea)  latex (Unknown)  morphine (Other; Short breath)  penicillins (Unknown)  sulfa drugs (Unknown)      SOCIAL HISTORY:  Alcohol   Tobacco   Drug use       FAMILY HISTORY:  No pertinent family history in first degree relatives      ROS:  All other organ systems are unremarkable      PHYSICAL EXAM:  Vital Signs Last 24 Hrs  T(C): 36.3 (03 Feb 2018 10:36), Max: 36.8 (02 Feb 2018 22:08)  T(F): 97.4 (03 Feb 2018 10:36), Max: 98.3 (02 Feb 2018 22:08)  HR: 102 (03 Feb 2018 10:36) (92 - 109)  BP: 143/93 (03 Feb 2018 10:36) (140/78 - 169/87)  BP(mean): --  RR: 32 (03 Feb 2018 11:20) (18 - 32)  SpO2: 94% (03 Feb 2018 12:38) (88% - 109%)  General:   Skin: Warm, normal turgor  Eyes: Normal conjunctivae  ENMT: Normal hearing, mmm  Lungs: bibasilar rales  CVS: no JVD, no carotid bruits, Irregular/tachycardic  Abdomen: normal active bowel sounds  MSK: normal ROM  Extremities: 1+ edema/chronic venous stasis  Neuro: A&O x3  Psych: Normal affect      ECG: Pending    ECHO (10/5/17): Reveals normal LV size and wall motion. LV EF 60-65%. Bi-atrial enlargement. Moderate/severe MR. severe TR. PA pressure ~44mmHgconsistent with mild pulmonary HTN.      LABS:                        13.5   10.6  )-----------( 95       ( 02 Feb 2018 06:04 )             44.0     02-03    150<H>  |  106  |  36.0<H>  ----------------------------<  175<H>  4.1   |  30.0<H>  |  1.13    Ca    8.7      03 Feb 2018 11:50    TPro  6.6  /  Alb  3.4  /  TBili  1.8  /  DBili  x   /  AST  36<H>  /  ALT  19  /  AlkPhos  129<H>  02-02        PT/INR - ( 03 Feb 2018 08:11 )   PT: 22.7 sec;   INR: 2.03 ratio                   Assessment:  1. 85 year old female with PMH HTN, cAF on Coumadin, MR, CHFpEF presented with RLE pain, erythema and swelling.   2. Confusion at admission (?encephalopathy), leukocytosis (WBC 22, lactic acidosis 3.1). Initially treated with Azactam/Clindamycin/Vancomycin in ER, later switched to Ceftriaxone/Zyvox by ID and then oral Keflex.  3. PAD. S/P skin graft.  4. SOB. Hx HFpEF (diastolic dysfunction).  5. Renal insufficiency (CKD 3).   6. Dementia       Plan:  1. Transfer to monitored bed.  2. IV diuretics  3. Topical nitrates.  4. Cardiac enzymes/troponin  5. Daily EKG  6. Echocardiogram.

## 2018-02-03 NOTE — CONSULT NOTE ADULT - SUBJECTIVE AND OBJECTIVE BOX
PULMONARY CONSULT NOTE      KAUSHIK DAVISMORENITAELIZABETH-[id number]    Patient is a 85y old  Female who presents with a chief complaint of rt. lower ext. pain (24 Jan 2018 04:35)      HISTORY OF PRESENT ILLNESS: Pt unable to give a history. History from chart. PMH of Atrial fibrillation, CHF (congestive heart failure), Clostridium difficile diarrhea , Gout , Hypertension , Mitral valve regurgitation ,Admitted 1/24 with cellulitis. Had episode of SOB 1/25 pm. Had RR called 1/28 for r/o CVA; negative. Called today for increased SOB, worsening hypoxia. Given lasix about one hour ago. Pt is unable to add any history.     MEDICATIONS  (STANDING):  ALBUTerol/ipratropium for Nebulization 3 milliLiter(s) Nebulizer every 6 hours  ATENolol  Tablet 25 milliGRAM(s) Oral two times a day  atorvastatin 10 milliGRAM(s) Oral at bedtime  digoxin     Tablet 0.125 milliGRAM(s) Oral every other day  furosemide   Injectable 40 milliGRAM(s) IV Push two times a day  lactobacillus acidophilus 1 Tablet(s) Oral two times a day with meals  multivitamin 1 Tablet(s) Oral daily  warfarin 3 milliGRAM(s) Oral once      MEDICATIONS  (PRN):  acetaminophen   Tablet 650 milliGRAM(s) Oral every 6 hours PRN For Temp greater than or equal to 38 C (100.4 F)  acetaminophen   Tablet. 650 milliGRAM(s) Oral every 6 hours PRN Moderate Pain (4 - 6)      Allergies    allopurinol (Rash)  aspirin (Unknown)  azithromycin (Diarrhea)  clindamycin (Diarrhea)  latex (Unknown)  morphine (Other; Short breath)  penicillins (Unknown)  sulfa drugs (Unknown)    Intolerances        PAST MEDICAL & SURGICAL HISTORY:  Clostridium difficile diarrhea  CHF (congestive heart failure)  Gout  Mitral valve regurgitation  Hypertension  Atrial fibrillation  H/O skin graft      FAMILY HISTORY:  No pertinent family history in first degree relatives      SOCIAL HISTORY  Smoking History: unknown    REVIEW OF SYSTEMS:    attempted but patient not able.    Vital Signs Last 24 Hrs  T(C): 36.3 (03 Feb 2018 10:36), Max: 36.8 (02 Feb 2018 22:08)  T(F): 97.4 (03 Feb 2018 10:36), Max: 98.3 (02 Feb 2018 22:08)  HR: 102 (03 Feb 2018 10:36) (92 - 109)  BP: 143/93 (03 Feb 2018 10:36) (140/78 - 169/87)  BP(mean): --  RR: 32 (03 Feb 2018 11:20) (18 - 32)  SpO2: 94% (03 Feb 2018 12:38) (88% - 109%)    PHYSICAL EXAMINATION:    GENERAL: The patient appears in respiratory distress.     HEENT: Head is normocephalic and atraumatic.      NECK: Supple.     LUNGS: decreased BS bases, crackles, using accessory muscles    HEART: tachycardic    ABDOMEN: Soft, nontender, and nondistended.      EXTREMITIES: Without any cyanosis, clubbing, rash, lesions; +edema.    NEUROLOGIC: Grossly intact.      LABS:                        13.5   10.6  )-----------( 95       ( 02 Feb 2018 06:04 )             44.0     02-03    150<H>  |  106  |  36.0<H>  ----------------------------<  175<H>  4.1   |  30.0<H>  |  1.13    Ca    8.7      03 Feb 2018 11:50    TPro  6.6  /  Alb  3.4  /  TBili  1.8  /  DBili  x   /  AST  36<H>  /  ALT  19  /  AlkPhos  129<H>  02-02    PT/INR - ( 03 Feb 2018 08:11 )   PT: 22.7 sec;   INR: 2.03 ratio             ABG - ( 03 Feb 2018 13:13 )  pH: 7.45  /  pCO2: 48    /  pO2: 62    / HCO3: 32    / Base Excess: 8.6   /  SaO2: 93                   Procalcitonin, Serum: 0.24 ng/mL (02-01-18 @ 10:10)           RADIOLOGY & ADDITIONAL STUDIES:  < from: CT Chest No Cont (02.01.18 @ 17:34) >     EXAM:  CT CHEST                          PROCEDURE DATE:  02/01/2018          INTERPRETATION:  TECHNIQUE: Noncontrast CT chest. 2-D MIP images.    COMPARISON: CT head 10/4/2017    CLINICAL HISTORY: Dyspnea, rule out pneumonia    FINDINGS:    The visualized thyroid gland is unremarkable in appearance. Moderate   bilateral upper lobe centrilobular emphysema changes. Small right-sided   pleural effusion. The heart is markedly enlarged. Dense coronary artery   and valvular calcifications. Moderateaortic calcifications. Small   pericardial effusion. Mild left lower lobe bronchiectasis.    Left kidney cyst measures 3.8 cm. Mild perihepatic ascites. No acute   osseous abnormality.    IMPRESSION:  Marked cardiomegaly. Small pericardial effusion. Small   right-sided pleural effusion. Mild perihepatic ascites. No pneumonia.                NAYA CAMACHO M.D., ATTENDING RADIOLOGIST    < end of copied text >    < from: Xray Chest 1 View- PORTABLE-Routine (01.31.18 @ 13:52) >   EXAM:  XR CHEST PORTABLE ROUTINE 1V                          PROCEDURE DATE:  01/31/2018          INTERPRETATION:  Portable chest radiograph        CLINICAL INFORMATION:   Encephalopathy. Assess for pneumonia.  Chest   x-ray ordered as part of standard stroke protocol /altered mental status   workup at [facility] emergency department .      TECHNIQUE:  Portable  AP view of the chest was obtained.    COMPARISON: 1/25/2018 available for review.    FINDINGS:   The lungs  show left lower lobe airspace consolidation with air   bronchograms. A left upper lobe and right lung parenchyma grossly clear.   There is gross cardiomegaly.         Visualized osseous structures are intact.        IMPRESSION:   Gross cardiomegaly with left lower lobe airspace   consolidation, air bronchograms obscuring left diaphragmatic contour..                        NATHALIE COX M.D., ATTENDING RADIOLOGIST    < end of copied text >

## 2018-02-03 NOTE — PROGRESS NOTE ADULT - SUBJECTIVE AND OBJECTIVE BOX
HOSPITALIST PROGRESS NOTE    RAGHU DAVIS  [id number]  85yFemale    Patient is a 85y old  Female who presents with a chief complaint of rt. lower ext. pain (2018 04:35)      SUBJECTIVE:   Chart reviewed since last visit.  Patient seen and examined at bedside for dyspnea.  As per Rn worsening dyspnea this am.  Patient somnolent, arouses, denies any complaints      OBJECTIVE:  Vital Signs Last 24 Hrs  T(C): 36.3 (2018 10:36), Max: 36.8 (2018 22:08)  T(F): 97.4 (2018 10:36), Max: 98.3 (2018 22:08)  HR: 102 (2018 10:36) (92 - 109)  BP: 143/93 (2018 10:36) (140/78 - 169/87)  RR: 32 (2018 11:20) (18 - 32)  SpO2: 94% (2018 11:20) (88% - 109%)    PHYSICAL EXAMINATION  General: Elderly, NAD[+]   sitting in bed, increased work of breathing  HEENT: AT/NC[+]  dried mouth, lips and tongue  NECK: Supple[+]  JVD[] Carotid bruit[]  CVS: RRR[]  Irregular[+]  S1+S2[+]   Murmur[]  RESP: Fair air entry bilaterally[+]   Clear sounds[]   poor effort [+]  wheeze[]   Crackles[] Decreases sounds at bases  GI: Soft[+]  nondistended NT   Bowel Sounds[+]  Mass[]   HSM[]   Ascites[]  : suprapubic tenderness[-]   CVA Tenderness[]   Montez[-]  MS: RLE without swelling, with medial wound. Chronic changes. No edema  CNS: less lethargic, answers appropriately, follow commands  INTEG: Skin is Warm[+]    PSYCH: oriented x 3.              I&O's Summary    2018 07:01  -  2018 12:29  --------------------------------------------------------  IN: 357 mL / OUT: 0 mL / NET: 357 mL                            13.5   10.6  )-----------( 95       ( 2018 06:04 )             44.0     PT/INR - ( 2018 08:11 )   PT: 22.7 sec;   INR: 2.03 ratio               148<H>  |  104  |  39.0<H>  ----------------------------<  93  5.0   |  28.0  |  1.19    Ca    9.2      2018 06:04    TPro  6.6  /  Alb  3.4  /  TBili  1.8  /  DBili  x   /  AST  36<H>  /  ALT  19  /  AlkPhos  129<H>              Culture:  Culture - Urine (18 @ 08:46)    Specimen Source: .Urine Clean Catch (Midstream)    Culture Results:   10,000 - 49,000 CFU/mL Gram Negative Rods  Culture in progress      TTE:  < from: TTE Echo Complete w/Doppler (10.05.17 @ 16:28) >    EXAM:  ECHO TRANSTHORACIC COMP W DOPP      PROCEDURE DATE:  Oct  5 2017   .      INTERPRETATION:  REPORT:    TRANSTHORACIC ECHOCARDIOGRAM REPORT           Patient Name:   RAGHU DAVIS Patient Location: Inpatient  Medical Rec #:  [id number]    Accession #:      [id number]  Account #:                    Height:           64.2 in 163.0 cm  YOB: 1932     Weight:           130.1 lb 59.00 kg  Patient Age:    84 years      BSA:              1.63 m²  Patient Gender: F             BP:          120/83 mmHg        Date of Exam:        10/5/2017 4:28:42 PM  Sonographer:         Malik Guallpa  Referring Physician: Kelsea Elizabeth MD     Procedure:   2D Echo/Doppler/Color Doppler Complete.  Indications: Unspecified combined systolic (congestive) and diastolic               (congestive) heart failure - I50.40  Diagnosis:   Unspecified combined systolic (congestive) and diastolic               (congestive) heart failure - I50.40           2D AND M-MODE MEASUREMENTS (normal ranges withinparentheses):  Left Ventricle:                  Normal   Aorta/Left Atrium:            Normal  IVSd (2D):              1.16 cm (0.7-1.1) Left Atrium (2D):  5.14 cm   (1.9-4.0)  LVPWd (2D):             1.21 cm (0.7-1.1) Right Ventricle:  LVIDd (2D):          3.87 cm (3.4-5.7) RVd (2D):        3.64 cm  LVIDs (2D):             2.49 cm  LV FS (2D):             35.7 %   (>25%)  Relative Wall Thickness  0.63    (<0.42)     SPECTRAL DOPPLER ANALYSIS (where applicable):  Aortic Valve: AoV Max Mundo: 2.01 m/s AoV Peak P.2 mmHg AoV Mean P.5 mmHg     LVOT Vmax: 1.08 m/s LVOT VTI: 0.170 m LVOT Diameter: 1.81 cm     AoV Area, Vmax: 1.38 cm² AoV Area, VTI: 1.36 cm² AoV Area, Vmn: 1.23 cm²  Ao VTI: 0.323  Aortic Insufficiency:  AI Half-time: 566msec     Tricuspid Valve and PA/RV Systolic Pressure: TR Max Velocity: 2.92 m/s   RA Pressure: 10 mmHg RVSP/PASP: 44.1 mmHg        PHYSICIAN INTERPRETATION:  Left Ventricle: The left ventricular internal cavity size is normal.   There is mild concentric left ventricular hypertrophy.  Global LV systolic function was normal. Left ventricular ejection   fraction, by visual estimation, is 60 to 65%.  Right Ventricle: Normal right ventricular size and function.  Left Atrium: Severely enlarged left atrium.  Right Atrium: The right atrium is normal in size. The right atrium is   severely dilated.  Pericardium: Trivial pericardial effusion is present.  Mitral Valve: Structurally normal mitral valve, with normal leaflet   excursion. Thickening of the anterior and posterior mitral valve   leaflets. There is mild to moderate mitral annular calcification.   Moderate to severe mitral valve regurgitation is seen. The MR jet is   eccentric posteriorly directed.  Tricuspid Valve: Structurally normal tricuspid valve, with normal leaflet   excursion. Severe tricuspid regurgitation is visualized. Estimated   pulmonary artery systolic pressure is 44.1 mmHg assuming a right atrial   pressure of 10 mmHg, which is consistent with mild pulmonary hypertension.  Aortic Valve: The aortic valve is trileaflet. Mild aortic stenosis is   present. Moderate aortic valve regurgitation is seen.  Pulmonic Valve: The pulmonic valve was not well visualized.        Summary:   1. Left ventricular ejection fraction, by visual estimation, is 60 to   65%.   2. Normal global left ventricular systolic function.   3. Normal left ventricular internal cavity size.   4. There is mild concentric left ventricular hypertrophy.   5. Normal right ventricular size and function.   6. Severely enlarged left atrium.   7. Severely dilated right atrium.   8. The right atrium is normal in size.   9. Mild to moderate mitral annular calcification.  10. Thickening of the anterior and posterior mitral valve leaflets.  11. Moderate to severe mitral valve regurgitation.  12. Mild aortic valve stenosis.  13. Moderate aortic regurgitation.  14. Severe tricuspid regurgitation.  15. Estimated pulmonary artery systolic pressure is 44.1 mmHg assuming a   right atrial pressure of 10 mmHg, which is consistent with mild pulmonary   hypertension.  16. Trivial pericardial effusion.     MD Michael Electronically signed on 10/5/2017 at 5:19:13 PM          *** Final ***                  MAGALI WISE   This document has been electronically signed. Oct  5 2017  4:28PM        < end of copied text >    RADIOLOGY  < from: CT Chest No Cont (18 @ 17:34) >     EXAM:  CT CHEST                          PROCEDURE DATE:  2018          INTERPRETATION:  TECHNIQUE: Noncontrast CT chest. 2-D MIP images.    COMPARISON: CT head 10/4/2017    CLINICAL HISTORY: Dyspnea, rule out pneumonia    FINDINGS:    The visualized thyroid gland is unremarkable in appearance. Moderate   bilateral upper lobe centrilobular emphysema changes. Small right-sided   pleural effusion. The heart is markedly enlarged. Dense coronary artery   and valvular calcifications. Moderateaortic calcifications. Small   pericardial effusion. Mild left lower lobe bronchiectasis.    Left kidney cyst measures 3.8 cm. Mild perihepatic ascites. No acute   osseous abnormality.    IMPRESSION:  Marked cardiomegaly. Small pericardial effusion. Small   right-sided pleural effusion. Mild perihepatic ascites. No pneumonia.                NAYA CAMACHO M.D., ATTENDING RADIOLOGIST  This document has been electronically signed. 2018  6:00PM        < end of copied text >        MEDICATIONS  (STANDING):  ALBUTerol/ipratropium for Nebulization 3 milliLiter(s) Nebulizer every 6 hours  ATENolol  Tablet 25 milliGRAM(s) Oral two times a day  atorvastatin 10 milliGRAM(s) Oral at bedtime  digoxin     Tablet 0.125 milliGRAM(s) Oral every other day  furosemide   Injectable 40 milliGRAM(s) IV Push two times a day  lactobacillus acidophilus 1 Tablet(s) Oral two times a day with meals  multivitamin 1 Tablet(s) Oral daily  warfarin 3 milliGRAM(s) Oral once      MEDICATIONS  (PRN):  acetaminophen   Tablet 650 milliGRAM(s) Oral every 6 hours PRN For Temp greater than or equal to 38 C (100.4 F)  acetaminophen   Tablet. 650 milliGRAM(s) Oral every 6 hours PRN Moderate Pain (4 - 6)

## 2018-02-04 DIAGNOSIS — J10.1 INFLUENZA DUE TO OTHER IDENTIFIED INFLUENZA VIRUS WITH OTHER RESPIRATORY MANIFESTATIONS: ICD-10-CM

## 2018-02-04 LAB
ANION GAP SERPL CALC-SCNC: 12 MMOL/L — SIGNIFICANT CHANGE UP (ref 5–17)
BUN SERPL-MCNC: 41 MG/DL — HIGH (ref 8–20)
CALCIUM SERPL-MCNC: 8.2 MG/DL — LOW (ref 8.6–10.2)
CHLORIDE SERPL-SCNC: 106 MMOL/L — SIGNIFICANT CHANGE UP (ref 98–107)
CO2 SERPL-SCNC: 30 MMOL/L — HIGH (ref 22–29)
CREAT SERPL-MCNC: 1.41 MG/DL — HIGH (ref 0.5–1.3)
GAS PNL BLDA: SIGNIFICANT CHANGE UP
GLUCOSE SERPL-MCNC: 119 MG/DL — HIGH (ref 70–115)
HCT VFR BLD CALC: 44 % — SIGNIFICANT CHANGE UP (ref 37–47)
HGB BLD-MCNC: 13.3 G/DL — SIGNIFICANT CHANGE UP (ref 12–16)
INR BLD: 1.84 RATIO — HIGH (ref 0.88–1.16)
MCHC RBC-ENTMCNC: 30.2 G/DL — LOW (ref 32–36)
MCHC RBC-ENTMCNC: 31.2 PG — HIGH (ref 27–31)
MCV RBC AUTO: 103.3 FL — HIGH (ref 81–99)
PLATELET # BLD AUTO: 73 K/UL — LOW (ref 150–400)
POTASSIUM SERPL-MCNC: 3.3 MMOL/L — LOW (ref 3.5–5.3)
POTASSIUM SERPL-SCNC: 3.3 MMOL/L — LOW (ref 3.5–5.3)
PROTHROM AB SERPL-ACNC: 20.5 SEC — HIGH (ref 9.8–12.7)
RBC # BLD: 4.26 M/UL — LOW (ref 4.4–5.2)
RBC # FLD: 15.1 % — SIGNIFICANT CHANGE UP (ref 11–15.6)
SODIUM SERPL-SCNC: 148 MMOL/L — HIGH (ref 135–145)
T3 SERPL-MCNC: 54 NG/DL — LOW (ref 80–200)
T4 AB SER-ACNC: 4 UG/DL — LOW (ref 4.5–12)
TROPONIN T SERPL-MCNC: 0.02 NG/ML — SIGNIFICANT CHANGE UP (ref 0–0.06)
WBC # BLD: 15.8 K/UL — HIGH (ref 4.8–10.8)
WBC # FLD AUTO: 15.8 K/UL — HIGH (ref 4.8–10.8)

## 2018-02-04 PROCEDURE — 93010 ELECTROCARDIOGRAM REPORT: CPT

## 2018-02-04 PROCEDURE — 99233 SBSQ HOSP IP/OBS HIGH 50: CPT

## 2018-02-04 PROCEDURE — 12345: CPT | Mod: NC

## 2018-02-04 PROCEDURE — 99232 SBSQ HOSP IP/OBS MODERATE 35: CPT

## 2018-02-04 PROCEDURE — 93306 TTE W/DOPPLER COMPLETE: CPT | Mod: 26

## 2018-02-04 RX ORDER — POTASSIUM CHLORIDE 20 MEQ
10 PACKET (EA) ORAL
Qty: 0 | Refills: 0 | Status: COMPLETED | OUTPATIENT
Start: 2018-02-04 | End: 2018-02-04

## 2018-02-04 RX ORDER — ACETAMINOPHEN 500 MG
1000 TABLET ORAL ONCE
Qty: 0 | Refills: 0 | Status: COMPLETED | OUTPATIENT
Start: 2018-02-04 | End: 2018-02-04

## 2018-02-04 RX ORDER — AZTREONAM 2 G
1000 VIAL (EA) INJECTION ONCE
Qty: 0 | Refills: 0 | Status: COMPLETED | OUTPATIENT
Start: 2018-02-04 | End: 2018-02-04

## 2018-02-04 RX ORDER — SODIUM CHLORIDE 9 MG/ML
500 INJECTION INTRAMUSCULAR; INTRAVENOUS; SUBCUTANEOUS ONCE
Qty: 0 | Refills: 0 | Status: COMPLETED | OUTPATIENT
Start: 2018-02-04 | End: 2018-02-04

## 2018-02-04 RX ORDER — VANCOMYCIN HCL 1 G
1000 VIAL (EA) INTRAVENOUS ONCE
Qty: 0 | Refills: 0 | Status: COMPLETED | OUTPATIENT
Start: 2018-02-04 | End: 2018-02-04

## 2018-02-04 RX ADMIN — Medication 100 MILLIEQUIVALENT(S): at 22:47

## 2018-02-04 RX ADMIN — Medication 100 MILLIEQUIVALENT(S): at 20:44

## 2018-02-04 RX ADMIN — Medication 250 MILLIGRAM(S): at 06:29

## 2018-02-04 RX ADMIN — Medication 40 MILLIGRAM(S): at 17:52

## 2018-02-04 RX ADMIN — Medication 3 MILLILITER(S): at 21:10

## 2018-02-04 RX ADMIN — Medication 3 MILLILITER(S): at 15:59

## 2018-02-04 RX ADMIN — Medication 400 MILLIGRAM(S): at 01:02

## 2018-02-04 RX ADMIN — Medication 3 MILLILITER(S): at 09:41

## 2018-02-04 RX ADMIN — SODIUM CHLORIDE 1000 MILLILITER(S): 9 INJECTION INTRAMUSCULAR; INTRAVENOUS; SUBCUTANEOUS at 04:30

## 2018-02-04 RX ADMIN — Medication 1 INCH(S): at 04:30

## 2018-02-04 RX ADMIN — Medication 50 MILLIGRAM(S): at 07:48

## 2018-02-04 NOTE — PROGRESS NOTE ADULT - PROBLEM SELECTOR PLAN 2
Likely secondary to CHF  Check TTE  Decrease lasix to once daily, monitor SCr  Cardiology, Pulmonology

## 2018-02-04 NOTE — PROGRESS NOTE ADULT - SUBJECTIVE AND OBJECTIVE BOX
RAGHU DAVIS  [id number]      Cellulitis of right lower extremity  H/o or current diagnosis of HF- Contraindication to ACEI/ARBs  Clostridium difficile diarrhea  CHF (congestive heart failure)  Gout  Mitral valve regurgitation  Hypertension  Atrial fibrillation  Cellulitis of right lower extremity  Pleural effusion  Delirium due to multiple etiologies, acute, hypoactive  Lethargy  Carotid stenosis, asymptomatic, right  CKD (chronic kidney disease), stage III    No significant past surgical history      Chief Complaint:  Admitted with RLE pain and swelling c/w cellulitis    Interval History:  Developed increased SOB and has evidence of HFpEF  with underlying MR/ PAH, CKD  Likely triggered by acute Influenza      acetaminophen   Tablet 650 milliGRAM(s) Oral every 6 hours PRN  acetaminophen   Tablet. 650 milliGRAM(s) Oral every 6 hours PRN  ALBUTerol/ipratropium for Nebulization 3 milliLiter(s) Nebulizer every 6 hours  ATENolol  Tablet 25 milliGRAM(s) Oral two times a day  atorvastatin 10 milliGRAM(s) Oral at bedtime  digoxin     Tablet 0.125 milliGRAM(s) Oral every other day  furosemide   Injectable 40 milliGRAM(s) IV Push two times a day  lactobacillus acidophilus 1 Tablet(s) Oral two times a day with meals  levothyroxine 25 MICROGram(s) Oral daily  LORazepam   Injectable 1 milliGRAM(s) IV Push every 6 hours PRN  multivitamin 1 Tablet(s) Oral daily  nitroglycerin    2% Ointment 1 Inch(s) Transdermal three times a day  oseltamivir 30 milliGRAM(s) Oral two times a day          Physical Exam:  T(C): 36.4 (02-04-18 @ 12:11), Max: 38.4 (02-04-18 @ 00:45)  HR: 83 (02-04-18 @ 15:59) (80 - 118)  BP: 93/67 (02-04-18 @ 13:14) (89/61 - 180/121)  RR: 19 (02-04-18 @ 13:14) (19 - 35)  SpO2: 98% (02-04-18 @ 15:59) (90% - 99%)  Wt(kg): --  General: Comfortable in NAD  Neck: No JVD  CVS: nl s1s2, no s3  Pulm: Bilat crackles No wheeze, rhonchi  Abd: soft, non-tender  Ext: venous stasis changes  Neuro A&O x3  Psych: Normal affect    I&O's Summary    03 Feb 2018 07:01  -  04 Feb 2018 07:00  --------------------------------------------------------  IN: 1157 mL / OUT: 895 mL / NET: 262 mL    04 Feb 2018 07:01  -  04 Feb 2018 16:12  --------------------------------------------------------  IN: 0 mL / OUT: 190 mL / NET: -190 mL    Rapid Respiratory Viral Panel (02.03.18 @ 17:40)    Influenza AH3 (RapRVP): Detected: TYPE:(C=Critical, N=Notification, A=Abnormal) C  TESTS: _influena A  DATE/TIME CALLED: _02/03/18 19:01  CALLED TO: Elvira macdonald  READ BACK (2 Patient Identifiers)(Y/N): _y  READ BACK VALUES (Y/N): _y  CALLED BY: _kb  send a copy to i.c and logistics          Labs:   04 Feb 2018 06:43    148    |  106    |  41.0   ----------------------------<  119    3.3     |  30.0   |  1.41     Ca    8.2        04 Feb 2018 06:43                            13.3   15.8  )-----------( 73       ( 04 Feb 2018 06:43 )             44.0     PT/INR - ( 04 Feb 2018 06:43 )   PT: 20.5 sec;   INR: 1.84 ratio           CARDIAC MARKERS ( 04 Feb 2018 06:43 )  x     / 0.02 ng/mL / x     / x     / x      CARDIAC MARKERS ( 03 Feb 2018 15:34 )  x     / <0.01 ng/mL / x     / x     / x        Serum Pro-Brain Natriuretic Peptide: 29877 pg/mL        Radiology:  CT FINDINGS:    The visualized thyroid gland is unremarkable in appearance. Moderate   bilateral upper lobe centrilobular emphysema changes. Small right-sided   pleural effusion. The heart is markedly enlarged. Dense coronary artery   and valvular calcifications. Moderate aortic calcifications. Small   pericardial effusion. Mild left lower lobe bronchiectasis.    Left kidney cyst measures 3.8 cm. Mild perihepatic ascites. No acute   osseous abnormality.    IMPRESSION:  Marked cardiomegaly. Small pericardial effusion. Small   right-sided pleural effusion. Mild perihepatic ascites. No pneumonia.      Assessment:  85 y Female mod/severe MR and CHF (perserved EF)  Recently being treated for cellulitis  elevated proBNP  declining mental status  Decrease Potassium  Increased BUN/Creat  +Influenza A    Plan:  1. Continue Diuretics cautiously  2. Follow Bun / Creat  3. With overall functional status decline and comorbidities, the prognosis is guarded

## 2018-02-04 NOTE — PROGRESS NOTE ADULT - ASSESSMENT
85 year old female with PMH HTN, cAF on Coumadin, MR, CHFpEF presented with RLE pain, erythema and swelling. Noted to have confusion at admission (?encephalopathy), leukocytosis (WBC 22, lactic acidosis  3.1) and imaging including CT consistent with RLE Cellulitis without abscess. Initially treated with Azactam/Clindamycin/Vancomycin in ER, later switched to Ceftriaxone/Zyvox by ID and then oral Keflex. Leukocytosis resolved, lactate cleared, remains afebrile. Cellulitis resolved. Blood cultures negative.    Lethargy secondary to delirium vs dementia  Encephalopathy work up with elevated TSH. Unclear significance, as may be abnormal secondary to metabolic processes.  UA dirty, Chest X-Ray suggestive of LLL consolidation; however remains without fever or leukocytosis so unclear if infectious; could be masked by Keflex. Doubt UTI, likely inappropriately collected sample, patient was on antibiotics unless MDR. Cultures pending. CT Chest with bilateral effusion, no infiltrate.  patient more encephalopathic today, spiking fever, influenza+    CHFpEF, recent TTE (10/2017) with preserved EF 60-65%, moderate to severe MR, Mild AS, Moderate AR, severe TR, Mild PAH. No evidence of decompensated CHF currently. However with bilateral pleural effusion on CT. Started on diuresis, appears to be on the dry side.    Also seen in consultation by Vascular surgery given possible history of PAD, skin graft with recommendations for follow up with primary vascular surgeon Dr Braswell upon discharge.     HTN - well controlled on current regimen.    AF - rate controlled, INR supra therapeutic, no evidence of bleeding.    CKD 3, renal insufficiency, improved SCr today - in fact normalized    Hyponatremia, hypokalemia present at admission - resolved.    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 Added statin, Check FLP. Follow up with Dr Garcia after discharge    Slurred speech as per  - CTH, MRI brain negative for stroke. Secondary to dry mouth - RN advised to provide with oral care    Diarrhea - received antibiotics, is on Probiotic. Cdiff negative. Diarrhea resolved.        Guarded prognosis - patient would not want aggressive measures as per daughter Iona. DNR, DNI

## 2018-02-04 NOTE — CHART NOTE - NSCHARTNOTEFT_GEN_A_CORE
Called to bedside to assess pt overnight. At approx 11:15pm pt in resp distress, tachypneic, tachycardic with RR 38 and agonal breathing. BP at time was 180/100 and . one dose metoprolol 5mg given in additon to standing dose and Ativan 1mg given in place of morphine as pt with allergy for resp distress. S/p ativan drastic improvement in tachypnea and resp distress, pt appeared comfortable throughout night. At approx. 1am hartmann with diuresis of 850. At 3am 15cc noted for urine outpt. Pt now at 4:35 with minimal <5cc noted of urine and hypotension. First hypotensive reading noted at 4am, now with BP of 83/67. In light of poor urine outpt and hypotension, with aggressive duriesis, one 500cc bolus to be given with close monitoring of renal function and resp status. Called to bedside to assess pt overnight. At approx 11:15pm pt in resp distress, tachypneic, tachycardic with RR 38 and agonal breathing. BP at time was 180/100 and . One dose metoprolol 5mg given in additon to standing dose and Ativan 1mg given in place of morphine as pt with allergy for resp distress. S/p ativan drastic improvement in tachypnea and resp distress, pt appeared comfortable throughout night and vitals had improved. At approx. 1am hartmann with diuresis of 850 and temperature noted of 101 treated with IV tylenol as pt on bipap and cannot tolerate PO. At 3am 15cc noted for urine outpt. Pt now at 4:35 with minimal <5cc noted of urine and hypotension. First hypotensive reading noted at 4am, now with BP of 83/67. In light of poor urine outpt and hypotension, with aggressive duriesis, one 500cc bolus to be given with close monitoring of renal function and resp status. NTG removed in setting of hypotension 1 hr early and one dose of Vanco and aztreonam ordered for new left sided consolidation as pt now with fever overnight, hypotension, tachypnea and tachycardia. Lactate, ABG with lytes, and Blood cultures x two sets ordered for concern of sepsis in setting of  +influenza and new consolidation on chest x ray.

## 2018-02-04 NOTE — PROGRESS NOTE ADULT - PROBLEM SELECTOR PLAN 3
Likely secondary to delirium, influenza, respiratory failure  Continue to monitor  Enhanced supervision  mobilise, OOBTC

## 2018-02-04 NOTE — PROGRESS NOTE ADULT - SUBJECTIVE AND OBJECTIVE BOX
HOSPITALIST PROGRESS NOTE    RAGHU DAVIS  [id number]  85yFemale    Patient is a 85y old  Female who presents with a chief complaint of rt. lower ext. pain (24 Jan 2018 04:35)      SUBJECTIVE:   Chart reviewed since last visit.  ICU, Pulm, Card consult appreciated.  Patient seen and examined at bedside for hypoxia, influenza.  Nonverbal today, only mumbles responses      OBJECTIVE:  Vital Signs Last 24 Hrs  T(C): 36.4 (04 Feb 2018 12:11), Max: 38.4 (04 Feb 2018 00:45)  T(F): 97.5 (04 Feb 2018 12:11), Max: 101.1 (04 Feb 2018 00:45)  HR: 94 (04 Feb 2018 16:31) (80 - 118)  BP: 110/76 (04 Feb 2018 16:31) (89/61 - 180/121)  BP(mean): 71 (04 Feb 2018 06:35) (71 - 98)  RR: 20 (04 Feb 2018 16:31) (19 - 35)  SpO2: 96% (04 Feb 2018 16:31) (90% - 99%)    PHYSICAL EXAMINATION  General: Elderly, NAD[+]   lying in bed, somnolent  HEENT: AT/NC[+]  dried mouth, lips and tongue  NECK: Supple[+]  JVD[] Carotid bruit[]  CVS: RRR[]  Irregular[+]  S1+S2[+]   Murmur[]  RESP: Fair air entry bilaterally[+]   Clear sounds[]   poor effort [+]  wheeze[]   Crackles[] Decreases sounds at bases  GI: Soft[+]  nondistended NT   Bowel Sounds[+]  Mass[]   HSM[]   Ascites[]  : suprapubic tenderness[-]   CVA Tenderness[]   Montez[-]  MS: RLE without swelling, with medial wound. Chronic changes. No edema  CNS: exam limited by lethargy  INTEG: Skin is Warm[+]    PSYCH: somnolent      MONITOR: AF  CAPILLARY BLOOD GLUCOSE         I&O's Summary    03 Feb 2018 07:01  -  04 Feb 2018 07:00  --------------------------------------------------------  IN: 1157 mL / OUT: 895 mL / NET: 262 mL    04 Feb 2018 07:01  -  04 Feb 2018 16:52  --------------------------------------------------------  IN: 0 mL / OUT: 240 mL / NET: -240 mL                            13.3   15.8  )-----------( 73       ( 04 Feb 2018 06:43 )             44.0     PT/INR - ( 04 Feb 2018 06:43 )   PT: 20.5 sec;   INR: 1.84 ratio           02-04    148<H>  |  106  |  41.0<H>  ----------------------------<  119<H>  3.3<L>   |  30.0<H>  |  1.41<H>    Ca    8.2<L>      04 Feb 2018 06:43      CARDIAC MARKERS ( 04 Feb 2018 06:43 )  x     / 0.02 ng/mL / x     / x     / x      CARDIAC MARKERS ( 03 Feb 2018 15:34 )  x     / <0.01 ng/mL / x     / x     / x          Rapid Respiratory Viral Panel (02.03.18 @ 17:40)    Rapid RVP Result: Detected: The [employer] RVP Rapid uses polymerase chain reaction (PCR) and melt  curve analysis to screen for adenovirus; coronavirus HKU1, NL63, 229E,  OC43; human metapneumovirus (hMPV); human enterovirus/rhinovirus  (Entero/RV); influenza A; influenza A/H1;influenza A/H3; influenza  A/H1-2009; influenza B; parainfluenza viruses 1, 2, 3, 4; respiratory  syncytial virus; Bordetella pertussis; Mycoplasma pneumoniae; and  Chlamydophila pneumoniae.    Influenza AH3 (RapRVP): Detected: TYPE:(C=Critical, N=Notification, A=Abnormal) C  TESTS: _influena A  DATE/TIME CALLED: _02/03/18 19:01  CALLED TO: _ jose r macdonald  READ BACK (2 Patient Identifiers)(Y/N): _y  READ BACK VALUES (Y/N): _y  CALLED BY: _kb  send a copy to i.c and logistics        Culture:  Culture - Urine (02.02.18 @ 08:46)    Specimen Source: .Urine Clean Catch (Midstream)    Culture Results:   10,000 - 49,000 CFU/mL Gram Negative Rods  Culture in progress    Culture - Blood (02.01.18 @ 10:10)    Specimen Source: .Blood Blood-Peripheral    Culture Results:   No growth at 48 hours    Culture - Blood (02.01.18 @ 10:10)    Specimen Source: .Blood Blood-Peripheral    Culture Results:   No growth at 48 hours      TTE: pending    RADIOLOGY    < from: Xray Chest 1 View- PORTABLE-Urgent (02.03.18 @ 14:25) >  EXAM:  XR CHEST PORTABLE URGENT 1V                          PROCEDURE DATE:  02/03/2018          INTERPRETATION:  Portable chest radiograph        CLINICAL INFORMATION:   Short of breath.    TECHNIQUE:  Portable  AP view of the chest was obtained.    COMPARISON: 1/31/2018 available for review.    FINDINGS:   The lungs  show left lateral lower lobe retrocardiac airspace   consolidation scarring left diaphragmatic contour. Left upper lobe clear.   Increased interstitial markings noted within thelateral right perihilar   and upper lobe parenchyma. Right lung base clear.         The  heart is enlarged in transverse diameter. No hilar mass. Trachea   midline.  Thoracic aorta heavily calcified.   Visualized osseous structures are intact.        IMPRESSION:   Cardiomegaly.. Left lower lobe retrocardiac   airspaceconsolidation..                          NATHALIE COX M.D., ATTENDING RADIOLOGIST  This document has been electronically signed. Feb  3 2018  2:42PM          < end of copied text >        MEDICATIONS  (STANDING):  ALBUTerol/ipratropium for Nebulization 3 milliLiter(s) Nebulizer every 6 hours  ATENolol  Tablet 25 milliGRAM(s) Oral two times a day  atorvastatin 10 milliGRAM(s) Oral at bedtime  digoxin     Tablet 0.125 milliGRAM(s) Oral every other day  furosemide   Injectable 40 milliGRAM(s) IV Push two times a day  lactobacillus acidophilus 1 Tablet(s) Oral two times a day with meals  levothyroxine 25 MICROGram(s) Oral daily  multivitamin 1 Tablet(s) Oral daily  nitroglycerin    2% Ointment 1 Inch(s) Transdermal three times a day  oseltamivir 30 milliGRAM(s) Oral two times a day      MEDICATIONS  (PRN):  acetaminophen   Tablet 650 milliGRAM(s) Oral every 6 hours PRN For Temp greater than or equal to 38 C (100.4 F)  acetaminophen   Tablet. 650 milliGRAM(s) Oral every 6 hours PRN Moderate Pain (4 - 6)  LORazepam   Injectable 1 milliGRAM(s) IV Push every 6 hours PRN Anxiety

## 2018-02-04 NOTE — PROGRESS NOTE ADULT - ASSESSMENT
-Hypoxic resp failure; see below  -CHF; appear decompensated.   -No pna seen on CT  -Emphysema seen on ct  -Pleural effusion  -Renal insufficiency      RECC:   Diurese. O2. Nebs. BPAP prn. Cardiology f/u.  Echo. Palliative eval.     Prognosis poor overall.     Pt DNR.

## 2018-02-04 NOTE — PROGRESS NOTE ADULT - SUBJECTIVE AND OBJECTIVE BOX
PULMONARY PROGRESS NOTE      JESSICA DAVIS-[id number]    Patient is a 85y old  Female who presents with a chief complaint of rt. lower ext. pain (24 Jan 2018 04:35)      INTERVAL HPI/OVERNIGHT EVENTS: Events noted. Seen by ICU as well. On NCO2. Looks more comfortable.     MEDICATIONS  (STANDING):  acetazolamide Injectable 500 milliGRAM(s) IV Push every 8 hours  ALBUTerol/ipratropium for Nebulization 3 milliLiter(s) Nebulizer every 6 hours  ATENolol  Tablet 25 milliGRAM(s) Oral two times a day  atorvastatin 10 milliGRAM(s) Oral at bedtime  digoxin     Tablet 0.125 milliGRAM(s) Oral every other day  furosemide   Injectable 40 milliGRAM(s) IV Push two times a day  lactobacillus acidophilus 1 Tablet(s) Oral two times a day with meals  levothyroxine 25 MICROGram(s) Oral daily  multivitamin 1 Tablet(s) Oral daily  nitroglycerin    2% Ointment 1 Inch(s) Transdermal three times a day  oseltamivir 30 milliGRAM(s) Oral two times a day      MEDICATIONS  (PRN):  acetaminophen   Tablet 650 milliGRAM(s) Oral every 6 hours PRN For Temp greater than or equal to 38 C (100.4 F)  acetaminophen   Tablet. 650 milliGRAM(s) Oral every 6 hours PRN Moderate Pain (4 - 6)  LORazepam   Injectable 1 milliGRAM(s) IV Push every 6 hours PRN Anxiety      Allergies    allopurinol (Rash)  aspirin (Unknown)  azithromycin (Diarrhea)  clindamycin (Diarrhea)  latex (Unknown)  morphine (Other; Short breath)  penicillins (Unknown)  sulfa drugs (Unknown)    Intolerances        PAST MEDICAL & SURGICAL HISTORY:  Clostridium difficile diarrhea  CHF (congestive heart failure)  Gout  Mitral valve regurgitation  Hypertension  Atrial fibrillation  H/O skin graft             REVIEW OF SYSTEMS:    unable to provide ROS    Vital Signs Last 24 Hrs  T(C): 36.6 (04 Feb 2018 07:55), Max: 38.4 (04 Feb 2018 00:45)  T(F): 97.8 (04 Feb 2018 07:55), Max: 101.1 (04 Feb 2018 00:45)  HR: 81 (04 Feb 2018 09:41) (80 - 118)  BP: 108/67 (04 Feb 2018 08:00) (89/61 - 180/121)  BP(mean): 71 (04 Feb 2018 06:35) (71 - 112)  RR: 19 (04 Feb 2018 09:44) (19 - 35)  SpO2: 97% (04 Feb 2018 09:44) (90% - 99%)    PHYSICAL EXAMINATION:    GENERAL: The patient is in no apparent distress.     HEENT: Head is normocephalic and atraumatic. Extraocular muscles are intact. Mucous membranes are moist.    NECK: Supple.    LUNGS: crackles b/l, respirations now unlabored    HEART: Regular rate and rhythm      ABDOMEN: Soft, nontender, and nondistended.      EXTREMITIES: Without any cyanosis, clubbing, rash, lesions; + edema.    NEUROLOGIC: more awake    LABS:                        13.3   15.8  )-----------( 73       ( 04 Feb 2018 06:43 )             44.0     02-04    148<H>  |  106  |  41.0<H>  ----------------------------<  119<H>  3.3<L>   |  30.0<H>  |  1.41<H>    Ca    8.2<L>      04 Feb 2018 06:43      PT/INR - ( 04 Feb 2018 06:43 )   PT: 20.5 sec;   INR: 1.84 ratio             ABG - ( 04 Feb 2018 05:35 )  pH: 7.46  /  pCO2: 44    /  pO2: 91    / HCO3: 30    / Base Excess: 6.7   /  SaO2: 98                CARDIAC MARKERS ( 04 Feb 2018 06:43 )  x     / 0.02 ng/mL / x     / x     / x      CARDIAC MARKERS ( 03 Feb 2018 15:34 )  x     / <0.01 ng/mL / x     / x     / x            Serum Pro-Brain Natriuretic Peptide: 61694 pg/mL (02-03-18 @ 15:34)  Serum Pro-Brain Natriuretic Peptide: 55578 pg/mL (02-03-18 @ 13:57)       RADIOLOGY & ADDITIONAL STUDIES:  < from: Xray Chest 1 View- PORTABLE-Urgent (02.03.18 @ 14:25) >     EXAM:  XR CHEST PORTABLE URGENT 1V                          PROCEDURE DATE:  02/03/2018          INTERPRETATION:  Portable chest radiograph        CLINICAL INFORMATION:   Short of breath.    TECHNIQUE:  Portable  AP view of the chest was obtained.    COMPARISON: 1/31/2018 available for review.    FINDINGS:   The lungs  show left lateral lower lobe retrocardiac airspace   consolidation scarring left diaphragmatic contour. Left upper lobe clear.   Increased interstitial markings noted within thelateral right perihilar   and upper lobe parenchyma. Right lung base clear.         The  heart is enlarged in transverse diameter. No hilar mass. Trachea   midline.  Thoracic aorta heavily calcified.   Visualized osseous structures are intact.        IMPRESSION:   Cardiomegaly.. Left lower lobe retrocardiac   airspaceconsolidation..                          NATHALIE COX M.D., ATTENDING RADIOLOGIST    < end of copied text >

## 2018-02-05 DIAGNOSIS — R13.10 DYSPHAGIA, UNSPECIFIED: ICD-10-CM

## 2018-02-05 DIAGNOSIS — R53.2 FUNCTIONAL QUADRIPLEGIA: ICD-10-CM

## 2018-02-05 DIAGNOSIS — N39.0 URINARY TRACT INFECTION, SITE NOT SPECIFIED: ICD-10-CM

## 2018-02-05 DIAGNOSIS — Z51.5 ENCOUNTER FOR PALLIATIVE CARE: ICD-10-CM

## 2018-02-05 LAB
-  AMIKACIN: SIGNIFICANT CHANGE UP
-  AMPICILLIN/SULBACTAM: SIGNIFICANT CHANGE UP
-  AMPICILLIN: SIGNIFICANT CHANGE UP
-  AZTREONAM: SIGNIFICANT CHANGE UP
-  CEFAZOLIN: SIGNIFICANT CHANGE UP
-  CEFEPIME: SIGNIFICANT CHANGE UP
-  CEFOXITIN: SIGNIFICANT CHANGE UP
-  CEFTAZIDIME: SIGNIFICANT CHANGE UP
-  CEFTRIAXONE: SIGNIFICANT CHANGE UP
-  CIPROFLOXACIN: SIGNIFICANT CHANGE UP
-  ERTAPENEM: SIGNIFICANT CHANGE UP
-  GENTAMICIN: SIGNIFICANT CHANGE UP
-  IMIPENEM: SIGNIFICANT CHANGE UP
-  LEVOFLOXACIN: SIGNIFICANT CHANGE UP
-  MEROPENEM: SIGNIFICANT CHANGE UP
-  NITROFURANTOIN: SIGNIFICANT CHANGE UP
-  PIPERACILLIN/TAZOBACTAM: SIGNIFICANT CHANGE UP
-  TOBRAMYCIN: SIGNIFICANT CHANGE UP
-  TRIMETHOPRIM/SULFAMETHOXAZOLE: SIGNIFICANT CHANGE UP
ANION GAP SERPL CALC-SCNC: 15 MMOL/L — SIGNIFICANT CHANGE UP (ref 5–17)
BUN SERPL-MCNC: 47 MG/DL — HIGH (ref 8–20)
CALCIUM SERPL-MCNC: 8.6 MG/DL — SIGNIFICANT CHANGE UP (ref 8.6–10.2)
CHLORIDE SERPL-SCNC: 108 MMOL/L — HIGH (ref 98–107)
CO2 SERPL-SCNC: 30 MMOL/L — HIGH (ref 22–29)
CREAT SERPL-MCNC: 1.55 MG/DL — HIGH (ref 0.5–1.3)
CULTURE RESULTS: SIGNIFICANT CHANGE UP
DIGOXIN SERPL-MCNC: 0.6 NG/ML — LOW (ref 0.8–2)
GLUCOSE BLDC GLUCOMTR-MCNC: 89 MG/DL — SIGNIFICANT CHANGE UP (ref 70–99)
GLUCOSE SERPL-MCNC: 112 MG/DL — SIGNIFICANT CHANGE UP (ref 70–115)
HCT VFR BLD CALC: 44 % — SIGNIFICANT CHANGE UP (ref 34–45)
HCT VFR BLD CALC: 45 % — SIGNIFICANT CHANGE UP (ref 37–47)
HGB BLD-MCNC: 13.7 G/DL — SIGNIFICANT CHANGE UP (ref 12–16)
INR BLD: 2.11 RATIO — HIGH (ref 0.88–1.16)
MCHC RBC-ENTMCNC: 30.4 G/DL — LOW (ref 32–36)
MCHC RBC-ENTMCNC: 31.1 PG — HIGH (ref 27–31)
MCV RBC AUTO: 102 FL — HIGH (ref 81–99)
METHOD TYPE: SIGNIFICANT CHANGE UP
ORGANISM # SPEC MICROSCOPIC CNT: SIGNIFICANT CHANGE UP
ORGANISM # SPEC MICROSCOPIC CNT: SIGNIFICANT CHANGE UP
PLATELET # BLD AUTO: 78 K/UL — LOW (ref 150–400)
POTASSIUM SERPL-MCNC: 3.8 MMOL/L — SIGNIFICANT CHANGE UP (ref 3.5–5.3)
POTASSIUM SERPL-SCNC: 3.8 MMOL/L — SIGNIFICANT CHANGE UP (ref 3.5–5.3)
PROCALCITONIN SERPL-MCNC: 2.32 NG/ML — HIGH (ref 0–0.04)
PROTHROM AB SERPL-ACNC: 23.6 SEC — HIGH (ref 9.8–12.7)
RBC # BLD: 4.41 M/UL — SIGNIFICANT CHANGE UP (ref 4.4–5.2)
RBC # FLD: 15.2 % — SIGNIFICANT CHANGE UP (ref 11–15.6)
SODIUM SERPL-SCNC: 153 MMOL/L — HIGH (ref 135–145)
SPECIMEN SOURCE: SIGNIFICANT CHANGE UP
WBC # BLD: 11.1 K/UL — HIGH (ref 4.8–10.8)
WBC # FLD AUTO: 11.1 K/UL — HIGH (ref 4.8–10.8)

## 2018-02-05 PROCEDURE — 99233 SBSQ HOSP IP/OBS HIGH 50: CPT

## 2018-02-05 PROCEDURE — 99222 1ST HOSP IP/OBS MODERATE 55: CPT

## 2018-02-05 RX ORDER — DIGOXIN 250 MCG
0.12 TABLET ORAL ONCE
Qty: 0 | Refills: 0 | Status: COMPLETED | OUTPATIENT
Start: 2018-02-05 | End: 2018-02-05

## 2018-02-05 RX ORDER — AZTREONAM 2 G
500 VIAL (EA) INJECTION EVERY 12 HOURS
Qty: 0 | Refills: 0 | Status: DISCONTINUED | OUTPATIENT
Start: 2018-02-05 | End: 2018-02-05

## 2018-02-05 RX ORDER — WARFARIN SODIUM 2.5 MG/1
5 TABLET ORAL ONCE
Qty: 0 | Refills: 0 | Status: COMPLETED | OUTPATIENT
Start: 2018-02-05 | End: 2018-02-05

## 2018-02-05 RX ORDER — ZINC OXIDE 200 MG/G
1 OINTMENT TOPICAL DAILY
Qty: 0 | Refills: 0 | Status: DISCONTINUED | OUTPATIENT
Start: 2018-02-05 | End: 2018-02-14

## 2018-02-05 RX ORDER — SACCHAROMYCES BOULARDII 250 MG
250 POWDER IN PACKET (EA) ORAL
Qty: 0 | Refills: 0 | Status: DISCONTINUED | OUTPATIENT
Start: 2018-02-05 | End: 2018-02-05

## 2018-02-05 RX ADMIN — Medication 1 INCH(S): at 13:15

## 2018-02-05 RX ADMIN — Medication 40 MILLIGRAM(S): at 17:20

## 2018-02-05 RX ADMIN — Medication 0.12 MILLIGRAM(S): at 14:05

## 2018-02-05 RX ADMIN — Medication 3 MILLILITER(S): at 15:29

## 2018-02-05 RX ADMIN — Medication 3 MILLILITER(S): at 03:33

## 2018-02-05 RX ADMIN — Medication 3 MILLILITER(S): at 08:20

## 2018-02-05 RX ADMIN — Medication 1 INCH(S): at 23:12

## 2018-02-05 RX ADMIN — Medication 3 MILLILITER(S): at 20:15

## 2018-02-05 RX ADMIN — ZINC OXIDE 1 APPLICATION(S): 200 OINTMENT TOPICAL at 18:49

## 2018-02-05 RX ADMIN — Medication 1 INCH(S): at 23:00

## 2018-02-05 RX ADMIN — Medication 40 MILLIGRAM(S): at 05:34

## 2018-02-05 NOTE — PROGRESS NOTE ADULT - PROBLEM SELECTOR PLAN 5
Blood cultures no growth  Completed 4 days of Linezolid and Ceftriaxone followed by 3 days of Keflex  Cellulitis is resolved

## 2018-02-05 NOTE — CONSULT NOTE ADULT - PROBLEM SELECTOR RECOMMENDATION 3
Patient reportedly allergic to PCN  Tolerated Ceftriaxone on prior admission
Cont lasix. Monitor electrolytes

## 2018-02-05 NOTE — PROGRESS NOTE ADULT - PROBLEM SELECTOR PLAN 1
RVP with Influenza A  Continue Tamiflu  Would complete 10 days  Blood cultures no growth  No fever since 2/4/18  Trend leukocytosis

## 2018-02-05 NOTE — CONSULT NOTE ADULT - SUBJECTIVE AND OBJECTIVE BOX
HPI:  84 y/o femal with PMH of Atrial fibrillation    CHF (congestive heart failure)    Clostridium difficile diarrhea    Gout    Hypertension    Mitral valve regurgitation    came to ER for rt. lower ext. pain, swelling skin discoloration which as per history and ER chart is acute in onset. pt. is poor historian and not giving much history. pt's wbc count  and lactate level was elevated. pt. was seen by surgery and vascular surgery but on ct scan there has been no free subcut air or any drainable collection, cellulitis is noted. no fever. no n/v/d , no cp. no sob as per history. it. is not clear how rt. lower xet. cellulitis started. pt. is on coumadin for her afib. cr 1.5 inc from nL baseline for which we are consulted.    ROS unable to obain pt nonverbal due to lethargy    PAST MEDICAL & SURGICAL HISTORY:  Clostridium difficile diarrhea  CHF (congestive heart failure)  Gout  Mitral valve regurgitation  Hypertension  Atrial fibrillation  H/O skin graft    FAMILY HISTORY:  No pertinent family history in first degree relatives  [place]    Social History:Non smoker    MEDICATIONS  (STANDING):  ALBUTerol/ipratropium for Nebulization 3 milliLiter(s) Nebulizer every 6 hours  ATENolol  Tablet 25 milliGRAM(s) Oral two times a day  atorvastatin 10 milliGRAM(s) Oral at bedtime  digoxin     Tablet 0.125 milliGRAM(s) Oral every other day  furosemide   Injectable 40 milliGRAM(s) IV Push two times a day  lactobacillus acidophilus 1 Tablet(s) Oral two times a day with meals  levothyroxine 25 MICROGram(s) Oral daily  multivitamin 1 Tablet(s) Oral daily  nitroglycerin    2% Ointment 1 Inch(s) Transdermal three times a day  oseltamivir 30 milliGRAM(s) Oral two times a day  warfarin 5 milliGRAM(s) Oral once    MEDICATIONS  (PRN):  acetaminophen   Tablet 650 milliGRAM(s) Oral every 6 hours PRN For Temp greater than or equal to 38 C (100.4 F)  acetaminophen   Tablet. 650 milliGRAM(s) Oral every 6 hours PRN Moderate Pain (4 - 6)  LORazepam   Injectable 1 milliGRAM(s) IV Push every 6 hours PRN Anxiety   Meds reviewed    Allergies    allopurinol (Rash)  aspirin (Unknown)  azithromycin (Diarrhea)  clindamycin (Diarrhea)  latex (Unknown)  morphine (Other; Short breath)  penicillins (Unknown)  sulfa drugs (Unknown)    HEME/LYMPH: No easy bruising, or bleeding gums  ALLERY AND IMMUNOLOGIC: No hives or eczema      Vital Signs Last 24 Hrs  T(C): 36.4 (2018 08:53), Max: 36.7 (2018 20:20)  T(F): 97.5 (2018 08:53), Max: 98.1 (2018 20:20)  HR: 103 (2018 13:12) (87 - 120)  BP: 125/89 (2018 13:12) (105/76 - 132/86)  BP(mean): 84 (2018 06:00) (84 - 98)  RR: 24 (2018 13:12) (16 - 26)  SpO2: 96% (2018 15:30) (92% - 100%)  Daily     Daily Weight in k.4 (2018 05:05)    PHYSICAL EXAM:    GENERAL: appears chronically ill  HEAD:  Atraumatic, Normocephalic  EYES: EOMI  NECK: Supple, neck  veins full  NERVOUS SYSTEM:  Nonverbal lethargic  CHEST/LUNG: dec BS B/L   HEART: Regular rate and rhythm; No murmurs  ABDOMEN: Soft, Nontender, Nondistended; Bowel sounds present  EXTREMITIES:  Trace edema      LABS:                        13.7   11.1  )-----------( 78       ( 2018 15:26 )             45.0     02-05    153<H>  |  108<H>  |  47.0<H>  ----------------------------<  112  3.8   |  30.0<H>  |  1.55<H>    Ca    8.6      2018 15:26      PT/INR - ( 2018 06:02 )   PT: 23.6 sec;   INR: 2.11 ratio               ABG - ( 2018 05:35 )  pH: 7.46  /  pCO2: 44    /  pO2: 91    / HCO3: 30    / Base Excess: 6.7   /  SaO2: 98                    RADIOLOGY & ADDITIONAL TESTS:

## 2018-02-05 NOTE — CONSULT NOTE ADULT - CONSULT REQUESTED DATE/TIME
02-Feb-2018 07:54
03-Feb-2018 14:13
03-Feb-2018 17:02
05-Feb-2018 13:04
05-Feb-2018 16:02
24-Jan-2018 00:00
03-Feb-2018 14:03
24-Jan-2018 13:00
23-Jan-2018 18:00

## 2018-02-05 NOTE — CONSULT NOTE ADULT - CONSULT REQUESTED BY NAME
DR. Valentin
Doctor
Dr. Angel Luis Valentin
Dr. Pascual
Dr. Valentin
Homero
Dr Valentin
Dr Horan
Dr. Nj

## 2018-02-05 NOTE — SWALLOW BEDSIDE ASSESSMENT ADULT - SLP GENERAL OBSERVATIONS
Pt received asleep in bed, arousable to voice, however required cues to maintain arousal, Ox2, Sp02 via nc, Sp02 95% baseline.
Pt received asleep in bed, SANDY Silva present at bedside. Pt woken via verbal/tactile stimuli, however remained with eyes closed and lethargic throughout. Pt noted with labored respiration, however clear vocal quality at baseline. +02 via nc .

## 2018-02-05 NOTE — SWALLOW BEDSIDE ASSESSMENT ADULT - ORAL PHASE
Delayed oral transit time/suspect premature loss Delayed oral transit time/suspect premature loss of bolus

## 2018-02-05 NOTE — SWALLOW BEDSIDE ASSESSMENT ADULT - SLP PERTINENT HISTORY OF CURRENT PROBLEM
Pt admitted with RLE cellulitis, ?encephalopathy with confusion and lethargy - delirium vs. dementia. Work up for CVA negative. Per RN Shira, pt with difficulty breathing, very wet upper respiratory sounds
Pt known to this service, last seen 2/3 for bedside swallow at which time a puree/no liquid diet was rx'd. Pt was lethargic, with respiratory difficulty at that time [facility], [facility]. Per RN Baldev, pt with improved arousnal, however  +cough on small amount of liquids this morning

## 2018-02-05 NOTE — CONSULT NOTE ADULT - SUBJECTIVE AND OBJECTIVE BOX
Palliative Medicine Initial Consultation Note    HPI:  History from chart  Came to ER for rt. lower ext. pain, swelling skin discoloration which as per history. Patient treated with abx for cellulitis, issues with delirium. + RVP, + SOB , hx of mod MR, cleveed.     PERTINENT PMH REVIEWED:  [ x] YES [ ] NO         Atrial fibrillation    CHF (congestive heart failure)    Clostridium difficile diarrhea    Gout    Hypertension    Mitral valve regurgitation.     Past Surgical History:  H/O skin graft    SOCIAL HISTORY:        EtOH [ ] Yes  [x ] No                                    Drugs [ ] Yes [x ] No                                   [ ] smoker [ x] nonsmoker                                    Admitted from: [x ] home [ ] SNF _________ [ ] [facility] ________    Surrogate/HCP/Guardian:  No HCP form in chart-  is surrogate    FAMILY HISTORY:  No pertinent family history in first degree relatives      Baseline ADLs (prior to admission):  Independent [ ] moderately [ ] fully   Dependent   [ x] moderately [ ]fully    MEDICATIONS  (STANDING):  ALBUTerol/ipratropium for Nebulization 3 milliLiter(s) Nebulizer every 6 hours  ATENolol  Tablet 25 milliGRAM(s) Oral two times a day  atorvastatin 10 milliGRAM(s) Oral at bedtime  aztreonam  IVPB 500 milliGRAM(s) IV Intermittent every 12 hours  digoxin     Tablet 0.125 milliGRAM(s) Oral every other day  digoxin  Injectable 0.125 milliGRAM(s) IV Push once  furosemide   Injectable 40 milliGRAM(s) IV Push two times a day  lactobacillus acidophilus 1 Tablet(s) Oral two times a day with meals  levothyroxine 25 MICROGram(s) Oral daily  multivitamin 1 Tablet(s) Oral daily  nitroglycerin    2% Ointment 1 Inch(s) Transdermal three times a day  oseltamivir 30 milliGRAM(s) Oral two times a day    MEDICATIONS  (PRN):  acetaminophen   Tablet 650 milliGRAM(s) Oral every 6 hours PRN For Temp greater than or equal to 38 C (100.4 F)  acetaminophen   Tablet. 650 milliGRAM(s) Oral every 6 hours PRN Moderate Pain (4 - 6)  LORazepam   Injectable 1 milliGRAM(s) IV Push every 6 hours PRN Anxiety      Allergies    allopurinol (Rash)  aspirin (Unknown)  azithromycin (Diarrhea)  clindamycin (Diarrhea)  latex (Unknown)  morphine (Other; Short breath)  penicillins (Unknown)  sulfa drugs (Unknown)    Intolerances        REVIEW OF SYSTEMS       [x ] Unable to obtain due to poor mentation           Karnofsky Performance Score/Palliative Performance Status Version 2:         %    Vital Signs Last 24 Hrs  T(C): 36.4 (05 Feb 2018 08:53), Max: 36.7 (04 Feb 2018 20:20)  T(F): 97.5 (05 Feb 2018 08:53), Max: 98.1 (04 Feb 2018 20:20)  HR: 93 (05 Feb 2018 06:00) (82 - 120)  BP: 116/68 (05 Feb 2018 06:00) (93/67 - 132/86)  BP(mean): 84 (05 Feb 2018 06:00) (84 - 98)  RR: 21 (05 Feb 2018 06:00) (16 - 26)  SpO2: 94% (05 Feb 2018 06:00) (92% - 100%)    PHYSICAL EXAM:    General: [ ] alert  [ ] oriented x ____ [ ] lethargic [ ] agitated                  [ ] cachexia  [ ] nonverbal  [ ] coma    HEENT: [ ] normal  [ ] dry mouth  [ ] ET tube/trach    Lungs: [ ] comfortable [ ] tachypnea/labored breathing  [ ] excessive secretions    CV: [ ] normal  [ ] tachycardia    GI: [ ] normal  [ ] distended  [ ] tender  [ ] no BS               [ ] PEG/NG/OG tube    : [ ] normal  [ ] incontinent  [ ] oliguria/anuria  [ ] hartmann    MSK: [ ] normal  [ ] weakness  [ ] edema             [ ] ambulatory  [ ] bedbound/wheelchair bound    Skin: [ ] normal  [ ] pressure ulcers- Stage_____  [ ] no rash    LABS:                        13.3   15.8  )-----------( 73       ( 04 Feb 2018 06:43 )             44.0     02-04    148<H>  |  106  |  41.0<H>  ----------------------------<  119<H>  3.3<L>   |  30.0<H>  |  1.41<H>    Ca    8.2<L>      04 Feb 2018 06:43      PT/INR - ( 05 Feb 2018 06:02 )   PT: 23.6 sec;   INR: 2.11 ratio             I&O's Summary    04 Feb 2018 07:01  -  05 Feb 2018 07:00  --------------------------------------------------------  IN: 200 mL / OUT: 1365 mL / NET: -1165 mL    05 Feb 2018 07:01  -  05 Feb 2018 13:05  --------------------------------------------------------  IN: 0 mL / OUT: 400 mL / NET: -400 mL        RADIOLOGY & ADDITIONAL STUDIES:  < from: CT Chest No Cont (02.01.18 @ 17:34) >  EXAM:  CT CHEST                          PROCEDURE DATE:  02/01/2018          INTERPRETATION:  TECHNIQUE: Noncontrast CT chest. 2-D MIP images.    COMPARISON: CT head 10/4/2017    CLINICAL HISTORY: Dyspnea, rule out pneumonia    FINDINGS:    The visualized thyroid gland is unremarkable in appearance. Moderate   bilateral upper lobe centrilobular emphysema changes. Small right-sided   pleural effusion. The heart is markedly enlarged. Dense coronary artery   and valvular calcifications. Moderateaortic calcifications. Small   pericardial effusion. Mild left lower lobe bronchiectasis.    Left kidney cyst measures 3.8 cm. Mild perihepatic ascites. No acute   osseous abnormality.    IMPRESSION:  Marked cardiomegaly. Small pericardial effusion. Small   right-sided pleural effusion. Mild perihepatic ascites. No pneumonia.          < end of copied text >        ADVANCE DIRECTIVES:  [ x] YES [ ] NO   DNR [ x] YES [ ] NO  Completed on:                     MOLST  [ ] YES [x ] NO   Completed on:  Living Will  [ ] YES [x ] NO   Completed on:    Thank you for the opportunity to assist with the care of this patient.   [facility] Consult Service [phone]. Palliative Medicine Initial Consultation Note    HPI:  History from chart  Came to ER for rt. lower ext. pain, swelling skin discoloration which as per history. Patient treated with abx for cellulitis, issues with delirium. + RVP, + SOB , hx of mod MR, yuuresed.     PERTINENT PMH REVIEWED:  [ x] YES [ ] NO         Atrial fibrillation    CHF (congestive heart failure)    Clostridium difficile diarrhea    Gout    Hypertension    Mitral valve regurgitation.     Past Surgical History:  H/O skin graft    SOCIAL HISTORY:        EtOH [ ] Yes  [x ] No                                    Drugs [ ] Yes [x ] No                                   [ ] smoker [ x] nonsmoker                                    Admitted from: [x ] home [ ] SNF _________ [ ] [facility] ________    Surrogate/HCP/Guardian:  No HCP form in chart-  is surrogate    FAMILY HISTORY:  No pertinent family history in first degree relatives      Baseline ADLs (prior to admission):  Independent [ ] moderately [ ] fully   Dependent   [ x] moderately [ ]fully  Walks with walker, needs assistance with ADLs    MEDICATIONS  (STANDING):  ALBUTerol/ipratropium for Nebulization 3 milliLiter(s) Nebulizer every 6 hours  ATENolol  Tablet 25 milliGRAM(s) Oral two times a day  atorvastatin 10 milliGRAM(s) Oral at bedtime  aztreonam  IVPB 500 milliGRAM(s) IV Intermittent every 12 hours  digoxin     Tablet 0.125 milliGRAM(s) Oral every other day  digoxin  Injectable 0.125 milliGRAM(s) IV Push once  furosemide   Injectable 40 milliGRAM(s) IV Push two times a day  lactobacillus acidophilus 1 Tablet(s) Oral two times a day with meals  levothyroxine 25 MICROGram(s) Oral daily  multivitamin 1 Tablet(s) Oral daily  nitroglycerin    2% Ointment 1 Inch(s) Transdermal three times a day  oseltamivir 30 milliGRAM(s) Oral two times a day    MEDICATIONS  (PRN):  acetaminophen   Tablet 650 milliGRAM(s) Oral every 6 hours PRN For Temp greater than or equal to 38 C (100.4 F)  acetaminophen   Tablet. 650 milliGRAM(s) Oral every 6 hours PRN Moderate Pain (4 - 6)  LORazepam   Injectable 1 milliGRAM(s) IV Push every 6 hours PRN Anxiety      Allergies    allopurinol (Rash)  aspirin (Unknown)  azithromycin (Diarrhea)  clindamycin (Diarrhea)  latex (Unknown)  morphine (Other; Short breath)  penicillins (Unknown)  sulfa drugs (Unknown)    Intolerances        REVIEW OF SYSTEMS       [x ] Unable to obtain due to poor mentation           Karnofsky Performance Score/Palliative Performance Status Version 2:   20      %    Vital Signs Last 24 Hrs  T(C): 36.4 (05 Feb 2018 08:53), Max: 36.7 (04 Feb 2018 20:20)  T(F): 97.5 (05 Feb 2018 08:53), Max: 98.1 (04 Feb 2018 20:20)  HR: 93 (05 Feb 2018 06:00) (82 - 120)  BP: 116/68 (05 Feb 2018 06:00) (93/67 - 132/86)  BP(mean): 84 (05 Feb 2018 06:00) (84 - 98)  RR: 21 (05 Feb 2018 06:00) (16 - 26)  SpO2: 94% (05 Feb 2018 06:00) (92% - 100%)    PHYSICAL EXAM:    General: Frail elderly woman, Lethargic, some verbal responses, NAD    HEENT: [x ] normal  [ ] dry mouth  [ ] ET tube/trach    Lungs: [ x] comfortable [ ] tachypnea/labored breathing  [ ] excessive secretions    CV: [x ] normal  [ ] tachycardia    GI: [x ] normal  [ ] distended  [ ] tender  [ ] no BS               [ ] PEG/NG/OG tube    : [x ] normal  [ ] incontinent  [ ] oliguria/anuria  [ ] hartmann    MSK: [ ] normal  [x ] weakness  [ ] edema             [ ] ambulatory  [ ] bedbound/wheelchair bound    Skin: right leg no swelling, dry no redenss    LABS:                        13.3   15.8  )-----------( 73       ( 04 Feb 2018 06:43 )             44.0     02-04    148<H>  |  106  |  41.0<H>  ----------------------------<  119<H>  3.3<L>   |  30.0<H>  |  1.41<H>    Ca    8.2<L>      04 Feb 2018 06:43      PT/INR - ( 05 Feb 2018 06:02 )   PT: 23.6 sec;   INR: 2.11 ratio             I&O's Summary    04 Feb 2018 07:01  -  05 Feb 2018 07:00  --------------------------------------------------------  IN: 200 mL / OUT: 1365 mL / NET: -1165 mL    05 Feb 2018 07:01  -  05 Feb 2018 13:05  --------------------------------------------------------  IN: 0 mL / OUT: 400 mL / NET: -400 mL        RADIOLOGY & ADDITIONAL STUDIES:  < from: CT Chest No Cont (02.01.18 @ 17:34) >  EXAM:  CT CHEST                          PROCEDURE DATE:  02/01/2018          INTERPRETATION:  TECHNIQUE: Noncontrast CT chest. 2-D MIP images.    COMPARISON: CT head 10/4/2017    CLINICAL HISTORY: Dyspnea, rule out pneumonia    FINDINGS:    The visualized thyroid gland is unremarkable in appearance. Moderate   bilateral upper lobe centrilobular emphysema changes. Small right-sided   pleural effusion. The heart is markedly enlarged. Dense coronary artery   and valvular calcifications. Moderateaortic calcifications. Small   pericardial effusion. Mild left lower lobe bronchiectasis.    Left kidney cyst measures 3.8 cm. Mild perihepatic ascites. No acute   osseous abnormality.    IMPRESSION:  Marked cardiomegaly. Small pericardial effusion. Small   right-sided pleural effusion. Mild perihepatic ascites. No pneumonia.          < end of copied text >        ADVANCE DIRECTIVES:  [ x] YES [ ] NO   DNR [ x] YES [ ] NO  Completed on:                     MOLST  [ ] YES [x ] NO   Completed on:  Living Will  [ ] YES [x ] NO   Completed on:    Thank you for the opportunity to assist with the care of this patient.   [facility] Consult Service [phone].

## 2018-02-05 NOTE — SWALLOW BEDSIDE ASSESSMENT ADULT - SWALLOW EVAL: DIAGNOSIS
Mild oral dysphagia confounded by lethargy. Suspect pharyngeal dysphagia across administered consistencies, further - impacted by lethargy
Moderate oral dysphagia confounded by lethargy and reduced cognition. Suspect pharyngeal dysphagia with liquids 2* +cough after thin, +increase in wet upper airway sounds post nectar/honey liquids. Swallow profile - impacted by lethargy and respiratory difficulty at this time.

## 2018-02-05 NOTE — CONSULT NOTE ADULT - PROVIDER SPECIALTY LIST ADULT
Cardiology
Critical Care
Nephrology
Palliative Care
Rehab Medicine
Vascular Surgery
Pulmonology
Infectious Disease
Surgery

## 2018-02-05 NOTE — CONSULT NOTE ADULT - PROBLEM SELECTOR RECOMMENDATION 2
Patient with worsening Cellulitis  Leukocytosis of 22k and lethargy  antibiotics as above
Monitor. Multifactorial- Infection, CHF, hospitalization, age.

## 2018-02-05 NOTE — PROGRESS NOTE ADULT - ASSESSMENT
85 year old female with PMH HTN, cAF on Coumadin, MR, CHFpEF presented with RLE pain, erythema and swelling. Noted to have confusion at admission (?encephalopathy), leukocytosis (WBC 22, lactic acidosis  3.1) and imaging including CT consistent with RLE Cellulitis without abscess. Initially treated with Azactam/Clindamycin/Vancomycin in ER, later switched to Ceftriaxone/Zyvox by ID and then oral Keflex. Leukocytosis resolved, lactate cleared, remains afebrile. Cellulitis resolved. Blood cultures negative.    Lethargy secondary to delirium vs dementia  Encephalopathy work up with elevated TSH. Unclear significance, as may be abnormal secondary to metabolic processes. Started on Synthroid.  UA dirty, Chest X-Ray suggestive of LLL consolidation; however remains without fever or leukocytosis so unclear if infectious; could be masked by Keflex. Doubt UTI, likely inappropriately collected sample, patient was on antibiotics unless MDR. Cultures pending. CT Chest with bilateral effusion, no infiltrate.  nfluenza+    CHFpEF, recent TTE (10/2017) with preserved EF 60-65%, moderate to severe MR, Mild AS, Moderate AR, severe TR, Mild PAH. No evidence of decompensated CHF currently. However with bilateral pleural effusion on CT. Started on diuresis, appears to be on the dry side. Repeat TTE with similar findings.    Also seen in consultation by Vascular surgery given possible history of PAD, skin graft with recommendations for follow up with primary vascular surgeon Dr Braswell upon discharge.     HTN - well controlled on current regimen.    AF - rate controlled, INR supra therapeutic, no evidence of bleeding.    CKD 3, renal insufficiency, improved SCr today - in fact normalized    Hyponatremia, hypokalemia present at admission - resolved.    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 Added statin, Check FLP. Follow up with Dr Garcia after discharge    Slurred speech as per  - CTH, MRI brain negative for stroke. Secondary to dry mouth - RN advised to provide with oral care    Diarrhea - received antibiotics, is on Probiotic. Cdiff negative. Diarrhea resolved.        Guarded prognosis - patient would not want aggressive measures as per daughter Iona. DNR, DNI

## 2018-02-05 NOTE — CONSULT NOTE ADULT - PROBLEM SELECTOR RECOMMENDATION 8
Met with family - , daughter, son and son/daughter in law.   Reviewed past condition- functionally debilitated, several hospitalizations in the last few months. Discussed current condition - guarded given age and multiple medical issues.   states wishes to continue current treatments. Family wants to see how she is once she gets over the flu.  Discussed flu in the older population are at risk for  more negative sequale and now she is unable to take Tamilfu. Informed them given her age and medical issues, she can be easily tipped and may be  more difficult  in general to recover from an illness.  They want to take things one day at a time. Informed them  I will be continue to monitor her hospitalization and if things do not progress positively will discuss care options ( comfort).   Psychosocial support

## 2018-02-05 NOTE — PROGRESS NOTE ADULT - SUBJECTIVE AND OBJECTIVE BOX
[facility]  INFECTIOUS DISEASES AND INTERNAL MEDICINE at [place]  =======================================================  Leighton Harris MD [facility]   Austin Brady MD  Diplomates American Board of Internal Medicine and Infectious Diseases  =======================================================    RAGHU DAVIS [id number]    Re-consulted for pneumonia  patient was admitted with cellulitis and was treated.   Developed respiratory distress 2/2/18  RVP + for Influenza    Febrile to 101F 2/4/18      Allergies:  allopurinol (Rash)  aspirin (Unknown)  azithromycin (Diarrhea)  clindamycin (Diarrhea)  latex (Unknown)  morphine (Other; Short breath)  penicillins (Unknown)  sulfa drugs (Unknown)      Antibiotics:  aztreonam  IVPB 500 milliGRAM(s) IV Intermittent every 12 hours  oseltamivir 30 milliGRAM(s) Oral two times a day       REVIEW OF SYSTEMS:  limited due to current medical condition       Physical Exam:  Vital Signs Last 24 Hrs  T(C): 36.4 (05 Feb 2018 08:53), Max: 36.7 (04 Feb 2018 20:20)  T(F): 97.5 (05 Feb 2018 08:53), Max: 98.1 (04 Feb 2018 20:20)  HR: 103 (05 Feb 2018 13:12) (83 - 120)  BP: 125/89 (05 Feb 2018 13:12) (105/76 - 132/86)  BP(mean): 84 (05 Feb 2018 06:00) (84 - 98)  RR: 24 (05 Feb 2018 13:12) (16 - 26)  SpO2: 95% (05 Feb 2018 13:12) (92% - 100%)      GEN: NAD   HEENT: normocephalic and atraumatic. EOMI. PERRL.    NECK: Supple.  LUNGS: Coarse BS B/L  HEART: Regular rate and rhythm  ABDOMEN: Soft, nontender, and nondistended.  Positive bowel sounds.    : No CVA tenderness  EXTREMITIES: No edema  MSK: No joint swelling  NEUROLOGIC: Alert, lethargic, mumbles incomprehensible sounds   SKIN: No rash      Labs:  02-04    148<H>  |  106  |  41.0<H>  ----------------------------<  119<H>  3.3<L>   |  30.0<H>  |  1.41<H>    Ca    8.2<L>      04 Feb 2018 06:43               13.3   15.8  )-----------( 73       ( 04 Feb 2018 06:43 )             44.0       PT/INR - ( 05 Feb 2018 06:02 )   PT: 23.6 sec;   INR: 2.11 ratio      CARDIAC MARKERS ( 04 Feb 2018 06:43 )  x     / 0.02 ng/mL / x     / x     / x      CARDIAC MARKERS ( 03 Feb 2018 15:34 )  x     / <0.01 ng/mL / x     / x     / x          ABG - ( 04 Feb 2018 05:35 )  pH: 7.46  /  pCO2: 44    /  pO2: 91    / HCO3: 30    / Base Excess: 6.7   /  SaO2: 98          RECENT CULTURES:  02-03 @ 17:40    RVP  Detected - influenza A      02-02 @ 08:46 .Urine Clean Catch (Midstream) Escherichia coli    10,000 - 49,000 CFU/mL Escherichia coli      02-01 @ 10:10 .Blood Blood-Peripheral     No growth at 48 hours      01-23 @ 19:36 .Blood Blood-Peripheral     No growth at 5 days.      EXAM:  XR CHEST PORTABLE URGENT 1V                        PROCEDURE DATE:  02/03/2018    INTERPRETATION:  Portable chest radiograph      CLINICAL INFORMATION:   Short of breath.  TECHNIQUE:  Portable  AP view of the chest was obtained.  COMPARISON: 1/31/2018 available for review.  FINDINGS:   The lungs  show left lateral lower lobe retrocardiac airspace   consolidation scarring left diaphragmatic contour. Left upper lobe clear.   Increased interstitial markings noted within thelateral right perihilar   and upper lobe parenchyma. Right lung base clear.       The  heart is enlarged in transverse diameter. No hilar mass. Trachea   midline.  Thoracic aorta heavily calcified.  Visualized osseous structures are intact.  IMPRESSION:     Cardiomegaly.. Left lower lobe retrocardiac airspace consolidation..      EXAM:  CT CHEST                        PROCEDURE DATE:  02/01/2018    INTERPRETATION:  TECHNIQUE: Noncontrast CT chest. 2-D MIP images.  COMPARISON: CT head 10/4/2017  CLINICAL HISTORY: Dyspnea, rule out pneumonia  FINDINGS:  The visualized thyroid gland is unremarkable in appearance. Moderate   bilateral upper lobe centrilobular emphysema changes. Small right-sided   pleural effusion. The heart is markedly enlarged. Dense coronary artery   and valvular calcifications. Moderateaortic calcifications. Small   pericardial effusion. Mild left lower lobe bronchiectasis.  Left kidney cyst measures 3.8 cm. Mild perihepatic ascites. No acute   osseous abnormality.  IMPRESSION:    Marked cardiomegaly. Small pericardial effusion. Small   right-sided pleural effusion. Mild perihepatic ascites. No pneumonia.

## 2018-02-05 NOTE — CONSULT NOTE ADULT - CONSULT REASON
ALIDA
Goals of Care
PAD c cellulitis vs necrotizing fascitis
Rehab evaluation
SOB
hypoxic respiratory failure
respiratory failure, hypoxia
Cellulitis
RLE changes, rule out necrotizing fasciitis

## 2018-02-05 NOTE — PROGRESS NOTE ADULT - SUBJECTIVE AND OBJECTIVE BOX
HOSPITALIST PROGRESS NOTE    RAGHU DAVIS  [id number]  85yFemale    Patient is a 85y old  Female who presents with a chief complaint of rt. lower ext. pain (2018 04:35)      SUBJECTIVE:   Chart reviewed since last visit.  Patient seen and examined at bedside for influenza, encephalopathy  Patient arousable, but doesn't verbalize any complaints or answers      OBJECTIVE:  Vital Signs Last 24 Hrs  T(C): 36.4 (2018 08:53), Max: 36.7 (2018 20:20)  T(F): 97.5 (2018 08:53), Max: 98.1 (2018 20:20)  HR: 103 (2018 13:12) (83 - 120)  BP: 125/89 (2018 13:12) (105/76 - 132/86)  BP(mean): 84 (2018 06:00) (84 - 98)  RR: 24 (2018 13:12) (16 - 26)  SpO2: 95% (2018 13:12) (92% - 100%)    PHYSICAL EXAMINATION  General: Elderly, NAD[+]   lying in bed, somnolent  HEENT: AT/NC[+]  dried mouth, lips and tongue  NECK: Supple[+]  JVD[] Carotid bruit[]  CVS: RRR[]  Irregular[+]  S1+S2[+]   Murmur[]  RESP: Fair air entry bilaterally[]   Clear sounds[]   poor effort [+]  wheeze[]   Crackles[] Decreases sounds at bases  GI: Soft[+]  nondistended NT   Bowel Sounds[+]  Mass[]   HSM[]   Ascites[]  : suprapubic tenderness[+/-]   CVA Tenderness[]   Montez[+]  MS: RLE without swelling, with medial wound. Chronic changes. No edema  CNS: exam limited by lethargy  INTEG: Skin is Warm[+]    PSYCH: somnolent    MONITOR:  CAPILLARY BLOOD GLUCOSE      POCT Blood Glucose.: 89 mg/dL (2018 11:27)        I&O's Summary    2018 07:01  -  2018 07:00  --------------------------------------------------------  IN: 200 mL / OUT: 1365 mL / NET: -1165 mL    2018 07:01  -  2018 14:13  --------------------------------------------------------  IN: 0 mL / OUT: 400 mL / NET: -400 mL                            13.3   15.8  )-----------( 73       ( 2018 06:43 )             44.0     PT/INR - ( 2018 06:02 )   PT: 23.6 sec;   INR: 2.11 ratio           -    148<H>  |  106  |  41.0<H>  ----------------------------<  119<H>  3.3<L>   |  30.0<H>  |  1.41<H>    Ca    8.2<L>      2018 06:43      CARDIAC MARKERS ( 2018 06:43 )  x     / 0.02 ng/mL / x     / x     / x      CARDIAC MARKERS ( 2018 15:34 )  x     / <0.01 ng/mL / x     / x     / x              Culture:  Culture - Urine (18 @ 08:46)    -  Piperacillin/Tazobactam: S <=16    -  Tobramycin: S <=4    -  Trimethoprim/Sulfamethoxazole: S <=2/38    -  Amikacin: S <=16    -  Ampicillin: R >16    -  Ampicillin/Sulbactam: R >16/8    -  Aztreonam: R >16    -  Cefazolin: R >16    -  Cefepime: S <=4    -  Cefoxitin: R >16    -  Ceftazidime: S <=1    -  Gentamicin: S <=4    -  Imipenem: S <=1    -  Levofloxacin: S <=2    -  Meropenem: S <=1    -  Nitrofurantoin: S <=32    -  Ertapenem: S <=1    -  Ceftriaxone: R 8    -  Ciprofloxacin: S <=1    Specimen Source: .Urine Clean Catch (Midstream)    Culture Results:   10,000 - 49,000 CFU/mL Escherichia coli    Organism Identification: Escherichia coli    Organism: Escherichia coli    Method Type: KATHRYN      TTE:  < from: TTE Echo Complete w/Doppler (18 @ 14:20) >  EXAM:  ECHO TRANSTHORACIC COMP W DOPP      PROCEDURE DATE:  2018   .      INTERPRETATION:  REPORT:    TRANSTHORACIC ECHOCARDIOGRAM REPORT         Patient Name:   RAGHU DAVIS Patient Location: Inpatient  Medical Rec #:  [id number]    Accession #:      [id number]  Account #:                    Height:           66.9 in 170.0 cm  YOB: 1932     Weight:           136.7 lb 62.00 kg  Patient Age:    85 years      BSA:              1.72 m²  Patient Gender: F             BP:        91/63 mmHg       Date of Exam:        2018 2:20:20 PM  Sonographer:         Bella Kay  Referring Physician: Angel Luis Valentin MD    Procedure:     2D Echo/Doppler/Color Doppler Complete.  Indications:   Dyspnea, unspecified - R06.00  Diagnosis:     Acute on chronic diastolic (congestive) heart failure -   I50.33  Study Details: Technically fair study.         2D AND M-MODE MEASUREMENTS (normal ranges within parentheses):  Left Ventricle:                   Normal    Aorta/Left Atrium:         Normal  IVSd (Mmode):          1.01 cm    (0.7-1.1) Aortic Root (2D):  2.00 cm   (2.4-3.7)  LVPWd (Mmode):         1.06 cm    (0.7-1.1)  LVIDd (Mmode):         4.72 cm    (3.4-5.7)  LVIDs (Mmode):         3.42 cm  LV FS (Mmode):         27.5%     (>25%)  Rel. Wall Thickness Mm 0.45       (<0.42)  LV Mass Index: Mmode   101.0 g/m²    LV DIASTOLIC FUNCTION:  MV Peak E: 0.98 m/s e', MV Nany: 0.07 m/s                      E/e' Ratio: 14.98    SPECTRAL DOPPLER ANALYSIS (where applicable):  Aortic Valve: AoV Max Mundo: 2.17 m/s AoV Peak P.8 mmHg AoV Mean P.6 mmHg    LVOT Vmax: 0.82 m/s LVOT VTI: 0.170 m LVOT Diameter: 2.01 cm    AoV Area, Vmax: 1.20 cm² AoV Area, VTI: 1.41 cm² AoV Area, Vmn: 1.43 cm²  Ao VTI: 0.385  Aortic Insufficiency:  AI Half-time: 480 msec    Tricuspid Valve and PA/RV Systolic Pressure: TR Max Velocity: 2.70 m/s RA   Pressure: 3 mmHg RVSP/PASP: 32.2 mmHg       PHYSICIAN INTERPRETATION:  Left Ventricle: The left ventricular internal cavity size is normal.   There is mild concentric left ventricular hypertrophy.  Global LV systolic function was normal. Left ventricular ejection   fraction, by visual estimation, is 60 to 65%. Spectral Doppler shows   pseudonormal pattern of left ventricular myocardial filling (Grade II   diastolic dysfunction). Elevated left atrial and left ventricular   end-diastolic pressures.  Right Ventricle: Normal right ventricular size and function.  Left Atrium: Severely enlarged left atrium.  Right Atrium: The right atrium is severely dilated.  Pericardium: Trivial pericardial effusion is present. There is a small   pleural effusion in both left and right lateral regions.  Mitral Valve: Mitral leaflet mobility is normal. There is mild mitral   annular calcification. Moderate mitral valve regurgitation is seen.  Tricuspid Valve: Moderate tricuspid regurgitation is visualized.  Aortic Valve: The aortic valve is trileaflet. Sclerotic aortic valve with   decreased opening. Mild aortic valve regurgitation is seen.  Aorta:The aortic root is normal in size and structure.  Pulmonary Artery: The pulmonary artery is not well seen. Normal pulmonary   artery pressure.  Venous: The inferior vena cava was normal sized, with respiratory size   variation greater than 50%.       Summary:   1. Severely enlarged left atrium.   2. There is mild concentric left ventricular hypertrophy.   3. Normal wall motion. Left ventricular ejection fraction, by visual   estimation, is 60 to 65%. Grade II diastolic dysfunction.   4. Elevated left atrial and left ventricular end-diastolic pressures.   (LAP = 19 mm Hg)   5. Severely dilated right atrium.   6. Normal right ventricular size and function.   7. Moderate mitral valve regurgitation.   8. Mild aortic regurgitation.   9. Moderate tricuspid regurgitation.  10. Trivial pericardial effusion.    MD Fran Electronically signed on 2018 at 5:32:16 PM              *** Final ***                  DONTE BURDICK M.D., ATTENDING CARDIOLOGY  This document has been electronically signed. Feb  4 2018  2:20PM    < end of copied text >    RADIOLOGY        MEDICATIONS  (STANDING):  ALBUTerol/ipratropium for Nebulization 3 milliLiter(s) Nebulizer every 6 hours  ATENolol  Tablet 25 milliGRAM(s) Oral two times a day  atorvastatin 10 milliGRAM(s) Oral at bedtime  digoxin     Tablet 0.125 milliGRAM(s) Oral every other day  furosemide   Injectable 40 milliGRAM(s) IV Push two times a day  lactobacillus acidophilus 1 Tablet(s) Oral two times a day with meals  levothyroxine 25 MICROGram(s) Oral daily  multivitamin 1 Tablet(s) Oral daily  nitroglycerin    2% Ointment 1 Inch(s) Transdermal three times a day  oseltamivir 30 milliGRAM(s) Oral two times a day      MEDICATIONS  (PRN):  acetaminophen   Tablet 650 milliGRAM(s) Oral every 6 hours PRN For Temp greater than or equal to 38 C (100.4 F)  acetaminophen   Tablet. 650 milliGRAM(s) Oral every 6 hours PRN Moderate Pain (4 - 6)  LORazepam   Injectable 1 milliGRAM(s) IV Push every 6 hours PRN Anxiety

## 2018-02-05 NOTE — PROGRESS NOTE ADULT - ATTENDING COMMENTS
Speech therapy recommending NPO as patient very lethargic.    Had a family meeting with patients family, which included spouse Gael, Son Bryan, Daughter Iona with their spouses.  Cellulitis, Delirium, Respiratory failure requiring NIPPV, Diastolic CHF, Moderate MR, bilateral pleural effusion, Influenza pneumonia, CKD, anorexia.  Recommendation for palliative care meeting to discuss goals of care and expectations, and options.  Patient spouse Gael appears somewhat unrealistic, resistant - however family agrees to meeting with them.  Dr Mcdonald group called for kidney issues per Allan muñoz

## 2018-02-06 LAB
ANION GAP SERPL CALC-SCNC: 14 MMOL/L — SIGNIFICANT CHANGE UP (ref 5–17)
ANION GAP SERPL CALC-SCNC: 15 MMOL/L — SIGNIFICANT CHANGE UP (ref 5–17)
BUN SERPL-MCNC: 45 MG/DL — HIGH (ref 8–20)
BUN SERPL-MCNC: 45 MG/DL — HIGH (ref 8–20)
CALCIUM SERPL-MCNC: 8.8 MG/DL — SIGNIFICANT CHANGE UP (ref 8.6–10.2)
CALCIUM SERPL-MCNC: 8.9 MG/DL — SIGNIFICANT CHANGE UP (ref 8.6–10.2)
CHLORIDE SERPL-SCNC: 109 MMOL/L — HIGH (ref 98–107)
CHLORIDE SERPL-SCNC: 111 MMOL/L — HIGH (ref 98–107)
CO2 SERPL-SCNC: 30 MMOL/L — HIGH (ref 22–29)
CO2 SERPL-SCNC: 32 MMOL/L — HIGH (ref 22–29)
CREAT SERPL-MCNC: 1.41 MG/DL — HIGH (ref 0.5–1.3)
CREAT SERPL-MCNC: 1.45 MG/DL — HIGH (ref 0.5–1.3)
FOLATE RBC-MCNC: 1403 NG/ML — SIGNIFICANT CHANGE UP (ref 499–1504)
GLUCOSE BLDC GLUCOMTR-MCNC: 117 MG/DL — HIGH (ref 70–99)
GLUCOSE BLDC GLUCOMTR-MCNC: 142 MG/DL — HIGH (ref 70–99)
GLUCOSE BLDC GLUCOMTR-MCNC: 78 MG/DL — SIGNIFICANT CHANGE UP (ref 70–99)
GLUCOSE SERPL-MCNC: 163 MG/DL — HIGH (ref 70–115)
GLUCOSE SERPL-MCNC: 93 MG/DL — SIGNIFICANT CHANGE UP (ref 70–115)
HCT VFR BLD CALC: 49.2 % — HIGH (ref 37–47)
HGB BLD-MCNC: 15 G/DL — SIGNIFICANT CHANGE UP (ref 12–16)
INR BLD: 3.16 RATIO — HIGH (ref 0.88–1.16)
MCHC RBC-ENTMCNC: 30.5 G/DL — LOW (ref 32–36)
MCHC RBC-ENTMCNC: 31.3 PG — HIGH (ref 27–31)
MCV RBC AUTO: 102.5 FL — HIGH (ref 81–99)
PLATELET # BLD AUTO: 84 K/UL — LOW (ref 150–400)
POTASSIUM SERPL-MCNC: 3.2 MMOL/L — LOW (ref 3.5–5.3)
POTASSIUM SERPL-MCNC: 3.3 MMOL/L — LOW (ref 3.5–5.3)
POTASSIUM SERPL-SCNC: 3.2 MMOL/L — LOW (ref 3.5–5.3)
POTASSIUM SERPL-SCNC: 3.3 MMOL/L — LOW (ref 3.5–5.3)
PROTHROM AB SERPL-ACNC: 35.6 SEC — HIGH (ref 9.8–12.7)
RBC # BLD: 4.8 M/UL — SIGNIFICANT CHANGE UP (ref 4.4–5.2)
RBC # FLD: 15.3 % — SIGNIFICANT CHANGE UP (ref 11–15.6)
SODIUM SERPL-SCNC: 155 MMOL/L — HIGH (ref 135–145)
SODIUM SERPL-SCNC: 156 MMOL/L — HIGH (ref 135–145)
WBC # BLD: 10.9 K/UL — HIGH (ref 4.8–10.8)
WBC # FLD AUTO: 10.9 K/UL — HIGH (ref 4.8–10.8)

## 2018-02-06 PROCEDURE — 99233 SBSQ HOSP IP/OBS HIGH 50: CPT

## 2018-02-06 PROCEDURE — 99232 SBSQ HOSP IP/OBS MODERATE 35: CPT

## 2018-02-06 RX ORDER — METOPROLOL TARTRATE 50 MG
5 TABLET ORAL EVERY 6 HOURS
Qty: 0 | Refills: 0 | Status: DISCONTINUED | OUTPATIENT
Start: 2018-02-06 | End: 2018-02-12

## 2018-02-06 RX ORDER — SODIUM CHLORIDE 9 MG/ML
1000 INJECTION, SOLUTION INTRAVENOUS
Qty: 0 | Refills: 0 | Status: DISCONTINUED | OUTPATIENT
Start: 2018-02-06 | End: 2018-02-09

## 2018-02-06 RX ORDER — HYDROCORTISONE 1 %
1 OINTMENT (GRAM) TOPICAL ONCE
Qty: 0 | Refills: 0 | Status: COMPLETED | OUTPATIENT
Start: 2018-02-06 | End: 2018-02-06

## 2018-02-06 RX ADMIN — Medication 1 INCH(S): at 22:03

## 2018-02-06 RX ADMIN — Medication 40 MILLIGRAM(S): at 05:05

## 2018-02-06 RX ADMIN — Medication 3 MILLILITER(S): at 09:25

## 2018-02-06 RX ADMIN — Medication 3 MILLILITER(S): at 15:05

## 2018-02-06 RX ADMIN — Medication 5 MILLIGRAM(S): at 18:01

## 2018-02-06 RX ADMIN — SODIUM CHLORIDE 75 MILLILITER(S): 9 INJECTION, SOLUTION INTRAVENOUS at 13:24

## 2018-02-06 RX ADMIN — ZINC OXIDE 1 APPLICATION(S): 200 OINTMENT TOPICAL at 13:25

## 2018-02-06 RX ADMIN — Medication 1 INCH(S): at 05:08

## 2018-02-06 RX ADMIN — Medication 3 MILLILITER(S): at 20:39

## 2018-02-06 RX ADMIN — Medication 1 INCH(S): at 05:15

## 2018-02-06 RX ADMIN — Medication 1 INCH(S): at 13:24

## 2018-02-06 RX ADMIN — Medication 1 APPLICATION(S): at 05:05

## 2018-02-06 RX ADMIN — Medication 1 INCH(S): at 17:00

## 2018-02-06 NOTE — CHART NOTE - NSCHARTNOTEFT_GEN_A_CORE
Source: Patient [ ]  Family [ ]   other [x ]nsg    Current Diet: NPO was on dash with ensure TID with poor to fair po noted in chart    PO intake:  < 50% [x ]   50-75%  [ ]   %  [ ]  other :    Source for PO intake [ ] Patient [ ] family [ ] chart [x ] staff [ ] other pt confused, sleeping    Enteral /Parenteral Nutrition:     Current Weight:  2/5 123#, 1/24 134#    % Weight Change 8% 2 weeks if accurate    Pertinent Medications: MEDICATIONS  (STANDING):  ALBUTerol/ipratropium for Nebulization 3 milliLiter(s) Nebulizer every 6 hours  ATENolol  Tablet 25 milliGRAM(s) Oral two times a day  atorvastatin 10 milliGRAM(s) Oral at bedtime  digoxin     Tablet 0.125 milliGRAM(s) Oral every other day  furosemide   Injectable 40 milliGRAM(s) IV Push two times a day  lactobacillus acidophilus 1 Tablet(s) Oral two times a day with meals  levothyroxine 25 MICROGram(s) Oral daily  multivitamin 1 Tablet(s) Oral daily  nitroglycerin    2% Ointment 1 Inch(s) Transdermal three times a day  oseltamivir 30 milliGRAM(s) Oral two times a day  zinc oxide 11.3% Cream 1 Application(s) Topical daily    MEDICATIONS  (PRN):  acetaminophen   Tablet 650 milliGRAM(s) Oral every 6 hours PRN For Temp greater than or equal to 38 C (100.4 F)  acetaminophen   Tablet. 650 milliGRAM(s) Oral every 6 hours PRN Moderate Pain (4 - 6)  LORazepam   Injectable 1 milliGRAM(s) IV Push every 6 hours PRN Anxiety    Pertinent Labs: CBC Full  -  ( 06 Feb 2018 07:38 )  WBC Count : 10.9 K/uL  Hemoglobin : 15.0 g/dL  Hematocrit : 49.2 %  Platelet Count - Automated : 84 K/uL  Mean Cell Volume : 102.5 fl  Mean Cell Hemoglobin : 31.3 pg  Mean Cell Hemoglobin Concentration : 30.5 g/dL  Auto Neutrophil # : x  Auto Lymphocyte # : x  Auto Monocyte # : x  Auto Eosinophil # : x  Auto Basophil # : x  Auto Neutrophil % : x  Auto Lymphocyte % : x  Auto Monocyte % : x  Auto Eosinophil % : x  Auto Basophil % : x      02-06 Na155 mmol/L<H> Glu 93 mg/dL K+ 3.2 mmol/L<L> Cr  1.45 mg/dL<H> BUN 45.0 mg/dL<H> Phos n/a   Alb n/a   PAB n/a           Skin:     Nutrition focused physical exam conducted - found signs of malnutrition [ ]absent [ ]present    Subcutaneous fat loss: [ ] Orbital fat pads region, [ ]Buccal fat region, [ ]Triceps region,  [ ]Ribs region    Muscle wasting: [ ]Temples region, [ ]Clavicle region, [ ]Shoulder region, [ ]Scapula region, [ ]Interosseous region,  [ ]thigh region, [ ]Calf region    Estimated Needs:   [x ] no change since previous assessment  [ ] recalculated:     Current Nutrition Diagnosis:   Pt at nutrition risk for inadequate oral intake related to pt with altered mental status as evidenced by pt with noted fair intake. Now NPO. Pt is confused with AMS.  Per chart, pt with fair PO intake when on diet.  Pt is positive RSV, flu.  Recommendations below:    Recommendations: Advance diet as tolerated, continue Ensure    Monitoring and Evaluation:   [x ] PO intake [x ] Tolerance to diet prescription [X] Weights  [X] Follow up per protocol [X] Labs: Source: Patient [ ]  Family [ ]   other [x ]nsg    Current Diet: NPO was on dash with ensure TID with poor to fair po noted in chart    PO intake:  < 50% [x ]   50-75%  [ ]   %  [ ]  other :    Source for PO intake [ ] Patient [ ] family [ ] chart [x ] staff [ ] other pt confused, sleeping    Enteral /Parenteral Nutrition:     Current Weight:  2/5 123#, 1/24 134#    % Weight Change 8% 2 weeks if accurate    Pertinent Medications: MEDICATIONS  (STANDING):  ALBUTerol/ipratropium for Nebulization 3 milliLiter(s) Nebulizer every 6 hours  ATENolol  Tablet 25 milliGRAM(s) Oral two times a day  atorvastatin 10 milliGRAM(s) Oral at bedtime  digoxin     Tablet 0.125 milliGRAM(s) Oral every other day  furosemide   Injectable 40 milliGRAM(s) IV Push two times a day  lactobacillus acidophilus 1 Tablet(s) Oral two times a day with meals  levothyroxine 25 MICROGram(s) Oral daily  multivitamin 1 Tablet(s) Oral daily  nitroglycerin    2% Ointment 1 Inch(s) Transdermal three times a day  oseltamivir 30 milliGRAM(s) Oral two times a day  zinc oxide 11.3% Cream 1 Application(s) Topical daily    MEDICATIONS  (PRN):  acetaminophen   Tablet 650 milliGRAM(s) Oral every 6 hours PRN For Temp greater than or equal to 38 C (100.4 F)  acetaminophen   Tablet. 650 milliGRAM(s) Oral every 6 hours PRN Moderate Pain (4 - 6)  LORazepam   Injectable 1 milliGRAM(s) IV Push every 6 hours PRN Anxiety    Pertinent Labs: CBC Full  -  ( 06 Feb 2018 07:38 )  WBC Count : 10.9 K/uL  Hemoglobin : 15.0 g/dL  Hematocrit : 49.2 %  Platelet Count - Automated : 84 K/uL  Mean Cell Volume : 102.5 fl  Mean Cell Hemoglobin : 31.3 pg  Mean Cell Hemoglobin Concentration : 30.5 g/dL  Auto Neutrophil # : x  Auto Lymphocyte # : x  Auto Monocyte # : x  Auto Eosinophil # : x  Auto Basophil # : x  Auto Neutrophil % : x  Auto Lymphocyte % : x  Auto Monocyte % : x  Auto Eosinophil % : x  Auto Basophil % : x      02-06 Na155 mmol/L<H> Glu 93 mg/dL K+ 3.2 mmol/L<L> Cr  1.45 mg/dL<H> BUN 45.0 mg/dL<H> Phos n/a   Alb n/a   PAB n/a           Skin:     Nutrition focused physical exam conducted - found signs of malnutrition [ ]absent [ ]present    Subcutaneous fat loss: [ ] Orbital fat pads region, [ ]Buccal fat region, [ ]Triceps region,  [ ]Ribs region    Muscle wasting: [ ]Temples region, [ ]Clavicle region, [ ]Shoulder region, [ ]Scapula region, [ ]Interosseous region,  [ ]thigh region, [ ]Calf region    Estimated Needs:   [x ] no change since previous assessment  [ ] recalculated:     Current Nutrition Diagnosis:   Pt at nutrition risk for inadequate oral intake related to pt with altered mental status as evidenced by pt with noted fair intake. Now NPO. Pt is confused with AMS.  Per chart, pt with fair PO intake when on diet.  Pt is positive RSV, flu.  Speech tx recommending NPO 2/5.      Recommendations: Provide nutrition via tolerated route    Monitoring and Evaluation:   [x ] PO intake [x ] Tolerance to diet prescription [X] Weights  [X] Follow up per protocol [X] Labs:

## 2018-02-06 NOTE — PROGRESS NOTE ADULT - ASSESSMENT
85 year old female with PMH HTN, cAF on Coumadin, MR, CHFpEF presented with RLE pain, erythema and swelling. Noted to have confusion at admission (?encephalopathy), leukocytosis (WBC 22, lactic acidosis  3.1) and imaging including CT consistent with RLE Cellulitis without abscess. Initially treated with Azactam/Clindamycin/Vancomycin in ER, later switched to Ceftriaxone/Zyvox by ID and then oral Keflex. Cellulitis resolved. Blood cultures negative.    Lethargy secondary to delirium vs dementia  Encephalopathy work up with elevated TSH. Unclear significance, as may be abnormal secondary to metabolic processes. Started on Synthroid.  UA dirty, Chest X-Ray suggestive of LLL consolidation; however remains without fever or leukocytosis so unclear if infectious; could be masked by Keflex. Doubt UTI, likely inappropriately collected sample, patient was on antibiotics unless MDR. Cultures pending. CT Chest with bilateral effusion, no infiltrate.  Influenza+, no longer requiring NIPPV    CHFpEF, recent TTE (10/2017) with preserved EF 60-65%, moderate to severe MR, Mild AS, Moderate AR, severe TR, Mild PAH. No evidence of decompensated CHF currently. However with bilateral pleural effusion on CT. Started on diuresis, appears to be on the dry side. Repeat TTE with similar findings.    Hypernatremia, secondary to dehydration, NPO. Mild hypokalemia and bump SCr.    Also seen in consultation by Vascular surgery given possible history of PAD, skin graft with recommendations for follow up with primary vascular surgeon Dr Braswell upon discharge.     HTN - well controlled on current regimen.    AF - rate controlled, INR supra therapeutic, no evidence of bleeding.    CKD 3, renal insufficiency, improved SCr today - in fact normalized    Hyponatremia, hypokalemia present at admission - resolved.    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 Added statin, Check FLP. Follow up with Dr Garcia after discharge    Slurred speech as per  - CTH, MRI brain negative for stroke. Secondary to dry mouth - RN advised to provide with oral care    Diarrhea - received antibiotics, is on Probiotic. Cdiff negative. Diarrhea resolved.        Guarded prognosis - patient would not want aggressive measures as per daughter Iona. DNR, DNI

## 2018-02-06 NOTE — CHART NOTE - NSCHARTNOTEFT_GEN_A_CORE
PA called by nurse on [facility] to report patient noted with generalized rash. Upon assessment pt lying in bed sleeping comfortably, in no signs of acute distress. Generalized macular erythematous rash noted to patient abdomen, underneath B/L breasts, B/L axilla, back and B/L groin. Respirations even and unlabored, no accessory muscle use, no dyspnea, no perioral redness or swelling. Pt has dysphagia at baseline, pt is NPO. Pt has not received new medications topically or PO as pt has NPO status. Nurse on [facility] reports pt had incontinent episode and cleaned pt with skin cleanser 15 minutes prior to rash presentation . Pt A&O x2, limited ROS secondary to pt mentation. T 97.9 F orally, /91, P 88, RR 20, O2 sat 95% on oxygen 2L/min via n/c. Likely contact dermatitis, hydrocortisone 0.5% topical cream ordered to be applied to rash, pt receiving zinc oxide for rash to B/L groin. Monitor for any changes in rash appearance or patient status and notify provider. Left pt sleeping in bed, NAD.

## 2018-02-06 NOTE — PROGRESS NOTE ADULT - ASSESSMENT
ALIDA cr slight better  h/o HF would cont diuretics for now  may need to tolerate some degree of azotemia  Influenza A + ID following  Electrolytes BP acceptable  hypokalemia supplement

## 2018-02-06 NOTE — PROGRESS NOTE ADULT - ASSESSMENT
At present the pts current primary problem appears to be the flu --her CHF is relatively well compensated for her   Her BUN usually is in the high 30's-40's and creatinine 1.73 range normally (she was on Lasix 40 mg once a day recently)  Would consider lowering Lasix to bid alternating with qd  or even lower to qd adjusting as needed-- she currently needs free water given elevated Na  Adjust INR  PT/rehab  Please reconsult prn  Thank  you

## 2018-02-06 NOTE — PROGRESS NOTE ADULT - SUBJECTIVE AND OBJECTIVE BOX
NEPHROLOGY INTERVAL HPI/OVERNIGHT EVENTS:    Exaamined earlier  Less alert Non verbal    MEDICATIONS  (STANDING):  ALBUTerol/ipratropium for Nebulization 3 milliLiter(s) Nebulizer every 6 hours  ATENolol  Tablet 25 milliGRAM(s) Oral two times a day  atorvastatin 10 milliGRAM(s) Oral at bedtime  cetylpyridinium 0.05% Oral Solution 5 milliLiter(s) Swish and Spit three times a day  dextrose 5% 1000 milliLiter(s) (75 mL/Hr) IV Continuous <Continuous>  digoxin     Tablet 0.125 milliGRAM(s) Oral every other day  lactobacillus acidophilus 1 Tablet(s) Oral two times a day with meals  levothyroxine 25 MICROGram(s) Oral daily  multivitamin 1 Tablet(s) Oral daily  nitroglycerin    2% Ointment 1 Inch(s) Transdermal three times a day  oseltamivir 30 milliGRAM(s) Oral two times a day  zinc oxide 11.3% Cream 1 Application(s) Topical daily    MEDICATIONS  (PRN):  acetaminophen   Tablet 650 milliGRAM(s) Oral every 6 hours PRN For Temp greater than or equal to 38 C (100.4 F)  acetaminophen   Tablet. 650 milliGRAM(s) Oral every 6 hours PRN Moderate Pain (4 - 6)  LORazepam   Injectable 1 milliGRAM(s) IV Push every 6 hours PRN Anxiety      Allergies    allopurinol (Rash)  aspirin (Unknown)  azithromycin (Diarrhea)  clindamycin (Diarrhea)  latex (Unknown)  morphine (Other; Short breath)  penicillins (Unknown)  sulfa drugs (Unknown)    Intolerances        Vital Signs Last 24 Hrs  T(C): 36.3 (06 Feb 2018 15:15), Max: 37 (05 Feb 2018 20:13)  T(F): 97.4 (06 Feb 2018 15:15), Max: 98.6 (05 Feb 2018 20:13)  HR: 96 (06 Feb 2018 15:41) (86 - 96)  BP: 175/90 (06 Feb 2018 15:15) (133/78 - 175/90)  BP(mean): 104 (06 Feb 2018 00:51) (104 - 104)  RR: 20 (06 Feb 2018 09:35) (18 - 26)  SpO2: 98% (06 Feb 2018 15:41) (94% - 98%)  Daily     Daily     PHYSICAL EXAM:  GENERAL: appears chronically ill  HEENT: NCAT  NECK: Supple.  LUNGS: dec bs at bases B/L  HEART: Regular rate and rhythm    ABDOMEN: Soft, nontender, and nondistended.    EXTREMITIES:  + edema LE  NEUROLOGIC: nonverbal less alert    LABS:                        15.0   10.9  )-----------( 84       ( 06 Feb 2018 07:38 )             49.2     02-06    155<H>  |  109<H>  |  45.0<H>  ----------------------------<  93  3.2<L>   |  32.0<H>  |  1.45<H>    Ca    8.8      06 Feb 2018 07:38      PT/INR - ( 06 Feb 2018 07:38 )   PT: 35.6 sec;   INR: 3.16 ratio                     RADIOLOGY & ADDITIONAL TESTS:

## 2018-02-06 NOTE — PROGRESS NOTE ADULT - SUBJECTIVE AND OBJECTIVE BOX
[facility]  INFECTIOUS DISEASES AND INTERNAL MEDICINE at [place]  =======================================================  Leighton Harris MD FAC   Austin Brady MD  Diplomates American Board of Internal Medicine and Infectious Diseases  =======================================================    RAGHU DAVIS [id number]    Follow up for pneumonia  patient was admitted with cellulitis and was treated.   Developed respiratory distress 2/2/18  RVP + for Influenza    Febrile to 101F 2/4/18  no fevers since  unable to take Tamiflu due to dysphagia   improving without Tamiflu    Allergies:  allopurinol (Rash)  aspirin (Unknown)  azithromycin (Diarrhea)  clindamycin (Diarrhea)  latex (Unknown)  morphine (Other; Short breath)  penicillins (Unknown)  sulfa drugs (Unknown)      Antibiotics:  oseltamivir 30 milliGRAM(s) Oral two times a day       REVIEW OF SYSTEMS:  limited due to current medical condition       Physical Exam:  Vital Signs Last 24 Hrs  T(C): 36.7 (06 Feb 2018 09:35), Max: 37 (05 Feb 2018 20:13)  T(F): 98.1 (06 Feb 2018 09:35), Max: 98.6 (05 Feb 2018 20:13)  HR: 94 (06 Feb 2018 09:35) (86 - 94)  BP: 164/85 (06 Feb 2018 09:35) (133/78 - 165/91)  BP(mean): 104 (06 Feb 2018 00:51) (104 - 104)  RR: 20 (06 Feb 2018 09:35) (18 - 26)  SpO2: 96% (06 Feb 2018 09:35) (94% - 98%)      GEN: NAD   HEENT: normocephalic and atraumatic. EOMI. PERRL.    NECK: Supple.  LUNGS: Coarse BS B/L  HEART: Regular rate and rhythm  ABDOMEN: Soft, nontender, and nondistended.  Positive bowel sounds.    : No CVA tenderness  EXTREMITIES: No edema  MSK: No joint swelling  NEUROLOGIC: Alert, lethargic, mumbles incomprehensible sounds   SKIN: No rash      Labs:  02-06    155<H>  |  109<H>  |  45.0<H>  ----------------------------<  93  3.2<L>   |  32.0<H>  |  1.45<H>    Ca    8.8      06 Feb 2018 07:38                 15.0   10.9  )-----------( 84       ( 06 Feb 2018 07:38 )             49.2       PT/INR - ( 06 Feb 2018 07:38 )   PT: 35.6 sec;   INR: 3.16 ratio        RECENT CULTURES:  02-03 @ 17:40    RVP  Detected - influenza A      02-02 @ 08:46 .Urine Clean Catch (Midstream) Escherichia coli    10,000 - 49,000 CFU/mL Escherichia coli      02-01 @ 10:10 .Blood Blood-Peripheral     No growth at 48 hours      01-23 @ 19:36 .Blood Blood-Peripheral     No growth at 5 days.      EXAM:  XR CHEST PORTABLE URGENT 1V                        PROCEDURE DATE:  02/03/2018    INTERPRETATION:  Portable chest radiograph      CLINICAL INFORMATION:   Short of breath.  TECHNIQUE:  Portable  AP view of the chest was obtained.  COMPARISON: 1/31/2018 available for review.  FINDINGS:   The lungs  show left lateral lower lobe retrocardiac airspace   consolidation scarring left diaphragmatic contour. Left upper lobe clear.   Increased interstitial markings noted within thelateral right perihilar   and upper lobe parenchyma. Right lung base clear.       The  heart is enlarged in transverse diameter. No hilar mass. Trachea   midline.  Thoracic aorta heavily calcified.  Visualized osseous structures are intact.  IMPRESSION:     Cardiomegaly.. Left lower lobe retrocardiac airspace consolidation..      EXAM:  CT CHEST                        PROCEDURE DATE:  02/01/2018    INTERPRETATION:  TECHNIQUE: Noncontrast CT chest. 2-D MIP images.  COMPARISON: CT head 10/4/2017  CLINICAL HISTORY: Dyspnea, rule out pneumonia  FINDINGS:  The visualized thyroid gland is unremarkable in appearance. Moderate   bilateral upper lobe centrilobular emphysema changes. Small right-sided   pleural effusion. The heart is markedly enlarged. Dense coronary artery   and valvular calcifications. Moderateaortic calcifications. Small   pericardial effusion. Mild left lower lobe bronchiectasis.  Left kidney cyst measures 3.8 cm. Mild perihepatic ascites. No acute   osseous abnormality.  IMPRESSION:    Marked cardiomegaly. Small pericardial effusion. Small   right-sided pleural effusion. Mild perihepatic ascites. No pneumonia.

## 2018-02-06 NOTE — PROGRESS NOTE ADULT - PROBLEM SELECTOR PLAN 5
Met with  and daughter. Daughter states mother seems less alert today and not as verbal. She states mother failed swallow test this am (?) Discussed with family patient's continued guarded prognosis. Continue to monitor for progress.

## 2018-02-06 NOTE — PROGRESS NOTE ADULT - PROBLEM SELECTOR PLAN 1
RVP with Influenza A  patient not taking Tamiflu due to dysphagia  Blood cultures no growth  No fever since 2/4/18  Improving without Tamiflu  Trend leukocytosis, down trending

## 2018-02-06 NOTE — PROGRESS NOTE ADULT - ATTENDING COMMENTS
Hypernatremia - discontinue IV Lasix  D5W +KCL  Follow up speech evaluation  Patient and family do not want TF  palliative care follow up

## 2018-02-06 NOTE — PROGRESS NOTE ADULT - SUBJECTIVE AND OBJECTIVE BOX
INTERVAL HPI/OVERNIGHT EVENTS:  Daughter states mother seems less alert today , not speaking as much    PRESENT SYMPTOMS: SOURCE:  Patient/Family/Team    PAIN SCALE:  0 = none  1 = mild   2 = moderate  3 = severe    Pain:     Dyspnea:  [ ] YES [ x] NO  Anxiety:  [ ] YES [ ] NO na  Fatigue: [ x] YES [ ] NO  Nausea: [ ] YES [ ] NO  na  Loss of Appetite: [ ] YES [ ] NO  NPO  Other symptoms: __________    MEDICATIONS  (STANDING):  ALBUTerol/ipratropium for Nebulization 3 milliLiter(s) Nebulizer every 6 hours  ATENolol  Tablet 25 milliGRAM(s) Oral two times a day  atorvastatin 10 milliGRAM(s) Oral at bedtime  cetylpyridinium 0.05% Oral Solution 5 milliLiter(s) Swish and Spit three times a day  dextrose 5% 1000 milliLiter(s) (75 mL/Hr) IV Continuous <Continuous>  digoxin     Tablet 0.125 milliGRAM(s) Oral every other day  lactobacillus acidophilus 1 Tablet(s) Oral two times a day with meals  levothyroxine 25 MICROGram(s) Oral daily  multivitamin 1 Tablet(s) Oral daily  nitroglycerin    2% Ointment 1 Inch(s) Transdermal three times a day  oseltamivir 30 milliGRAM(s) Oral two times a day  zinc oxide 11.3% Cream 1 Application(s) Topical daily    MEDICATIONS  (PRN):  acetaminophen   Tablet 650 milliGRAM(s) Oral every 6 hours PRN For Temp greater than or equal to 38 C (100.4 F)  acetaminophen   Tablet. 650 milliGRAM(s) Oral every 6 hours PRN Moderate Pain (4 - 6)  LORazepam   Injectable 1 milliGRAM(s) IV Push every 6 hours PRN Anxiety      Allergies    allopurinol (Rash)  aspirin (Unknown)  azithromycin (Diarrhea)  clindamycin (Diarrhea)  latex (Unknown)  morphine (Other; Short breath)  penicillins (Unknown)  sulfa drugs (Unknown)    Intolerances        Karnofsky Performance Score/Palliative Performance Status Version 2:  20    %    Vital Signs Last 24 Hrs  T(C): 36.7 (06 Feb 2018 09:35), Max: 37 (05 Feb 2018 20:13)  T(F): 98.1 (06 Feb 2018 09:35), Max: 98.6 (05 Feb 2018 20:13)  HR: 96 (06 Feb 2018 15:41) (86 - 96)  BP: 164/85 (06 Feb 2018 09:35) (133/78 - 165/91)  BP(mean): 104 (06 Feb 2018 00:51) (104 - 104)  RR: 20 (06 Feb 2018 09:35) (18 - 26)  SpO2: 98% (06 Feb 2018 15:41) (94% - 98%)    PHYSICAL EXAM:    General: Lethargic, opens eyes    HEENT: [x ] normal  [ ] dry mouth  [ ] ET tube/trach    Lungs: [ x] comfortable [ ] tachypnea/labored breathing  [ ] excessive secretions    CV: [ x] normal  [ ] tachycardia    GI: [ x] normal  [ ] distended  [ ] tender  [ ] no BS               [ ] PEG/NG/OG tube    : [ ] normal  [ x] incontinent  [ ] oliguria/anuria  [ ] hartmann    MSK: [ ] normal  [x ] weakness  [ ] edema             [ ] ambulatory  [ ] bedbound/wheelchair bound    Skin: warm/dry    LABS:                        15.0   10.9  )-----------( 84       ( 06 Feb 2018 07:38 )             49.2     02-06    155<H>  |  109<H>  |  45.0<H>  ----------------------------<  93  3.2<L>   |  32.0<H>  |  1.45<H>    Ca    8.8      06 Feb 2018 07:38      PT/INR - ( 06 Feb 2018 07:38 )   PT: 35.6 sec;   INR: 3.16 ratio             I&O's Summary    05 Feb 2018 07:01  -  06 Feb 2018 07:00  --------------------------------------------------------  IN: 0 mL / OUT: 660 mL / NET: -660 mL        Thank you for the opportunity to assist with the care of this patient.   [facility] [phone].

## 2018-02-06 NOTE — PROGRESS NOTE ADULT - SUBJECTIVE AND OBJECTIVE BOX
HOSPITALIST PROGRESS NOTE    RAGHU DAVIS  [id number]  85yFemale    Patient is a 85y old  Female who presents with a chief complaint of rt. lower ext. pain (24 Jan 2018 04:35)      SUBJECTIVE:   Chart reviewed since last visit.  Patient seen and examined at bedside for Flu, pleural effusion  Feels sick, cant explain.  Denies nausea, vomiting, dyspnea, chest pain, abdominal pain.    per family was seeing things before.    Still NPO as per speech recommendations      OBJECTIVE:  Vital Signs Last 24 Hrs  T(C): 36.7 (06 Feb 2018 09:35), Max: 37 (05 Feb 2018 20:13)  T(F): 98.1 (06 Feb 2018 09:35), Max: 98.6 (05 Feb 2018 20:13)  HR: 94 (06 Feb 2018 09:35) (86 - 103)  BP: 164/85 (06 Feb 2018 09:35) (125/89 - 165/91)  BP(mean): 104 (06 Feb 2018 00:51) (104 - 104)  RR: 20 (06 Feb 2018 09:35) (18 - 26)  SpO2: 96% (06 Feb 2018 09:35) (94% - 98%)    PHYSICAL EXAMINATION  General: Elderly, NAD[+]   lying in bed, somnolent, arousable  HEENT: AT/NC[+]  dried mouth, lips and tongue  NECK: Supple[+]  JVD[] Carotid bruit[]  CVS: RRR[]  Irregular[+]  S1+S2[+]   Murmur[]  RESP: Fair air entry bilaterally[]   Clear sounds[]   poor effort [+]  wheeze[]   Crackles[] Decreases sounds at bases  GI: Soft[+]  nondistended NT   Bowel Sounds[+]  Mass[]   HSM[]   Ascites[]  : suprapubic tenderness[+/-]   CVA Tenderness[]   Montez[-]  MS: RLE without swelling, with medial wound. Chronic changes. No edema  CNS: exam limited by lethargy  INTEG: Skin is Warm[+]  Appears dry  PSYCH: somnolent      MONITOR:  CAPILLARY BLOOD GLUCOSE      POCT Blood Glucose.: 117 mg/dL (06 Feb 2018 11:26)  POCT Blood Glucose.: 78 mg/dL (06 Feb 2018 06:09)  POCT Blood Glucose.: 142 mg/dL (06 Feb 2018 00:54)        I&O's Summary    05 Feb 2018 07:01  -  06 Feb 2018 07:00  --------------------------------------------------------  IN: 0 mL / OUT: 660 mL / NET: -660 mL                            15.0   10.9  )-----------( 84       ( 06 Feb 2018 07:38 )             49.2     PT/INR - ( 06 Feb 2018 07:38 )   PT: 35.6 sec;   INR: 3.16 ratio           02-06    155<H>  |  109<H>  |  45.0<H>  ----------------------------<  93  3.2<L>   |  32.0<H>  |  1.45<H>    Ca    8.8      06 Feb 2018 07:38         RADIOLOGY        MEDICATIONS  (STANDING):  ALBUTerol/ipratropium for Nebulization 3 milliLiter(s) Nebulizer every 6 hours  ATENolol  Tablet 25 milliGRAM(s) Oral two times a day  atorvastatin 10 milliGRAM(s) Oral at bedtime  dextrose 5% 1000 milliLiter(s) (75 mL/Hr) IV Continuous <Continuous>  digoxin     Tablet 0.125 milliGRAM(s) Oral every other day  lactobacillus acidophilus 1 Tablet(s) Oral two times a day with meals  levothyroxine 25 MICROGram(s) Oral daily  multivitamin 1 Tablet(s) Oral daily  nitroglycerin    2% Ointment 1 Inch(s) Transdermal three times a day  oseltamivir 30 milliGRAM(s) Oral two times a day  zinc oxide 11.3% Cream 1 Application(s) Topical daily      MEDICATIONS  (PRN):  acetaminophen   Tablet 650 milliGRAM(s) Oral every 6 hours PRN For Temp greater than or equal to 38 C (100.4 F)  acetaminophen   Tablet. 650 milliGRAM(s) Oral every 6 hours PRN Moderate Pain (4 - 6)  LORazepam   Injectable 1 milliGRAM(s) IV Push every 6 hours PRN Anxiety

## 2018-02-06 NOTE — PROGRESS NOTE ADULT - ASSESSMENT
85yr woman, frail, functionally debility, hx of CHF, VHD, afib, admitted with right leg cellulitis treated. Hospital course complicated by SOB 2/2 CHF, + Flu

## 2018-02-06 NOTE — PROGRESS NOTE ADULT - PROBLEM SELECTOR PLAN 2
Daughter states mother failed bedside swallow test this am?  Patient still lethargic, cont NPO. Follow up repeat swallow test when patient little more alert.   IV hydration

## 2018-02-06 NOTE — PROGRESS NOTE ADULT - SUBJECTIVE AND OBJECTIVE BOX
Giovanna Soliz M.D., F.A.C.C.                                                                                            [facility]                                                                                                   [address] [place] [address]                                                                                                     [phone]      RAGHU DAVIS is a 85yFemale with hx of hypertension   who presented with ?CHF exascerbation and the flu    THe pt has a hx of chronic AF and severe anxiety  I have followed her for many years for AF, periodic SOB, leg swelling and valvular heart disease  She has had multiple episodes of cellulitis  In 2/17 she had diarrhea, fever and abdominal pain and SOB and was treated with fluid overload, C Diff and leg ulcers  In 10/2017 [facility] was [facility] with fever, AMS, worsening renal fx and lactic acidosis  Echo with normal LVEF Dilated LA and RA. Moderate to sever DOUG. Severe TR and mild as/moderate AI. She was treated with abx for 4 weeks for S. Milleri.  She was just seen by me 1/22/18 at which time she was stable  She has now been admitted with a ?CHF exascerbation and has the flu  Being treated with IV meds (can't take po's)       MEDICATIONS  (STANDING):  ALBUTerol/ipratropium for Nebulization 3 milliLiter(s) Nebulizer every 6 hours  ATENolol  Tablet 25 milliGRAM(s) Oral two times a day  atorvastatin 10 milliGRAM(s) Oral at bedtime  digoxin     Tablet 0.125 milliGRAM(s) Oral every other day  furosemide   Injectable 40 milliGRAM(s) IV Push two times a day  lactobacillus acidophilus 1 Tablet(s) Oral two times a day with meals  levothyroxine 25 MICROGram(s) Oral daily  multivitamin 1 Tablet(s) Oral daily  nitroglycerin    2% Ointment 1 Inch(s) Transdermal three times a day  oseltamivir 30 milliGRAM(s) Oral two times a day  zinc oxide 11.3% Cream 1 Application(s) Topical daily    MEDICATIONS  (PRN):  acetaminophen   Tablet 650 milliGRAM(s) Oral every 6 hours PRN For Temp greater than or equal to 38 C (100.4 F)  acetaminophen   Tablet. 650 milliGRAM(s) Oral every 6 hours PRN Moderate Pain (4 - 6)  LORazepam   Injectable 1 milliGRAM(s) IV Push every 6 hours PRN Anxiety      Vital Signs Last 24 Hrs  T(C): 36.7 (06 Feb 2018 05:03), Max: 37 (05 Feb 2018 20:13)  T(F): 98 (06 Feb 2018 05:03), Max: 98.6 (05 Feb 2018 20:13)  HR: 88 (06 Feb 2018 07:44) (86 - 103)  BP: 140/92 (06 Feb 2018 05:03) (125/89 - 165/91)  BP(mean): 104 (06 Feb 2018 00:51) (104 - 104)  RR: 20 (06 Feb 2018 05:03) (18 - 26)  SpO2: 98% (06 Feb 2018 07:44) (94% - 98%)    I&O's Detail    05 Feb 2018 07:01  -  06 Feb 2018 07:00  --------------------------------------------------------  IN:  Total IN: 0 mL    OUT:    Indwelling Catheter - Urethral: 660 mL  Total OUT: 660 mL    Total NET: -660 mL          Constitutional:    NC/AT: Normal    HEENT: Normal    Neck:  No JVD, bruits or thyromegaly    Respiratory:   Cough with rhonchi and occasional rales    Cardiovascular:  IRR with  3/6 syst  murmur, rub or gallop.    Gastrointestinal: Soft without hepatosplenomegaly.    Extremities: without cyanosis, . Legs are discolored.    Neurological:  Oriented   x    ?1-2   . No gross sensory or motor defects.    Skin: Discolored on lower extremities    Psychiatric: Normal affect.                              15.0   10.9  )-----------( x        ( 06 Feb 2018 07:38 )             49.2     02-06    155<H>  |  109<H>  |  45.0<H>  ----------------------------<  93  3.2<L>   |  32.0<H>  |  1.45<H>    Ca    8.8      06 Feb 2018 07:38      PT/INR - ( 06 Feb 2018 07:38 )   PT: 35.6 sec;   INR: 3.16 ratio               ECHO HERE--      1. Severely enlarged left atrium.   2. There is mild concentric left ventricular hypertrophy.   3. Normal wall motion. Left ventricular ejection fraction, by visual   estimation, is 60 to 65%. Grade II diastolic dysfunction.   4. Elevated left atrial and left ventricular end-diastolic pressures.   (LAP = 19 mm Hg)   5. Severely dilated right atrium.   6. Normal right ventricular size and function.   7. Moderate mitral valve regurgitation.   8. Mild aortic regurgitation.   9. Moderate tricuspid regurgitation.  10. Trivial pericardial effusion.        Active Issues:  HEALTH ISSUES - PROBLEM Dx:  Encounter for palliative care: Encounter for palliative care  Dysphagia: Dysphagia  Functional quadriplegia: Functional quadriplegia  UTI (urinary tract infection): UTI (urinary tract infection)  Influenza A: Influenza A  Pleural effusion: Pleural effusion  Delirium due to multiple etiologies, acute, hypoactive  Lethargy: Lethargy  Carotid stenosis, asymptomatic, right: Carotid stenosis, asymptomatic, right  Prophylactic measure: Prophylactic measure  CKD (chronic kidney disease), stage III: CKD (chronic kidney disease), stage III  Essential hypertension: Essential hypertension  Chronic atrial fibrillation: Chronic atrial fibrillation  Mitral valve insufficiency, unspecified etiology: Mitral valve insufficiency, unspecified etiology  Chronic diastolic congestive heart failure: Chronic diastolic congestive heart failure  Penicillin allergy: Penicillin allergy  Sepsis due to cellulitis: Sepsis due to cellulitis  Cellulitis of right lower extremity: Cellulitis of right lower extremity          IMPRESSION:  Flu  Chronic AF  Moderate valvular heart disease

## 2018-02-07 LAB
CULTURE RESULTS: SIGNIFICANT CHANGE UP
CULTURE RESULTS: SIGNIFICANT CHANGE UP
GLUCOSE BLDC GLUCOMTR-MCNC: 125 MG/DL — HIGH (ref 70–99)
GLUCOSE BLDC GLUCOMTR-MCNC: 90 MG/DL — SIGNIFICANT CHANGE UP (ref 70–99)
HCT VFR BLD CALC: 46.7 % — SIGNIFICANT CHANGE UP (ref 37–47)
HGB BLD-MCNC: 14.6 G/DL — SIGNIFICANT CHANGE UP (ref 12–16)
INR BLD: 3.77 RATIO — HIGH (ref 0.88–1.16)
MCHC RBC-ENTMCNC: 31.3 G/DL — LOW (ref 32–36)
MCHC RBC-ENTMCNC: 31.4 PG — HIGH (ref 27–31)
MCV RBC AUTO: 100.4 FL — HIGH (ref 81–99)
PLATELET # BLD AUTO: 101 K/UL — LOW (ref 150–400)
PROTHROM AB SERPL-ACNC: 42.6 SEC — HIGH (ref 9.8–12.7)
RBC # BLD: 4.65 M/UL — SIGNIFICANT CHANGE UP (ref 4.4–5.2)
RBC # FLD: 15.5 % — SIGNIFICANT CHANGE UP (ref 11–15.6)
SPECIMEN SOURCE: SIGNIFICANT CHANGE UP
SPECIMEN SOURCE: SIGNIFICANT CHANGE UP
WBC # BLD: 9.2 K/UL — SIGNIFICANT CHANGE UP (ref 4.8–10.8)
WBC # FLD AUTO: 9.2 K/UL — SIGNIFICANT CHANGE UP (ref 4.8–10.8)

## 2018-02-07 PROCEDURE — 99233 SBSQ HOSP IP/OBS HIGH 50: CPT

## 2018-02-07 RX ADMIN — Medication 3 MILLILITER(S): at 03:37

## 2018-02-07 RX ADMIN — ZINC OXIDE 1 APPLICATION(S): 200 OINTMENT TOPICAL at 18:12

## 2018-02-07 RX ADMIN — ATENOLOL 25 MILLIGRAM(S): 25 TABLET ORAL at 18:12

## 2018-02-07 RX ADMIN — Medication 1 INCH(S): at 14:27

## 2018-02-07 RX ADMIN — Medication 1 INCH(S): at 07:03

## 2018-02-07 RX ADMIN — Medication 1 TABLET(S): at 10:32

## 2018-02-07 RX ADMIN — Medication 1 INCH(S): at 21:37

## 2018-02-07 RX ADMIN — SODIUM CHLORIDE 75 MILLILITER(S): 9 INJECTION, SOLUTION INTRAVENOUS at 21:38

## 2018-02-07 RX ADMIN — ATORVASTATIN CALCIUM 10 MILLIGRAM(S): 80 TABLET, FILM COATED ORAL at 21:37

## 2018-02-07 RX ADMIN — Medication 1 INCH(S): at 01:00

## 2018-02-07 RX ADMIN — Medication 5 MILLIGRAM(S): at 14:22

## 2018-02-07 RX ADMIN — Medication 3 MILLILITER(S): at 20:35

## 2018-02-07 RX ADMIN — Medication 3 MILLILITER(S): at 15:58

## 2018-02-07 RX ADMIN — Medication 1 INCH(S): at 21:17

## 2018-02-07 RX ADMIN — Medication 3 MILLILITER(S): at 08:25

## 2018-02-07 RX ADMIN — SODIUM CHLORIDE 75 MILLILITER(S): 9 INJECTION, SOLUTION INTRAVENOUS at 10:32

## 2018-02-07 RX ADMIN — Medication 5 MILLIGRAM(S): at 00:22

## 2018-02-07 RX ADMIN — Medication 5 MILLIGRAM(S): at 07:03

## 2018-02-07 RX ADMIN — Medication 1 INCH(S): at 10:33

## 2018-02-07 RX ADMIN — Medication 1 TABLET(S): at 18:11

## 2018-02-07 RX ADMIN — Medication 5 MILLIGRAM(S): at 18:12

## 2018-02-07 NOTE — PROGRESS NOTE ADULT - SUBJECTIVE AND OBJECTIVE BOX
NEPHROLOGY INTERVAL HPI/OVERNIGHT EVENTS:    Being reassessed for swallowing  IVF noted   Palliative follow up noted     MEDICATIONS  (STANDING):  ALBUTerol/ipratropium for Nebulization 3 milliLiter(s) Nebulizer every 6 hours  ATENolol  Tablet 25 milliGRAM(s) Oral two times a day  atorvastatin 10 milliGRAM(s) Oral at bedtime  cetylpyridinium 0.05% Oral Solution 5 milliLiter(s) Swish and Spit three times a day  dextrose 5% 1000 milliLiter(s) (75 mL/Hr) IV Continuous <Continuous>  digoxin     Tablet 0.125 milliGRAM(s) Oral every other day  lactobacillus acidophilus 1 Tablet(s) Oral two times a day with meals  levothyroxine 25 MICROGram(s) Oral daily  metoprolol    tartrate Injectable 5 milliGRAM(s) IV Push every 6 hours  multivitamin 1 Tablet(s) Oral daily  nitroglycerin    2% Ointment 1 Inch(s) Transdermal three times a day  zinc oxide 11.3% Cream 1 Application(s) Topical daily    MEDICATIONS  (PRN):  acetaminophen   Tablet 650 milliGRAM(s) Oral every 6 hours PRN For Temp greater than or equal to 38 C (100.4 F)  acetaminophen   Tablet. 650 milliGRAM(s) Oral every 6 hours PRN Moderate Pain (4 - 6)  LORazepam   Injectable 1 milliGRAM(s) IV Push every 6 hours PRN Anxiety      Allergies    allopurinol (Rash)  aspirin (Unknown)  azithromycin (Diarrhea)  clindamycin (Diarrhea)  latex (Unknown)  morphine (Other; Short breath)  penicillins (Unknown)  sulfa drugs (Unknown)    Intolerances          Vital Signs Last 24 Hrs  T(C): 36.7 (07 Feb 2018 16:11), Max: 36.9 (07 Feb 2018 05:52)  T(F): 98.1 (07 Feb 2018 16:11), Max: 98.4 (07 Feb 2018 05:52)  HR: 79 (07 Feb 2018 16:11) (74 - 91)  BP: 160/85 (07 Feb 2018 16:11) (125/80 - 162/82)  BP(mean): --  RR: 17 (07 Feb 2018 16:11) (14 - 18)  SpO2: 95% (07 Feb 2018 16:11) (95% - 100%)  Daily     Daily   I&O's Detail    06 Feb 2018 07:01  -  07 Feb 2018 07:00  --------------------------------------------------------  IN:    dextrose 5%: 975 mL  Total IN: 975 mL    OUT:  Total OUT: 0 mL    Total NET: 975 mL        I&O's Summary    06 Feb 2018 07:01  -  07 Feb 2018 07:00  --------------------------------------------------------  IN: 975 mL / OUT: 0 mL / NET: 975 mL        PHYSICAL EXAM:    GENERAL: appears chronically ill  HEENT: NCAT  NECK: Supple.  LUNGS: dec bs at bases B/L  HEART: Regular rate and rhythm    ABDOMEN: Soft, nontender, and nondistended.    EXTREMITIES:  + edema LE  NEUROLOGIC: nonverbal less alert      LABS:                        14.6   9.2   )-----------( 101      ( 07 Feb 2018 07:55 )             46.7     02-06    156<H>  |  111<H>  |  45.0<H>  ----------------------------<  163<H>  3.3<L>   |  30.0<H>  |  1.41<H>    Ca    8.9      06 Feb 2018 19:48      PT/INR - ( 07 Feb 2018 07:55 )   PT: 42.6 sec;   INR: 3.77 ratio         [facility]cent ECHO   Summary:   1. Severely enlarged left atrium.   2. There is mild concentric left ventricular hypertrophy.   3. Normal wall motion. Left ventricular ejection fraction, by visual   estimation, is 60 to 65%. Grade II diastolic dysfunction.   4. Elevated left atrial and left ventricular end-diastolic pressures.   (LAP = 19 mm Hg)   5. Severely dilated right atrium.   6. Normal right ventricular size and function.   7. Moderate mitral valve regurgitation.   8. Mild aortic regurgitation.   9. Moderate tricuspid regurgitation.  10. Trivial pericardial effusion.    MD Fran Electronically signed on 2/4/2018 at 5:32:16 PM                        RADIOLOGY & ADDITIONAL TESTS:

## 2018-02-07 NOTE — PROGRESS NOTE ADULT - PROBLEM SELECTOR PLAN 4
Discussed with family - illness, dehydration, age, poor nutrition.  Informed them seen very commonly given age and medical issues.  Also told them of possibility of terminal delirium?

## 2018-02-07 NOTE — PROGRESS NOTE ADULT - ASSESSMENT
Hypernatremia   Est free water deficit 4.2 L   Agree with the current IVF   Hold Lasix   No evidence of DI   Labs in am

## 2018-02-07 NOTE — PROGRESS NOTE ADULT - PROBLEM SELECTOR PLAN 2
Likely secondary to CHF  Lasix discontinued secondary to hypernatremia.  Cardiology, Pulmonology follow up

## 2018-02-07 NOTE — PROGRESS NOTE ADULT - ASSESSMENT
85 year old female with PMH HTN, cAF on Coumadin, MR, CHFpEF presented with RLE pain, erythema and swelling. Noted to have confusion at admission (?encephalopathy), leukocytosis (WBC 22, lactic acidosis  3.1) and imaging including CT consistent with RLE Cellulitis without abscess. Initially treated with Azactam/Clindamycin/Vancomycin in ER, later switched to Ceftriaxone/Zyvox by ID and then oral Keflex. Cellulitis resolved. Blood cultures negative.    Lethargy secondary to delirium vs dementia  Encephalopathy work up with elevated TSH. Unclear significance, as may be abnormal secondary to metabolic processes. Started on Synthroid.  UA dirty, Chest X-Ray suggestive of LLL consolidation; however remains without fever or leukocytosis so unclear if infectious; could be masked by Keflex. Doubt UTI, likely inappropriately collected sample, patient was on antibiotics unless MDR. Cultures pending. CT Chest with bilateral effusion, no infiltrate.  Influenza+, no longer requiring NIPPV    CHFpEF, recent TTE (10/2017) with preserved EF 60-65%, moderate to severe MR, Mild AS, Moderate AR, severe TR, Mild PAH. No evidence of decompensated CHF currently. However with bilateral pleural effusion on CT. Started on diuresis, appears to be on the dry side. Repeat TTE with similar findings.    Hypernatremia, secondary to dehydration, NPO. Mild hypokalemia and bump SCr.    Dysphagia, currently NPO, appears to be at risk for protein calorie malnutrition.    Also seen in consultation by Vascular surgery given possible history of PAD, skin graft with recommendations for follow up with primary vascular surgeon Dr Braswell upon discharge.     HTN - well controlled on current regimen.    AF - rate controlled, INR supra therapeutic, no evidence of bleeding.    CKD 3, renal insufficiency, improved SCr today - in fact normalized    Hyponatremia, hypokalemia present at admission - resolved.    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 Added statin, Check FLP. Follow up with Dr Garcia after discharge    Slurred speech as per  - CTH, MRI brain negative for stroke. Secondary to dry mouth - RN advised to provide with oral care    Diarrhea - received antibiotics, is on Probiotic. Cdiff negative. Diarrhea resolved.        Guarded prognosis - patient would not want aggressive measures as per daughter Iona. DNR, DNI

## 2018-02-07 NOTE — PROGRESS NOTE ADULT - PROBLEM SELECTOR PLAN 6
Met with daughter and .   They are hoping the IV fluids will help her improve. They feel she has had some improvement  since IV Fluids were started.  Discussed given age and medical issues, likely be a long recovery and probably not be able to her baseline functioning ( patient at baseline with functional debility).   They seem to understand. Will continue to monitor.  Ongoing goals of care discussions.

## 2018-02-07 NOTE — PROGRESS NOTE ADULT - SUBJECTIVE AND OBJECTIVE BOX
INTERVAL HPI/OVERNIGHT EVENTS:  Daughter feels little better today - eyes open when  here today  PRESENT SYMPTOMS: SOURCE:  Patient/Family/Team    PAIN SCALE:  0 = none  1 = mild   2 = moderate  3 = severe    Pain:  ni    Dyspnea:  [ ] YES [x ] NO  Anxiety:  [ x] YES [ ] NO  Fatigue: [x ] YES [ ] NO  Nausea: [ ] YES [ x] NO    Loss of Appetite: [ ] YES [ ] NO  NPO  Other symptoms: __________    MEDICATIONS  (STANDING):  ALBUTerol/ipratropium for Nebulization 3 milliLiter(s) Nebulizer every 6 hours  ATENolol  Tablet 25 milliGRAM(s) Oral two times a day  atorvastatin 10 milliGRAM(s) Oral at bedtime  cetylpyridinium 0.05% Oral Solution 5 milliLiter(s) Swish and Spit three times a day  dextrose 5% 1000 milliLiter(s) (75 mL/Hr) IV Continuous <Continuous>  digoxin     Tablet 0.125 milliGRAM(s) Oral every other day  lactobacillus acidophilus 1 Tablet(s) Oral two times a day with meals  levothyroxine 25 MICROGram(s) Oral daily  metoprolol    tartrate Injectable 5 milliGRAM(s) IV Push every 6 hours  multivitamin 1 Tablet(s) Oral daily  nitroglycerin    2% Ointment 1 Inch(s) Transdermal three times a day  oseltamivir 30 milliGRAM(s) Oral two times a day  zinc oxide 11.3% Cream 1 Application(s) Topical daily    MEDICATIONS  (PRN):  acetaminophen   Tablet 650 milliGRAM(s) Oral every 6 hours PRN For Temp greater than or equal to 38 C (100.4 F)  acetaminophen   Tablet. 650 milliGRAM(s) Oral every 6 hours PRN Moderate Pain (4 - 6)  LORazepam   Injectable 1 milliGRAM(s) IV Push every 6 hours PRN Anxiety      Allergies    allopurinol (Rash)  aspirin (Unknown)  azithromycin (Diarrhea)  clindamycin (Diarrhea)  latex (Unknown)  morphine (Other; Short breath)  penicillins (Unknown)  sulfa drugs (Unknown)    Intolerances      OTHER SYMPTOMS:  [ ] YES ___________ [ ] NO    Karnofsky Performance Score/Palliative Performance Status Version 2:  20  %    Vital Signs Last 24 Hrs  T(C): 36.9 (07 Feb 2018 10:31), Max: 36.9 (07 Feb 2018 05:52)  T(F): 98.4 (07 Feb 2018 10:31), Max: 98.4 (07 Feb 2018 05:52)  HR: 83 (07 Feb 2018 14:21) (74 - 96)  BP: 162/82 (07 Feb 2018 14:21) (125/80 - 175/90)  BP(mean): --  RR: 16 (07 Feb 2018 14:21) (14 - 18)  SpO2: 100% (07 Feb 2018 14:21) (95% - 100%)    PHYSICAL EXAM:    General:  frail, opens eyes to verbal communication. tired now    HEENT: [ x] normal  [ x] dry mouth  [ ] ET tube/trach    Lungs: [ x] comfortable [ ] tachypnea/labored breathing  [ ] excessive secretions    CV: [x ] normal  [ ] tachycardia    GI: [x ] normal  [ ] distended  [ ] tender  [ ] no BS               [ ] PEG/NG/OG tube    : [ ] normal  [x ] incontinent  [ ] oliguria/anuria  [ ] hartmann    MSK: [ ] normal  [c ] weakness  [ ] edema             [ ] ambulatory  [ x] bedbound/wheelchair bound    Skin: [ ] normal  [ ] pressure ulcers- Stage_____  x ] no rash    LABS:                        14.6   9.2   )-----------( 101      ( 07 Feb 2018 07:55 )             46.7     02-06    156<H>  |  111<H>  |  45.0<H>  ----------------------------<  163<H>  3.3<L>   |  30.0<H>  |  1.41<H>    Ca    8.9      06 Feb 2018 19:48      PT/INR - ( 07 Feb 2018 07:55 )   PT: 42.6 sec;   INR: 3.77 ratio       I&O's Summary    06 Feb 2018 07:01  -  07 Feb 2018 07:00  --------------------------------------------------------  IN: 975 mL / OUT: 0 mL / NET: 975 mL      Thank you for the opportunity to assist with the care of this patient.   [facility] [phone].

## 2018-02-07 NOTE — PROGRESS NOTE ADULT - PROBLEM SELECTOR PLAN 3
Daughter states mother had verbalized and waved her hand to  motion  NO NGT - feels mother would also likely pull it out anyway.   Discussed pleasure feed understanding risk of aspiration.    Swallow test recommends Puree NO liquids.   Aspiration precautions  Speech therapist advised family patient to be fed only by staff.

## 2018-02-07 NOTE — PROGRESS NOTE ADULT - SUBJECTIVE AND OBJECTIVE BOX
HOSPITALIST PROGRESS NOTE    RAGHU DAVIS  [id number]  85yFemale    Patient is a 85y old  Female who presents with a chief complaint of rt. lower ext. pain (24 Jan 2018 04:35)      SUBJECTIVE:   Chart reviewed since last visit.  Patient seen and examined at bedside for dysphagia, influenza, pleural effusion  NPO, awaiting follow up swallow evaluation.  Feels unwell, cant explain.      OBJECTIVE:  Vital Signs Last 24 Hrs  T(C): 36.9 (07 Feb 2018 10:31), Max: 36.9 (07 Feb 2018 05:52)  T(F): 98.4 (07 Feb 2018 10:31), Max: 98.4 (07 Feb 2018 05:52)  HR: 91 (07 Feb 2018 10:31) (74 - 96)  BP: 144/80 (07 Feb 2018 10:31) (125/80 - 175/90)  RR: 14 (07 Feb 2018 10:31) (14 - 18)  SpO2: 95% (07 Feb 2018 10:31) (95% - 98%)    PHYSICAL EXAMINATION  General: Elderly, NAD[+]   lying in bed, awake, appears weak  HEENT: AT/NC[+]  dried mouth, lips and tongue  NECK: Supple[+]  JVD[] Carotid bruit[]  CVS: RRR[]  Irregular[+]  S1+S2[+]   Murmur[]  RESP: Fair air entry bilaterally[]   Clear sounds[]   poor effort [+]  wheeze[]   Crackles[] Decreases sounds at bases  GI: Soft[+]  nondistended NT   Bowel Sounds[+]  Mass[]   HSM[]   Ascites[]  : suprapubic tenderness[+/-]   CVA Tenderness[]   Montez[-]  MS: RLE without swelling, with medial wound. Chronic changes. No edema  CNS: exam limited by lethargy  INTEG: Skin is Warm[+]  Appears dry  PSYCH: somnolent    MONITOR:  CAPILLARY BLOOD GLUCOSE      POCT Blood Glucose.: 90 mg/dL (07 Feb 2018 07:02)  POCT Blood Glucose.: 125 mg/dL (07 Feb 2018 00:35)        I&O's Summary    06 Feb 2018 07:01  -  07 Feb 2018 07:00  --------------------------------------------------------  IN: 975 mL / OUT: 0 mL / NET: 975 mL                            14.6   9.2   )-----------( 101      ( 07 Feb 2018 07:55 )             46.7     PT/INR - ( 07 Feb 2018 07:55 )   PT: 42.6 sec;   INR: 3.77 ratio           02-06    156<H>  |  111<H>  |  45.0<H>  ----------------------------<  163<H>  3.3<L>   |  30.0<H>  |  1.41<H>    Ca    8.9      06 Feb 2018 19:48              Culture:  Culture - Blood (02.04.18 @ 07:16)    Specimen Source: .Blood Blood-Peripheral    Culture Results:   No growth at 48 hours    Culture - Blood (02.04.18 @ 07:16)    Specimen Source: .Blood Blood-Peripheral    Culture Results:   No growth at 48 hours          MEDICATIONS  (STANDING):  ALBUTerol/ipratropium for Nebulization 3 milliLiter(s) Nebulizer every 6 hours  ATENolol  Tablet 25 milliGRAM(s) Oral two times a day  atorvastatin 10 milliGRAM(s) Oral at bedtime  cetylpyridinium 0.05% Oral Solution 5 milliLiter(s) Swish and Spit three times a day  dextrose 5% 1000 milliLiter(s) (75 mL/Hr) IV Continuous <Continuous>  digoxin     Tablet 0.125 milliGRAM(s) Oral every other day  lactobacillus acidophilus 1 Tablet(s) Oral two times a day with meals  levothyroxine 25 MICROGram(s) Oral daily  metoprolol    tartrate Injectable 5 milliGRAM(s) IV Push every 6 hours  multivitamin 1 Tablet(s) Oral daily  nitroglycerin    2% Ointment 1 Inch(s) Transdermal three times a day  oseltamivir 30 milliGRAM(s) Oral two times a day  zinc oxide 11.3% Cream 1 Application(s) Topical daily      MEDICATIONS  (PRN):  acetaminophen   Tablet 650 milliGRAM(s) Oral every 6 hours PRN For Temp greater than or equal to 38 C (100.4 F)  acetaminophen   Tablet. 650 milliGRAM(s) Oral every 6 hours PRN Moderate Pain (4 - 6)  LORazepam   Injectable 1 milliGRAM(s) IV Push every 6 hours PRN Anxiety

## 2018-02-08 DIAGNOSIS — E87.0 HYPEROSMOLALITY AND HYPERNATREMIA: ICD-10-CM

## 2018-02-08 DIAGNOSIS — E63.9 NUTRITIONAL DEFICIENCY, UNSPECIFIED: ICD-10-CM

## 2018-02-08 LAB
ANION GAP SERPL CALC-SCNC: 14 MMOL/L — SIGNIFICANT CHANGE UP (ref 5–17)
BUN SERPL-MCNC: 37 MG/DL — HIGH (ref 8–20)
CALCIUM SERPL-MCNC: 8.8 MG/DL — SIGNIFICANT CHANGE UP (ref 8.6–10.2)
CHLORIDE SERPL-SCNC: 111 MMOL/L — HIGH (ref 98–107)
CO2 SERPL-SCNC: 27 MMOL/L — SIGNIFICANT CHANGE UP (ref 22–29)
CREAT SERPL-MCNC: 1.01 MG/DL — SIGNIFICANT CHANGE UP (ref 0.5–1.3)
GLUCOSE SERPL-MCNC: 89 MG/DL — SIGNIFICANT CHANGE UP (ref 70–115)
HCT VFR BLD CALC: 49.5 % — HIGH (ref 37–47)
HGB BLD-MCNC: 15.6 G/DL — SIGNIFICANT CHANGE UP (ref 12–16)
INR BLD: 5.84 RATIO — CRITICAL HIGH (ref 0.88–1.16)
MCHC RBC-ENTMCNC: 31.5 G/DL — LOW (ref 32–36)
MCHC RBC-ENTMCNC: 31.9 PG — HIGH (ref 27–31)
MCV RBC AUTO: 101.2 FL — HIGH (ref 81–99)
PLATELET # BLD AUTO: 107 K/UL — LOW (ref 150–400)
POTASSIUM SERPL-MCNC: 4.7 MMOL/L — SIGNIFICANT CHANGE UP (ref 3.5–5.3)
POTASSIUM SERPL-SCNC: 4.7 MMOL/L — SIGNIFICANT CHANGE UP (ref 3.5–5.3)
PROTHROM AB SERPL-ACNC: 66.6 SEC — HIGH (ref 9.8–12.7)
RBC # BLD: 4.89 M/UL — SIGNIFICANT CHANGE UP (ref 4.4–5.2)
RBC # FLD: 15.4 % — SIGNIFICANT CHANGE UP (ref 11–15.6)
SODIUM SERPL-SCNC: 152 MMOL/L — HIGH (ref 135–145)
WBC # BLD: 7.6 K/UL — SIGNIFICANT CHANGE UP (ref 4.8–10.8)
WBC # FLD AUTO: 7.6 K/UL — SIGNIFICANT CHANGE UP (ref 4.8–10.8)

## 2018-02-08 PROCEDURE — 99233 SBSQ HOSP IP/OBS HIGH 50: CPT

## 2018-02-08 RX ADMIN — Medication 1 TABLET(S): at 08:35

## 2018-02-08 RX ADMIN — SODIUM CHLORIDE 75 MILLILITER(S): 9 INJECTION, SOLUTION INTRAVENOUS at 14:53

## 2018-02-08 RX ADMIN — Medication 25 MICROGRAM(S): at 05:46

## 2018-02-08 RX ADMIN — Medication 1 INCH(S): at 14:53

## 2018-02-08 RX ADMIN — ZINC OXIDE 1 APPLICATION(S): 200 OINTMENT TOPICAL at 12:22

## 2018-02-08 RX ADMIN — Medication 1 TABLET(S): at 12:22

## 2018-02-08 RX ADMIN — Medication 1 TABLET(S): at 18:54

## 2018-02-08 RX ADMIN — Medication 1 INCH(S): at 09:43

## 2018-02-08 RX ADMIN — Medication 1 INCH(S): at 05:47

## 2018-02-08 RX ADMIN — Medication 5 MILLIGRAM(S): at 05:46

## 2018-02-08 RX ADMIN — SODIUM CHLORIDE 75 MILLILITER(S): 9 INJECTION, SOLUTION INTRAVENOUS at 08:36

## 2018-02-08 RX ADMIN — Medication 5 MILLIGRAM(S): at 01:26

## 2018-02-08 RX ADMIN — Medication 3 MILLILITER(S): at 09:40

## 2018-02-08 RX ADMIN — ATENOLOL 25 MILLIGRAM(S): 25 TABLET ORAL at 05:46

## 2018-02-08 RX ADMIN — Medication 5 MILLIGRAM(S): at 18:54

## 2018-02-08 RX ADMIN — Medication 5 MILLIGRAM(S): at 12:22

## 2018-02-08 RX ADMIN — Medication 1 INCH(S): at 02:16

## 2018-02-08 RX ADMIN — Medication 1 INCH(S): at 18:01

## 2018-02-08 RX ADMIN — ATENOLOL 25 MILLIGRAM(S): 25 TABLET ORAL at 18:54

## 2018-02-08 NOTE — PROGRESS NOTE ADULT - PROBLEM SELECTOR PLAN 2
Diet upgraded to puree with honey liquids TO BE GIVEN VIA TSP.  Cont aspiration precautions.   Concern for poor nutritional status despite upgraded diet.

## 2018-02-08 NOTE — PROGRESS NOTE ADULT - SUBJECTIVE AND OBJECTIVE BOX
HOSPITALIST PROGRESS NOTE    RAGHU DAVIS  [id number]  85yFemale    Patient is a 85y old  Female who presents with a chief complaint of rt. lower ext. pain (24 Jan 2018 04:35)      SUBJECTIVE:   Chart reviewed since last visit.  Patient seen and examined at bedside for dyspnea, dysphagia.  Sleeping, not sure how she feels.  Started on puree without liquids yesterday        OBJECTIVE:  Vital Signs Last 24 Hrs  T(C): 37.2 (08 Feb 2018 08:36), Max: 37.4 (07 Feb 2018 20:28)  T(F): 98.9 (08 Feb 2018 08:36), Max: 99.3 (07 Feb 2018 20:28)  HR: 75 (08 Feb 2018 08:36) (67 - 87)  BP: 158/83 (08 Feb 2018 08:36) (150/86 - 164/62)  RR: 18 (08 Feb 2018 08:36) (15 - 18)  SpO2: 94% (08 Feb 2018 08:36) (94% - 100%)    PHYSICAL EXAMINATION  General: Elderly, NAD[+]   lying in bed, sleeping, appears weak  HEENT: AT/NC[+]  dried mouth, lips and tongue  NECK: Supple[+]  JVD[] Carotid bruit[]  CVS: RRR[]  Irregular[+]  S1+S2[+]   Murmur[]  RESP: Fair air entry bilaterally[]   Clear sounds[]   poor effort [+]  wheeze[]   Crackles[]   GI: Soft[+]  nondistended NT   Bowel Sounds[+]  Mass[]   HSM[]   Ascites[]  : suprapubic tenderness[-]   CVA Tenderness[]   Montez[-]  MS: RLE without swelling, with medial healed wound. Chronic changes. No edema  CNS: exam limited by lethargy  INTEG: Skin is Warm[+]  Appears dry  PSYCH: somnolent    MONITOR:  CAPILLARY BLOOD GLUCOSE            I&O's Summary    07 Feb 2018 07:01  -  08 Feb 2018 07:00  --------------------------------------------------------  IN: 975 mL / OUT: 0 mL / NET: 975 mL                            15.6   7.6   )-----------( 107      ( 08 Feb 2018 08:37 )             49.5     PT/INR - ( 08 Feb 2018 08:37 )   PT: 66.6 sec;   INR: 5.84 ratio           02-08    152<H>  |  111<H>  |  37.0<H>  ----------------------------<  89  4.7   |  27.0  |  1.01    Ca    8.8      08 Feb 2018 08:37            MEDICATIONS  (STANDING):  ATENolol  Tablet 25 milliGRAM(s) Oral two times a day  atorvastatin 10 milliGRAM(s) Oral at bedtime  cetylpyridinium 0.05% Oral Solution 5 milliLiter(s) Swish and Spit three times a day  dextrose 5% 1000 milliLiter(s) (75 mL/Hr) IV Continuous <Continuous>  digoxin     Tablet 0.125 milliGRAM(s) Oral every other day  lactobacillus acidophilus 1 Tablet(s) Oral two times a day with meals  levothyroxine 25 MICROGram(s) Oral daily  metoprolol    tartrate Injectable 5 milliGRAM(s) IV Push every 6 hours  multivitamin 1 Tablet(s) Oral daily  nitroglycerin    2% Ointment 1 Inch(s) Transdermal three times a day  zinc oxide 11.3% Cream 1 Application(s) Topical daily      MEDICATIONS  (PRN):  acetaminophen   Tablet 650 milliGRAM(s) Oral every 6 hours PRN For Temp greater than or equal to 38 C (100.4 F)  acetaminophen   Tablet. 650 milliGRAM(s) Oral every 6 hours PRN Moderate Pain (4 - 6)  LORazepam   Injectable 1 milliGRAM(s) IV Push every 6 hours PRN Anxiety

## 2018-02-08 NOTE — PROGRESS NOTE ADULT - SUBJECTIVE AND OBJECTIVE BOX
INTERVAL HPI/OVERNIGHT EVENTS:    PRESENT SYMPTOMS: SOURCE:  Patient/Family/Team    PAIN SCALE:  0 = none  1 = mild   2 = moderate  3 = severe    Pain:     Dyspnea:  [ ] YES [ x] NO  Anxiety:  [ ] YES [ ] NO  na  Fatigue: [x ] YES [ ] NO  Nausea: [ ] YES [x ] NO  Loss of Appetite: [x] YES [ ] NO  Other symptoms: __________    MEDICATIONS  (STANDING):  ATENolol  Tablet 25 milliGRAM(s) Oral two times a day  atorvastatin 10 milliGRAM(s) Oral at bedtime  dextrose 5% 1000 milliLiter(s) (75 mL/Hr) IV Continuous <Continuous>  digoxin     Tablet 0.125 milliGRAM(s) Oral every other day  lactobacillus acidophilus 1 Tablet(s) Oral two times a day with meals  levothyroxine 25 MICROGram(s) Oral daily  metoprolol    tartrate Injectable 5 milliGRAM(s) IV Push every 6 hours  multivitamin 1 Tablet(s) Oral daily  nitroglycerin    2% Ointment 1 Inch(s) Transdermal three times a day  zinc oxide 11.3% Cream 1 Application(s) Topical daily    MEDICATIONS  (PRN):  acetaminophen   Tablet 650 milliGRAM(s) Oral every 6 hours PRN For Temp greater than or equal to 38 C (100.4 F)  acetaminophen   Tablet. 650 milliGRAM(s) Oral every 6 hours PRN Moderate Pain (4 - 6)  LORazepam   Injectable 1 milliGRAM(s) IV Push every 6 hours PRN Anxiety      Allergies    allopurinol (Rash)  aspirin (Unknown)  azithromycin (Diarrhea)  clindamycin (Diarrhea)  latex (Unknown)  morphine (Other; Short breath)  penicillins (Unknown)  sulfa drugs (Unknown)    Intolerances        Karnofsky Performance Score/Palliative Performance Status Version 2:  30 %    Vital Signs Last 24 Hrs  T(C): 37.3 (08 Feb 2018 16:26), Max: 37.4 (07 Feb 2018 20:28)  T(F): 99.2 (08 Feb 2018 16:26), Max: 99.3 (07 Feb 2018 20:28)  HR: 70 (08 Feb 2018 16:26) (64 - 87)  BP: 151/79 (08 Feb 2018 16:26) (117/65 - 164/62)  BP(mean): --  RR: 18 (08 Feb 2018 16:26) (15 - 18)  SpO2: 96% (08 Feb 2018 12:21) (94% - 98%)    PHYSICAL EXAM:    General: Frail lethargic - opens  eyes to verbal communication    HEENT: [ x] normal  [ ] dry mouth  [ ] ET tube/trach    Lungs: [ x] comfortable [ ] tachypnea/labored breathing  [ ] excessive secretions    CV: [ x] normal  [ ] tachycardia    GI: [x ] normal  [ ] distended  [ ] tender  [ ] no BS               [ ] PEG/NG/OG tube    :  ] normal  [x ] incontinent  [ ] oliguria/anuria  [ ] hartmann    MSK: [ ] normal  [ x] weakness  [ ] edema             [ ] ambulatory  [ ] bedbound/wheelchair bound    Skin: [ ] normal  [ ] pressure ulcers- Stage_____  [x ] no rash    LABS:                        15.6   7.6   )-----------( 107      ( 08 Feb 2018 08:37 )             49.5     02-08    152<H>  |  111<H>  |  37.0<H>  ----------------------------<  89  4.7   |  27.0  |  1.01    Ca    8.8      08 Feb 2018 08:37      PT/INR - ( 08 Feb 2018 08:37 )   PT: 66.6 sec;   INR: 5.84 ratio             I&O's Summary    07 Feb 2018 07:01  -  08 Feb 2018 07:00  --------------------------------------------------------  IN: 975 mL / OUT: 0 mL / NET: 975 mL      Thank you for the opportunity to assist with the care of this patient.   [facility] [phone].

## 2018-02-08 NOTE — PROGRESS NOTE ADULT - PROBLEM SELECTOR PLAN 3
Hydrating, continuing medical optimization.   Unclear if patient will have much improvement at this point

## 2018-02-08 NOTE — PROGRESS NOTE ADULT - PROBLEM SELECTOR PLAN 6
and daughter currently not at bedside.  If patient remains lethargic with poor po intake, outlook is poor. Will continue goals of care discussions with family. Hospice?

## 2018-02-08 NOTE — PROGRESS NOTE ADULT - SUBJECTIVE AND OBJECTIVE BOX
NEPHROLOGY INTERVAL HPI/OVERNIGHT EVENTS:    interim noted   Remains off lasix   IVF noted     MEDICATIONS  (STANDING):  ATENolol  Tablet 25 milliGRAM(s) Oral two times a day  atorvastatin 10 milliGRAM(s) Oral at bedtime  cetylpyridinium 0.05% Oral Solution 5 milliLiter(s) Swish and Spit three times a day  dextrose 5% 1000 milliLiter(s) (75 mL/Hr) IV Continuous <Continuous>  digoxin     Tablet 0.125 milliGRAM(s) Oral every other day  lactobacillus acidophilus 1 Tablet(s) Oral two times a day with meals  levothyroxine 25 MICROGram(s) Oral daily  metoprolol    tartrate Injectable 5 milliGRAM(s) IV Push every 6 hours  multivitamin 1 Tablet(s) Oral daily  nitroglycerin    2% Ointment 1 Inch(s) Transdermal three times a day  zinc oxide 11.3% Cream 1 Application(s) Topical daily    MEDICATIONS  (PRN):  acetaminophen   Tablet 650 milliGRAM(s) Oral every 6 hours PRN For Temp greater than or equal to 38 C (100.4 F)  acetaminophen   Tablet. 650 milliGRAM(s) Oral every 6 hours PRN Moderate Pain (4 - 6)  LORazepam   Injectable 1 milliGRAM(s) IV Push every 6 hours PRN Anxiety      Allergies    allopurinol (Rash)  aspirin (Unknown)  azithromycin (Diarrhea)  clindamycin (Diarrhea)  latex (Unknown)  morphine (Other; Short breath)  penicillins (Unknown)  sulfa drugs (Unknown)    Intolerances          Vital Signs Last 24 Hrs  T(C): 37.2 (08 Feb 2018 08:36), Max: 37.4 (07 Feb 2018 20:28)  T(F): 98.9 (08 Feb 2018 08:36), Max: 99.3 (07 Feb 2018 20:28)  HR: 64 (08 Feb 2018 12:21) (64 - 87)  BP: 117/65 (08 Feb 2018 12:21) (117/65 - 164/62)  BP(mean): --  RR: 16 (08 Feb 2018 12:21) (15 - 18)  SpO2: 96% (08 Feb 2018 12:21) (94% - 100%)  Daily     Daily   I&O's Detail    07 Feb 2018 07:01  -  08 Feb 2018 07:00  --------------------------------------------------------  IN:    dextrose 5%: 975 mL  Total IN: 975 mL    OUT:  Total OUT: 0 mL    Total NET: 975 mL        I&O's Summary    07 Feb 2018 07:01  -  08 Feb 2018 07:00  --------------------------------------------------------  IN: 975 mL / OUT: 0 mL / NET: 975 mL        PHYSICAL EXAM:  GENERAL: appears chronically ill  HEENT: NCAT  NECK: Supple.  LUNGS: dec bs at bases B/L  HEART: Regular rate and rhythm    ABDOMEN: Soft, nontender, and nondistended.    EXTREMITIES:  + edema LE    LABS:                        15.6   7.6   )-----------( 107      ( 08 Feb 2018 08:37 )             49.5     02-08    152<H>  |  111<H>  |  37.0<H>  ----------------------------<  89  4.7   |  27.0  |  1.01    Ca    8.8      08 Feb 2018 08:37      PT/INR - ( 08 Feb 2018 08:37 )   PT: 66.6 sec;   INR: 5.84 ratio                     RADIOLOGY & ADDITIONAL TESTS:

## 2018-02-08 NOTE — PROGRESS NOTE ADULT - ASSESSMENT
85 year old female with PMH HTN, cAF on Coumadin, MR, CHFpEF presented with RLE pain, erythema and swelling. Noted to have confusion at admission (?encephalopathy), leukocytosis (WBC 22, lactic acidosis  3.1) and imaging including CT consistent with RLE Cellulitis without abscess. Initially treated with Azactam/Clindamycin/Vancomycin in ER, later switched to Ceftriaxone/Zyvox by ID and then oral Keflex. Cellulitis resolved. Blood cultures negative.    Lethargy secondary to delirium vs dementia  Encephalopathy work up with elevated TSH. Unclear significance, as may be abnormal secondary to metabolic processes. Started on Synthroid.  UA dirty, Chest X-Ray suggestive of LLL consolidation; however remains without fever or leukocytosis so unclear if infectious; could be masked by Keflex. Doubt UTI, likely inappropriately collected sample, patient was on antibiotics unless MDR. Cultures pending. CT Chest with bilateral effusion, no infiltrate.  Influenza+, no longer requiring NIPPV    CHFpEF, recent TTE (10/2017) with preserved EF 60-65%, moderate to severe MR, Mild AS, Moderate AR, severe TR, Mild PAH. No evidence of decompensated CHF currently. However with bilateral pleural effusion on CT. Started on diuresis, appears to be on the dry side. Repeat TTE with similar findings.    Hypernatremia, secondary to dehydration, NPO. Mild hypokalemia and bump SCr.    Dysphagia, appears to be at risk for protein calorie malnutrition. Started on Puree, no liquids, receiving D5W for hypernatremia.    Also seen in consultation by Vascular surgery given possible history of PAD, skin graft with recommendations for follow up with primary vascular surgeon Dr Braswell upon discharge.     HTN - well controlled on current regimen.    AF - rate controlled, INR supra therapeutic, no evidence of bleeding.    CKD 3, renal insufficiency - in fact normalized    Hyponatremia, hypokalemia present at admission - resolved.    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 Added statin, Check FLP. Follow up with Dr Garcia after discharge    Slurred speech as per  - CTH, MRI brain negative for stroke. Secondary to dry mouth - RN advised to provide with oral care    Diarrhea - received antibiotics, is on Probiotic. Cdiff negative. Diarrhea resolved.        Guarded prognosis - patient would not want aggressive measures as per daughter Iona. DNR, DNI

## 2018-02-08 NOTE — PROGRESS NOTE ADULT - PROBLEM SELECTOR PLAN 4
Patient with minimal po intake.   Family have stated patient would NOT want a feeding tube.   Poor outlook if nutritional status remains poor.

## 2018-02-09 DIAGNOSIS — R06.03 ACUTE RESPIRATORY DISTRESS: ICD-10-CM

## 2018-02-09 LAB
ANION GAP SERPL CALC-SCNC: 13 MMOL/L — SIGNIFICANT CHANGE UP (ref 5–17)
BUN SERPL-MCNC: 32 MG/DL — HIGH (ref 8–20)
CALCIUM SERPL-MCNC: 8.2 MG/DL — LOW (ref 8.6–10.2)
CHLORIDE SERPL-SCNC: 108 MMOL/L — HIGH (ref 98–107)
CO2 SERPL-SCNC: 25 MMOL/L — SIGNIFICANT CHANGE UP (ref 22–29)
CREAT SERPL-MCNC: 0.97 MG/DL — SIGNIFICANT CHANGE UP (ref 0.5–1.3)
CULTURE RESULTS: SIGNIFICANT CHANGE UP
CULTURE RESULTS: SIGNIFICANT CHANGE UP
DIGOXIN SERPL-MCNC: 0.8 NG/ML — SIGNIFICANT CHANGE UP (ref 0.8–2)
GLUCOSE SERPL-MCNC: 123 MG/DL — HIGH (ref 70–115)
HCT VFR BLD CALC: 40.5 % — SIGNIFICANT CHANGE UP (ref 37–47)
HGB BLD-MCNC: 12.6 G/DL — SIGNIFICANT CHANGE UP (ref 12–16)
INR BLD: 4.48 RATIO — HIGH (ref 0.88–1.16)
MAGNESIUM SERPL-MCNC: 1.8 MG/DL — SIGNIFICANT CHANGE UP (ref 1.6–2.6)
MCHC RBC-ENTMCNC: 31 PG — SIGNIFICANT CHANGE UP (ref 27–31)
MCHC RBC-ENTMCNC: 31.1 G/DL — LOW (ref 32–36)
MCV RBC AUTO: 99.5 FL — HIGH (ref 81–99)
PLATELET # BLD AUTO: 148 K/UL — LOW (ref 150–400)
POTASSIUM SERPL-MCNC: 3.4 MMOL/L — LOW (ref 3.5–5.3)
POTASSIUM SERPL-SCNC: 3.4 MMOL/L — LOW (ref 3.5–5.3)
PROTHROM AB SERPL-ACNC: 50.8 SEC — HIGH (ref 9.8–12.7)
RBC # BLD: 4.07 M/UL — LOW (ref 4.4–5.2)
RBC # FLD: 15.4 % — SIGNIFICANT CHANGE UP (ref 11–15.6)
SODIUM SERPL-SCNC: 146 MMOL/L — HIGH (ref 135–145)
SPECIMEN SOURCE: SIGNIFICANT CHANGE UP
SPECIMEN SOURCE: SIGNIFICANT CHANGE UP
WBC # BLD: 11.4 K/UL — HIGH (ref 4.8–10.8)
WBC # FLD AUTO: 11.4 K/UL — HIGH (ref 4.8–10.8)

## 2018-02-09 PROCEDURE — 71045 X-RAY EXAM CHEST 1 VIEW: CPT | Mod: 26

## 2018-02-09 PROCEDURE — 99233 SBSQ HOSP IP/OBS HIGH 50: CPT

## 2018-02-09 RX ORDER — DEXTROSE MONOHYDRATE, SODIUM CHLORIDE, AND POTASSIUM CHLORIDE 50; .745; 4.5 G/1000ML; G/1000ML; G/1000ML
1000 INJECTION, SOLUTION INTRAVENOUS
Qty: 0 | Refills: 0 | Status: DISCONTINUED | OUTPATIENT
Start: 2018-02-09 | End: 2018-02-10

## 2018-02-09 RX ADMIN — DEXTROSE MONOHYDRATE, SODIUM CHLORIDE, AND POTASSIUM CHLORIDE 50 MILLILITER(S): 50; .745; 4.5 INJECTION, SOLUTION INTRAVENOUS at 18:44

## 2018-02-09 RX ADMIN — SODIUM CHLORIDE 75 MILLILITER(S): 9 INJECTION, SOLUTION INTRAVENOUS at 00:46

## 2018-02-09 RX ADMIN — ATORVASTATIN CALCIUM 10 MILLIGRAM(S): 80 TABLET, FILM COATED ORAL at 00:19

## 2018-02-09 RX ADMIN — Medication 1 INCH(S): at 01:03

## 2018-02-09 RX ADMIN — Medication 25 MICROGRAM(S): at 06:35

## 2018-02-09 RX ADMIN — Medication 5 MILLIGRAM(S): at 06:35

## 2018-02-09 RX ADMIN — Medication 1 INCH(S): at 21:54

## 2018-02-09 RX ADMIN — Medication 5 MILLIGRAM(S): at 00:19

## 2018-02-09 RX ADMIN — ATENOLOL 25 MILLIGRAM(S): 25 TABLET ORAL at 06:35

## 2018-02-09 RX ADMIN — ZINC OXIDE 1 APPLICATION(S): 200 OINTMENT TOPICAL at 12:21

## 2018-02-09 RX ADMIN — Medication 1 INCH(S): at 12:00

## 2018-02-09 RX ADMIN — Medication 1 INCH(S): at 06:35

## 2018-02-09 RX ADMIN — Medication 1 INCH(S): at 00:19

## 2018-02-09 RX ADMIN — Medication 1 TABLET(S): at 12:20

## 2018-02-09 NOTE — PROVIDER CONTACT NOTE (OTHER) - SITUATION
Pt chart reviewed, contents noted, pt w/ change in medical status requiring higher level of care, please enter new P.T. orders when deemed appropriate by team, P.T. to sign off pending receipt of new
Patient with labored breathing, coarse crackles, 02 sat 88, 2L NC increased to 4L NC, 02 sat 93%. Lasix ordered as per Dr. Valentin request.
Patient's HR going down to 40's for a few beats, having three beats of VTACH
Patient's HR keeps going to the 40's for a few beats. Due for digoxin and lopressor, HR 64, /86.
Pt had 6 beats of VTACH

## 2018-02-09 NOTE — PROGRESS NOTE ADULT - SUBJECTIVE AND OBJECTIVE BOX
INTERVAL HPI/OVERNIGHT EVENTS:    PRESENT SYMPTOMS: SOURCE:  Patient/Family/Team    PAIN SCALE:  0 = none  1 = mild   2 = moderate  3 = severe    Pain:     Dyspnea:  [x ] YES [ ] NO  Anxiety:  [x ] YES [ ] NO  Fatigue: [x ] YES [ ] NO  Nausea: [ ] YES [x ] NO  Loss of Appetite: x ] YES [ ] NO  Other symptoms: __________    MEDICATIONS  (STANDING):  ATENolol  Tablet 25 milliGRAM(s) Oral two times a day  atorvastatin 10 milliGRAM(s) Oral at bedtime  dextrose 5% + lactated ringers with potassium chloride 20 mEq/L 1000 milliLiter(s) (50 mL/Hr) IV Continuous <Continuous>  digoxin     Tablet 0.125 milliGRAM(s) Oral every other day  lactobacillus acidophilus 1 Tablet(s) Oral two times a day with meals  levothyroxine 25 MICROGram(s) Oral daily  metoprolol    tartrate Injectable 5 milliGRAM(s) IV Push every 6 hours  multivitamin 1 Tablet(s) Oral daily  nitroglycerin    2% Ointment 1 Inch(s) Transdermal three times a day  zinc oxide 11.3% Cream 1 Application(s) Topical daily    MEDICATIONS  (PRN):  acetaminophen   Tablet 650 milliGRAM(s) Oral every 6 hours PRN For Temp greater than or equal to 38 C (100.4 F)  acetaminophen   Tablet. 650 milliGRAM(s) Oral every 6 hours PRN Moderate Pain (4 - 6)  LORazepam   Injectable 1 milliGRAM(s) IV Push every 6 hours PRN Anxiety      Allergies    allopurinol (Rash)  aspirin (Unknown)  azithromycin (Diarrhea)  clindamycin (Diarrhea)  latex (Unknown)  morphine (Other; Short breath)  penicillins (Unknown)  sulfa drugs (Unknown)    Intolerances    Karnofsky Performance Score/Palliative Performance Status Version 2:   10 %    Vital Signs Last 24 Hrs  T(C): 36.8 (09 Feb 2018 04:53), Max: 37.4 (08 Feb 2018 21:00)  T(F): 98.2 (09 Feb 2018 04:53), Max: 99.4 (08 Feb 2018 21:00)  HR: 73 (09 Feb 2018 04:53) (63 - 75)  BP: 124/69 (09 Feb 2018 04:53) (124/69 - 151/79)  BP(mean): --  RR: 20 (09 Feb 2018 04:53) (15 - 20)  SpO2: 90% (09 Feb 2018 04:53) (90% - 96%)    PHYSICAL EXAM:    General: Lethargic grimacing   HEENT: NCAT+ venti mask    Lungs: [ ] comfortable [x ]mild  tachypnea/labored breathing  [ ] excessive secretions    CV: [x ] normal  [ ] tachycardia    GI: [x ] normal  [ ] distended  [ ] tender  [ ] no BS               [ ] PEG/NG/OG tube    : [ ] normal  [x ] incontinent  [ ] oliguria/anuria  [ ] hartmann    MSK: [ ] normal  [x ] weakness  [ ] edema             [ ] ambulatory  [x ] bedbound/wheelchair bound    Skin: [ ] normal  [ ] pressure ulcers- Stage_____  [ x] no rash    LABS:                        12.6   11.4  )-----------( 148      ( 09 Feb 2018 13:29 )             40.5     02-09    146<H>  |  108<H>  |  32.0<H>  ----------------------------<  123<H>  3.4<L>   |  25.0  |  0.97    Ca    8.2<L>      09 Feb 2018 13:29  Mg     1.8     02-09      PT/INR - ( 09 Feb 2018 07:27 )   PT: 50.8 sec;   INR: 4.48 ratio             I&O's Summary    08 Feb 2018 07:01  -  09 Feb 2018 07:00  --------------------------------------------------------  IN: 1725 mL / OUT: 0 mL / NET: 1725 mL      Thank you for the opportunity to assist with the care of this patient.   [facility] Consult [facility] [phone].

## 2018-02-09 NOTE — PROGRESS NOTE ADULT - SUBJECTIVE AND OBJECTIVE BOX
NEPHROLOGY INTERVAL HPI/OVERNIGHT EVENTS:    No acute  changes.    MEDICATIONS  (STANDING):  ATENolol  Tablet 25 milliGRAM(s) Oral two times a day  atorvastatin 10 milliGRAM(s) Oral at bedtime  cetylpyridinium 0.05% Oral Solution 5 milliLiter(s) Swish and Spit three times a day  dextrose 5% 1000 milliLiter(s) (75 mL/Hr) IV Continuous <Continuous>  digoxin     Tablet 0.125 milliGRAM(s) Oral every other day  lactobacillus acidophilus 1 Tablet(s) Oral two times a day with meals  levothyroxine 25 MICROGram(s) Oral daily  metoprolol    tartrate Injectable 5 milliGRAM(s) IV Push every 6 hours  multivitamin 1 Tablet(s) Oral daily  nitroglycerin    2% Ointment 1 Inch(s) Transdermal three times a day  zinc oxide 11.3% Cream 1 Application(s) Topical daily    MEDICATIONS  (PRN):  acetaminophen   Tablet 650 milliGRAM(s) Oral every 6 hours PRN For Temp greater than or equal to 38 C (100.4 F)  acetaminophen   Tablet. 650 milliGRAM(s) Oral every 6 hours PRN Moderate Pain (4 - 6)  LORazepam   Injectable 1 milliGRAM(s) IV Push every 6 hours PRN Anxiety      Allergies    allopurinol (Rash)  aspirin (Unknown)  azithromycin (Diarrhea)  clindamycin (Diarrhea)  latex (Unknown)  morphine (Other; Short breath)  penicillins (Unknown)  sulfa drugs (Unknown)            Vital Signs Last 24 Hrs  T(C): 36.8 (09 Feb 2018 04:53), Max: 37.4 (08 Feb 2018 21:00)  T(F): 98.2 (09 Feb 2018 04:53), Max: 99.4 (08 Feb 2018 21:00)  HR: 73 (09 Feb 2018 04:53) (63 - 75)  BP: 124/69 (09 Feb 2018 04:53) (124/69 - 151/79)  BP(mean): --  ABP: --  ABP(mean): --  RR: 20 (09 Feb 2018 04:53) (15 - 20)  SpO2: 90% (09 Feb 2018 04:53) (90% - 96%)    T(C): 37.2 (08 Feb 2018 08:36), Max: 37.4 (07 Feb 2018 20:28)  T(F): 98.9 (08 Feb 2018 08:36), Max: 99.3 (07 Feb 2018 20:28)  HR: 64 (08 Feb 2018 12:21) (64 - 87)  BP: 117/65 (08 Feb 2018 12:21) (117/65 - 164/62)  BP(mean): --  RR: 16 (08 Feb 2018 12:21) (15 - 18)  SpO2: 96% (08 Feb 2018 12:21) (94% - 100%)          PHYSICAL EXAM:  GENERAL: appears chronically ill  HEENT: same  NECK: Supple.  LUNGS: no wheezes  HEART: Regular rate , murmur noted   Neuro: Lethargic, family at bedside  ABDOMEN: Soft, nontender, and nondistended.    EXTREMITIES:  muscle wasting  with stasis changes   neg    LABS:  02-09    146<H>  |  108<H>  |  32.0<H>  ----------------------------<  123<H>  3.4<L>   |  25.0  |  0.97    Ca    8.2<L>      09 Feb 2018 13:29  Mg     1.8     02-09                            15.6   7.6   )-----------( 107      ( 08 Feb 2018 08:37 )             49.5     02-08    152<H>  |  111<H>  |  37.0<H>  ----------------------------<  89  4.7   |  27.0  |  1.01    Ca    8.8      08 Feb 2018 08:37      PT/INR - ( 08 Feb 2018 08:37 )   PT: 66.6 sec;   INR: 5.84 ratio                     RADIOLOGY & ADDITIONAL TESTS:

## 2018-02-09 NOTE — PROGRESS NOTE ADULT - SUBJECTIVE AND OBJECTIVE BOX
HOSPITALIST PROGRESS NOTE    RAGHU DAVIS  [id number]  85yFemale    Patient is a 85y old  Female who presents with a chief complaint of rt. lower ext. pain (24 Jan 2018 04:35)      SUBJECTIVE:   Chart reviewed since last visit.  Patient seen and examined at bedside for pleural effusion, hypernatremia.  Lethargic, VM on , mumbles.      OBJECTIVE:  Vital Signs Last 24 Hrs  T(C): 36.8 (09 Feb 2018 04:53), Max: 37.4 (08 Feb 2018 21:00)  T(F): 98.2 (09 Feb 2018 04:53), Max: 99.4 (08 Feb 2018 21:00)  HR: 73 (09 Feb 2018 04:53) (63 - 75)  BP: 124/69 (09 Feb 2018 04:53) (117/65 - 151/79)  RR: 20 (09 Feb 2018 04:53) (15 - 20)  SpO2: 90% (09 Feb 2018 04:53) (90% - 96%)    PHYSICAL EXAMINATION  General: Elderly, NAD[+]   lying in bed, lethargic  HEENT: AT/NC[+]  dried mouth, lips and tongue  NECK: Supple[+]  JVD[] Carotid bruit[]  CVS: RRR[]  Irregular[+]  S1+S2[+]   Murmur[]  RESP: Fair air entry bilaterally[]   Clear sounds[]   poor effort [+]  wheeze[]   Crackles[] Rhonchi[+]  GI: Soft[+]  nondistended NT   Bowel Sounds[+]  Mass[]   HSM[]   Ascites[]  : suprapubic tenderness[-]   CVA Tenderness[]   Montez[-]  MS: trace edema  CNS: exam limited by lethargy  INTEG: Skin is Warm[+]  Appears dry  PSYCH: somnolent    MONITOR:  CAPILLARY BLOOD GLUCOSE            I&O's Summary    08 Feb 2018 07:01  -  09 Feb 2018 07:00  --------------------------------------------------------  IN: 1725 mL / OUT: 0 mL / NET: 1725 mL            PT/INR - ( 09 Feb 2018 07:27 )   PT: 50.8 sec;   INR: 4.48 ratio           MEDICATIONS  (STANDING):  ATENolol  Tablet 25 milliGRAM(s) Oral two times a day  atorvastatin 10 milliGRAM(s) Oral at bedtime  dextrose 5% 1000 milliLiter(s) (75 mL/Hr) IV Continuous <Continuous>  digoxin     Tablet 0.125 milliGRAM(s) Oral every other day  lactobacillus acidophilus 1 Tablet(s) Oral two times a day with meals  levothyroxine 25 MICROGram(s) Oral daily  metoprolol    tartrate Injectable 5 milliGRAM(s) IV Push every 6 hours  multivitamin 1 Tablet(s) Oral daily  nitroglycerin    2% Ointment 1 Inch(s) Transdermal three times a day  zinc oxide 11.3% Cream 1 Application(s) Topical daily      MEDICATIONS  (PRN):  acetaminophen   Tablet 650 milliGRAM(s) Oral every 6 hours PRN For Temp greater than or equal to 38 C (100.4 F)  acetaminophen   Tablet. 650 milliGRAM(s) Oral every 6 hours PRN Moderate Pain (4 - 6)  LORazepam   Injectable 1 milliGRAM(s) IV Push every 6 hours PRN Anxiety

## 2018-02-09 NOTE — PROGRESS NOTE ADULT - PROBLEM SELECTOR PLAN 1
IV Fluids discontinued.  Neb tx  Supplemental Oxygen - Daughter states mother would not want Bipap , but would need that directive from  who currently does not want to speak to us.  " not ready to give up"   Patient is DNR/I

## 2018-02-09 NOTE — PROGRESS NOTE ADULT - ATTENDING COMMENTS
Hypernatremia, awaiting am labs; Discontinue IVF for now fgiven worsening respidatory distress    Guarded prognosis, family aware, but choose to continue current care over comfort care.  Palliative follow up ?Hospice    Hospitalist service to reassign care in am

## 2018-02-09 NOTE — PROVIDER CONTACT NOTE (OTHER) - ACTION/TREATMENT ORDERED:
Monitor.
Will order breathing treatments
Documented late.
Hold digoxin, check level. hold lopressor.
Will order labs

## 2018-02-09 NOTE — PROVIDER CONTACT NOTE (OTHER) - DATE AND TIME:
04-Feb-2018
01-Feb-2018 08:39
03-Feb-2018 10:30
09-Feb-2018 09:00
09-Feb-2018 12:20
09-Feb-2018 12:32
28-Jan-2018 15:22
31-Jan-2018 08:48

## 2018-02-09 NOTE — PROGRESS NOTE ADULT - PROBLEM SELECTOR PLAN 4
Patient was on puree with honey thick liquids via tsp, with poor po intake.   Poor outlook if not able to take adequate po.

## 2018-02-09 NOTE — PROGRESS NOTE ADULT - ASSESSMENT
85 year old female with PMH HTN, cAF on Coumadin, MR, CHFpEF presented with RLE pain, erythema and swelling. Noted to have confusion at admission (?encephalopathy), leukocytosis (WBC 22, lactic acidosis  3.1) and imaging including CT consistent with RLE Cellulitis without abscess. Initially treated with Azactam/Clindamycin/Vancomycin in ER, later switched to Ceftriaxone/Zyvox by ID and then oral Keflex. Cellulitis resolved. Blood cultures negative.    Lethargy secondary to delirium vs dementia  Encephalopathy work up with elevated TSH. Unclear significance, as may be abnormal secondary to metabolic processes. Started on Synthroid. UA dirty, Chest X-Ray suggestive of LLL consolidation; however remains without fever or leukocytosis so unclear if infectious; could be masked by Keflex. Doubt UTI, likely inappropriately collected sample, patient was on antibiotics unless MDR.  Repeat cultures negative. CT Chest with bilateral effusion, no infiltrate.  Influenza+, no longer requiring NIPPV. intermittent Tamiflu secondary to dysphagia.  Still requiring supplement O2    CHFpEF, recent TTE (10/2017) with preserved EF 60-65%, moderate to severe MR, Mild AS, Moderate AR, severe TR, Mild PAH. No evidence of decompensated CHF currently. However with bilateral pleural effusion on CT. Started on diuresis, appears to be on the dry side. Repeat TTE with similar findings.    Hypernatremia, secondary to dehydration, NPO. Mild hypokalemia and bump SCr.    Dysphagia, appears to be at risk for protein calorie malnutrition. Started on Puree, nectar, receiving D5W for hypernatremia.    Also seen in consultation by Vascular surgery given possible history of PAD, skin graft with recommendations for follow up with primary vascular surgeon Dr Braswell upon discharge.     HTN - well controlled on current regimen.    AF - rate controlled, INR supra therapeutic, no evidence of bleeding.    CKD 3, renal insufficiency - in fact normalized    Hyponatremia, hypokalemia present at admission - resolved.    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 Added statin, Check FLP. Follow up with Dr Garcia after discharge    Slurred speech as per  - CTH, MRI brain negative for stroke. Secondary to dry mouth - RN advised to provide with oral care    Diarrhea - received antibiotics, is on Probiotic. Cdiff negative. Diarrhea resolved.        Guarded prognosis - patient would not want aggressive measures as per daughter Iona. DNR, DNI

## 2018-02-09 NOTE — PROGRESS NOTE ADULT - PROBLEM SELECTOR PLAN 6
Met with  an daughter earlier- Discussed current condition - now with respiratory issues, lethargy and lack of significant po intake thus poor outlook.  asked me if there was anything I could give her to make her more alert. Informed him that she is being medically optimized but not making progress.   Later spoke with daughter ( with Megan Neil RN) - discussed mother declining, respiratory status not improved  and speech therapy recommending NPO, Discussed consideration to focus on comfort - meds to help with dyspnea or pain. She feels her father would be opposed to using opioids. Informed her any further interventions likely prolonging dying process.       is [employer]. Though Daughter verbalized understanding and seemed accepting of information.  ultimately main decision. He did not want to speak to me to further discuss. He does " not want to give up" . Psychosocial support. Met with  an daughter earlier- Discussed current condition - now with respiratory issues, lethargy and lack of significant po intake thus poor outlook.  asked me if there was anything I could give her to make her more alert. Informed him that she is being medically optimized but not making progress.   Later spoke with daughter ( with Megan Neil RN) - discussed mother declining, respiratory status not improved  and speech therapy recommending NPO, Discussed consideration to focus on comfort - meds to help with dyspnea or pain. She feels her father would be opposed to using opioids. Informed her any further interventions likely prolonging dying process.       is MAIN [employer]. Though Daughter verbalized understanding and seemed accepting of information.  ultimately main decision maker. He did not want to speak to me to further discuss. He does " not want to give up" .   Daughter confirms DNR/I. Psychosocial support.

## 2018-02-09 NOTE — PROGRESS NOTE ADULT - PROBLEM SELECTOR PLAN 3
Repeat swallow test recommending NPO given patient's lethargy.  IV fluids discontinued today 2/2 to respiratory status. Repeat swallow test recommending NPO given patient's lethargy.  IV fluids discontinued today 2/2 to respiratory status.  Discussed pleasure feeds for quality understanding risks of aspiration.

## 2018-02-10 LAB
ANION GAP SERPL CALC-SCNC: 15 MMOL/L — SIGNIFICANT CHANGE UP (ref 5–17)
BUN SERPL-MCNC: 30 MG/DL — HIGH (ref 8–20)
CALCIUM SERPL-MCNC: 8.3 MG/DL — LOW (ref 8.6–10.2)
CHLORIDE SERPL-SCNC: 109 MMOL/L — HIGH (ref 98–107)
CO2 SERPL-SCNC: 25 MMOL/L — SIGNIFICANT CHANGE UP (ref 22–29)
CREAT SERPL-MCNC: 0.89 MG/DL — SIGNIFICANT CHANGE UP (ref 0.5–1.3)
GLUCOSE SERPL-MCNC: 119 MG/DL — HIGH (ref 70–115)
HCT VFR BLD CALC: 41.4 % — SIGNIFICANT CHANGE UP (ref 37–47)
HGB BLD-MCNC: 13 G/DL — SIGNIFICANT CHANGE UP (ref 12–16)
INR BLD: 3.42 RATIO — HIGH (ref 0.88–1.16)
MAGNESIUM SERPL-MCNC: 2 MG/DL — SIGNIFICANT CHANGE UP (ref 1.6–2.6)
MCHC RBC-ENTMCNC: 31.2 PG — HIGH (ref 27–31)
MCHC RBC-ENTMCNC: 31.4 G/DL — LOW (ref 32–36)
MCV RBC AUTO: 99.3 FL — HIGH (ref 81–99)
PLATELET # BLD AUTO: 166 K/UL — SIGNIFICANT CHANGE UP (ref 150–400)
POTASSIUM SERPL-MCNC: 3.5 MMOL/L — SIGNIFICANT CHANGE UP (ref 3.5–5.3)
POTASSIUM SERPL-SCNC: 3.5 MMOL/L — SIGNIFICANT CHANGE UP (ref 3.5–5.3)
PROTHROM AB SERPL-ACNC: 38.6 SEC — HIGH (ref 9.8–12.7)
RBC # BLD: 4.17 M/UL — LOW (ref 4.4–5.2)
RBC # FLD: 15.4 % — SIGNIFICANT CHANGE UP (ref 11–15.6)
SODIUM SERPL-SCNC: 149 MMOL/L — HIGH (ref 135–145)
WBC # BLD: 11.7 K/UL — HIGH (ref 4.8–10.8)
WBC # FLD AUTO: 11.7 K/UL — HIGH (ref 4.8–10.8)

## 2018-02-10 PROCEDURE — 99233 SBSQ HOSP IP/OBS HIGH 50: CPT

## 2018-02-10 RX ORDER — DEXTROSE MONOHYDRATE, SODIUM CHLORIDE, AND POTASSIUM CHLORIDE 50; .745; 4.5 G/1000ML; G/1000ML; G/1000ML
1000 INJECTION, SOLUTION INTRAVENOUS
Qty: 0 | Refills: 0 | Status: DISCONTINUED | OUTPATIENT
Start: 2018-02-10 | End: 2018-02-12

## 2018-02-10 RX ADMIN — ZINC OXIDE 1 APPLICATION(S): 200 OINTMENT TOPICAL at 13:46

## 2018-02-10 RX ADMIN — Medication 1 INCH(S): at 21:30

## 2018-02-10 RX ADMIN — Medication 1 INCH(S): at 13:45

## 2018-02-10 RX ADMIN — Medication 5 MILLIGRAM(S): at 05:36

## 2018-02-10 RX ADMIN — Medication 1 INCH(S): at 09:44

## 2018-02-10 RX ADMIN — Medication 1 INCH(S): at 05:36

## 2018-02-10 RX ADMIN — DEXTROSE MONOHYDRATE, SODIUM CHLORIDE, AND POTASSIUM CHLORIDE 60 MILLILITER(S): 50; .745; 4.5 INJECTION, SOLUTION INTRAVENOUS at 21:30

## 2018-02-10 RX ADMIN — Medication 1 INCH(S): at 17:07

## 2018-02-10 NOTE — PROGRESS NOTE ADULT - SUBJECTIVE AND OBJECTIVE BOX
NEPHROLOGY INTERVAL HPI/OVERNIGHT EVENTS: No new events.    MEDICATIONS  (STANDING):  ATENolol  Tablet 25 milliGRAM(s) Oral two times a day  atorvastatin 10 milliGRAM(s) Oral at bedtime  cetylpyridinium 0.05% Oral Solution 5 milliLiter(s) Swish and Spit three times a day  dextrose 5% 1000 milliLiter(s) (75 mL/Hr) IV Continuous <Continuous>  digoxin     Tablet 0.125 milliGRAM(s) Oral every other day  lactobacillus acidophilus 1 Tablet(s) Oral two times a day with meals  levothyroxine 25 MICROGram(s) Oral daily  metoprolol    tartrate Injectable 5 milliGRAM(s) IV Push every 6 hours  multivitamin 1 Tablet(s) Oral daily  nitroglycerin    2% Ointment 1 Inch(s) Transdermal three times a day  zinc oxide 11.3% Cream 1 Application(s) Topical daily    MEDICATIONS  (PRN):  acetaminophen   Tablet 650 milliGRAM(s) Oral every 6 hours PRN For Temp greater than or equal to 38 C (100.4 F)  acetaminophen   Tablet. 650 milliGRAM(s) Oral every 6 hours PRN Moderate Pain (4 - 6)  LORazepam   Injectable 1 milliGRAM(s) IV Push every 6 hours PRN Anxiety      Allergies    allopurinol (Rash)  aspirin (Unknown)  azithromycin (Diarrhea)  clindamycin (Diarrhea)  latex (Unknown)  morphine (Other; Short breath)  penicillins (Unknown)  sulfa drugs (Unknown)            Vital Signs Last 24 Hrs    T(C): 36.2 (10 Feb 2018 16:38), Max: 37 (10 Feb 2018 04:25)  T(F): 97.1 (10 Feb 2018 16:38), Max: 98.6 (10 Feb 2018 04:25)  HR: 71 (10 Feb 2018 16:38) (62 - 78)  BP: 123/78 (10 Feb 2018 16:38) (122/66 - 159/84)  BP(mean): --  RR: 18 (10 Feb 2018 09:47) (18 - 20)  SpO2: 97% (10 Feb 2018 09:47) (95% - 100%)  T(C): 36.8 (09 Feb 2018 04:53), Max: 37.4 (08 Feb 2018 21:00)  T(F): 98.2 (09 Feb 2018 04:53), Max: 99.4 (08 Feb 2018 21:00)  HR: 73 (09 Feb 2018 04:53) (63 - 75)  BP: 124/69 (09 Feb 2018 04:53) (124/69 - 151/79)  BP(mean): --  ABP: --  ABP(mean): --  RR: 20 (09 Feb 2018 04:53) (15 - 20)  SpO2: 90% (09 Feb 2018 04:53) (90% - 96%)    T(C): 37.2 (08 Feb 2018 08:36), Max: 37.4 (07 Feb 2018 20:28)  T(F): 98.9 (08 Feb 2018 08:36), Max: 99.3 (07 Feb 2018 20:28)  HR: 64 (08 Feb 2018 12:21) (64 - 87)  BP: 117/65 (08 Feb 2018 12:21) (117/65 - 164/62)  BP(mean): --  RR: 16 (08 Feb 2018 12:21) (15 - 18)  SpO2: 96% (08 Feb 2018 12:21) (94% - 100%)          PHYSICAL EXAM:  GENERAL: appears chronically ill  HEENT: same  NECK: Supple.  LUNGS: no wheezes  HEART: Regular rate , murmur noted   Neuro: Lethargic, family at bedside  ABDOMEN: Soft, nontender, and nondistended.    EXTREMITIES:  muscle wasting  with stasis changes   neg    LABS:    02-10    149<H>  |  109<H>  |  30.0<H>  ----------------------------<  119<H>  3.5   |  25.0  |  0.89    Ca    8.3<L>      10 Feb 2018 07:21  Mg     2.0     02-10      02-09    146<H>  |  108<H>  |  32.0<H>  ----------------------------<  123<H>  3.4<L>   |  25.0  |  0.97    Ca    8.2<L>      09 Feb 2018 13:29  Mg     1.8     02-09                            15.6   7.6   )-----------( 107      ( 08 Feb 2018 08:37 )             49.5     02-08    152<H>  |  111<H>  |  37.0<H>  ----------------------------<  89  4.7   |  27.0  |  1.01    Ca    8.8      08 Feb 2018 08:37      PT/INR - ( 08 Feb 2018 08:37 )   PT: 66.6 sec;   INR: 5.84 ratio                     RADIOLOGY & ADDITIONAL TESTS:

## 2018-02-10 NOTE — PROGRESS NOTE ADULT - SUBJECTIVE AND OBJECTIVE BOX
RAGHU DAVIS     Chief Complaint: Patient is a 85y old  Female who presents with a chief complaint of rt. lower ext. pain (24 Jan 2018 04:35)      PAST MEDICAL & SURGICAL HISTORY:  Clostridium difficile diarrhea  CHF (congestive heart failure)  Gout  Mitral valve regurgitation  Hypertension  Atrial fibrillation  H/O skin graft      HPI/OVERNIGHT EVENTS: Patient lethargic, unarousable.    MEDICATIONS  (STANDING):  ATENolol  Tablet 25 milliGRAM(s) Oral two times a day  atorvastatin 10 milliGRAM(s) Oral at bedtime  dextrose 5% + lactated ringers with potassium chloride 20 mEq/L 1000 milliLiter(s) (50 mL/Hr) IV Continuous <Continuous>  digoxin     Tablet 0.125 milliGRAM(s) Oral every other day  lactobacillus acidophilus 1 Tablet(s) Oral two times a day with meals  levothyroxine 25 MICROGram(s) Oral daily  metoprolol    tartrate Injectable 5 milliGRAM(s) IV Push every 6 hours  multivitamin 1 Tablet(s) Oral daily  nitroglycerin    2% Ointment 1 Inch(s) Transdermal three times a day  zinc oxide 11.3% Cream 1 Application(s) Topical daily      Vital Signs Last 24 Hrs  T(C): 36.8 (10 Feb 2018 09:47), Max: 37 (10 Feb 2018 04:25)  T(F): 98.3 (10 Feb 2018 09:47), Max: 98.6 (10 Feb 2018 04:25)  HR: 75 (10 Feb 2018 09:47) (62 - 78)  BP: 140/76 (10 Feb 2018 09:47) (122/66 - 159/84)  BP(mean): --  RR: 18 (10 Feb 2018 09:47) (18 - 20)  SpO2: 97% (10 Feb 2018 09:47) (95% - 100%)    PHYSICAL EXAM:  Constitutional: frail elderly female  HEENT: temporal wasting  Neck: No LAD, No JVD  Back: Normal spine flexure, No CVA tenderness  Respiratory: CTAB Cardiovascular: S1 and S2, RRR, no M/G/R  Gastrointestinal: BS+, soft, NT/ND  Extremities: No peripheral edema  Vascular: 2+ peripheral pulses  Neurological:  lethargic, unarousable.    CAPILLARY BLOOD GLUCOSE    LABS:                        13.0   11.7  )-----------( 166      ( 10 Feb 2018 07:21 )             41.4     02-10    149<H>  |  109<H>  |  30.0<H>  ----------------------------<  119<H>  3.5   |  25.0  |  0.89    Ca    8.3<L>      10 Feb 2018 07:21  Mg     2.0     02-10      PT/INR - ( 10 Feb 2018 07:21 )   PT: 38.6 sec;   INR: 3.42 ratio               RADIOLOGY & ADDITIONAL TESTS:

## 2018-02-11 LAB
ANION GAP SERPL CALC-SCNC: 13 MMOL/L — SIGNIFICANT CHANGE UP (ref 5–17)
BUN SERPL-MCNC: 27 MG/DL — HIGH (ref 8–20)
CALCIUM SERPL-MCNC: 8.2 MG/DL — LOW (ref 8.6–10.2)
CHLORIDE SERPL-SCNC: 110 MMOL/L — HIGH (ref 98–107)
CO2 SERPL-SCNC: 25 MMOL/L — SIGNIFICANT CHANGE UP (ref 22–29)
CREAT SERPL-MCNC: 0.91 MG/DL — SIGNIFICANT CHANGE UP (ref 0.5–1.3)
GLUCOSE SERPL-MCNC: 86 MG/DL — SIGNIFICANT CHANGE UP (ref 70–115)
HCT VFR BLD CALC: 40.5 % — SIGNIFICANT CHANGE UP (ref 37–47)
HGB BLD-MCNC: 12.5 G/DL — SIGNIFICANT CHANGE UP (ref 12–16)
INR BLD: 3.55 RATIO — HIGH (ref 0.88–1.16)
MAGNESIUM SERPL-MCNC: 1.9 MG/DL — SIGNIFICANT CHANGE UP (ref 1.6–2.6)
MCHC RBC-ENTMCNC: 30.7 PG — SIGNIFICANT CHANGE UP (ref 27–31)
MCHC RBC-ENTMCNC: 30.9 G/DL — LOW (ref 32–36)
MCV RBC AUTO: 99.5 FL — HIGH (ref 81–99)
PHOSPHATE SERPL-MCNC: 2.3 MG/DL — LOW (ref 2.4–4.7)
PLATELET # BLD AUTO: 227 K/UL — SIGNIFICANT CHANGE UP (ref 150–400)
POTASSIUM SERPL-MCNC: 4 MMOL/L — SIGNIFICANT CHANGE UP (ref 3.5–5.3)
POTASSIUM SERPL-SCNC: 4 MMOL/L — SIGNIFICANT CHANGE UP (ref 3.5–5.3)
PROTHROM AB SERPL-ACNC: 40.1 SEC — HIGH (ref 9.8–12.7)
RBC # BLD: 4.07 M/UL — LOW (ref 4.4–5.2)
RBC # FLD: 15.4 % — SIGNIFICANT CHANGE UP (ref 11–15.6)
SODIUM SERPL-SCNC: 148 MMOL/L — HIGH (ref 135–145)
WBC # BLD: 12.7 K/UL — HIGH (ref 4.8–10.8)
WBC # FLD AUTO: 12.7 K/UL — HIGH (ref 4.8–10.8)

## 2018-02-11 PROCEDURE — 99233 SBSQ HOSP IP/OBS HIGH 50: CPT

## 2018-02-11 RX ORDER — LEVOTHYROXINE SODIUM 125 MCG
25 TABLET ORAL AT BEDTIME
Qty: 0 | Refills: 0 | Status: DISCONTINUED | OUTPATIENT
Start: 2018-02-11 | End: 2018-02-12

## 2018-02-11 RX ADMIN — Medication 1 INCH(S): at 13:47

## 2018-02-11 RX ADMIN — Medication 5 MILLIGRAM(S): at 12:44

## 2018-02-11 RX ADMIN — Medication 1 INCH(S): at 02:48

## 2018-02-11 RX ADMIN — Medication 1 INCH(S): at 11:59

## 2018-02-11 RX ADMIN — DEXTROSE MONOHYDRATE, SODIUM CHLORIDE, AND POTASSIUM CHLORIDE 60 MILLILITER(S): 50; .745; 4.5 INJECTION, SOLUTION INTRAVENOUS at 13:45

## 2018-02-11 RX ADMIN — Medication 5 MILLIGRAM(S): at 18:28

## 2018-02-11 RX ADMIN — Medication 5 MILLIGRAM(S): at 23:17

## 2018-02-11 RX ADMIN — Medication 1 INCH(S): at 23:18

## 2018-02-11 RX ADMIN — ZINC OXIDE 1 APPLICATION(S): 200 OINTMENT TOPICAL at 12:22

## 2018-02-11 RX ADMIN — Medication 1 INCH(S): at 18:24

## 2018-02-11 RX ADMIN — DEXTROSE MONOHYDRATE, SODIUM CHLORIDE, AND POTASSIUM CHLORIDE 90 MILLILITER(S): 50; .745; 4.5 INJECTION, SOLUTION INTRAVENOUS at 14:37

## 2018-02-11 RX ADMIN — Medication 5 MILLIGRAM(S): at 06:05

## 2018-02-11 RX ADMIN — Medication 25 MICROGRAM(S): at 23:17

## 2018-02-11 RX ADMIN — Medication 1 INCH(S): at 06:03

## 2018-02-12 LAB
ALBUMIN SERPL ELPH-MCNC: 2.3 G/DL — LOW (ref 3.3–5.2)
ALBUMIN SERPL ELPH-MCNC: 2.4 G/DL — LOW (ref 3.3–5.2)
ALP SERPL-CCNC: 210 U/L — HIGH (ref 40–120)
ALP SERPL-CCNC: 216 U/L — HIGH (ref 40–120)
ALT FLD-CCNC: 21 U/L — SIGNIFICANT CHANGE UP
ALT FLD-CCNC: 23 U/L — SIGNIFICANT CHANGE UP
ANION GAP SERPL CALC-SCNC: 13 MMOL/L — SIGNIFICANT CHANGE UP (ref 5–17)
ANION GAP SERPL CALC-SCNC: 16 MMOL/L — SIGNIFICANT CHANGE UP (ref 5–17)
APTT BLD: 50.9 SEC — HIGH (ref 27.5–37.4)
AST SERPL-CCNC: 46 U/L — HIGH
AST SERPL-CCNC: 50 U/L — HIGH
BASOPHILS # BLD AUTO: 0 K/UL — SIGNIFICANT CHANGE UP (ref 0–0.2)
BASOPHILS NFR BLD AUTO: 0.4 % — SIGNIFICANT CHANGE UP (ref 0–2)
BILIRUB SERPL-MCNC: 2.3 MG/DL — HIGH (ref 0.4–2)
BILIRUB SERPL-MCNC: 2.5 MG/DL — HIGH (ref 0.4–2)
BUN SERPL-MCNC: 26 MG/DL — HIGH (ref 8–20)
BUN SERPL-MCNC: 28 MG/DL — HIGH (ref 8–20)
CALCIUM SERPL-MCNC: 8.4 MG/DL — LOW (ref 8.6–10.2)
CALCIUM SERPL-MCNC: 8.6 MG/DL — SIGNIFICANT CHANGE UP (ref 8.6–10.2)
CHLORIDE SERPL-SCNC: 107 MMOL/L — SIGNIFICANT CHANGE UP (ref 98–107)
CHLORIDE SERPL-SCNC: 110 MMOL/L — HIGH (ref 98–107)
CO2 SERPL-SCNC: 22 MMOL/L — SIGNIFICANT CHANGE UP (ref 22–29)
CO2 SERPL-SCNC: 22 MMOL/L — SIGNIFICANT CHANGE UP (ref 22–29)
CREAT SERPL-MCNC: 0.89 MG/DL — SIGNIFICANT CHANGE UP (ref 0.5–1.3)
CREAT SERPL-MCNC: 0.97 MG/DL — SIGNIFICANT CHANGE UP (ref 0.5–1.3)
EOSINOPHIL # BLD AUTO: 0.2 K/UL — SIGNIFICANT CHANGE UP (ref 0–0.5)
EOSINOPHIL NFR BLD AUTO: 1.5 % — SIGNIFICANT CHANGE UP (ref 0–6)
GLUCOSE BLDC GLUCOMTR-MCNC: 78 MG/DL — SIGNIFICANT CHANGE UP (ref 70–99)
GLUCOSE SERPL-MCNC: 82 MG/DL — SIGNIFICANT CHANGE UP (ref 70–115)
GLUCOSE SERPL-MCNC: 83 MG/DL — SIGNIFICANT CHANGE UP (ref 70–115)
HCT VFR BLD CALC: 40.9 % — SIGNIFICANT CHANGE UP (ref 37–47)
HCT VFR BLD CALC: 41.7 % — SIGNIFICANT CHANGE UP (ref 37–47)
HGB BLD-MCNC: 13 G/DL — SIGNIFICANT CHANGE UP (ref 12–16)
HGB BLD-MCNC: 13.3 G/DL — SIGNIFICANT CHANGE UP (ref 12–16)
INR BLD: 2.98 RATIO — HIGH (ref 0.88–1.16)
INR BLD: 2.99 RATIO — HIGH (ref 0.88–1.16)
LACTATE BLDV-MCNC: 1.6 MMOL/L — SIGNIFICANT CHANGE UP (ref 0.5–2)
LYMPHOCYTES # BLD AUTO: 0.7 K/UL — LOW (ref 1–4.8)
LYMPHOCYTES # BLD AUTO: 5.1 % — LOW (ref 20–55)
MAGNESIUM SERPL-MCNC: 1.9 MG/DL — SIGNIFICANT CHANGE UP (ref 1.6–2.6)
MCHC RBC-ENTMCNC: 31.4 PG — HIGH (ref 27–31)
MCHC RBC-ENTMCNC: 31.5 PG — HIGH (ref 27–31)
MCHC RBC-ENTMCNC: 31.8 G/DL — LOW (ref 32–36)
MCHC RBC-ENTMCNC: 31.9 G/DL — LOW (ref 32–36)
MCV RBC AUTO: 98.8 FL — SIGNIFICANT CHANGE UP (ref 81–99)
MCV RBC AUTO: 98.8 FL — SIGNIFICANT CHANGE UP (ref 81–99)
MONOCYTES # BLD AUTO: 1.1 K/UL — HIGH (ref 0–0.8)
MONOCYTES NFR BLD AUTO: 8.6 % — SIGNIFICANT CHANGE UP (ref 3–10)
NEUTROPHILS # BLD AUTO: 10.8 K/UL — HIGH (ref 1.8–8)
NEUTROPHILS NFR BLD AUTO: 83.8 % — HIGH (ref 37–73)
PHOSPHATE SERPL-MCNC: 3.1 MG/DL — SIGNIFICANT CHANGE UP (ref 2.4–4.7)
PLATELET # BLD AUTO: 283 K/UL — SIGNIFICANT CHANGE UP (ref 150–400)
PLATELET # BLD AUTO: 308 K/UL — SIGNIFICANT CHANGE UP (ref 150–400)
POTASSIUM SERPL-MCNC: 4.1 MMOL/L — SIGNIFICANT CHANGE UP (ref 3.5–5.3)
POTASSIUM SERPL-MCNC: 4.4 MMOL/L — SIGNIFICANT CHANGE UP (ref 3.5–5.3)
POTASSIUM SERPL-SCNC: 4.1 MMOL/L — SIGNIFICANT CHANGE UP (ref 3.5–5.3)
POTASSIUM SERPL-SCNC: 4.4 MMOL/L — SIGNIFICANT CHANGE UP (ref 3.5–5.3)
PROCALCITONIN SERPL-MCNC: 0.73 NG/ML — HIGH (ref 0–0.04)
PROT SERPL-MCNC: 6.1 G/DL — LOW (ref 6.6–8.7)
PROT SERPL-MCNC: 6.2 G/DL — LOW (ref 6.6–8.7)
PROTHROM AB SERPL-ACNC: 33.5 SEC — HIGH (ref 9.8–12.7)
PROTHROM AB SERPL-ACNC: 33.6 SEC — HIGH (ref 9.8–12.7)
RBC # BLD: 4.14 M/UL — LOW (ref 4.4–5.2)
RBC # BLD: 4.22 M/UL — LOW (ref 4.4–5.2)
RBC # FLD: 15.6 % — SIGNIFICANT CHANGE UP (ref 11–15.6)
RBC # FLD: 15.6 % — SIGNIFICANT CHANGE UP (ref 11–15.6)
SODIUM SERPL-SCNC: 142 MMOL/L — SIGNIFICANT CHANGE UP (ref 135–145)
SODIUM SERPL-SCNC: 148 MMOL/L — HIGH (ref 135–145)
WBC # BLD: 12.9 K/UL — HIGH (ref 4.8–10.8)
WBC # BLD: 13.3 K/UL — HIGH (ref 4.8–10.8)
WBC # FLD AUTO: 12.9 K/UL — HIGH (ref 4.8–10.8)
WBC # FLD AUTO: 13.3 K/UL — HIGH (ref 4.8–10.8)

## 2018-02-12 PROCEDURE — 71045 X-RAY EXAM CHEST 1 VIEW: CPT | Mod: 26

## 2018-02-12 PROCEDURE — 99233 SBSQ HOSP IP/OBS HIGH 50: CPT

## 2018-02-12 PROCEDURE — 93010 ELECTROCARDIOGRAM REPORT: CPT

## 2018-02-12 RX ORDER — SODIUM CHLORIDE 9 MG/ML
1000 INJECTION, SOLUTION INTRAVENOUS
Qty: 0 | Refills: 0 | Status: DISCONTINUED | OUTPATIENT
Start: 2018-02-12 | End: 2018-02-12

## 2018-02-12 RX ORDER — LEVOTHYROXINE SODIUM 125 MCG
25 TABLET ORAL DAILY
Qty: 0 | Refills: 0 | Status: DISCONTINUED | OUTPATIENT
Start: 2018-02-12 | End: 2018-02-14

## 2018-02-12 RX ORDER — FUROSEMIDE 40 MG
40 TABLET ORAL ONCE
Qty: 0 | Refills: 0 | Status: COMPLETED | OUTPATIENT
Start: 2018-02-12 | End: 2018-02-12

## 2018-02-12 RX ADMIN — Medication 1 TABLET(S): at 17:03

## 2018-02-12 RX ADMIN — Medication 1 INCH(S): at 13:40

## 2018-02-12 RX ADMIN — Medication 40 MILLIGRAM(S): at 00:40

## 2018-02-12 RX ADMIN — Medication 1 INCH(S): at 17:03

## 2018-02-12 RX ADMIN — Medication 1 INCH(S): at 21:03

## 2018-02-12 RX ADMIN — Medication 5 MILLIGRAM(S): at 06:48

## 2018-02-12 RX ADMIN — Medication 5 MILLIGRAM(S): at 13:49

## 2018-02-12 RX ADMIN — ZINC OXIDE 1 APPLICATION(S): 200 OINTMENT TOPICAL at 13:18

## 2018-02-12 RX ADMIN — Medication 1 INCH(S): at 01:13

## 2018-02-12 RX ADMIN — Medication 25 MICROGRAM(S): at 20:01

## 2018-02-12 RX ADMIN — Medication 1 INCH(S): at 21:06

## 2018-02-12 RX ADMIN — Medication 1 INCH(S): at 06:51

## 2018-02-12 RX ADMIN — ATORVASTATIN CALCIUM 10 MILLIGRAM(S): 80 TABLET, FILM COATED ORAL at 21:06

## 2018-02-12 RX ADMIN — ATENOLOL 25 MILLIGRAM(S): 25 TABLET ORAL at 17:03

## 2018-02-12 NOTE — PROGRESS NOTE ADULT - PROBLEM SELECTOR PLAN 3
Monitor PO intake now that patient is on a diet - puree with honey thick.  At risk for further decline if po intake minimal.

## 2018-02-12 NOTE — PROGRESS NOTE ADULT - SUBJECTIVE AND OBJECTIVE BOX
RAGHU DAVIS     Chief Complaint: Patient is a 85y old  Female who presents with a chief complaint of rt. lower ext. pain (24 Jan 2018 04:35)      PAST MEDICAL & SURGICAL HISTORY:  Clostridium difficile diarrhea  CHF (congestive heart failure)  Gout  Mitral valve regurgitation  Hypertension  Atrial fibrillation  H/O skin graft      HPI/OVERNIGHT EVENTS: Patient had rapid response early this morning. She was cleared for puree diet.    MEDICATIONS  (STANDING):  ATENolol  Tablet 25 milliGRAM(s) Oral two times a day  atorvastatin 10 milliGRAM(s) Oral at bedtime  digoxin     Tablet 0.125 milliGRAM(s) Oral every other day  levothyroxine 25 MICROGram(s) Oral daily  multivitamin 1 Tablet(s) Oral daily  nitroglycerin    2% Ointment 1 Inch(s) Transdermal three times a day  zinc oxide 11.3% Cream 1 Application(s) Topical daily      Vital Signs Last 24 Hrs  T(C): 36.8 (12 Feb 2018 09:41), Max: 37.4 (12 Feb 2018 00:13)  T(F): 98.2 (12 Feb 2018 09:41), Max: 99.3 (12 Feb 2018 00:13)  HR: 88 (12 Feb 2018 13:41) (71 - 92)  BP: 140/86 (12 Feb 2018 13:41) (133/78 - 160/84)  BP(mean): --  RR: 22 (12 Feb 2018 09:41) (19 - 28)  SpO2: 92% (12 Feb 2018 09:41) (92% - 94%)    PHYSICAL EXAM:  Constitutional: NAD, well-groomed, well-developed  HEENT: PERRLA, EOMI, Normal Hearing, MMM  Neck: No LAD, No JVD  Back: Normal spine flexure, No CVA tenderness  Respiratory: CTAB Cardiovascular: S1 and S2, RRR, no M/G/R  Gastrointestinal: BS+, soft, NT/ND  Extremities: No peripheral edema  Vascular: 2+ peripheral pulses  Neurological awake earlier now lethargic after food.    CAPILLARY BLOOD GLUCOSE    LABS:                        13.3   13.3  )-----------( 308      ( 12 Feb 2018 07:12 )             41.7     02-12    148<H>  |  110<H>  |  28.0<H>  ----------------------------<  82  4.1   |  22.0  |  0.97    Ca    8.6      12 Feb 2018 07:12  Phos  3.1     02-12  Mg     1.9     02-12    TPro  6.1<L>  /  Alb  2.3<L>  /  TBili  2.5<H>  /  DBili  x   /  AST  46<H>  /  ALT  23  /  AlkPhos  210<H>  02-12    PT/INR - ( 12 Feb 2018 07:12 )   PT: 33.5 sec;   INR: 2.98 ratio         PTT - ( 12 Feb 2018 00:42 )  PTT:50.9 sec      RADIOLOGY & ADDITIONAL TESTS:

## 2018-02-12 NOTE — CHART NOTE - NSCHARTNOTEFT_GEN_A_CORE
Rapid Response PGY 3 Note    HPI:  84 y/o femoral, with PMH of Atrial fibrillation  CHF (congestive heart failure)  Clostridium difficile, diarrhea, Gout, Hypertension, Mitral valve regurgitation,  came to ER for rt. lower ext. pain, admitted for RLE cellulitis s/p IV antibiotic treatment. Hospital course complicated by a flu positive on Feb 3, hypernatremia and hyperkalemia.  Now with a rapid response/code sepsis called for called because  tachypnea with a respiratory rate of 34 per min, WBC count of 12.7.  The patient denies any chest pain, sob, abd pain, or confusion. She denies any calf pain, nausea, or vomiting.  She is otherwise without any complaints.     Allergies:  allopurinol (Rash)  aspirin (Unknown)  azithromycin (Diarrhea)  clindamycin (Diarrhea)  latex (Unknown)  morphine (Other; Short breath)  penicillins (Unknown)  sulfa drugs (Unknown)      PAST MEDICAL & SURGICAL HISTORY:  Clostridium difficile diarrhea  CHF (congestive heart failure)  Gout  Mitral valve regurgitation  Hypertension  Atrial fibrillation  H/O skin graft    Vital Signs     T(F): 99.3 rectal  HR: 83-92 bmp  BP: 133/78   RR: 34  SpO2: 93% on 50% FIO2    PHYSICAL EXAM  GENERAL: The patient is awake and alert in no apparent distress.   HEENT: Head is normocephalic and atraumatic. Extraocular muscles are intact. Mucous membranes are moist. No throat erythema/exudates no lymphadenopathy, no JVD,   NECK: Supple.  LUNGS: Clear to auscultation BL without wheezing, Bibasilar crackles, increase respiratory rate, no accessory muscle use.  HEART: Irregular rate and rhythm ,+S1/+S2, no murmurs, rubs, gallops  ABDOMEN: Soft, nontender, and nondistended, no rebound, guarding rigidity, bowel sounds in all 4 quadrants  EXTREMITIES: Without any cyanosis, clubbing, rash, lesions or edema.  SKIN: No new rashes or lesions.  MSK: strength equal BL  VASCULAR: Radial and Dorsal pedal pulses palpable BL  NEUROLOGIC: Alert and oriented to person, place, time and situation.  CN II- XII grossly intact.   PSYCH: No new changes.    Capillary Refill- <2 secs    Labs:                          12.5   12.7  )-----------( 227      ( 11 Feb 2018 05:47 )             40.5     02-12    142  |  107  |  26.0<H>  ----------------------------<  83  4.4   |  22.0  |  0.89    Ca    8.4<L>      12 Feb 2018 00:42  Phos  2.3     02-11  Mg     1.9     02-11    TPro  6.2<L>  /  Alb  2.4<L>  /  TBili  2.3<H>  /  DBili  x   /  AST  50<H>  /  ALT  21  /  AlkPhos  216<H>  02-12      LIVER FUNCTIONS - ( 12 Feb 2018 00:42 )  Alb: 2.4 g/dL / Pro: 6.2 g/dL / ALK PHOS: 216 U/L / ALT: 21 U/L / AST: 50 U/L / GGT: x            PT/INR - ( 12 Feb 2018 00:42 )   PT: 33.6 sec;   INR: 2.99 ratio         PTT - ( 12 Feb 2018 00:42 )  PTT:50.9 sec    Assessment- 84 y/o femoral, with PMH of Atrial fibrillation  CHF (congestive heart failure)  Clostridium difficile, diarrhea, Gout, Hypertension, Mitral valve regurgitation,  came to ER for rt. lower ext. pain, admitted for RLE cellulitis s/p IV antibiotic treatment now with a code sepsis called for tachypnea with elevated wbc.  Code sepsis was discontinued as her tachypnea is likely 2/2 worsening respiratory function. Patient was noted to have a small pleural effusion on prior CT & TTE, now with bibasilar crackle.  Chest xray shows minimal improvement.  She is afebrile, and otherwise without any complaint. The site of her RLE cellulitis appears well and there is no clinical reappearance of the cellulitis.  The patient does not want any non-invasive ventilation or medication for her increase work of breathing.     Plan-  Labs and chart reviewed  Lasix 40mg IVP x1-  Hold IV fluids for now and re-evaluate in the morning.   CBC with minimal increase in WBC level  CMP demonstrates improvement of Na and K.  Chronic elevation of alk phos and ast noted.   Patient reports allergy to morphine.  Offered patient 0.25mg IVP Xanax but the patient adamantly refused and states she wants to be left alone.   Although her respiratory rate has improved she refused any further intervention.    Will continue her on 50% FIO2 for now  Will re-evaluate.  Discussed case with eICU attending and Dr. Olivia.    Called patients  for update-unable to reach him ([phone])  Nursing staff debriefed and updated on plan.    Disposition: 5t     ANNELISE Larry PGY-3

## 2018-02-12 NOTE — PROGRESS NOTE ADULT - SUBJECTIVE AND OBJECTIVE BOX
INTERVAL HPI/OVERNIGHT EVENTS:    PRESENT SYMPTOMS: SOURCE:  Patient/Family/Team    PAIN SCALE:  0 = none  1 = mild   2 = moderate  3 = severe    Pain: appears comfortable    Dyspnea:  [x ] YES [ ] NO  Anxiety:  [ ] YES [ x] NO  Fatigue: [ x] YES [ ] NO  Nausea: [ ] YES [ x] NO  Loss of Appetite: [x ] YES [ ] NO  Other symptoms: __________    MEDICATIONS  (STANDING):  ATENolol  Tablet 25 milliGRAM(s) Oral two times a day  atorvastatin 10 milliGRAM(s) Oral at bedtime  dextrose 2.5% 1000 milliLiter(s) (75 mL/Hr) IV Continuous <Continuous>  digoxin     Tablet 0.125 milliGRAM(s) Oral every other day  levothyroxine Injectable 25 MICROGram(s) IV Push at bedtime  metoprolol    tartrate Injectable 5 milliGRAM(s) IV Push every 6 hours  multivitamin 1 Tablet(s) Oral daily  nitroglycerin    2% Ointment 1 Inch(s) Transdermal three times a day  zinc oxide 11.3% Cream 1 Application(s) Topical daily    MEDICATIONS  (PRN):  acetaminophen   Tablet 650 milliGRAM(s) Oral every 6 hours PRN For Temp greater than or equal to 38 C (100.4 F)  acetaminophen   Tablet. 650 milliGRAM(s) Oral every 6 hours PRN Moderate Pain (4 - 6)      Allergies    allopurinol (Rash)  aspirin (Unknown)  azithromycin (Diarrhea)  clindamycin (Diarrhea)  latex (Unknown)  morphine (Other; Short breath)  penicillins (Unknown)  sulfa drugs (Unknown)    Intolerances    Karnofsky Performance Score/Palliative Performance Status Version 2: 30   %    Vital Signs Last 24 Hrs  T(C): 37.4 (12 Feb 2018 00:13), Max: 37.4 (12 Feb 2018 00:13)  T(F): 99.3 (12 Feb 2018 00:13), Max: 99.3 (12 Feb 2018 00:13)  HR: 88 (12 Feb 2018 06:44) (73 - 92)  BP: 150/90 (12 Feb 2018 06:44) (133/78 - 160/84)  BP(mean): --  RR: 28 (12 Feb 2018 00:13) (19 - 28)  SpO2: 92% (12 Feb 2018 00:13) (92% - 94%)    PHYSICAL EXAM:    General: little more awake and conversant, + venti mask    HEENT: [x ] normal  [ ] dry mouth  [ ] ET tube/trach    Lungs: [x ] comfortable [ ] tachypnea/labored breathing  [ ] excessive secretions    CV: [x ] normal  [ ] tachycardia    GI: [ x] normal  [ ] distended  [ ] tender  [ ] no BS               [ ] PEG/NG/OG tube    : [ x] normal  [ ] incontinent  [ ] oliguria/anuria  [ ] hartmann    MSK: [ ] normal  [x ] weakness  [ ] edema             [ ] ambulatory  [ ] bedbound/wheelchair bound    Skin: [ ] normal  [ ] pressure ulcers- Stage_____  [ x] no rash    LABS:                        13.3   13.3  )-----------( 308      ( 12 Feb 2018 07:12 )             41.7     02-12    148<H>  |  110<H>  |  28.0<H>  ----------------------------<  82  4.1   |  22.0  |  0.97    Ca    8.6      12 Feb 2018 07:12  Phos  3.1     02-12  Mg     1.9     02-12    TPro  6.1<L>  /  Alb  2.3<L>  /  TBili  2.5<H>  /  DBili  x   /  AST  46<H>  /  ALT  23  /  AlkPhos  210<H>  02-12    PT/INR - ( 12 Feb 2018 07:12 )   PT: 33.5 sec;   INR: 2.98 ratio         PTT - ( 12 Feb 2018 00:42 )  PTT:50.9 sec    I&O's Summary    11 Feb 2018 07:01  -  12 Feb 2018 07:00  --------------------------------------------------------  IN: 810 mL / OUT: 1 mL / NET: 809 mL        Thank you for the opportunity to assist with the care of this patient.   [facility] [phone].

## 2018-02-12 NOTE — CHART NOTE - NSCHARTNOTEFT_GEN_A_CORE
PGY-3 Note    Patient examined at bedside and chart reviewed.  Patients respiratory rated improved now 26 per min.  She report being more comfortable.  Patient with one episode of urinary incontinence s/p Lasix IV treatment.  No increase work of breathing.  If patient has tachypnea and increase work of breathing then will recommend 0.25mg IVP of xanax.  Follow up as per Nocturnist team.    ANNELISE Larry PGY-3

## 2018-02-12 NOTE — PROGRESS NOTE ADULT - SUBJECTIVE AND OBJECTIVE BOX
NEPHROLOGY INTERVAL HPI/OVERNIGHT EVENTS: No new events.    MEDICATIONS  (STANDING):  ATENolol  Tablet 25 milliGRAM(s) Oral two times a day  atorvastatin 10 milliGRAM(s) Oral at bedtime  dextrose 2.5% 1000 milliLiter(s) (75 mL/Hr) IV Continuous <Continuous>  digoxin     Tablet 0.125 milliGRAM(s) Oral every other day  levothyroxine Injectable 25 MICROGram(s) IV Push at bedtime  metoprolol    tartrate Injectable 5 milliGRAM(s) IV Push every 6 hours  multivitamin 1 Tablet(s) Oral daily  nitroglycerin    2% Ointment 1 Inch(s) Transdermal three times a day  zinc oxide 11.3% Cream 1 Application(s) Topical daily    MEDICATIONS  (PRN):  acetaminophen   Tablet 650 milliGRAM(s) Oral every 6 hours PRN For Temp greater than or equal to 38 C (100.4 F)  acetaminophen   Tablet. 650 milliGRAM(s) Oral every 6 hours PRN Moderate Pain (4 - 6)      Allergies    allopurinol (Rash)  aspirin (Unknown)  azithromycin (Diarrhea)  clindamycin (Diarrhea)  latex (Unknown)  morphine (Other; Short breath)  penicillins (Unknown)  sulfa drugs (Unknown)            Vital Signs Last 24 Hrs  T(C): 37.4 (2018 00:13), Max: 37.4 (2018 00:13)  T(F): 99.3 (2018 00:13), Max: 99.3 (2018 00:13)  HR: 88 (2018 06:44) (73 - 92)  BP: 150/90 (2018 06:44) (133/78 - 160/84)  BP(mean): --  RR: 28 (2018 00:13) (19 - 28)  SpO2: 92% (2018 00:13) (92% - 94%)  Daily     Daily     PHYSICAL EXAM:    GENERAL: appears chronically ill  HEAD:  same  EYES:  same  ENMT:   NECK: veins not  full  NERVOUS SYSTEM:  awake, alert in bed, dementia  same  CHEST/LUN by mask, occasional  rhonchi  HEART: no gallop  ABDOMEN: soft, not  tender  EXTREMITIES: stasis  changes lower legs   LYMPH:   SKIN: ecchymosis     LABS:                        13.3   13.3  )-----------( 308      ( 2018 07:12 )             41.7         148<H>  |  110<H>  |  28.0<H>  ----------------------------<  82  4.1   |  22.0  |  0.97    Ca    8.6      2018 07:12  Phos  3.1       Mg     1.9         TPro  6.1<L>  /  Alb  2.3<L>  /  TBili  2.5<H>  /  DBili  x   /  AST  46<H>  /  ALT  23  /  AlkPhos  210<H>      PT/INR - ( 2018 07:12 )   PT: 33.5 sec;   INR: 2.98 ratio         PTT - ( 2018 00:42 )  PTT:50.9 sec    Magnesium, Serum: 1.9 mg/dL ( @ 07:12)  Phosphorus Level, Serum: 3.1 mg/dL ( @ 07:12)          RADIOLOGY & ADDITIONAL TESTS:

## 2018-02-12 NOTE — PROGRESS NOTE ADULT - ASSESSMENT
85 year old female with PMH HTN, cAF on Coumadin, MR, CHFpEF presented with RLE pain, erythema and swelling. Noted to have confusion at admission (?encephalopathy), leukocytosis (WBC 22, lactic acidosis  3.1) and imaging including CT consistent with RLE Cellulitis without abscess. Initially treated with Azactam/Clindamycin/Vancomycin in ER, later switched to Ceftriaxone/Zyvox by ID and then oral Keflex. Cellulitis resolved. Blood cultures negative.    Lethargy secondary to delirium vs dementia  Encephalopathy work up with elevated TSH. Unclear significance, as may be abnormal secondary to metabolic processes. Started on Synthroid. UA dirty, Chest X-Ray suggestive of LLL consolidation; however remains without fever or leukocytosis so unclear if infectious; could be masked by Keflex. Doubt UTI, likely inappropriately collected sample, patient was on antibiotics unless MDR.  Repeat cultures negative. CT Chest with bilateral effusion, no infiltrate.  Influenza+, no longer requiring NIPPV. intermittent Tamiflu secondary to dysphagia.  Still requiring supplement O2    CHFpEF, recent TTE (10/2017) with preserved EF 60-65%, moderate to severe MR, Mild AS, Moderate AR, severe TR, Mild PAH. No evidence of decompensated CHF currently. However with bilateral pleural effusion on CT. Started on diuresis, appears to be on the dry side. Repeat TTE with similar findings.    Hypernatremia, secondary to dehydration, NPO. Mild hypokalemia and bump SCr.    Dysphagia, appears to be at risk for protein calorie malnutrition. Started on Puree, nectar, receiving D5W for hypernatremia.    Also seen in consultation by Vascular surgery given possible history of PAD, skin graft with recommendations for follow up with primary vascular surgeon Dr Braswell upon discharge.     HTN - well controlled on current regimen.    AF - rate controlled, INR supra therapeutic, no evidence of bleeding. Monitor INR     CKD 3, renal insufficiency - in fact normalized    Hyponatremia, hypokalemia present at admission - resolved.    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 Added statin, Check FLP. Follow up with Dr Garcia after discharge    Slurred speech as per  - CTH, MRI brain negative for stroke. Secondary to dry mouth - RN advised to provide with oral care    Diarrhea - received antibiotics, is on Probiotic. Cdiff negative. Diarrhea resolved.        Guarded prognosis - patient would not want aggressive measures as per daughter Iona. DNR, DNI  Patient with multiorgan system failure. I fear the risk of coumadin outweighs the benefit therefore discontinue coumadin.  DC IV fluids secondary to fear of chf. Monitor closely.

## 2018-02-13 DIAGNOSIS — J18.9 PNEUMONIA, UNSPECIFIED ORGANISM: ICD-10-CM

## 2018-02-13 LAB
ANION GAP SERPL CALC-SCNC: 16 MMOL/L — SIGNIFICANT CHANGE UP (ref 5–17)
BUN SERPL-MCNC: 42 MG/DL — HIGH (ref 8–20)
CALCIUM SERPL-MCNC: 8.7 MG/DL — SIGNIFICANT CHANGE UP (ref 8.6–10.2)
CHLORIDE SERPL-SCNC: 114 MMOL/L — HIGH (ref 98–107)
CO2 SERPL-SCNC: 22 MMOL/L — SIGNIFICANT CHANGE UP (ref 22–29)
CREAT SERPL-MCNC: 1.17 MG/DL — SIGNIFICANT CHANGE UP (ref 0.5–1.3)
GLUCOSE SERPL-MCNC: 123 MG/DL — HIGH (ref 70–115)
HCT VFR BLD CALC: 41.8 % — SIGNIFICANT CHANGE UP (ref 37–47)
HGB BLD-MCNC: 12.9 G/DL — SIGNIFICANT CHANGE UP (ref 12–16)
INR BLD: 2.81 RATIO — HIGH (ref 0.88–1.16)
LACTATE SERPL-SCNC: 2.3 MMOL/L — HIGH (ref 0.5–2)
MAGNESIUM SERPL-MCNC: 2 MG/DL — SIGNIFICANT CHANGE UP (ref 1.6–2.6)
MCHC RBC-ENTMCNC: 30.6 PG — SIGNIFICANT CHANGE UP (ref 27–31)
MCHC RBC-ENTMCNC: 30.9 G/DL — LOW (ref 32–36)
MCV RBC AUTO: 99.3 FL — HIGH (ref 81–99)
PHOSPHATE SERPL-MCNC: 3.4 MG/DL — SIGNIFICANT CHANGE UP (ref 2.4–4.7)
PLATELET # BLD AUTO: 354 K/UL — SIGNIFICANT CHANGE UP (ref 150–400)
POTASSIUM SERPL-MCNC: 3.8 MMOL/L — SIGNIFICANT CHANGE UP (ref 3.5–5.3)
POTASSIUM SERPL-SCNC: 3.8 MMOL/L — SIGNIFICANT CHANGE UP (ref 3.5–5.3)
PROCALCITONIN SERPL-MCNC: 1.18 NG/ML — HIGH (ref 0–0.04)
PROTHROM AB SERPL-ACNC: 31.6 SEC — HIGH (ref 9.8–12.7)
RBC # BLD: 4.21 M/UL — LOW (ref 4.4–5.2)
RBC # FLD: 15.7 % — HIGH (ref 11–15.6)
SODIUM SERPL-SCNC: 152 MMOL/L — HIGH (ref 135–145)
WBC # BLD: 17.3 K/UL — HIGH (ref 4.8–10.8)
WBC # FLD AUTO: 17.3 K/UL — HIGH (ref 4.8–10.8)

## 2018-02-13 PROCEDURE — 99233 SBSQ HOSP IP/OBS HIGH 50: CPT

## 2018-02-13 RX ORDER — IMIPENEM AND CILASTATIN 250; 250 MG/100ML; MG/100ML
250 INJECTION, POWDER, FOR SOLUTION INTRAVENOUS EVERY 6 HOURS
Qty: 0 | Refills: 0 | Status: DISCONTINUED | OUTPATIENT
Start: 2018-02-14 | End: 2018-02-14

## 2018-02-13 RX ORDER — SODIUM CHLORIDE 9 MG/ML
1000 INJECTION, SOLUTION INTRAVENOUS
Qty: 0 | Refills: 0 | Status: DISCONTINUED | OUTPATIENT
Start: 2018-02-13 | End: 2018-02-14

## 2018-02-13 RX ORDER — IMIPENEM AND CILASTATIN 250; 250 MG/100ML; MG/100ML
INJECTION, POWDER, FOR SOLUTION INTRAVENOUS
Qty: 0 | Refills: 0 | Status: DISCONTINUED | OUTPATIENT
Start: 2018-02-13 | End: 2018-02-14

## 2018-02-13 RX ORDER — VANCOMYCIN HCL 1 G
1000 VIAL (EA) INTRAVENOUS EVERY 24 HOURS
Qty: 0 | Refills: 0 | Status: DISCONTINUED | OUTPATIENT
Start: 2018-02-13 | End: 2018-02-14

## 2018-02-13 RX ORDER — IMIPENEM AND CILASTATIN 250; 250 MG/100ML; MG/100ML
250 INJECTION, POWDER, FOR SOLUTION INTRAVENOUS ONCE
Qty: 0 | Refills: 0 | Status: COMPLETED | OUTPATIENT
Start: 2018-02-13 | End: 2018-02-13

## 2018-02-13 RX ORDER — SACCHAROMYCES BOULARDII 250 MG
250 POWDER IN PACKET (EA) ORAL
Qty: 0 | Refills: 0 | Status: DISCONTINUED | OUTPATIENT
Start: 2018-02-13 | End: 2018-02-14

## 2018-02-13 RX ORDER — ROBINUL 0.2 MG/ML
0.2 INJECTION INTRAMUSCULAR; INTRAVENOUS EVERY 6 HOURS
Qty: 0 | Refills: 0 | Status: DISCONTINUED | OUTPATIENT
Start: 2018-02-13 | End: 2018-02-14

## 2018-02-13 RX ADMIN — ZINC OXIDE 1 APPLICATION(S): 200 OINTMENT TOPICAL at 14:11

## 2018-02-13 RX ADMIN — IMIPENEM AND CILASTATIN 100 MILLIGRAM(S): 250; 250 INJECTION, POWDER, FOR SOLUTION INTRAVENOUS at 18:36

## 2018-02-13 RX ADMIN — Medication 1 INCH(S): at 09:36

## 2018-02-13 RX ADMIN — Medication 0.5 MILLIGRAM(S): at 19:35

## 2018-02-13 RX ADMIN — Medication 1 INCH(S): at 06:40

## 2018-02-13 RX ADMIN — SODIUM CHLORIDE 50 MILLILITER(S): 9 INJECTION, SOLUTION INTRAVENOUS at 17:49

## 2018-02-13 RX ADMIN — Medication 1 INCH(S): at 21:55

## 2018-02-13 RX ADMIN — Medication 250 MILLIGRAM(S): at 15:53

## 2018-02-13 RX ADMIN — Medication 1 INCH(S): at 07:02

## 2018-02-13 RX ADMIN — Medication 1 INCH(S): at 21:57

## 2018-02-13 RX ADMIN — ATENOLOL 25 MILLIGRAM(S): 25 TABLET ORAL at 07:02

## 2018-02-13 RX ADMIN — Medication 1 INCH(S): at 14:23

## 2018-02-13 RX ADMIN — Medication 0.12 MILLIGRAM(S): at 13:50

## 2018-02-13 RX ADMIN — Medication 250 MILLIGRAM(S): at 21:58

## 2018-02-13 RX ADMIN — ROBINUL 0.2 MILLIGRAM(S): 0.2 INJECTION INTRAMUSCULAR; INTRAVENOUS at 14:16

## 2018-02-13 RX ADMIN — Medication 25 MICROGRAM(S): at 06:42

## 2018-02-13 RX ADMIN — ATORVASTATIN CALCIUM 10 MILLIGRAM(S): 80 TABLET, FILM COATED ORAL at 21:56

## 2018-02-13 RX ADMIN — Medication 1 INCH(S): at 18:40

## 2018-02-13 RX ADMIN — Medication 1 TABLET(S): at 13:51

## 2018-02-13 RX ADMIN — ATENOLOL 25 MILLIGRAM(S): 25 TABLET ORAL at 17:53

## 2018-02-13 NOTE — PROVIDER CONTACT NOTE (CRITICAL VALUE NOTIFICATION) - NAME OF MD/NP/PA/DO NOTIFIED:
Dr. Wise
Dr. Jerez
Dr. Jerez
Dr. Valentin
Dr. long
MD Valentin - voicemail left at [phone]

## 2018-02-13 NOTE — PROGRESS NOTE ADULT - SUBJECTIVE AND OBJECTIVE BOX
NEPHROLOGY INTERVAL HPI/OVERNIGHT EVENTS: No new events.    MEDICATIONS  (STANDING):  ATENolol  Tablet 25 milliGRAM(s) Oral two times a day  atorvastatin 10 milliGRAM(s) Oral at bedtime  dextrose 2.5% 1000 milliLiter(s) (75 mL/Hr) IV Continuous <Continuous>  digoxin     Tablet 0.125 milliGRAM(s) Oral every other day  levothyroxine Injectable 25 MICROGram(s) IV Push at bedtime  metoprolol    tartrate Injectable 5 milliGRAM(s) IV Push every 6 hours  multivitamin 1 Tablet(s) Oral daily  nitroglycerin    2% Ointment 1 Inch(s) Transdermal three times a day  zinc oxide 11.3% Cream 1 Application(s) Topical daily    MEDICATIONS  (PRN):  acetaminophen   Tablet 650 milliGRAM(s) Oral every 6 hours PRN For Temp greater than or equal to 38 C (100.4 F)  acetaminophen   Tablet. 650 milliGRAM(s) Oral every 6 hours PRN Moderate Pain (4 - 6)      Allergies    allopurinol (Rash)  aspirin (Unknown)  azithromycin (Diarrhea)  clindamycin (Diarrhea)  latex (Unknown)  morphine (Other; Short breath)  penicillins (Unknown)  sulfa drugs (Unknown)            Vital Signs Last 24 Hrs     T(C): 36.6 (2018 09:27), Max: 36.6 (2018 16:48)  T(F): 97.8 (2018 09:27), Max: 97.9 (2018 16:48)  HR: 98 (2018 09:27) (65 - 101)  BP: 113/69 (2018 09:27) (113/69 - 143/83)  BP(mean): --  RR: 16 (2018 09:27) (16 - 18)  SpO2: 92% (2018 09:27) (92% - 93%)  T(C): 37.4 (2018 00:13), Max: 37.4 (2018 00:13)  T(F): 99.3 (2018 00:13), Max: 99.3 (2018 00:13)  HR: 88 (2018 06:44) (73 - 92)  BP: 150/90 (2018 06:44) (133/78 - 160/84)  BP(mean): --  RR: 28 (2018 00:13) (19 - 28)  SpO2: 92% (2018 00:13) (92% - 94%)      PHYSICAL EXAM:    GENERAL: appears chronically ill; family at bedside  HEAD:  same  EYES:  same  ENMT:   NECK: veins not  full  NERVOUS SYSTEM:  lethargic in bed   CHEST/LUN by mask, occasional  rhonchi  HEART: no gallop  ABDOMEN: soft, not  tender  EXTREMITIES: stasis  changes lower legs   LYMPH:   SKIN: ecchymosis     LABS:     152<H>  |  114<H>  |  42.0<H>  ----------------------------<  123<H>  3.8   |  22.0  |  1.17    Ca    8.7      2018 07:51  Phos  3.4     -13  Mg     2.0         TPro  6.1<L>  /  Alb  2.3<L>  /  TBili  2.5<H>  /  DBili  x   /  AST  46<H>  /  ALT  23  /  AlkPhos  210<H>                            13.3   13.3  )-----------( 308      ( 2018 07:12 )             41.7     02-12    148<H>  |  110<H>  |  28.0<H>  ----------------------------<  82  4.1   |  22.0  |  0.97    Ca    8.6      2018 07:12  Phos  3.1     02-12  Mg     1.9     -12    TPro  6.1<L>  /  Alb  2.3<L>  /  TBili  2.5<H>  /  DBili  x   /  AST  46<H>  /  ALT  23  /  AlkPhos  210<H>  12    PT/INR - ( 2018 07:12 )   PT: 33.5 sec;   INR: 2.98 ratio         PTT - ( 2018 00:42 )  PTT:50.9 sec    Magnesium, Serum: 1.9 mg/dL ( @ 07:12)  Phosphorus Level, Serum: 3.1 mg/dL ( @ 07:12)          RADIOLOGY & ADDITIONAL TESTS:

## 2018-02-13 NOTE — PROVIDER CONTACT NOTE (CRITICAL VALUE NOTIFICATION) - TEST AND RESULT REPORTED:
influenza A
INR 7.19
INR critical at 5.84
Lactate 2.3
Lactate 3.1
PT 63.2, INR 5.55
Potassium-6.4 moderately hemolyzed
inr 7.01

## 2018-02-13 NOTE — PROGRESS NOTE ADULT - PROBLEM SELECTOR PLAN 7
Met with son Stefano.  not present.   Patient now with elevated lactate, PNA, started on ABX.  Informed him again considering mother's age and medical issues, difficult for her to recover and likely will  have ongoing issues. Prognosis poor. He seems to understand.   maintains hopeful for recovery. Will continue to have GOC discussions with family

## 2018-02-13 NOTE — PROGRESS NOTE ADULT - SUBJECTIVE AND OBJECTIVE BOX
RAGHU DAVIS     Chief Complaint: Patient is a 85y old  Female who presents with a chief complaint of rt. lower ext. pain (24 Jan 2018 04:35)      PAST MEDICAL & SURGICAL HISTORY:  Clostridium difficile diarrhea  CHF (congestive heart failure)  Gout  Mitral valve regurgitation  Hypertension  Atrial fibrillation  H/O skin graft      HPI/OVERNIGHT EVENTS: Patient with aspiration pneumonia add broad spectrum antibiotics for gram negative coverage.    MEDICATIONS  (STANDING):  ATENolol  Tablet 25 milliGRAM(s) Oral two times a day  atorvastatin 10 milliGRAM(s) Oral at bedtime  digoxin     Tablet 0.125 milliGRAM(s) Oral every other day  imipenem/cilastatin  IVPB 250 milliGRAM(s) IV Intermittent every 6 hours  imipenem/cilastatin  IVPB      levothyroxine 25 MICROGram(s) Oral daily  LORazepam   Injectable 1 milliGRAM(s) IV Push every 6 hours  multivitamin 1 Tablet(s) Oral daily  nitroglycerin    2% Ointment 1 Inch(s) Transdermal three times a day  saccharomyces boulardii 250 milliGRAM(s) Oral two times a day  vancomycin  IVPB 1000 milliGRAM(s) IV Intermittent every 24 hours  zinc oxide 11.3% Cream 1 Application(s) Topical daily      Vital Signs Last 24 Hrs  T(C): 37.1 (14 Feb 2018 09:37), Max: 37.1 (14 Feb 2018 09:37)  T(F): 98.8 (14 Feb 2018 09:37), Max: 98.8 (14 Feb 2018 09:37)  HR: 92 (14 Feb 2018 09:37) (84 - 98)  BP: 138/90 (14 Feb 2018 09:37) (116/77 - 149/96)  BP(mean): --  RR: 18 (14 Feb 2018 09:37) (18 - 22)  SpO2: 90% (14 Feb 2018 09:37) (90% - 93%)    PHYSICAL EXAM:  Constitutional:  temporal wasting  HEENT: PERRLA, EOMI, Normal Hearing, MMM  Neck: No LAD, No JVD  Back: Normal spine flexure, No CVA tenderness  Respiratory: CTAB Cardiovascular: S1 and S2, RRR, no M/G/R  Gastrointestinal: BS+, soft, NT/ND  Extremities: No peripheral edema  Vascular: 2+ peripheral pulses  Neurological: lethargic    CAPILLARY BLOOD GLUCOSE    LABS:                        13.4   14.3  )-----------( 363      ( 14 Feb 2018 06:07 )             43.0     02-14    155<H>  |  118<H>  |  45.0<H>  ----------------------------<  121<H>  3.8   |  24.0  |  1.24    Ca    8.9      14 Feb 2018 06:07  Phos  2.5     02-14  Mg     2.0     02-14    TPro  6.2<L>  /  Alb  2.3<L>  /  TBili  1.8  /  DBili  x   /  AST  45<H>  /  ALT  22  /  AlkPhos  243<H>  02-14    PT/INR - ( 13 Feb 2018 07:50 )   PT: 31.6 sec;   INR: 2.81 ratio               RADIOLOGY & ADDITIONAL TESTS:

## 2018-02-13 NOTE — PROGRESS NOTE ADULT - ASSESSMENT
85 year old female with PMH HTN, cAF on Coumadin, MR, CHFpEF presented with RLE pain, erythema and swelling. Noted to have confusion at admission (?encephalopathy), leukocytosis (WBC 22, lactic acidosis  3.1) and imaging including CT consistent with RLE Cellulitis without abscess. Initially treated with Azactam/Clindamycin/Vancomycin in ER, later switched to Ceftriaxone/Zyvox by ID and then oral Keflex. Cellulitis resolved. Blood cultures negative.    Lethargy secondary to delirium vs dementia  Encephalopathy work up with elevated TSH. Unclear significance, as may be abnormal secondary to metabolic processes. Started on Synthroid. UA dirty, Chest X-Ray suggestive of LLL consolidation; however remains without fever or leukocytosis so unclear if infectious; could be masked by Keflex. Doubt UTI, likely inappropriately collected sample, patient was on antibiotics unless MDR.  Repeat cultures negative. CT Chest with bilateral effusion, no infiltrate.  Influenza+, no longer requiring NIPPV. intermittent Tamiflu secondary to dysphagia.  Still requiring supplement O2    CHFpEF, recent TTE (10/2017) with preserved EF 60-65%, moderate to severe MR, Mild AS, Moderate AR, severe TR, Mild PAH. No evidence of decompensated CHF currently. However with bilateral pleural effusion on CT. Started on diuresis, appears to be on the dry side. Repeat TTE with similar findings.    Hypernatremia, secondary to dehydration, NPO. Mild hypokalemia and bump SCr.    Dysphagia, appears to be at risk for protein calorie malnutrition. Started on Puree, nectar, receiving D5W for hypernatremia.    Also seen in consultation by Vascular surgery given possible history of PAD, skin graft with recommendations for follow up with primary vascular surgeon Dr Braswell upon discharge.     HTN - well controlled on current regimen.    AF - rate controlled, INR supra therapeutic, no evidence of bleeding. Monitor INR     CKD 3, renal insufficiency - in fact normalized    Hyponatremia, hypokalemia present at admission - resolved.    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 Added statin, Check FLP. Follow up with Dr Garcia after discharge    Slurred speech as per  - CTH, MRI brain negative for stroke. Secondary to dry mouth - RN advised to provide with oral care    Diarrhea - received antibiotics, is on Probiotic. Cdiff negative. Diarrhea resolved.        Guarded prognosis - patient would not want aggressive measures as per daughter Iona. DNR, DNI  Patient with multiorgan system failure. I fear the risk of coumadin outweighs the benefit therefore discontinue coumadin.  DC IV fluids secondary to fear of chf. Monitor closely.    2/13 Patient developed aspiration pneumonia prognosis poor.

## 2018-02-13 NOTE — PROGRESS NOTE ADULT - SUBJECTIVE AND OBJECTIVE BOX
INTERVAL HPI/OVERNIGHT EVENTS:    PRESENT SYMPTOMS: SOURCE:  Patient/Family/Team    PAIN SCALE:  0 = none  1 = mild   2 = moderate  3 = severe    Pain: does not apppear    Dyspnea:  [x ] YES [ ] NO  Anxiety:  [x ] YES [ ] NO  Fatigue: [x ] YES [ ] NO  Nausea: [ ] YES [x ] NO  Loss of Appetite: [x ] YES [ ] NO  Other symptoms: __________    MEDICATIONS  (STANDING):  ATENolol  Tablet 25 milliGRAM(s) Oral two times a day  atorvastatin 10 milliGRAM(s) Oral at bedtime  dextrose 5% + lactated ringers. 1000 milliLiter(s) (50 mL/Hr) IV Continuous <Continuous>  digoxin     Tablet 0.125 milliGRAM(s) Oral every other day  imipenem/cilastatin  IVPB 250 milliGRAM(s) IV Intermittent once  imipenem/cilastatin  IVPB      levothyroxine 25 MICROGram(s) Oral daily  multivitamin 1 Tablet(s) Oral daily  nitroglycerin    2% Ointment 1 Inch(s) Transdermal three times a day  vancomycin  IVPB 1000 milliGRAM(s) IV Intermittent every 24 hours  zinc oxide 11.3% Cream 1 Application(s) Topical daily    MEDICATIONS  (PRN):  acetaminophen   Tablet 650 milliGRAM(s) Oral every 6 hours PRN For Temp greater than or equal to 38 C (100.4 F)  acetaminophen   Tablet. 650 milliGRAM(s) Oral every 6 hours PRN Moderate Pain (4 - 6)  glycopyrrolate Injectable 0.2 milliGRAM(s) IV Push every 6 hours PRN increased secretions      Allergies    allopurinol (Rash)  aspirin (Unknown)  azithromycin (Diarrhea)  clindamycin (Diarrhea)  latex (Unknown)  morphine (Other; Short breath)  penicillins (Unknown)  sulfa drugs (Unknown)    Intolerances        Karnofsky Performance Score/Palliative Performance Status Version 2:  30       %    Vital Signs Last 24 Hrs  T(C): 36.4 (13 Feb 2018 16:50), Max: 36.6 (13 Feb 2018 06:35)  T(F): 97.5 (13 Feb 2018 16:50), Max: 97.9 (13 Feb 2018 06:35)  HR: 98 (13 Feb 2018 16:50) (65 - 98)  BP: 116/77 (13 Feb 2018 16:50) (113/69 - 125/75)  BP(mean): --  RR: 22 (13 Feb 2018 14:02) (16 - 22)  SpO2: 90% (13 Feb 2018 14:02) (90% - 93%)    PHYSICAL EXAM:    General: Elderly frail, more tired appearing today    HEENT: + venti mask    Lungs: [ ] comfortable [x ] mild tachypnea/labored breathing  [ ] excessive secretions    CV: [ x] normal  [ ] tachycardia    GI: [ x] normal  [ ] distended  [ ] tender  [ ] no BS               [ ] PEG/NG/OG tube    : [ x] normal  [ ] incontinent  [ ] oliguria/anuria  [ ] hartmann    MSK: [ ] normal  [x ] weakness  [ ] edema             [ ] ambulatory  [ ] bedbound/wheelchair bound    Skin: [ ] normal  [ ] pressure ulcers- Stage_____  [ x] no rash    LABS:                        12.9   17.3  )-----------( 354      ( 13 Feb 2018 07:53 )             41.8     02-13    152<H>  |  114<H>  |  42.0<H>  ----------------------------<  123<H>  3.8   |  22.0  |  1.17    Ca    8.7      13 Feb 2018 07:51  Phos  3.4     02-13  Mg     2.0     02-13    TPro  6.1<L>  /  Alb  2.3<L>  /  TBili  2.5<H>  /  DBili  x   /  AST  46<H>  /  ALT  23  /  AlkPhos  210<H>  02-12    PT/INR - ( 13 Feb 2018 07:50 )   PT: 31.6 sec;   INR: 2.81 ratio         PTT - ( 12 Feb 2018 00:42 )  PTT:50.9 sec    I&O's Summary    12 Feb 2018 07:01  -  13 Feb 2018 07:00  --------------------------------------------------------  IN: 195 mL / OUT: 1 mL / NET: 194 mL       Thank you for the opportunity to assist with the care of this patient.   [facility] [phone].

## 2018-02-13 NOTE — CHART NOTE - NSCHARTNOTEFT_GEN_A_CORE
Source: Patient [ ]  Family [ ]   other [x ]    Current Diet: Puree with honey thick liquids    PO intake:  < 50% [x ]   50-75%  [ ]   %  [ ]  other :    Source for PO intake [ ] Patient [ ] family [x ] chart [ ] staff [ ] other Pt sleeping    Enteral /Parenteral Nutrition:     Current Weight:     % Weight Change     Pertinent Medications: MEDICATIONS  (STANDING):  ATENolol  Tablet 25 milliGRAM(s) Oral two times a day  atorvastatin 10 milliGRAM(s) Oral at bedtime  digoxin     Tablet 0.125 milliGRAM(s) Oral every other day  levothyroxine 25 MICROGram(s) Oral daily  multivitamin 1 Tablet(s) Oral daily  nitroglycerin    2% Ointment 1 Inch(s) Transdermal three times a day  zinc oxide 11.3% Cream 1 Application(s) Topical daily    MEDICATIONS  (PRN):  acetaminophen   Tablet 650 milliGRAM(s) Oral every 6 hours PRN For Temp greater than or equal to 38 C (100.4 F)  acetaminophen   Tablet. 650 milliGRAM(s) Oral every 6 hours PRN Moderate Pain (4 - 6)    Pertinent Labs: CBC Full  -  ( 13 Feb 2018 07:53 )  WBC Count : 17.3 K/uL  Hemoglobin : 12.9 g/dL  Hematocrit : 41.8 %  Platelet Count - Automated : 354 K/uL  Mean Cell Volume : 99.3 fl  Mean Cell Hemoglobin : 30.6 pg  Mean Cell Hemoglobin Concentration : 30.9 g/dL  Auto Neutrophil # : x  Auto Lymphocyte # : x  Auto Monocyte # : x  Auto Eosinophil # : x  Auto Basophil # : x  Auto Neutrophil % : x  Auto Lymphocyte % : x  Auto Monocyte % : x  Auto Eosinophil % : x  Auto Basophil % : x      02-13 Na152 mmol/L<H> Glu 123 mg/dL<H> K+ 3.8 mmol/L Cr  1.17 mg/dL BUN 42.0 mg/dL<H> Phos 3.4 mg/dL Alb n/a   PAB n/a           Skin:     Nutrition focused physical exam conducted - found signs of malnutrition [ ]absent [ ]present    Subcutaneous fat loss: [ ] Orbital fat pads region, [ ]Buccal fat region, [ ]Triceps region,  [ ]Ribs region    Muscle wasting: [ ]Temples region, [ ]Clavicle region, [ ]Shoulder region, [ ]Scapula region, [ ]Interosseous region,  [ ]thigh region, [ ]Calf region    Estimated Needs:   [x ] no change since previous assessment  [ ] recalculated:     Current Nutrition Diagnosis:  Pt at nutrition risk for inadequate oral intake related to pt with altered mental status as evidenced by pt with noted poor intake. Pt is confused with AMS.      Speech tx recommending NPO 2/5.  Diet now initiated by as per speech tx. Pt continues to eat poorly.     Recommendations: Continue to provide encouragement.     Monitoring and Evaluation:   [x ] PO intake [x ] Tolerance to diet prescription [X] Weights  [X] Follow up per protocol [X] Labs:

## 2018-02-13 NOTE — PROGRESS NOTE ADULT - ASSESSMENT
85yr woman, frail, functionally debility, hx of CHF, VHD, afib, admitted with right leg cellulitis treated. Hospital course complicated by SOB 2/2 CHF, + Flu. Patient with elevated lactate, PNA- poor outlook.

## 2018-02-13 NOTE — PROVIDER CONTACT NOTE (CRITICAL VALUE NOTIFICATION) - PERSON GIVING RESULT:
lab
Dennis Escalante
Dennis Martinez
Jackie Jacobs- hematology
Lab-Rachael Del Rosario
Osei Palma
[facility]/Lab
lab nya

## 2018-02-14 VITALS
DIASTOLIC BLOOD PRESSURE: 90 MMHG | TEMPERATURE: 99 F | SYSTOLIC BLOOD PRESSURE: 138 MMHG | HEART RATE: 92 BPM | RESPIRATION RATE: 18 BRPM | OXYGEN SATURATION: 90 %

## 2018-02-14 LAB
ALBUMIN SERPL ELPH-MCNC: 2.3 G/DL — LOW (ref 3.3–5.2)
ALP SERPL-CCNC: 243 U/L — HIGH (ref 40–120)
ALT FLD-CCNC: 22 U/L — SIGNIFICANT CHANGE UP
ANION GAP SERPL CALC-SCNC: 13 MMOL/L — SIGNIFICANT CHANGE UP (ref 5–17)
ANISOCYTOSIS BLD QL: SLIGHT — SIGNIFICANT CHANGE UP
AST SERPL-CCNC: 45 U/L — HIGH
BASOPHILS # BLD AUTO: 0 K/UL — SIGNIFICANT CHANGE UP (ref 0–0.2)
BASOPHILS NFR BLD AUTO: 1 % — SIGNIFICANT CHANGE UP (ref 0–2)
BILIRUB SERPL-MCNC: 1.8 MG/DL — SIGNIFICANT CHANGE UP (ref 0.4–2)
BUN SERPL-MCNC: 45 MG/DL — HIGH (ref 8–20)
CALCIUM SERPL-MCNC: 8.9 MG/DL — SIGNIFICANT CHANGE UP (ref 8.6–10.2)
CHLORIDE SERPL-SCNC: 118 MMOL/L — HIGH (ref 98–107)
CO2 SERPL-SCNC: 24 MMOL/L — SIGNIFICANT CHANGE UP (ref 22–29)
CREAT SERPL-MCNC: 1.24 MG/DL — SIGNIFICANT CHANGE UP (ref 0.5–1.3)
EOSINOPHIL # BLD AUTO: 0.2 K/UL — SIGNIFICANT CHANGE UP (ref 0–0.5)
EOSINOPHIL NFR BLD AUTO: 1 % — SIGNIFICANT CHANGE UP (ref 0–5)
GLUCOSE SERPL-MCNC: 121 MG/DL — HIGH (ref 70–115)
HCT VFR BLD CALC: 43 % — SIGNIFICANT CHANGE UP (ref 37–47)
HGB BLD-MCNC: 13.4 G/DL — SIGNIFICANT CHANGE UP (ref 12–16)
LACTATE SERPL-SCNC: 2 MMOL/L — SIGNIFICANT CHANGE UP (ref 0.5–2)
LYMPHOCYTES # BLD AUTO: 0.7 K/UL — LOW (ref 1–4.8)
LYMPHOCYTES # BLD AUTO: 6 % — LOW (ref 20–55)
MACROCYTES BLD QL: SLIGHT — SIGNIFICANT CHANGE UP
MAGNESIUM SERPL-MCNC: 2 MG/DL — SIGNIFICANT CHANGE UP (ref 1.6–2.6)
MCHC RBC-ENTMCNC: 31.2 G/DL — LOW (ref 32–36)
MCHC RBC-ENTMCNC: 31.2 PG — HIGH (ref 27–31)
MCV RBC AUTO: 100 FL — HIGH (ref 81–99)
MICROCYTES BLD QL: SLIGHT — SIGNIFICANT CHANGE UP
MONOCYTES # BLD AUTO: 1.4 K/UL — HIGH (ref 0–0.8)
MONOCYTES NFR BLD AUTO: 5 % — SIGNIFICANT CHANGE UP (ref 3–10)
NEUTROPHILS # BLD AUTO: 11.9 K/UL — HIGH (ref 1.8–8)
NEUTROPHILS NFR BLD AUTO: 87 % — HIGH (ref 37–73)
OVALOCYTES BLD QL SMEAR: SLIGHT — SIGNIFICANT CHANGE UP
PHOSPHATE SERPL-MCNC: 2.5 MG/DL — SIGNIFICANT CHANGE UP (ref 2.4–4.7)
PLAT MORPH BLD: NORMAL — SIGNIFICANT CHANGE UP
PLATELET # BLD AUTO: 363 K/UL — SIGNIFICANT CHANGE UP (ref 150–400)
POIKILOCYTOSIS BLD QL AUTO: SLIGHT — SIGNIFICANT CHANGE UP
POTASSIUM SERPL-MCNC: 3.8 MMOL/L — SIGNIFICANT CHANGE UP (ref 3.5–5.3)
POTASSIUM SERPL-SCNC: 3.8 MMOL/L — SIGNIFICANT CHANGE UP (ref 3.5–5.3)
PROT SERPL-MCNC: 6.2 G/DL — LOW (ref 6.6–8.7)
RBC # BLD: 4.3 M/UL — LOW (ref 4.4–5.2)
RBC # FLD: 16.2 % — HIGH (ref 11–15.6)
RBC BLD AUTO: ABNORMAL
SODIUM SERPL-SCNC: 155 MMOL/L — HIGH (ref 135–145)
VANCOMYCIN TROUGH SERPL-MCNC: 13.1 UG/ML — SIGNIFICANT CHANGE UP (ref 10–20)
WBC # BLD: 14.3 K/UL — HIGH (ref 4.8–10.8)
WBC # FLD AUTO: 14.3 K/UL — HIGH (ref 4.8–10.8)

## 2018-02-14 PROCEDURE — 99233 SBSQ HOSP IP/OBS HIGH 50: CPT

## 2018-02-14 PROCEDURE — 71045 X-RAY EXAM CHEST 1 VIEW: CPT | Mod: 26

## 2018-02-14 RX ORDER — HYDROMORPHONE HYDROCHLORIDE 2 MG/ML
0.5 INJECTION INTRAMUSCULAR; INTRAVENOUS; SUBCUTANEOUS EVERY 4 HOURS
Qty: 0 | Refills: 0 | Status: DISCONTINUED | OUTPATIENT
Start: 2018-02-14 | End: 2018-02-15

## 2018-02-14 RX ORDER — ROBINUL 0.2 MG/ML
0.4 INJECTION INTRAMUSCULAR; INTRAVENOUS EVERY 4 HOURS
Qty: 0 | Refills: 0 | Status: DISCONTINUED | OUTPATIENT
Start: 2018-02-14 | End: 2018-02-16

## 2018-02-14 RX ORDER — SODIUM CHLORIDE 9 MG/ML
1000 INJECTION INTRAMUSCULAR; INTRAVENOUS; SUBCUTANEOUS
Qty: 0 | Refills: 0 | Status: DISCONTINUED | OUTPATIENT
Start: 2018-02-14 | End: 2018-02-14

## 2018-02-14 RX ORDER — SCOPALAMINE 1 MG/3D
1 PATCH, EXTENDED RELEASE TRANSDERMAL ONCE
Qty: 0 | Refills: 0 | Status: COMPLETED | OUTPATIENT
Start: 2018-02-14 | End: 2018-02-14

## 2018-02-14 RX ADMIN — ATENOLOL 25 MILLIGRAM(S): 25 TABLET ORAL at 05:06

## 2018-02-14 RX ADMIN — Medication 1 INCH(S): at 17:26

## 2018-02-14 RX ADMIN — Medication 1 INCH(S): at 06:42

## 2018-02-14 RX ADMIN — HYDROMORPHONE HYDROCHLORIDE 0.5 MILLIGRAM(S): 2 INJECTION INTRAMUSCULAR; INTRAVENOUS; SUBCUTANEOUS at 18:40

## 2018-02-14 RX ADMIN — Medication 1 MILLIGRAM(S): at 09:52

## 2018-02-14 RX ADMIN — HYDROMORPHONE HYDROCHLORIDE 0.5 MILLIGRAM(S): 2 INJECTION INTRAMUSCULAR; INTRAVENOUS; SUBCUTANEOUS at 19:10

## 2018-02-14 RX ADMIN — IMIPENEM AND CILASTATIN 100 MILLIGRAM(S): 250; 250 INJECTION, POWDER, FOR SOLUTION INTRAVENOUS at 06:32

## 2018-02-14 RX ADMIN — Medication 0.5 MILLIGRAM(S): at 07:32

## 2018-02-14 RX ADMIN — SCOPALAMINE 1 PATCH: 1 PATCH, EXTENDED RELEASE TRANSDERMAL at 19:22

## 2018-02-14 RX ADMIN — Medication 250 MILLIGRAM(S): at 05:07

## 2018-02-14 RX ADMIN — Medication 1 INCH(S): at 05:06

## 2018-02-14 RX ADMIN — Medication 25 MICROGRAM(S): at 05:07

## 2018-02-14 RX ADMIN — SODIUM CHLORIDE 50 MILLILITER(S): 9 INJECTION, SOLUTION INTRAVENOUS at 07:34

## 2018-02-14 RX ADMIN — IMIPENEM AND CILASTATIN 100 MILLIGRAM(S): 250; 250 INJECTION, POWDER, FOR SOLUTION INTRAVENOUS at 01:11

## 2018-02-14 NOTE — PROGRESS NOTE ADULT - SUBJECTIVE AND OBJECTIVE BOX
INTERVAL HPI/OVERNIGHT EVENTS:  Patient on 100% NRB   nursing reports has episodes of dyspnea  Placed on Ativan by Dr. Doshi  for anxiety      PRESENT SYMPTOMS: SOURCE:  Patient/Family/Team    PAIN SCALE:  0 = none  1 = mild   2 = moderate  3 = severe    Pain: no grimacing or moaning    Dyspnea:  [x ] YES [ ] NO  Anxiety:  [ x] YES [ ] NO  Fatigue: [ x] YES [ ] NO  Nausea: [ ] YES [ ] NO  na  Loss of Appetite: [ ] YES [ ] NO  npo  Other symptoms: __________    MEDICATIONS  (STANDING):  LORazepam   Injectable 1 milliGRAM(s) IV Push every 6 hours  scopolamine   Patch 1 Patch Transdermal once    MEDICATIONS  (PRN):  glycopyrrolate Injectable 0.4 milliGRAM(s) IV Push every 4 hours PRN secretions  HYDROmorphone  Injectable 0.5 milliGRAM(s) IV Push every 4 hours PRN DYSPNEA and or PAIN      Allergies    allopurinol (Rash)  aspirin (Unknown)  azithromycin (Diarrhea)  clindamycin (Diarrhea)  latex (Unknown)  morphine (Other; Short breath)  penicillins (Unknown)  sulfa drugs (Unknown)    Intolerances    Karnofsky Performance Score/Palliative Performance Status Version 2:   10  %    Vital Signs Last 24 Hrs  T(C): 37.1 (14 Feb 2018 09:37), Max: 37.1 (14 Feb 2018 09:37)  T(F): 98.8 (14 Feb 2018 09:37), Max: 98.8 (14 Feb 2018 09:37)  HR: 92 (14 Feb 2018 09:37) (84 - 98)  BP: 138/90 (14 Feb 2018 09:37) (116/77 - 149/96)  BP(mean): --  RR: 18 (14 Feb 2018 09:37) (18 - 22)  SpO2: 90% (14 Feb 2018 09:37) (90% - 93%)    PHYSICAL EXAM:    General:  obtunded     HEENT: + NRB mask    Lungs: [ ] comfortable [ x] tachypnea/labored breathing  [ ] excessive secretions    CV: [x ] normal  [ ] tachycardia    GI: soft NT ND    : [ ] normal  [ ] incontinent  [x ] oliguria/anuria  [ ] hartmann    MSK: [ ] normal  [x ] weakness  [ ] edema             [ ] ambulatory  [ x] bedbound/wheelchair bound    Skin: extremities  cool     LABS:                        13.4   14.3  )-----------( 363      ( 14 Feb 2018 06:07 )             43.0     02-14    155<H>  |  118<H>  |  45.0<H>  ----------------------------<  121<H>  3.8   |  24.0  |  1.24    Ca    8.9      14 Feb 2018 06:07  Phos  2.5     02-14  Mg     2.0     02-14    TPro  6.2<L>  /  Alb  2.3<L>  /  TBili  1.8  /  DBili  x   /  AST  45<H>  /  ALT  22  /  AlkPhos  243<H>  02-14    PT/INR - ( 13 Feb 2018 07:50 )   PT: 31.6 sec;   INR: 2.81 ratio       Thank you for the opportunity to assist with the care of this patient.   [facility] [phone].

## 2018-02-14 NOTE — PROGRESS NOTE ADULT - PROBLEM SELECTOR PLAN 5
Met with  - He stated " this is it".    Discussed current condition -  wife is now dying. He was more accepting and spoke of his many years with his wife and good memories. Discussed focus more on comfort with her limited time. He was agreeable.   Discussed with daughter and son  Comfort measures- no abx,  no ivf, no vitals.   Family concerned about Dilaudid for dyspnea as she had a bad reaction to morphine years ago.  Will only give PRN if patient suffering from dyspnea.   Support.

## 2018-02-14 NOTE — PROGRESS NOTE ADULT - ASSESSMENT
85 year old female with PMH HTN, cAF on Coumadin, MR, CHFpEF presented with RLE pain, erythema and swelling. Noted to have confusion at admission (?encephalopathy), leukocytosis (WBC 22, lactic acidosis  3.1) and imaging including CT consistent with RLE Cellulitis without abscess. Initially treated with Azactam/Clindamycin/Vancomycin in ER, later switched to Ceftriaxone/Zyvox by ID and then oral Keflex. Cellulitis resolved. Blood cultures negative. On 2/14 Progressive aspiration pneumonia with hypoxemia and respiratory distress. DC ivf, npo, repeat cxr prognosis poor.  Add ativan for anxiety as patient states herself Im anxious.    Lethargy secondary to delirium vs dementia  Encephalopathy work up with elevated TSH. Unclear significance, as may be abnormal secondary to metabolic processes. Started on Synthroid. UA dirty, Chest X-Ray suggestive of LLL consolidation; however remains without fever or leukocytosis so unclear if infectious; could be masked by Keflex. Doubt UTI, likely inappropriately collected sample, patient was on antibiotics unless MDR.  Repeat cultures negative. CT Chest with bilateral effusion, no infiltrate.  Influenza+, no longer requiring NIPPV. intermittent Tamiflu secondary to dysphagia.  Still requiring supplement O2    CHFpEF, recent TTE (10/2017) with preserved EF 60-65%, moderate to severe MR, Mild AS, Moderate AR, severe TR, Mild PAH. No evidence of decompensated CHF currently. However with bilateral pleural effusion on CT. Started on diuresis, appears to be on the dry side. Repeat TTE with similar findings.    Hypernatremia, secondary to dehydration, NPO. Mild hypokalemia and bump SCr.    Dysphagia, appears to be at risk for protein calorie malnutrition. Started on Puree, nectar, receiving D5W for hypernatremia.    Also seen in consultation by Vascular surgery given possible history of PAD, skin graft with recommendations for follow up with primary vascular surgeon Dr Braswell upon discharge.     HTN - well controlled on current regimen.    AF - rate controlled, INR supra therapeutic, no evidence of bleeding. Monitor INR     CKD 3, renal insufficiency - in fact normalized    Hyponatremia, hypokalemia present at admission - resolved.    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 Added statin, Check FLP. Follow up with Dr Garcia after discharge    Slurred speech as per  - CTH, MRI brain negative for stroke. Secondary to dry mouth - RN advised to provide with oral care    Diarrhea - received antibiotics, is on Probiotic. Cdiff negative. Diarrhea resolved.        Guarded prognosis - patient would not want aggressive measures as per daughter Iona. DNR, DNI  Patient with multiorgan system failure. I fear the risk of coumadin outweighs the benefit therefore discontinue coumadin.  DC IV fluids secondary to fear of chf. Monitor closely.    2/13 Patient developed aspiration pneumonia prognosis poor.

## 2018-02-14 NOTE — PROGRESS NOTE ADULT - SUBJECTIVE AND OBJECTIVE BOX
RAGHU DAVIS     Chief Complaint: Patient is a 85y old  Female who presents with a chief complaint of rt. lower ext. pain (24 Jan 2018 04:35)      PAST MEDICAL & SURGICAL HISTORY:  Clostridium difficile diarrhea  CHF (congestive heart failure)  Gout  Mitral valve regurgitation  Hypertension  Atrial fibrillation  H/O skin graft      HPI/OVERNIGHT EVENTS: Patient much worse today. O2 sat 90 % on 100% nonrebreather markedly dyspneic.    MEDICATIONS  (STANDING):  ATENolol  Tablet 25 milliGRAM(s) Oral two times a day  atorvastatin 10 milliGRAM(s) Oral at bedtime  digoxin     Tablet 0.125 milliGRAM(s) Oral every other day  imipenem/cilastatin  IVPB 250 milliGRAM(s) IV Intermittent every 6 hours  imipenem/cilastatin  IVPB      levothyroxine 25 MICROGram(s) Oral daily  LORazepam   Injectable 1 milliGRAM(s) IV Push every 6 hours  multivitamin 1 Tablet(s) Oral daily  nitroglycerin    2% Ointment 1 Inch(s) Transdermal three times a day  saccharomyces boulardii 250 milliGRAM(s) Oral two times a day  vancomycin  IVPB 1000 milliGRAM(s) IV Intermittent every 24 hours  zinc oxide 11.3% Cream 1 Application(s) Topical daily      Vital Signs Last 24 Hrs  T(C): 37.1 (14 Feb 2018 09:37), Max: 37.1 (14 Feb 2018 09:37)  T(F): 98.8 (14 Feb 2018 09:37), Max: 98.8 (14 Feb 2018 09:37)  HR: 92 (14 Feb 2018 09:37) (84 - 98)  BP: 138/90 (14 Feb 2018 09:37) (116/77 - 149/96)  BP(mean): --  RR: 18 (14 Feb 2018 09:37) (18 - 22)  SpO2: 90% (14 Feb 2018 09:37) (90% - 93%)    PHYSICAL EXAM:  Constitutional:  temporal wasting with marked shortness of breath  HEENT: PERRLA, EOMI, Normal Hearing, MMM  Neck: No LAD, No JVD  Back: Normal spine flexure, No CVA tenderness  Respiratory: CTAB Cardiovascular: S1 and S2, RRR, no M/G/R  Gastrointestinal: BS+, soft, NT/ND  Extremities: No peripheral edema  Vascular: 2+ peripheral pulses  Neurological: lethargic    CAPILLARY BLOOD GLUCOSE    LABS:                        13.4   14.3  )-----------( 363      ( 14 Feb 2018 06:07 )             43.0     02-14    155<H>  |  118<H>  |  45.0<H>  ----------------------------<  121<H>  3.8   |  24.0  |  1.24    Ca    8.9      14 Feb 2018 06:07  Phos  2.5     02-14  Mg     2.0     02-14    TPro  6.2<L>  /  Alb  2.3<L>  /  TBili  1.8  /  DBili  x   /  AST  45<H>  /  ALT  22  /  AlkPhos  243<H>  02-14    PT/INR - ( 13 Feb 2018 07:50 )   PT: 31.6 sec;   INR: 2.81 ratio               RADIOLOGY & ADDITIONAL TESTS:

## 2018-02-14 NOTE — PROGRESS NOTE ADULT - ASSESSMENT
85yr woman, frail, functionally debility, hx of CHF, VHD, afib, admitted with right leg cellulitis treated. Hospital course complicated by SOB 2/2 CHF, + Flu. Patient with elevated lactate, PNA- poor outlook. Patient dying - prognosis hours to days.

## 2018-02-14 NOTE — PROGRESS NOTE ADULT - PROBLEM SELECTOR PLAN 2
On NRB.  Discussed with family consideration for changing to nasal canula for comfort and add Dilaudid for symptom management. They will think about it

## 2018-02-14 NOTE — PROGRESS NOTE ADULT - ASSESSMENT
Hypernatremia and ARF  Progressive overall clinical decline  - will adjust IVF  - no aggressive measures as noted in chart

## 2018-02-14 NOTE — PROGRESS NOTE ADULT - SUBJECTIVE AND OBJECTIVE BOX
NEPHROLOGY INTERVAL HPI/OVERNIGHT EVENTS:  pt essentially the same  still requiring O2; + SOB    MEDICATIONS  (STANDING):  ATENolol  Tablet 25 milliGRAM(s) Oral two times a day  atorvastatin 10 milliGRAM(s) Oral at bedtime  digoxin     Tablet 0.125 milliGRAM(s) Oral every other day  imipenem/cilastatin  IVPB 250 milliGRAM(s) IV Intermittent every 6 hours  imipenem/cilastatin  IVPB      levothyroxine 25 MICROGram(s) Oral daily  LORazepam   Injectable 1 milliGRAM(s) IV Push every 6 hours  multivitamin 1 Tablet(s) Oral daily  nitroglycerin    2% Ointment 1 Inch(s) Transdermal three times a day  saccharomyces boulardii 250 milliGRAM(s) Oral two times a day  vancomycin  IVPB 1000 milliGRAM(s) IV Intermittent every 24 hours  zinc oxide 11.3% Cream 1 Application(s) Topical daily    MEDICATIONS  (PRN):  acetaminophen   Tablet 650 milliGRAM(s) Oral every 6 hours PRN For Temp greater than or equal to 38 C (100.4 F)  acetaminophen   Tablet. 650 milliGRAM(s) Oral every 6 hours PRN Moderate Pain (4 - 6)  glycopyrrolate Injectable 0.2 milliGRAM(s) IV Push every 6 hours PRN increased secretions      Allergies    allopurinol (Rash)  aspirin (Unknown)  azithromycin (Diarrhea)  clindamycin (Diarrhea)  latex (Unknown)  morphine (Other; Short breath)  penicillins (Unknown)  sulfa drugs (Unknown)          Vital Signs Last 24 Hrs  T(C): 37.1 (14 Feb 2018 09:37), Max: 37.1 (14 Feb 2018 09:37)  T(F): 98.8 (14 Feb 2018 09:37), Max: 98.8 (14 Feb 2018 09:37)  HR: 92 (14 Feb 2018 09:37) (84 - 98)  BP: 138/90 (14 Feb 2018 09:37) (116/77 - 149/96)  BP(mean): --  RR: 18 (14 Feb 2018 09:37) (18 - 22)  SpO2: 90% (14 Feb 2018 09:37) (90% - 93%)    PHYSICAL EXAM:  GENERAL: appears chronically ill  LUNGS: Diminished BS at bases; coarse breath sounds  HEART: Regular rate and rhythm; no rub  ABDOMEN: Soft, nontender, and nondistended.    EXTREMITIES:  + edema LE  NEURO: Lethargic    LABS:                        13.4   14.3  )-----------( 363      ( 14 Feb 2018 06:07 )             43.0     02-14    155<H>  |  118<H>  |  45.0<H>  ----------------------------<  121<H>  3.8   |  24.0  |  1.24    Sodium, Serum: 152 mmol/L (02.13.18 @ 07:51)    Creatinine, Serum: 1.17 mg/dL (02.13.18 @ 07:51)        Ca    8.9      14 Feb 2018 06:07  Phos  2.5     02-14  Mg     2.0     02-14    TPro  6.2<L>  /  Alb  2.3<L>  /  TBili  1.8  /  DBili  x   /  AST  45<H>  /  ALT  22  /  AlkPhos  243<H>  02-14    PT/INR - ( 13 Feb 2018 07:50 )   PT: 31.6 sec;   INR: 2.81 ratio             Magnesium, Serum: 2.0 mg/dL (02-14 @ 06:07)  Phosphorus Level, Serum: 2.5 mg/dL (02-14 @ 06:07)      RADIOLOGY & ADDITIONAL TESTS:  < from: Xray Chest 1 View- PORTABLE-Urgent (02.14.18 @ 10:38) >   EXAM:  XR CHEST PORTABLE URGENT 1V                          PROCEDURE DATE:  02/14/2018          INTERPRETATION:  TECHNIQUE: Single portable view of the chest.    COMPARISON: 2/12/2018    CLINICAL HISTORY: Abnormal chest sounds.     FINDINGS:    Single frontal view of the chest demonstrates diffuse bilateral airspace   disease, worse. The cardiomediastinal silhouette is enlarged. No acute   osseous abnormalities. Overlying EKG leads and wires are noted. Mild COPD   changes.    IMPRESSION: Worsening diffuse bilateral airspace disease. Cardiomegaly.   Mild COPD changes.    < end of copied text >

## 2018-02-15 DIAGNOSIS — R06.00 DYSPNEA, UNSPECIFIED: ICD-10-CM

## 2018-02-15 DIAGNOSIS — F41.9 ANXIETY DISORDER, UNSPECIFIED: ICD-10-CM

## 2018-02-15 PROCEDURE — 83735 ASSAY OF MAGNESIUM: CPT

## 2018-02-15 PROCEDURE — 87581 M.PNEUMON DNA AMP PROBE: CPT

## 2018-02-15 PROCEDURE — 94640 AIRWAY INHALATION TREATMENT: CPT

## 2018-02-15 PROCEDURE — 97116 GAIT TRAINING THERAPY: CPT

## 2018-02-15 PROCEDURE — 92526 ORAL FUNCTION THERAPY: CPT

## 2018-02-15 PROCEDURE — 84100 ASSAY OF PHOSPHORUS: CPT

## 2018-02-15 PROCEDURE — 84436 ASSAY OF TOTAL THYROXINE: CPT

## 2018-02-15 PROCEDURE — 97110 THERAPEUTIC EXERCISES: CPT

## 2018-02-15 PROCEDURE — 83880 ASSAY OF NATRIURETIC PEPTIDE: CPT

## 2018-02-15 PROCEDURE — 82803 BLOOD GASES ANY COMBINATION: CPT

## 2018-02-15 PROCEDURE — 93880 EXTRACRANIAL BILAT STUDY: CPT

## 2018-02-15 PROCEDURE — 87186 SC STD MICRODIL/AGAR DIL: CPT

## 2018-02-15 PROCEDURE — 82435 ASSAY OF BLOOD CHLORIDE: CPT

## 2018-02-15 PROCEDURE — 84132 ASSAY OF SERUM POTASSIUM: CPT

## 2018-02-15 PROCEDURE — 36415 COLL VENOUS BLD VENIPUNCTURE: CPT

## 2018-02-15 PROCEDURE — 84484 ASSAY OF TROPONIN QUANT: CPT

## 2018-02-15 PROCEDURE — 82140 ASSAY OF AMMONIA: CPT

## 2018-02-15 PROCEDURE — 84145 PROCALCITONIN (PCT): CPT

## 2018-02-15 PROCEDURE — 87493 C DIFF AMPLIFIED PROBE: CPT

## 2018-02-15 PROCEDURE — 93971 EXTREMITY STUDY: CPT

## 2018-02-15 PROCEDURE — 81001 URINALYSIS AUTO W/SCOPE: CPT

## 2018-02-15 PROCEDURE — 87798 DETECT AGENT NOS DNA AMP: CPT

## 2018-02-15 PROCEDURE — 80048 BASIC METABOLIC PNL TOTAL CA: CPT

## 2018-02-15 PROCEDURE — 94660 CPAP INITIATION&MGMT: CPT

## 2018-02-15 PROCEDURE — 85027 COMPLETE CBC AUTOMATED: CPT

## 2018-02-15 PROCEDURE — 93005 ELECTROCARDIOGRAM TRACING: CPT

## 2018-02-15 PROCEDURE — 82330 ASSAY OF CALCIUM: CPT

## 2018-02-15 PROCEDURE — 80061 LIPID PANEL: CPT

## 2018-02-15 PROCEDURE — 82607 VITAMIN B-12: CPT

## 2018-02-15 PROCEDURE — 85730 THROMBOPLASTIN TIME PARTIAL: CPT

## 2018-02-15 PROCEDURE — 36600 WITHDRAWAL OF ARTERIAL BLOOD: CPT

## 2018-02-15 PROCEDURE — 93306 TTE W/DOPPLER COMPLETE: CPT

## 2018-02-15 PROCEDURE — 80162 ASSAY OF DIGOXIN TOTAL: CPT

## 2018-02-15 PROCEDURE — 70551 MRI BRAIN STEM W/O DYE: CPT

## 2018-02-15 PROCEDURE — 82947 ASSAY GLUCOSE BLOOD QUANT: CPT

## 2018-02-15 PROCEDURE — 83036 HEMOGLOBIN GLYCOSYLATED A1C: CPT

## 2018-02-15 PROCEDURE — 84295 ASSAY OF SERUM SODIUM: CPT

## 2018-02-15 PROCEDURE — 85610 PROTHROMBIN TIME: CPT

## 2018-02-15 PROCEDURE — 70450 CT HEAD/BRAIN W/O DYE: CPT

## 2018-02-15 PROCEDURE — 73590 X-RAY EXAM OF LOWER LEG: CPT

## 2018-02-15 PROCEDURE — 87040 BLOOD CULTURE FOR BACTERIA: CPT

## 2018-02-15 PROCEDURE — 99232 SBSQ HOSP IP/OBS MODERATE 35: CPT

## 2018-02-15 PROCEDURE — 87086 URINE CULTURE/COLONY COUNT: CPT

## 2018-02-15 PROCEDURE — 82962 GLUCOSE BLOOD TEST: CPT

## 2018-02-15 PROCEDURE — 82747 ASSAY OF FOLIC ACID RBC: CPT

## 2018-02-15 PROCEDURE — 80053 COMPREHEN METABOLIC PANEL: CPT

## 2018-02-15 PROCEDURE — 97163 PT EVAL HIGH COMPLEX 45 MIN: CPT

## 2018-02-15 PROCEDURE — 94760 N-INVAS EAR/PLS OXIMETRY 1: CPT

## 2018-02-15 PROCEDURE — 96374 THER/PROPH/DIAG INJ IV PUSH: CPT | Mod: XU

## 2018-02-15 PROCEDURE — 84443 ASSAY THYROID STIM HORMONE: CPT

## 2018-02-15 PROCEDURE — 83605 ASSAY OF LACTIC ACID: CPT

## 2018-02-15 PROCEDURE — 99285 EMERGENCY DEPT VISIT HI MDM: CPT | Mod: 25

## 2018-02-15 PROCEDURE — 87486 CHLMYD PNEUM DNA AMP PROBE: CPT

## 2018-02-15 PROCEDURE — 92610 EVALUATE SWALLOWING FUNCTION: CPT

## 2018-02-15 PROCEDURE — 73610 X-RAY EXAM OF ANKLE: CPT

## 2018-02-15 PROCEDURE — 71045 X-RAY EXAM CHEST 1 VIEW: CPT

## 2018-02-15 PROCEDURE — 99233 SBSQ HOSP IP/OBS HIGH 50: CPT

## 2018-02-15 PROCEDURE — 80202 ASSAY OF VANCOMYCIN: CPT

## 2018-02-15 PROCEDURE — 96375 TX/PRO/DX INJ NEW DRUG ADDON: CPT | Mod: XU

## 2018-02-15 PROCEDURE — 84480 ASSAY TRIIODOTHYRONINE (T3): CPT

## 2018-02-15 PROCEDURE — 71250 CT THORAX DX C-: CPT

## 2018-02-15 PROCEDURE — 87633 RESP VIRUS 12-25 TARGETS: CPT

## 2018-02-15 PROCEDURE — 73701 CT LOWER EXTREMITY W/DYE: CPT

## 2018-02-15 PROCEDURE — 85014 HEMATOCRIT: CPT

## 2018-02-15 RX ORDER — HYDROMORPHONE HYDROCHLORIDE 2 MG/ML
1 INJECTION INTRAMUSCULAR; INTRAVENOUS; SUBCUTANEOUS EVERY 4 HOURS
Qty: 0 | Refills: 0 | Status: DISCONTINUED | OUTPATIENT
Start: 2018-02-15 | End: 2018-02-16

## 2018-02-15 RX ORDER — HYDROMORPHONE HYDROCHLORIDE 2 MG/ML
1 INJECTION INTRAMUSCULAR; INTRAVENOUS; SUBCUTANEOUS
Qty: 0 | Refills: 0 | Status: DISCONTINUED | OUTPATIENT
Start: 2018-02-15 | End: 2018-02-16

## 2018-02-15 RX ORDER — HYDROMORPHONE HYDROCHLORIDE 2 MG/ML
1 INJECTION INTRAMUSCULAR; INTRAVENOUS; SUBCUTANEOUS EVERY 6 HOURS
Qty: 0 | Refills: 0 | Status: DISCONTINUED | OUTPATIENT
Start: 2018-02-15 | End: 2018-02-15

## 2018-02-15 RX ORDER — HYDROMORPHONE HYDROCHLORIDE 2 MG/ML
1 INJECTION INTRAMUSCULAR; INTRAVENOUS; SUBCUTANEOUS ONCE
Qty: 0 | Refills: 0 | Status: DISCONTINUED | OUTPATIENT
Start: 2018-02-15 | End: 2018-02-15

## 2018-02-15 RX ADMIN — HYDROMORPHONE HYDROCHLORIDE 1 MILLIGRAM(S): 2 INJECTION INTRAMUSCULAR; INTRAVENOUS; SUBCUTANEOUS at 05:48

## 2018-02-15 RX ADMIN — HYDROMORPHONE HYDROCHLORIDE 1 MILLIGRAM(S): 2 INJECTION INTRAMUSCULAR; INTRAVENOUS; SUBCUTANEOUS at 12:54

## 2018-02-15 RX ADMIN — HYDROMORPHONE HYDROCHLORIDE 0.5 MILLIGRAM(S): 2 INJECTION INTRAMUSCULAR; INTRAVENOUS; SUBCUTANEOUS at 01:11

## 2018-02-15 RX ADMIN — Medication 1 MILLIGRAM(S): at 00:35

## 2018-02-15 RX ADMIN — HYDROMORPHONE HYDROCHLORIDE 1 MILLIGRAM(S): 2 INJECTION INTRAMUSCULAR; INTRAVENOUS; SUBCUTANEOUS at 12:56

## 2018-02-15 NOTE — PROGRESS NOTE ADULT - ASSESSMENT
85yr woman, frail, functionally debility, hx of CHF, VHD, afib, admitted with right leg cellulitis treated. Hospital course complicated by SOB 2/2 CHF, + Flu. Patient with elevated lactate, PNA- poor outlook. Patient dying - prognosis hours to days

## 2018-02-15 NOTE — PROGRESS NOTE ADULT - PROVIDER SPECIALTY LIST ADULT
Cardiology
Cardiology
Hospitalist
Infectious Disease
Nephrology
Palliative Care
Psychiatry
Nephrology
Pulmonology
Hospitalist
Palliative Care
Hospitalist

## 2018-02-15 NOTE — PROGRESS NOTE ADULT - PROBLEM SELECTOR PROBLEM 1
Influenza A
Cellulitis of right lower extremity
Delirium due to multiple etiologies, acute, hypoactive
Influenza A
Pneumonia
Respiratory difficulty
Respiratory difficulty
Cellulitis of right lower extremity
Influenza A
Cellulitis of right lower extremity
Cellulitis of right lower extremity
Influenza A

## 2018-02-15 NOTE — PROGRESS NOTE ADULT - PROBLEM SELECTOR PLAN 10
VTE ppx - Supratherapeutic INR  CDiff ppx - Probiotics    PT evaluation

## 2018-02-15 NOTE — PROGRESS NOTE ADULT - PROBLEM SELECTOR PROBLEM 8
Carotid stenosis, asymptomatic, right
CKD (chronic kidney disease), stage III
Carotid stenosis, asymptomatic, right
CKD (chronic kidney disease), stage III
CKD (chronic kidney disease), stage III
Carotid stenosis, asymptomatic, right
Encounter for palliative care
Essential hypertension
Prophylactic measure
Prophylactic measure

## 2018-02-15 NOTE — PROGRESS NOTE ADULT - PROBLEM SELECTOR PROBLEM 3
Lethargy
Mitral valve insufficiency, unspecified etiology
Chronic atrial fibrillation
Chronic diastolic congestive heart failure
Lethargy
Anxiety
Chronic atrial fibrillation
Dysphagia
Dysphagia
Hypernatremia
Lethargy
Mitral valve insufficiency, unspecified etiology
Mitral valve insufficiency, unspecified etiology
Penicillin allergy
Penicillin allergy
Poor nutrition
Respiratory difficulty
UTI (urinary tract infection)
UTI (urinary tract infection)
Mitral valve insufficiency, unspecified etiology
Lethargy
Lethargy

## 2018-02-15 NOTE — PROGRESS NOTE ADULT - ASSESSMENT
85 year old female with PMH HTN, cAF on Coumadin, MR, CHFpEF presented with RLE pain, erythema and swelling. Noted to have confusion at admission (?encephalopathy), leukocytosis (WBC 22, lactic acidosis  3.1) and imaging including CT consistent with RLE Cellulitis without abscess. Initially treated with Azactam/Clindamycin/Vancomycin in ER, later switched to Ceftriaxone/Zyvox by ID and then oral Keflex. Cellulitis resolved. Blood cultures negative. On 2/14 Progressive aspiration pneumonia with hypoxemia and respiratory distress. DC ivf, npo, repeat cxr prognosis poor.  Add ativan for anxiety as patient states herself Im anxious.    Lethargy secondary to delirium vs dementia  Encephalopathy work up with elevated TSH. Unclear significance, as may be abnormal secondary to metabolic processes. Started on Synthroid. UA dirty, Chest X-Ray suggestive of LLL consolidation; however remains without fever or leukocytosis so unclear if infectious; could be masked by Keflex. Doubt UTI, likely inappropriately collected sample, patient was on antibiotics unless MDR.  Repeat cultures negative. CT Chest with bilateral effusion, no infiltrate.  Influenza+, no longer requiring NIPPV. intermittent Tamiflu secondary to dysphagia.  Still requiring supplement O2    CHFpEF, recent TTE (10/2017) with preserved EF 60-65%, moderate to severe MR, Mild AS, Moderate AR, severe TR, Mild PAH. No evidence of decompensated CHF currently. However with bilateral pleural effusion on CT. Started on diuresis, appears to be on the dry side. Repeat TTE with similar findings.    Hypernatremia, secondary to dehydration, NPO. Mild hypokalemia and bump SCr.    Dysphagia, appears to be at risk for protein calorie malnutrition. Started on Puree, nectar, receiving D5W for hypernatremia.    Also seen in consultation by Vascular surgery given possible history of PAD, skin graft with recommendations for follow up with primary vascular surgeon Dr Braswell upon discharge.     HTN - well controlled on current regimen.    AF - rate controlled, INR supra therapeutic, no evidence of bleeding. Monitor INR     CKD 3, renal insufficiency - in fact normalized    Hyponatremia, hypokalemia present at admission - resolved.    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 Added statin, Check FLP. Follow up with Dr Garcia after discharge    Slurred speech as per  - CTH, MRI brain negative for stroke. Secondary to dry mouth - RN advised to provide with oral care    Diarrhea - received antibiotics, is on Probiotic. Cdiff negative. Diarrhea resolved.        Guarded prognosis - patient would not want aggressive measures as per daughter Iona. DNR, DNI  Patient with multiorgan system failure. I fear the risk of coumadin outweighs the benefit therefore discontinue coumadin.  DC IV fluids secondary to fear of chf. Monitor closely.    2/13 Patient developed aspiration pneumonia prognosis poor.    2/15 Comfort Care

## 2018-02-15 NOTE — PROGRESS NOTE ADULT - PROBLEM SELECTOR PLAN 2
Increase Dilaudid 1mg IVP q 4hr ATC for steady symptom relief Increase Dilaudid 1mg IVP q 4hr ATC for steady symptom relief.   Discussed with family

## 2018-02-15 NOTE — PROGRESS NOTE ADULT - PROBLEM SELECTOR PROBLEM 4
Chronic atrial fibrillation
Chronic diastolic congestive heart failure
Carotid stenosis, asymptomatic, right
Functional quadriplegia
Chronic diastolic congestive heart failure
CKD (chronic kidney disease), stage III
Carotid stenosis, asymptomatic, right
Carotid stenosis, asymptomatic, right
Chronic atrial fibrillation
Chronic atrial fibrillation
Chronic diastolic congestive heart failure
Delirium due to multiple etiologies, acute, hypoactive
Dysphagia
Hypernatremia
Influenza A
Penicillin allergy
Penicillin allergy
Pleural effusion
Poor nutrition
Poor nutrition
Chronic atrial fibrillation
Chronic diastolic congestive heart failure
Chronic diastolic congestive heart failure

## 2018-02-15 NOTE — PROGRESS NOTE ADULT - PROBLEM SELECTOR PLAN 9
Completed antibiotics  Keep RLE elevated  Follow up with Vascular surgery upon discharge.  PT follow
VTE ppx - Supratherapeutic INR  CDiff ppx - Probiotics    PT evaluation
Completed antibiotics  Keep RLE elevated  Follow up with Vascular surgery upon discharge.  PT follow
Monitor SCr.  Discontinued IVF  Resumed Lasix
VTE ppx - Supratherapeutic INR  CDiff ppx - Probiotics    PT evaluation
VTE ppx - Supratherapeutic INR  CDiff ppx - Probiotics    PT evaluation

## 2018-02-15 NOTE — PROGRESS NOTE ADULT - PROBLEM SELECTOR PROBLEM 2
Dysphagia
Pleural effusion
Chronic diastolic congestive heart failure
Dysphagia
Dysphagia
Dyspnea
Hypernatremia
Lethargy
Pleural effusion
Respiratory difficulty
Sepsis due to cellulitis
Sepsis due to cellulitis
Chronic diastolic congestive heart failure
Pleural effusion
Pleural effusion
Chronic diastolic congestive heart failure
Lethargy
Pleural effusion

## 2018-02-15 NOTE — PROGRESS NOTE ADULT - SUBJECTIVE AND OBJECTIVE BOX
INTERVAL HPI/OVERNIGHT EVENTS:  Spoke with nursing this am- requesting to increase Dilaudid, current dose insufficient  Daughter states definitely noticed better response with 1mg Dilaudid    PRESENT SYMPTOMS: SOURCE:  Patient/Family/Team    PAIN SCALE:  0 = none  1 = mild   2 = moderate  3 = severe    Pain:   does not exhibit signs  Dyspnea:  [x ] YES [ ] NO  Anxiety:  [x ] YES [ ] NO  Fatigue: [ x] YES [ ] NO  Nausea: [ ] YES [ ] NO  na  Loss of Appetite: [ ] YES [ ] NO  na  Other symptoms: __________    MEDICATIONS  (STANDING):  HYDROmorphone  Injectable 1 milliGRAM(s) IV Push every 4 hours    MEDICATIONS  (PRN):  glycopyrrolate Injectable 0.4 milliGRAM(s) IV Push every 4 hours PRN secretions  HYDROmorphone  Injectable 1 milliGRAM(s) IV Push every 2 hours PRN dyspnea and or Pain  LORazepam   Injectable 1 milliGRAM(s) IV Push every 4 hours PRN Anxiety      Allergies    allopurinol (Rash)  aspirin (Unknown)  azithromycin (Diarrhea)  clindamycin (Diarrhea)  latex (Unknown)  morphine (Other; Short breath)  penicillins (Unknown)  sulfa drugs (Unknown)    Intolerances      Karnofsky Performance Score/Palliative Performance Status Version 2: 10  %    Vital Signs Last 24 Hrs  T(C): --  T(F): --  HR: --  BP: --  BP(mean): --  RR: --  SpO2: --    PHYSICAL EXAM:    General: non responsive,     HEENT:  NRB  Lungs: [ ] comfortable [x ] tachypnea/labored breathing  [ ] excessive secretions    CV: [ x] normal  [ ] tachycardia    GI: soft NT    : [ ] normal  [ ] incontinent  [ x] oliguria/anuria  [ ] hartmann    MSK: [ ] normal  [ ] weakness  [ ] edema             [ ] ambulatory  [x ] bedbound/wheelchair bound    Skin: extremities cool   LABS:                        13.4   14.3  )-----------( 363      ( 14 Feb 2018 06:07 )             43.0     02-14    155<H>  |  118<H>  |  45.0<H>  ----------------------------<  121<H>  3.8   |  24.0  |  1.24    Ca    8.9      14 Feb 2018 06:07  Phos  2.5     02-14  Mg     2.0     02-14    TPro  6.2<L>  /  Alb  2.3<L>  /  TBili  1.8  /  DBili  x   /  AST  45<H>  /  ALT  22  /  AlkPhos  243<H>  02-14    Thank you for the opportunity to assist with the care of this patient.   [facility] [phone].

## 2018-02-15 NOTE — PROGRESS NOTE ADULT - PROBLEM SELECTOR PLAN 8
Added statin  Resumed Coumadin  ASA - unclear allergic reaction  Follow up with vascular surgery after discharge
Continue statin  INR therapautic  ASA - unclear allergic reaction  Follow up with vascular surgery after discharge
Continue statin  INR supra therapeutic  ASA - unclear allergic reaction  Follow up with vascular surgery after discharge
Monitor SCr.  Discontinued IVF  Resumed Lasix
Continue statin  INR supra therapeutic  ASA - unclear allergic reaction  Follow up with vascular surgery after discharge
Added statin  Resumed Coumadin  ASA - unclear allergic reaction  Follow up with vascular surgery after discharge
Added statin  Resumed Coumadin  ASA - unclear allergic reaction  Follow up with vascular surgery after discharge
Continue statin  INR supra therapeutic  ASA - unclear allergic reaction  Follow up with vascular surgery after discharge
Continue statin  INR therapautic  ASA - unclear allergic reaction  Follow up with vascular surgery after discharge
Continues antihypertensives
Met with daughter and . Family feeling positive that patient a little more alert today and now upgraded diet. Will continue to monitor progress.  Concerned if  patient has  poor po intake, it will affect overall outlook.
Monitor SCr.  Discontinued IVF  Resumed Lasix
Monitor SCr.  Discontinued IVF  Resumed Lasix
VTE ppx - Supratherapeutic INR  CDiff ppx - Probiotics    PT evaluation
VTE ppx - Supratherapeutic INR  CDiff ppx - Probiotics    PT evaluation

## 2018-02-15 NOTE — PROGRESS NOTE ADULT - SUBJECTIVE AND OBJECTIVE BOX
RAGHU DAVIS     Chief Complaint: Patient is a 85y old  Female who presents with a chief complaint of rt. lower ext. pain (24 Jan 2018 04:35)      PAST MEDICAL & SURGICAL HISTORY:  Clostridium difficile diarrhea  CHF (congestive heart failure)  Gout  Mitral valve regurgitation  Hypertension  Atrial fibrillation  H/O skin graft      HPI/OVERNIGHT EVENTS: Patient is comfort care on narcotic drip    MEDICATIONS  (STANDING):  HYDROmorphone  Injectable 1 milliGRAM(s) IV Push every 4 hours      Vital Signs Last 24 Hrs  T(C): --  T(F): --  HR: --  BP: --  BP(mean): --  RR: --  SpO2: --     CAPILLARY BLOOD GLUCOSE    LABS:                        13.4   14.3  )-----------( 363      ( 14 Feb 2018 06:07 )             43.0     02-14    155<H>  |  118<H>  |  45.0<H>  ----------------------------<  121<H>  3.8   |  24.0  |  1.24    Ca    8.9      14 Feb 2018 06:07  Phos  2.5     02-14  Mg     2.0     02-14    TPro  6.2<L>  /  Alb  2.3<L>  /  TBili  1.8  /  DBili  x   /  AST  45<H>  /  ALT  22  /  AlkPhos  243<H>  02-14          RADIOLOGY & ADDITIONAL TESTS:

## 2018-02-15 NOTE — PROGRESS NOTE ADULT - PROBLEM SELECTOR PROBLEM 9
Cellulitis of right lower extremity
Prophylactic measure
Cellulitis of right lower extremity
CKD (chronic kidney disease), stage III
Cellulitis of right lower extremity
Prophylactic measure
Prophylactic measure

## 2018-02-16 NOTE — DISCHARGE NOTE FOR THE EXPIRED PATIENT - HOSPITAL COURSE
85 year old female with PMH HTN, cAF on Coumadin, MR, CHFpEF presented with RLE pain, erythema and swelling. Noted to have confusion at admission (?encephalopathy), leukocytosis (WBC 22, lactic acidosis  3.1) and imaging including CT consistent with RLE Cellulitis without abscess. Initially treated with Azactam/Clindamycin/Vancomycin in ER, later switched to Ceftriaxone/Zyvox by ID and then oral Keflex. Cellulitis resolved. Blood cultures negative. On 2/14 Progressive aspiration pneumonia with hypoxemia and respiratory distress. DC ivf, npo, repeat cxr prognosis poor.  Add ativan for anxiety as patient states herself Is anxious. Pt made comfort care, Palliative Care

## 2018-02-18 LAB
CULTURE RESULTS: SIGNIFICANT CHANGE UP
SPECIMEN SOURCE: SIGNIFICANT CHANGE UP

## 2018-10-01 ENCOUNTER — OTHER (OUTPATIENT)
Age: 83
End: 2018-10-01

## 2019-09-06 NOTE — PHYSICAL THERAPY INITIAL EVALUATION ADULT - DISCHARGE DISPOSITION, PT EVAL
Right ankle x-ray shows no new fractures.  There is widening of the lateral joint space which may be attributed to arthritis.  Possible old chronic ligament tear which may cause ankle instability. home, home PT, use of her RW, stairs TBA

## 2020-04-01 NOTE — ED PROVIDER NOTE - CPE EDP ENMT NORM
normal...

## 2020-05-20 NOTE — ED ADULT TRIAGE NOTE - TEMPERATURE IN FAHRENHEIT (DEGREES F)
DISCHARGE SUMMARY        DATE OF ADMISSION:  2020    ADMISSION DIAGNOSIS:    1. 29 year old  at 33w6d  2. Severe range BP   3. RH negative  4. IUGR  5. Obesity with Body mass index is 37.74 kg/m².       DATE OF DISCHARGE:  2020    DISCHARGE DIAGNOSIS:   1. 29 year old  s/p  at 34w0d   2. Preeclampsia with severe features   3. Same     DATE OF DELIVERY:  2020  at 11:18 AM      DELIVERING PROVIDER:  Reyna Serrano     DELIVERY TYPE:  Vaginal, Spontaneous     HOSPITAL COURSE:  Rochelle Black is a 29 year old  who presented at 33w6d with elevated blood pressure in the setting of a pregnancy complicated by IUGR, RH negative status, and obesity.     Patient had been following with [facility] for diagnosis of IUGR and was noted to have mild BP at visit on 20. She was given home blood pressure cuff and prior to admission on 20 called to report multiple severe range blood pressures at home. She presented for evaluation and did eventually meet criteria for preeclampsia with severe features. She was given 10 IR procardia, started on magnesium, and recommendation for IOL was made given persistent severe range BP at 33w6d. She received single dose of betamethasone for fetal lung maturity and was started on vancomycin for GBS prophylaxis given GBS unknown status and Amoxicillin allergy.      IOL was initiated with wilkins and pitocin. She progressed in her labor and developed category II FHT at which point AROM was performed with placement of FSE/IUPC. Patient did have intermittent deep variable decelerations, however did improve with resuscitative measures and amnioinfusion. Patient progressed to 5cm at which point she developed tachysystole and pitocin was decreased. She again had recurrent variable decelerations at which point her cervix was rechecked and she was found to be complete. She began to push, however made little descent. FHT did continue to be category II with  return to baseline, however after multiple pushing attempts and continued category II FHT, recommendation was made for operative delivery. Risks and benefits of vacuum assisted vaginal delivery were discussed, however on the next push patient pt made significant progress and the  was delivered in the ISABELLA position. The infant was immediately handed off to awaiting NICU staff and was subsequently evaluated. Apgars 2/7 at one and five minutes respectively.  weight 1420 g. Arterial pH 7.16 with base deficit 11. The placenta was delivered spontaneously with cord traction and fundal pressure. The placenta was inspected and found to be intact with 3 vessel cord with gross calcifications, sent to pathology for inspection. The perineum was examined and posterior vaginal laceration was repaired in the usual fashion with 3-0 Vicryl.  Estimated blood loss 150cc.     She did well through her postpartum course. Pt remained on magnesium for 24 hours postpartum. Her PIH labs had initially demonstrated elevated AST/ALT which subsequently normalized. She was started on Labetalol 200mg BID for blood pressure control and BPs remained stable thereafter.     The patient was stable and afebrile in her postpartum course. Her mood was good. The patient is providing Breast milk and formula, and requests natural family planning for contraception.  Patient's blood type is B Rh Negative.  Rhogam was given.      On date of discharge she was ambulating, voiding, tolerating regular diet, passing flatus, and admitting to adequate pain control through oral pain medication.  She denied fever, chills, chest pain, and/or shortness of breath, nausea, vomiting, lower extremity pain or swelling.  Her vital signs were stable and she was ready for discharge to home in stable condition.    Visit Vitals  /76 (BP Location: LUE - Left upper extremity, Patient Position: Semi-Oliveira's)   Pulse 86   Temp 98.3 °F (36.8 °C) (Oral)   Resp 20   Ht  5' 7.5\" (1.715 m)   Wt 110.9 kg   LMP 09/23/2019 (Exact Date)   SpO2 99%   Breastfeeding Yes   BMI 37.74 kg/m²     General:  Awake, alert, normal affect, no apparent distress  Skin:  Normal color, texture, turgor, no rashes/bruises/active lesions  Lungs:  Clear to auscultation bilaterally, no rales/rhonchi/wheezes; normal effort   Heart:  Regular rate and rhythm, no murmurs/rubs/gallops   Abdomen:  soft, nontender, normal active bowel sounds; uterine fundus firm and below umbilicus  Extremities:  No clubbing, no cyanosis, no edema; normal muscle tone and development bilaterally  Neurologic:  No tremor or focal deficits    DISPOSITION:   Home/Self-care    CONDITION ON DISCHARGE:   Stable    MEDICATIONS:     Summary of your Discharge Medications      Take these Medications      Details   acetaminophen 325 MG tablet  Commonly known as:  TYLENOL   Take 2 tablets by mouth every 6 hours.     docusate sodium-sennosides 50-8.6 MG per tablet  Commonly known as:  SENOKOT S   Take 2 tablets by mouth 2 times daily as needed for Constipation.     ferrous sulfate 325 (65 FE) MG tablet   Take 1 tablet by mouth daily (with breakfast).     ibuprofen 600 MG tablet  Commonly known as:  MOTRIN   Take 1 tablet by mouth every 6 hours.     labetalol 200 MG tablet  Commonly known as:  NORMODYNE   Take 1 tablet by mouth 2 (two) times a day.     Prenatal Vitamin 27-0.8 MG Tab             FOLLOW UP AND DISCHARGE INSTRUCTIONS:  Follow-up appointment in 1w for BP check and 4-6w for postpartum visit.   Patient was reminded of the following and that if they have any questions 24-48 hours after discharge, they should contact the hospital unit/department they were discharged from.    Discharge Instructions for Vaginal Delivery  You had a vaginal delivery.  Even though this is a natural process, you need time for a healthy recovery--for your own sake and because your new baby needs you.  Here are some guidelines to follow at home.    When to Call  Your Doctor  Call your doctor right away if you have any of the following:  · Fever of 100.4°F or higher.  · Repeated clots of blood (the size of a quarter or larger) passing from the vagina.  · Bleeding that requires a new sanitary pad every hour.  · Severe pain in the abdomen.  · Pain or urgency with urination.  · Trouble urinating or emptying your bladder.  · No bowel movement within 1 week after the birth of your baby.    Activity  · Don’t try to take care of anyone other than your baby and yourself.  · Remember, the more active you are, the more likely you are to start bleeding.  · Get lots of rest.  Take naps in the afternoon.  · Increase your activities gradually.  · Don’t lift anything heavier than your baby until your doctor tells you it’s okay.  · Don’t drive until your doctor says it’s okay.  · Don’t have sexual intercourse until after you have had a follow-up appointment with your doctor and you have decided on a birth control method.  · Don’t take a tub bath or use douches or tampons for 4 weeks.    Follow-Up  Make a follow-up appointment as directed by our staff.  Usually this is in six weeks.      Grisel Bates DO, PGY I   5/20/2020      I agree with the above discharge summary.  Pt is stable for discharge to home. BPs stable on Labetalol 200 mg BID; meeting all postpartum goals; no signs of worsening preeclampsia; doing well emotionally and adjusting well postpartum; pt denies symptoms suggestive of postpartum blues or depression.    Vitals:    05/20/20 0746   BP: 137/87   Pulse: 74   Resp: 16   Temp:        Reyna Serrano DO       100.1

## 2020-07-23 NOTE — PHYSICAL THERAPY INITIAL EVALUATION ADULT - WORK/LEISURE ACTIVITY, REHAB EVAL
[facility] Daily Progress Note  Name: Tim Sears  Age: 68 y.o. Gender: female  CodeStatus: Full Code    Primary Cardiology: Dr. Felicity Castillo MD  [id number]  Cardiology: Dr. Felicity Castillo MD  Verónica Lipoma APRN-CNP  Primary Care Provider: Marilia Jimenez Angelita Lora, BACTERIA, RBCUA, BLOODU, [address], [address]    [facility] [needs device

## 2020-07-28 NOTE — ED ADULT NURSE NOTE - NSSISCREENINGQ4_ED_A_ED
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

## 2020-09-17 NOTE — H&P ADULT - GASTROINTESTINAL DETAILS
Addended by: Zaida Colon on: 9/17/2020 10:43 AM     Modules accepted: Jamin Lopez pt. does not appear in pain on abd. exam. rectal deferred at this point./bowel sounds normal/soft/no distention

## 2020-10-10 NOTE — H&P ADULT - ASSESSMENT
[FreeTextEntry1] : check blood work, blood drawn in office\par follow up with optometry/ophthalmology and dentist for routine exams when due\par go for mammogram\par check fecal globin for colon cancer screening \par Flu vaccine given, patient tolerated well \par up to date with Pneumovax \par \par Osteoporosis- s/p Prolia injections, patient is on calcium/vitamin D3 supplementation, check updated bone density, check blood work\par \par Alzheimer's dementia- follows with neurology, on Donepezil, Memantine, has family support\par \par Vitamin D deficiency- check blood work 85 y/o female with fever, sepsis, chronic a fib, chronic CHF, ? new onset diabetes M II

## 2022-04-21 NOTE — ED PROVIDER NOTE - CROS ED GI ALL NEG
Admission Reconciliation is Completed  Discharge Reconciliation is Not Complete Admission Reconciliation is Completed  Discharge Reconciliation is Completed negative...

## 2022-06-30 NOTE — PROGRESS NOTE ADULT - PROBLEM SELECTOR PROBLEM 5
Pt seen and examined at bedside. Pt stable for discharge today. As per cardiology recommendations hold brillinta due to thrombocytopenia. Pt to follow up with cardiology with in 2 weeks and with CT surgery in 1 week.     PHYSICAL EXAM:  GENERAL: NAD, speaks in full sentences, no signs of respiratory distress  HEAD:  Atraumatic, Normocephalic  EYES: Anicteric  NECK: Supple, No JVD  CHEST/LUNG: Clear to auscultation bilaterally; No wheeze; No crackles; No accessory muscles used  HEART: Regular rate and rhythm; No murmurs;   ABDOMEN: Soft, Nontender, Nondistended; Bowel sounds present; No guarding  EXTREMITIES:  2+ Peripheral Pulses, No cyanosis or edema  PSYCH: AAOx3  NEUROLOGY: non-focal  SKIN: No rashes or lesions Mitral valve insufficiency, unspecified etiology

## 2022-08-15 NOTE — PROGRESS NOTE ADULT - PROBLEM/PLAN-2
See urgent care note 08/14/22   
DISPLAY PLAN FREE TEXT

## 2023-05-05 NOTE — ED PROVIDER NOTE - CONSTITUTIONAL, MLM
See above EUS, and CT and MRI reports. EUS report reviewed. CT and MRI images reviewed by myself and I agree with the radiologist's interpretation. For PTC with biliary drainage today. Repeat labs ordered for the AM. normal... Well appearing, well nourished, awake, alert, oriented to person, place, time/situation and in no apparent distress.

## 2024-01-23 NOTE — H&P ADULT - CARDIOVASCULAR DETAILS
OA referral in chart from PCP  Referring Physician: Tiffany Pastrana MD    Procedure: Colonoscopy [id number]    Diagnosis:   COLONOSCOPY:  Screening for Colon Cancer  EGD:  N/A    DUE: mm/dd/year:  N/A (N/A if never scoped)    BMI over 50: No  There is no height or weight on file to calculate BMI.     Recent (within 6 months) Myocardial Infarction or Stroke: No    Cardiac assistive device (VAD's not including pacemakers or automatic defibrillators):  No    (If yes to any of the above 3 questions, location must be hospital)    [facility]    GI Physician: Any GI Physician  (Select if already established with GI, otherwise indicate Any GI Physician)    Diabetic: NO     Blood Thinners: No    PHENTERMINE: NO      Pharmacy: (obtained at time of scheduling)          
Patient was scheduled with Tory Siddiqui MD for COLONOSCOPY  on 2/16/24 at [facility]. MIRALAX instructions E-mailed to [email or web address] / verified by patient. Prep medications sent to [facility].    Patient advised to contact insurance company to verify coverage verbally/in instructions.    
irregular rate and rhythm/positive S1/positive S2

## 2024-10-17 NOTE — BEHAVIORAL HEALTH ASSESSMENT NOTE - NS ED [facility] MED ROS NEUROLOGICAL
[de-identified] : Examination of the right elbow reveals tenderness at the level of the distal triceps insertion.  There is a palpable olecranon protuberance which is tender upon assessment.  There is 4+ weakness with resisted extension [de-identified] : 3 views of [right] elbow were obtained today in my office and were seen by me and discussed with the patient.  These are demonstrating a right olecranon rather large spur Unable to assess

## 2024-11-27 NOTE — PHYSICAL THERAPY INITIAL EVALUATION ADULT - ACTIVE RANGE OF MOTION EXAMINATION, REHAB EVAL
B shoulder WFL, elbow and wrist WFL. R knee lacking 20 degrees PROM from full extension. B hips and L knee WNL. B ankle dorsiflexion lacking a few degrees from neutral AROM/PROM. Tesha

## 2025-03-03 NOTE — PROGRESS NOTE ADULT - PROBLEM SELECTOR PLAN 2
MRN [id number] Likely secondary to delitium, improved  Continue to monitor  Enhanced supervision  mobilise, OOBTC  Defer antibiotics for now

## 2025-05-17 NOTE — SWALLOW BEDSIDE ASSESSMENT ADULT - ASR SWALLOW ASPIRATION MONITOR
pneumonia/change of breathing pattern/position upright (90Y)/gurgly voice/throat clearing/oral hygiene/cough/fever
cough/gurgly voice/oral hygiene/fever/pneumonia/throat clearing/change of breathing pattern/position upright (90Y)
sib md for weakness and fatigue, trouble walking pain in rectum. dx with uti on abx started last night. pmh: dmII, htn, hypothyroid